# Patient Record
Sex: MALE | Race: WHITE | Employment: OTHER | URBAN - METROPOLITAN AREA
[De-identification: names, ages, dates, MRNs, and addresses within clinical notes are randomized per-mention and may not be internally consistent; named-entity substitution may affect disease eponyms.]

---

## 2017-01-10 ENCOUNTER — OFFICE VISIT (OUTPATIENT)
Dept: CARDIOLOGY CLINIC | Age: 62
End: 2017-01-10

## 2017-01-10 VITALS
WEIGHT: 315 LBS | BODY MASS INDEX: 39.17 KG/M2 | SYSTOLIC BLOOD PRESSURE: 126 MMHG | HEIGHT: 75 IN | DIASTOLIC BLOOD PRESSURE: 86 MMHG | HEART RATE: 58 BPM

## 2017-01-10 DIAGNOSIS — I48.19 PERSISTENT ATRIAL FIBRILLATION (HCC): Primary | ICD-10-CM

## 2017-01-10 DIAGNOSIS — E66.01 MORBID OBESITY DUE TO EXCESS CALORIES (HCC): ICD-10-CM

## 2017-01-10 DIAGNOSIS — J43.9 PULMONARY EMPHYSEMA, UNSPECIFIED EMPHYSEMA TYPE (HCC): ICD-10-CM

## 2017-01-10 DIAGNOSIS — G47.33 OBSTRUCTIVE SLEEP APNEA SYNDROME: ICD-10-CM

## 2017-01-10 DIAGNOSIS — I10 ESSENTIAL HYPERTENSION: ICD-10-CM

## 2017-01-10 DIAGNOSIS — I50.32 CHRONIC DIASTOLIC CONGESTIVE HEART FAILURE (HCC): ICD-10-CM

## 2017-01-10 PROCEDURE — 99213 OFFICE O/P EST LOW 20 MIN: CPT | Performed by: INTERNAL MEDICINE

## 2017-01-10 RX ORDER — METOPROLOL TARTRATE 50 MG/1
50 TABLET, FILM COATED ORAL 2 TIMES DAILY
Qty: 180 TABLET | Refills: 3 | Status: ON HOLD | OUTPATIENT
Start: 2017-01-10 | End: 2017-05-08 | Stop reason: HOSPADM

## 2017-01-10 RX ORDER — FUROSEMIDE 40 MG/1
40 TABLET ORAL 2 TIMES DAILY
Qty: 180 TABLET | Refills: 3 | Status: ON HOLD | OUTPATIENT
Start: 2017-01-10 | End: 2017-08-18

## 2017-01-10 RX ORDER — DILTIAZEM HYDROCHLORIDE 180 MG/1
180 CAPSULE, COATED, EXTENDED RELEASE ORAL 2 TIMES DAILY
Qty: 180 CAPSULE | Refills: 3 | Status: SHIPPED | OUTPATIENT
Start: 2017-01-10 | End: 2017-09-12 | Stop reason: SDUPTHER

## 2017-01-10 RX ORDER — PRAVASTATIN SODIUM 40 MG
40 TABLET ORAL EVERY EVENING
Qty: 90 TABLET | Refills: 3 | Status: SHIPPED | OUTPATIENT
Start: 2017-01-10 | End: 2017-07-07

## 2017-01-30 RX ORDER — PANTOPRAZOLE SODIUM 40 MG/1
TABLET, DELAYED RELEASE ORAL
Qty: 60 TABLET | Refills: 5 | Status: ON HOLD | OUTPATIENT
Start: 2017-01-30 | End: 2022-11-01 | Stop reason: HOSPADM

## 2017-03-09 RX ORDER — ALLOPURINOL 100 MG/1
TABLET ORAL
Qty: 270 TABLET | Refills: 1 | Status: ON HOLD | OUTPATIENT
Start: 2017-03-09 | End: 2017-05-08 | Stop reason: HOSPADM

## 2017-03-13 ENCOUNTER — OFFICE VISIT (OUTPATIENT)
Dept: PULMONOLOGY | Age: 62
End: 2017-03-13

## 2017-03-13 VITALS
WEIGHT: 315 LBS | HEART RATE: 101 BPM | HEIGHT: 75 IN | BODY MASS INDEX: 39.17 KG/M2 | DIASTOLIC BLOOD PRESSURE: 87 MMHG | OXYGEN SATURATION: 96 % | RESPIRATION RATE: 18 BRPM | SYSTOLIC BLOOD PRESSURE: 157 MMHG

## 2017-03-13 DIAGNOSIS — I10 ESSENTIAL HYPERTENSION: ICD-10-CM

## 2017-03-13 DIAGNOSIS — J44.9 COPD, SEVERE (HCC): Primary | ICD-10-CM

## 2017-03-13 DIAGNOSIS — G47.33 OBSTRUCTIVE SLEEP APNEA SYNDROME: ICD-10-CM

## 2017-03-13 DIAGNOSIS — J96.11 CHRONIC HYPOXEMIC RESPIRATORY FAILURE (HCC): ICD-10-CM

## 2017-03-13 PROCEDURE — G8484 FLU IMMUNIZE NO ADMIN: HCPCS | Performed by: INTERNAL MEDICINE

## 2017-03-13 PROCEDURE — G8427 DOCREV CUR MEDS BY ELIG CLIN: HCPCS | Performed by: INTERNAL MEDICINE

## 2017-03-13 PROCEDURE — 1036F TOBACCO NON-USER: CPT | Performed by: INTERNAL MEDICINE

## 2017-03-13 PROCEDURE — 3017F COLORECTAL CA SCREEN DOC REV: CPT | Performed by: INTERNAL MEDICINE

## 2017-03-13 PROCEDURE — G8419 CALC BMI OUT NRM PARAM NOF/U: HCPCS | Performed by: INTERNAL MEDICINE

## 2017-03-13 PROCEDURE — 3023F SPIROM DOC REV: CPT | Performed by: INTERNAL MEDICINE

## 2017-03-13 PROCEDURE — 99214 OFFICE O/P EST MOD 30 MIN: CPT | Performed by: INTERNAL MEDICINE

## 2017-03-13 PROCEDURE — G8926 SPIRO NO PERF OR DOC: HCPCS | Performed by: INTERNAL MEDICINE

## 2017-05-08 ENCOUNTER — CARE COORDINATOR VISIT (OUTPATIENT)
Dept: CASE MANAGEMENT | Age: 62
End: 2017-05-08

## 2017-05-09 ENCOUNTER — TELEPHONE (OUTPATIENT)
Dept: OTHER | Age: 62
End: 2017-05-09

## 2017-05-09 ENCOUNTER — CARE COORDINATION (OUTPATIENT)
Dept: CASE MANAGEMENT | Age: 62
End: 2017-05-09

## 2017-05-09 ENCOUNTER — TELEPHONE (OUTPATIENT)
Dept: PHARMACY | Age: 62
End: 2017-05-09

## 2017-05-12 ENCOUNTER — CARE COORDINATION (OUTPATIENT)
Dept: CASE MANAGEMENT | Age: 62
End: 2017-05-12

## 2017-05-16 ENCOUNTER — OFFICE VISIT (OUTPATIENT)
Dept: INTERNAL MEDICINE CLINIC | Age: 62
End: 2017-05-16

## 2017-05-16 VITALS
HEART RATE: 84 BPM | DIASTOLIC BLOOD PRESSURE: 68 MMHG | HEIGHT: 75 IN | WEIGHT: 309.6 LBS | SYSTOLIC BLOOD PRESSURE: 128 MMHG | BODY MASS INDEX: 38.49 KG/M2

## 2017-05-16 DIAGNOSIS — F32.4: ICD-10-CM

## 2017-05-16 DIAGNOSIS — Z13.31 POSITIVE DEPRESSION SCREENING: ICD-10-CM

## 2017-05-16 DIAGNOSIS — I50.32 CHRONIC DIASTOLIC CONGESTIVE HEART FAILURE (HCC): Primary | ICD-10-CM

## 2017-05-16 DIAGNOSIS — N18.3 CHRONIC KIDNEY DISEASE (CKD), STAGE 3 (MODERATE): ICD-10-CM

## 2017-05-16 DIAGNOSIS — D50.9 MICROCYTIC ANEMIA: ICD-10-CM

## 2017-05-16 PROCEDURE — G8431 POS CLIN DEPRES SCRN F/U DOC: HCPCS | Performed by: INTERNAL MEDICINE

## 2017-05-16 ASSESSMENT — PATIENT HEALTH QUESTIONNAIRE - PHQ9
2. FEELING DOWN, DEPRESSED OR HOPELESS: 2
SUM OF ALL RESPONSES TO PHQ QUESTIONS 1-9: 6
9. THOUGHTS THAT YOU WOULD BE BETTER OFF DEAD, OR OF HURTING YOURSELF: 0
4. FEELING TIRED OR HAVING LITTLE ENERGY: 2
7. TROUBLE CONCENTRATING ON THINGS, SUCH AS READING THE NEWSPAPER OR WATCHING TELEVISION: 0
6. FEELING BAD ABOUT YOURSELF - OR THAT YOU ARE A FAILURE OR HAVE LET YOURSELF OR YOUR FAMILY DOWN: 0
5. POOR APPETITE OR OVEREATING: 0
3. TROUBLE FALLING OR STAYING ASLEEP: 0
1. LITTLE INTEREST OR PLEASURE IN DOING THINGS: 2
8. MOVING OR SPEAKING SO SLOWLY THAT OTHER PEOPLE COULD HAVE NOTICED. OR THE OPPOSITE, BEING SO FIGETY OR RESTLESS THAT YOU HAVE BEEN MOVING AROUND A LOT MORE THAN USUAL: 0
10. IF YOU CHECKED OFF ANY PROBLEMS, HOW DIFFICULT HAVE THESE PROBLEMS MADE IT FOR YOU TO DO YOUR WORK, TAKE CARE OF THINGS AT HOME, OR GET ALONG WITH OTHER PEOPLE: 0
SUM OF ALL RESPONSES TO PHQ9 QUESTIONS 1 & 2: 4

## 2017-05-17 ENCOUNTER — TELEPHONE (OUTPATIENT)
Dept: INTERNAL MEDICINE CLINIC | Age: 62
End: 2017-05-17

## 2017-05-18 ENCOUNTER — TELEPHONE (OUTPATIENT)
Dept: INTERNAL MEDICINE CLINIC | Age: 62
End: 2017-05-18

## 2017-05-25 ENCOUNTER — TELEPHONE (OUTPATIENT)
Dept: INTERNAL MEDICINE CLINIC | Age: 62
End: 2017-05-25

## 2017-06-06 PROCEDURE — 99495 TRANSJ CARE MGMT MOD F2F 14D: CPT | Performed by: INTERNAL MEDICINE

## 2017-07-15 ENCOUNTER — CARE COORDINATION (OUTPATIENT)
Dept: CASE MANAGEMENT | Age: 62
End: 2017-07-15

## 2017-07-17 ENCOUNTER — TELEPHONE (OUTPATIENT)
Dept: INTERNAL MEDICINE CLINIC | Age: 62
End: 2017-07-17

## 2017-07-17 DIAGNOSIS — E87.6 HYPOKALEMIA: Primary | ICD-10-CM

## 2017-07-18 DIAGNOSIS — E87.6 HYPOKALEMIA: ICD-10-CM

## 2017-07-18 LAB — POTASSIUM SERPL-SCNC: 4.8 MMOL/L (ref 3.5–5.1)

## 2017-07-19 ENCOUNTER — CARE COORDINATION (OUTPATIENT)
Dept: CASE MANAGEMENT | Age: 62
End: 2017-07-19

## 2017-07-27 ENCOUNTER — CARE COORDINATION (OUTPATIENT)
Dept: CASE MANAGEMENT | Age: 62
End: 2017-07-27

## 2017-07-31 ENCOUNTER — HOSPITAL ENCOUNTER (OUTPATIENT)
Dept: WOUND CARE | Age: 62
Discharge: HOME | End: 2017-07-31
Attending: SURGERY | Admitting: SURGERY

## 2017-07-31 VITALS
DIASTOLIC BLOOD PRESSURE: 73 MMHG | SYSTOLIC BLOOD PRESSURE: 126 MMHG | WEIGHT: 309 LBS | RESPIRATION RATE: 17 BRPM | TEMPERATURE: 96.9 F | HEIGHT: 75 IN | HEART RATE: 102 BPM | BODY MASS INDEX: 38.42 KG/M2

## 2017-07-31 DIAGNOSIS — L97.512 SKIN ULCER OF RIGHT FOOT WITH FAT LAYER EXPOSED (HCC): ICD-10-CM

## 2017-07-31 PROCEDURE — 11042 DBRDMT SUBQ TIS 1ST 20SQCM/<: CPT | Performed by: SURGERY

## 2017-07-31 PROCEDURE — 11045 DBRDMT SUBQ TISS EACH ADDL: CPT | Performed by: SURGERY

## 2017-07-31 RX ORDER — LIDOCAINE HYDROCHLORIDE 40 MG/ML
SOLUTION TOPICAL ONCE
Status: DISCONTINUED | OUTPATIENT
Start: 2017-07-31 | End: 2017-08-01 | Stop reason: HOSPADM

## 2017-07-31 RX ORDER — CIPROFLOXACIN 500 MG/1
500 TABLET, FILM COATED ORAL 2 TIMES DAILY
Qty: 20 TABLET | Refills: 0 | Status: SHIPPED | OUTPATIENT
Start: 2017-07-31 | End: 2017-08-10

## 2017-08-03 LAB
GRAM STAIN RESULT: ABNORMAL
ORGANISM: ABNORMAL
WOUND/ABSCESS: ABNORMAL

## 2017-08-11 ENCOUNTER — HOSPITAL ENCOUNTER (OUTPATIENT)
Dept: WOUND CARE | Age: 62
Discharge: HOME | End: 2017-08-11
Attending: PODIATRIST | Admitting: PODIATRIST

## 2017-08-11 VITALS
HEART RATE: 97 BPM | TEMPERATURE: 97.9 F | DIASTOLIC BLOOD PRESSURE: 73 MMHG | RESPIRATION RATE: 16 BRPM | SYSTOLIC BLOOD PRESSURE: 125 MMHG

## 2017-08-11 DIAGNOSIS — I73.9 PVD (PERIPHERAL VASCULAR DISEASE) (HCC): Primary | ICD-10-CM

## 2017-08-11 DIAGNOSIS — M86.371 CHRONIC MULTIFOCAL OSTEOMYELITIS OF RIGHT FOOT (HCC): Chronic | ICD-10-CM

## 2017-08-11 PROBLEM — M86.379 CHRONIC MULTIFOCAL OSTEOMYELITIS OF FOOT (HCC): Chronic | Status: ACTIVE | Noted: 2017-08-11

## 2017-08-11 RX ORDER — SODIUM HYPOCHLORITE 1.25 MG/ML
SOLUTION TOPICAL DAILY
Qty: 1 BOTTLE | Refills: 0 | Status: SHIPPED | OUTPATIENT
Start: 2017-08-11 | End: 2017-08-30 | Stop reason: ALTCHOICE

## 2017-08-11 RX ORDER — CIPROFLOXACIN 500 MG/1
500 TABLET, FILM COATED ORAL 2 TIMES DAILY
Qty: 28 TABLET | Refills: 0 | Status: ON HOLD | OUTPATIENT
Start: 2017-08-11 | End: 2017-08-14

## 2017-08-13 LAB
GRAM STAIN RESULT: ABNORMAL
WOUND/ABSCESS: ABNORMAL

## 2017-08-19 ENCOUNTER — CARE COORDINATION (OUTPATIENT)
Dept: CASE MANAGEMENT | Age: 62
End: 2017-08-19

## 2017-08-21 ENCOUNTER — TELEPHONE (OUTPATIENT)
Dept: RHEUMATOLOGY | Age: 62
End: 2017-08-21

## 2017-08-21 ENCOUNTER — CARE COORDINATION (OUTPATIENT)
Dept: CASE MANAGEMENT | Age: 62
End: 2017-08-21

## 2017-08-23 ENCOUNTER — HOSPITAL ENCOUNTER (OUTPATIENT)
Dept: WOUND CARE | Age: 62
Discharge: HOME | End: 2017-08-23
Attending: PODIATRIST | Admitting: PODIATRIST

## 2017-08-23 VITALS
RESPIRATION RATE: 16 BRPM | SYSTOLIC BLOOD PRESSURE: 111 MMHG | DIASTOLIC BLOOD PRESSURE: 71 MMHG | HEART RATE: 82 BPM | TEMPERATURE: 98 F

## 2017-08-23 RX ORDER — LIDOCAINE HYDROCHLORIDE 40 MG/ML
SOLUTION TOPICAL ONCE
Status: DISCONTINUED | OUTPATIENT
Start: 2017-08-23 | End: 2017-08-24 | Stop reason: HOSPADM

## 2017-08-25 ENCOUNTER — OFFICE VISIT (OUTPATIENT)
Dept: INTERNAL MEDICINE CLINIC | Age: 62
End: 2017-08-25

## 2017-08-25 ENCOUNTER — CARE COORDINATION (OUTPATIENT)
Dept: CASE MANAGEMENT | Age: 62
End: 2017-08-25

## 2017-08-25 VITALS
DIASTOLIC BLOOD PRESSURE: 62 MMHG | BODY MASS INDEX: 39.17 KG/M2 | WEIGHT: 315 LBS | HEIGHT: 75 IN | HEART RATE: 68 BPM | SYSTOLIC BLOOD PRESSURE: 118 MMHG

## 2017-08-25 DIAGNOSIS — L97.512 SKIN ULCER OF RIGHT FOOT WITH FAT LAYER EXPOSED (HCC): ICD-10-CM

## 2017-08-25 DIAGNOSIS — R60.0 BILATERAL LEG EDEMA: ICD-10-CM

## 2017-08-25 DIAGNOSIS — M86.271 SUBACUTE OSTEOMYELITIS OF RIGHT FOOT (HCC): Primary | ICD-10-CM

## 2017-08-25 PROBLEM — E11.42 TYPE 2 DIABETES MELLITUS WITH DIABETIC POLYNEUROPATHY, WITHOUT LONG-TERM CURRENT USE OF INSULIN (HCC): Status: ACTIVE | Noted: 2017-08-25

## 2017-08-25 PROCEDURE — 99496 TRANSJ CARE MGMT HIGH F2F 7D: CPT | Performed by: INTERNAL MEDICINE

## 2017-08-25 RX ORDER — POTASSIUM CHLORIDE 20 MEQ/1
40 TABLET, EXTENDED RELEASE ORAL 2 TIMES DAILY
Qty: 120 TABLET | Refills: 3 | Status: SHIPPED | OUTPATIENT
Start: 2017-08-25 | End: 2017-08-25 | Stop reason: SDUPTHER

## 2017-08-25 RX ORDER — FUROSEMIDE 20 MG/1
20 TABLET ORAL DAILY
Qty: 60 TABLET | Refills: 3 | Status: SHIPPED | OUTPATIENT
Start: 2017-08-25 | End: 2017-08-25 | Stop reason: SDUPTHER

## 2017-08-28 ENCOUNTER — TELEPHONE (OUTPATIENT)
Dept: INFECTIOUS DISEASES | Age: 62
End: 2017-08-28

## 2017-08-28 NOTE — TELEPHONE ENCOUNTER
Vanco Trough 24.6 Creatinine 1.6, CRP 21.90 Called [facility] to check infusion vs draw time, Nurse Demetra Amador stated that he lana trough this morning at 0850 hrs, called Pt to check on infusion time but got no answer, left message. Spoke with Annita at [facility] she states the Pt is on a 9a, 9p infusion schedule. Pt on Vancomycin 1gm q12   Dr. Diomedes Ballard notified.

## 2017-08-28 NOTE — TELEPHONE ENCOUNTER
Talked with Marissa Cruz. Will decrease Vancomycin dose to 750 mg BID. Check vanco trough and BMP again before AM dose on 8/31/17. Please call me with results. CRP went up to 24. No fever, no rash. Will f/u on next week's level. If going up still, will like to see him in clinic.         Umesh Trevino MD, MPH  8/28/2017, 2:20 PM  [facility]   [address], [address]  Office: [phone]  Fax: [phone]

## 2017-08-30 ENCOUNTER — HOSPITAL ENCOUNTER (OUTPATIENT)
Dept: WOUND CARE | Age: 62
Discharge: HOME | End: 2017-08-30
Attending: PODIATRIST | Admitting: PODIATRIST

## 2017-08-30 VITALS
HEART RATE: 86 BPM | DIASTOLIC BLOOD PRESSURE: 55 MMHG | SYSTOLIC BLOOD PRESSURE: 87 MMHG | RESPIRATION RATE: 17 BRPM | TEMPERATURE: 97.9 F

## 2017-08-31 ENCOUNTER — CARE COORDINATION (OUTPATIENT)
Dept: CASE MANAGEMENT | Age: 62
End: 2017-08-31

## 2017-08-31 ENCOUNTER — TELEPHONE (OUTPATIENT)
Dept: INFECTIOUS DISEASES | Age: 62
End: 2017-08-31

## 2017-09-01 ENCOUNTER — OFFICE VISIT (OUTPATIENT)
Dept: INTERNAL MEDICINE CLINIC | Age: 62
End: 2017-09-01

## 2017-09-01 VITALS
DIASTOLIC BLOOD PRESSURE: 64 MMHG | WEIGHT: 315 LBS | SYSTOLIC BLOOD PRESSURE: 110 MMHG | BODY MASS INDEX: 39.17 KG/M2 | OXYGEN SATURATION: 98 % | TEMPERATURE: 98.3 F | HEART RATE: 84 BPM | HEIGHT: 75 IN

## 2017-09-01 DIAGNOSIS — M86.271 SUBACUTE OSTEOMYELITIS OF RIGHT FOOT (HCC): ICD-10-CM

## 2017-09-01 DIAGNOSIS — I50.32 CHRONIC DIASTOLIC CONGESTIVE HEART FAILURE (HCC): Primary | ICD-10-CM

## 2017-09-01 PROCEDURE — 99214 OFFICE O/P EST MOD 30 MIN: CPT | Performed by: INTERNAL MEDICINE

## 2017-09-01 PROCEDURE — G8427 DOCREV CUR MEDS BY ELIG CLIN: HCPCS | Performed by: INTERNAL MEDICINE

## 2017-09-01 PROCEDURE — 1111F DSCHRG MED/CURRENT MED MERGE: CPT | Performed by: INTERNAL MEDICINE

## 2017-09-01 PROCEDURE — G8417 CALC BMI ABV UP PARAM F/U: HCPCS | Performed by: INTERNAL MEDICINE

## 2017-09-01 PROCEDURE — 1036F TOBACCO NON-USER: CPT | Performed by: INTERNAL MEDICINE

## 2017-09-01 PROCEDURE — 3017F COLORECTAL CA SCREEN DOC REV: CPT | Performed by: INTERNAL MEDICINE

## 2017-09-01 RX ORDER — VANCOMYCIN HYDROCHLORIDE 10 G/100ML
INJECTION, POWDER, LYOPHILIZED, FOR SOLUTION INTRAVENOUS
COMMUNITY
Start: 2017-08-24 | End: 2017-09-28

## 2017-09-05 ENCOUNTER — TELEPHONE (OUTPATIENT)
Dept: INFECTIOUS DISEASES | Age: 62
End: 2017-09-05

## 2017-09-06 ENCOUNTER — CARE COORDINATION (OUTPATIENT)
Dept: INTERNAL MEDICINE CLINIC | Age: 62
End: 2017-09-06

## 2017-09-08 ENCOUNTER — HOSPITAL ENCOUNTER (OUTPATIENT)
Dept: WOUND CARE | Age: 62
Discharge: HOME | End: 2017-09-08
Attending: PODIATRIST | Admitting: PODIATRIST

## 2017-09-08 VITALS
HEART RATE: 86 BPM | RESPIRATION RATE: 16 BRPM | TEMPERATURE: 97.8 F | DIASTOLIC BLOOD PRESSURE: 69 MMHG | SYSTOLIC BLOOD PRESSURE: 105 MMHG

## 2017-09-11 ENCOUNTER — CARE COORDINATION (OUTPATIENT)
Dept: INTERNAL MEDICINE CLINIC | Age: 62
End: 2017-09-11

## 2017-09-11 LAB
ALBUMIN SERPL-MCNC: 3.1 G/DL
ALP BLD-CCNC: 142 U/L
ALT SERPL-CCNC: 15 U/L
ANION GAP SERPL CALCULATED.3IONS-SCNC: NORMAL MMOL/L
AST SERPL-CCNC: 14 U/L
BASOPHILS ABSOLUTE: 0.1 /ΜL
BASOPHILS RELATIVE PERCENT: 0.8 %
BILIRUB SERPL-MCNC: 1 MG/DL (ref 0.1–1.4)
BUN BLDV-MCNC: 16 MG/DL
C-REACTIVE PROTEIN: 22.1
CALCIUM SERPL-MCNC: 8.7 MG/DL
CHLORIDE BLD-SCNC: 105 MMOL/L
CO2: 29 MMOL/L
CREAT SERPL-MCNC: 1.4 MG/DL
EOSINOPHILS ABSOLUTE: 0.7 /ΜL
EOSINOPHILS RELATIVE PERCENT: 7.6 %
GFR CALCULATED: NORMAL
GLUCOSE BLD-MCNC: 87 MG/DL
HCT VFR BLD CALC: 24.4 % (ref 41–53)
HEMOGLOBIN: 7.3 G/DL (ref 13.5–17.5)
LYMPHOCYTES ABSOLUTE: 0.6 /ΜL
LYMPHOCYTES RELATIVE PERCENT: 6.8 %
MCH RBC QN AUTO: 20.4 PG
MCHC RBC AUTO-ENTMCNC: 29.8 G/DL
MCV RBC AUTO: 68.4 FL
MONOCYTES ABSOLUTE: 0.6 /ΜL
MONOCYTES RELATIVE PERCENT: 6.6 %
NEUTROPHILS ABSOLUTE: 7 /ΜL
NEUTROPHILS RELATIVE PERCENT: 78.2 %
PDW BLD-RTO: 18.6 %
PLATELET # BLD: 264 K/ΜL
PMV BLD AUTO: ABNORMAL FL
POTASSIUM SERPL-SCNC: 3.8 MMOL/L
RBC # BLD: 3.56 10^6/ΜL
SEDIMENTATION RATE, ERYTHROCYTE: 29
SODIUM BLD-SCNC: 139 MMOL/L
TOTAL PROTEIN: 6.4
VANCOMYCIN TROUGH: 11.3
WBC # BLD: 8.9 10^3/ML

## 2017-09-12 ENCOUNTER — OFFICE VISIT (OUTPATIENT)
Dept: CARDIOLOGY CLINIC | Age: 62
End: 2017-09-12

## 2017-09-12 VITALS
HEART RATE: 108 BPM | BODY MASS INDEX: 39.17 KG/M2 | DIASTOLIC BLOOD PRESSURE: 64 MMHG | WEIGHT: 315 LBS | HEIGHT: 75 IN | SYSTOLIC BLOOD PRESSURE: 136 MMHG

## 2017-09-12 DIAGNOSIS — J43.9 PULMONARY EMPHYSEMA, UNSPECIFIED EMPHYSEMA TYPE (HCC): ICD-10-CM

## 2017-09-12 DIAGNOSIS — I48.19 PERSISTENT ATRIAL FIBRILLATION (HCC): Primary | ICD-10-CM

## 2017-09-12 DIAGNOSIS — I10 ESSENTIAL HYPERTENSION: ICD-10-CM

## 2017-09-12 DIAGNOSIS — G47.33 OBSTRUCTIVE SLEEP APNEA SYNDROME: ICD-10-CM

## 2017-09-12 DIAGNOSIS — I50.32 CHRONIC DIASTOLIC CONGESTIVE HEART FAILURE (HCC): ICD-10-CM

## 2017-09-12 DIAGNOSIS — E66.01 MORBID OBESITY WITH BMI OF 50.0-59.9, ADULT (HCC): Chronic | ICD-10-CM

## 2017-09-12 PROCEDURE — G8926 SPIRO NO PERF OR DOC: HCPCS | Performed by: INTERNAL MEDICINE

## 2017-09-12 PROCEDURE — 1111F DSCHRG MED/CURRENT MED MERGE: CPT | Performed by: INTERNAL MEDICINE

## 2017-09-12 PROCEDURE — 3023F SPIROM DOC REV: CPT | Performed by: INTERNAL MEDICINE

## 2017-09-12 PROCEDURE — 3017F COLORECTAL CA SCREEN DOC REV: CPT | Performed by: INTERNAL MEDICINE

## 2017-09-12 PROCEDURE — 1036F TOBACCO NON-USER: CPT | Performed by: INTERNAL MEDICINE

## 2017-09-12 PROCEDURE — G8427 DOCREV CUR MEDS BY ELIG CLIN: HCPCS | Performed by: INTERNAL MEDICINE

## 2017-09-12 PROCEDURE — G8417 CALC BMI ABV UP PARAM F/U: HCPCS | Performed by: INTERNAL MEDICINE

## 2017-09-12 PROCEDURE — 99214 OFFICE O/P EST MOD 30 MIN: CPT | Performed by: INTERNAL MEDICINE

## 2017-09-12 RX ORDER — DILTIAZEM HYDROCHLORIDE 180 MG/1
180 CAPSULE, COATED, EXTENDED RELEASE ORAL 2 TIMES DAILY
Qty: 180 CAPSULE | Refills: 3 | Status: SHIPPED | OUTPATIENT
Start: 2017-09-12 | End: 2018-04-09 | Stop reason: SDUPTHER

## 2017-09-13 ENCOUNTER — TELEPHONE (OUTPATIENT)
Dept: INFECTIOUS DISEASES | Age: 62
End: 2017-09-13

## 2017-09-14 ENCOUNTER — CARE COORDINATION (OUTPATIENT)
Dept: INTERNAL MEDICINE CLINIC | Age: 62
End: 2017-09-14

## 2017-09-15 ENCOUNTER — HOSPITAL ENCOUNTER (OUTPATIENT)
Dept: WOUND CARE | Age: 62
Discharge: HOME | End: 2017-09-15
Attending: PODIATRIST | Admitting: PODIATRIST

## 2017-09-15 ENCOUNTER — TELEPHONE (OUTPATIENT)
Dept: INFECTIOUS DISEASES | Age: 62
End: 2017-09-15

## 2017-09-15 VITALS — SYSTOLIC BLOOD PRESSURE: 140 MMHG | DIASTOLIC BLOOD PRESSURE: 89 MMHG | TEMPERATURE: 98 F | HEART RATE: 94 BPM

## 2017-09-15 PROBLEM — M86.271 SUBACUTE OSTEOMYELITIS OF RIGHT FOOT (HCC): Status: ACTIVE | Noted: 2017-09-15

## 2017-09-15 RX ORDER — LIDOCAINE HYDROCHLORIDE 40 MG/ML
SOLUTION TOPICAL ONCE
Status: DISCONTINUED | OUTPATIENT
Start: 2017-09-15 | End: 2017-09-16 | Stop reason: HOSPADM

## 2017-09-16 ENCOUNTER — CARE COORDINATION (OUTPATIENT)
Dept: CASE MANAGEMENT | Age: 62
End: 2017-09-16

## 2017-09-18 ENCOUNTER — TELEPHONE (OUTPATIENT)
Dept: INFECTIOUS DISEASES | Age: 62
End: 2017-09-18

## 2017-09-18 DIAGNOSIS — D64.9 ANEMIA, UNSPECIFIED TYPE: Primary | ICD-10-CM

## 2017-09-18 LAB — SEDIMENTATION RATE, ERYTHROCYTE: 29

## 2017-09-18 NOTE — TELEPHONE ENCOUNTER
Called Critical access hospital gave order for Vancomycin to be increased to 1.25gm Q24 per Cate to BODØ.

## 2017-09-19 ENCOUNTER — CARE COORDINATION (OUTPATIENT)
Dept: INTERNAL MEDICINE CLINIC | Age: 62
End: 2017-09-19

## 2017-09-19 NOTE — TELEPHONE ENCOUNTER
Called Pt gave him the name of Dr. Morataya Courser and the phone number [phone] at [facility].

## 2017-09-20 ENCOUNTER — TELEPHONE (OUTPATIENT)
Dept: INFECTIOUS DISEASES | Age: 62
End: 2017-09-20

## 2017-09-20 LAB — VANCOMYCIN TROUGH: 10.1

## 2017-09-22 ENCOUNTER — TELEPHONE (OUTPATIENT)
Dept: RHEUMATOLOGY | Age: 62
End: 2017-09-22

## 2017-09-22 ENCOUNTER — HOSPITAL ENCOUNTER (OUTPATIENT)
Dept: WOUND CARE | Age: 62
Discharge: HOME | End: 2017-09-22
Attending: PODIATRIST | Admitting: PODIATRIST

## 2017-09-22 VITALS
SYSTOLIC BLOOD PRESSURE: 114 MMHG | DIASTOLIC BLOOD PRESSURE: 68 MMHG | TEMPERATURE: 97.9 F | HEART RATE: 101 BPM | RESPIRATION RATE: 16 BRPM

## 2017-09-22 RX ORDER — LIDOCAINE HYDROCHLORIDE 40 MG/ML
SOLUTION TOPICAL ONCE
Status: DISCONTINUED | OUTPATIENT
Start: 2017-09-22 | End: 2017-09-23 | Stop reason: HOSPADM

## 2017-09-25 ENCOUNTER — CARE COORDINATION (OUTPATIENT)
Dept: INTERNAL MEDICINE CLINIC | Age: 62
End: 2017-09-25

## 2017-09-26 ENCOUNTER — CARE COORDINATION (OUTPATIENT)
Dept: INTERNAL MEDICINE CLINIC | Age: 62
End: 2017-09-26

## 2017-09-27 ENCOUNTER — TELEPHONE (OUTPATIENT)
Dept: CARDIOLOGY CLINIC | Age: 62
End: 2017-09-27

## 2017-09-28 ENCOUNTER — TELEPHONE (OUTPATIENT)
Dept: INFECTIOUS DISEASES | Age: 62
End: 2017-09-28

## 2017-09-28 DIAGNOSIS — M86.271 SUBACUTE OSTEOMYELITIS OF RIGHT FOOT (HCC): Primary | ICD-10-CM

## 2017-09-28 RX ORDER — LEVOFLOXACIN 750 MG/1
750 TABLET ORAL DAILY
Qty: 14 TABLET | Refills: 0 | Status: SHIPPED | OUTPATIENT
Start: 2017-09-28 | End: 2017-10-12

## 2017-09-28 RX ORDER — DOXYCYCLINE HYCLATE 100 MG
100 TABLET ORAL 2 TIMES DAILY
Qty: 60 TABLET | Refills: 0 | Status: SHIPPED | OUTPATIENT
Start: 2017-09-28 | End: 2017-11-23

## 2017-09-28 NOTE — TELEPHONE ENCOUNTER
Continue levaquin for two more weeks please, I have eprescribed it to Donta located at [facility]

## 2017-09-29 ENCOUNTER — HOSPITAL ENCOUNTER (OUTPATIENT)
Dept: WOUND CARE | Age: 62
Discharge: HOME | End: 2017-09-29
Attending: PODIATRIST | Admitting: PODIATRIST

## 2017-09-29 VITALS
TEMPERATURE: 96.8 F | WEIGHT: 315 LBS | DIASTOLIC BLOOD PRESSURE: 76 MMHG | HEIGHT: 75 IN | BODY MASS INDEX: 39.17 KG/M2 | SYSTOLIC BLOOD PRESSURE: 125 MMHG | HEART RATE: 94 BPM | RESPIRATION RATE: 16 BRPM

## 2017-09-29 DIAGNOSIS — T81.89XD NON-HEALING SURGICAL WOUND, SUBSEQUENT ENCOUNTER: Primary | ICD-10-CM

## 2017-10-03 ENCOUNTER — CARE COORDINATION (OUTPATIENT)
Dept: INTERNAL MEDICINE CLINIC | Age: 62
End: 2017-10-03

## 2017-10-03 RX ORDER — FUROSEMIDE 40 MG/1
TABLET ORAL
Qty: 270 TABLET | Refills: 1 | Status: ON HOLD | OUTPATIENT
Start: 2017-10-03 | End: 2018-02-23 | Stop reason: HOSPADM

## 2017-10-03 NOTE — CARE COORDINATION
Nurse Care Coordinator called and spoke to pt on the phone today regarding his recent visit tot he ED for removal of his PICC line., He stated it took multiple RN's and physicians attempting to remove it, and it was finally done by IR yesterday at [facility]. The site is clean and dry today, and he states there has been no bleeding or redness at the site. He is getting a wound vac delivered tomorrow. He states he already feels better being off the IV antibiotics. He has a lost a few pounds. He is requesting a refill on his lasix, the old script/bottle has him taking 40mg bid. He has been taking 80mg in am, 40mg pm since last hospitalization. He would like 40mg tabs, and a 90day supply. He gave his appt this Monday to his dad, that was w/, since he was at [facility] having his PICC line removed. He will call and reschedule after he sees  Friday and gets started w/his wound vac. He also stated he has been having severe muscle cramps in his legs/calves and even his hands. He is taking his KCL bid as dir. He is drinking sugar free Gatorade to get extra K+. He is drinking enough fluids to not be dehydrated. He was told to discuss this further when he comes in to see Riccardo Rothman if his symptoms don't improve. Pt was encouraged to call w/any other  needs or concerns. He verbalized understanding.

## 2017-10-04 ENCOUNTER — HOSPITAL ENCOUNTER (OUTPATIENT)
Dept: VASCULAR LAB | Age: 62
Discharge: HOME | End: 2017-10-04
Attending: PODIATRIST | Admitting: PODIATRIST

## 2017-10-04 DIAGNOSIS — T81.89XD OTHER COMPLICATIONS OF PROCEDURES, NOT ELSEWHERE CLASSIFIED, SUBSEQUENT ENCOUNTER: ICD-10-CM

## 2017-10-04 DIAGNOSIS — T81.89XD NON-HEALING SURGICAL WOUND, SUBSEQUENT ENCOUNTER: ICD-10-CM

## 2017-10-04 LAB
GRAM STAIN RESULT: ABNORMAL
ORGANISM: ABNORMAL
ORGANISM: ABNORMAL
WOUND/ABSCESS: ABNORMAL

## 2017-10-06 ENCOUNTER — HOSPITAL ENCOUNTER (OUTPATIENT)
Dept: WOUND CARE | Age: 62
Discharge: HOME | End: 2017-10-06
Attending: PODIATRIST | Admitting: PODIATRIST

## 2017-10-06 VITALS
RESPIRATION RATE: 18 BRPM | HEIGHT: 63 IN | WEIGHT: 315 LBS | DIASTOLIC BLOOD PRESSURE: 76 MMHG | BODY MASS INDEX: 55.81 KG/M2 | HEART RATE: 95 BPM | TEMPERATURE: 97.2 F | SYSTOLIC BLOOD PRESSURE: 126 MMHG

## 2017-10-06 NOTE — IP AVS SNAPSHOT
furosemide 40 MG tablet   Commonly known as:  LASIX   Take 80mg in AM and 40mg in PM                                         JOBST KNEE HIGH COMPRESSION SM Misc   1 each by Does not apply route daily as needed (wear during the day time)                                         levofloxacin 750 MG tablet   Commonly known as:  LEVAQUIN   Take 1 tablet by mouth daily for 14 days                                         metoprolol tartrate 25 MG tablet   Commonly known as:  LOPRESSOR   Take 1 tablet by mouth 2 times daily                                         OXYGEN   Inhale  into the lungs. 2 liters/ nasal cannula prn                                         pantoprazole 40 MG tablet   Commonly known as:  PROTONIX   TAKE 1 TABLET BY MOUTH TWICE DAILY                                         potassium chloride 20 MEQ extended release tablet   Commonly known as:  KLOR-CON M   TAKE 2 TABLETS BY MOUTH TWICE DAILY                                         sertraline 50 MG tablet   Commonly known as:  ZOLOFT   TAKE 1 TABLET BY MOUTH EVERY DAY                                         therapeutic multivitamin-minerals tablet   Take 1 tablet by mouth daily. Allergies as of 10/6/2017        Reactions    Bactrim     hallucinations      Immunizations as of 10/6/2017     Name Date Dose VIS Date Route    DTaP Vaccine 7/29/2009 -- -- --    Tdap (Boostrix, Adacel) 5/7/2017 0.5 mL 2/24/2015 Intramuscular      Last Vitals          Most Recent Value    Temp  97.2 °F (36.2 °C)    Pulse  95    Resp  18    BP  126/76         After Visit Summary    This summary was created for you. Thank you for entrusting your care to us.   The following information includes details about your hospital/visit stay along with steps you should take to help with your recovery once you leave the hospital.  In this packet, you will find information about the topics listed below: · Instructions about your medications including a list of your home medications  · A summary of your hospital visit  · Follow-up appointments once you have left the hospital  · Your care plan at home      You may receive a survey regarding the care you received during your stay. Your input is valuable to us. We encourage you to complete and return your survey in the envelope provided. We hope you will choose us in the future for your healthcare needs. Patient Information     Patient Name VANE Miles 1955      Care Provided at:     Name Address [address] [phone]            Your Visit    Here you will find information about your visit, including the reason for your visit. Please take this sheet with you when you visit your doctor or other health care provider in the future. It will help determine the best possible medical care for you at that time. If you have any questions once you leave the hospital, please call the department phone number listed below. Why you were here     Your primary diagnosis was:  Not on File      Visit Information     Date & Time Provider Department Dept. Phone    10/6/2017 Kelsy Bazzi DPM [facility] [phone]       Follow-up Appointments    Below is a list of your follow-up and future appointments. This may not be a complete list as you may have made appointments directly with providers that we are not aware of or your providers may have made some for you. Please call your providers to confirm appointments. It is important to keep your appointments. Please bring your current insurance card, photo ID, co-pay, and all medication bottles to your appointment. If self-pay, payment is expected at the time of service.         Future Appointments     10/25/2017 9:30 AM     Appointment with Cristina Barnes MD at [facility], [address] [place] [address] ([phone]) Do not get wounds wet in bath or shower unless otherwise instructed by your physician.       When taking antibiotics take entire prescription as ordered by MD do not stop taking until medicine is all gone. Exercise as tolerated. No Smoking. Smoking prohibits wound healing.      Wound: Right Foot      With each dressing change, rinse wounds with 0.9% Saline. (May use wound wash or soft contact solution. Both can be purchased at a local drug store). If unable to obtain saline, may use a gentle soap and water.      Dressing care: Skin prep prabha wound. Wound vac at 125mmHg. Change MWF. Foam to dorsal foot and heel. 4 inch ace to foot, 2 - 6 inch ace wraps to foot up to knee. Keep tubing close to leg and feed up pant leg. Do not place tubing directly on leg under Ace wraps  Keep pressure off of right foot as much as possible    Your Doctor has ordered:     Negative pressure wound therapy (NPWT): a mechanical wound care treatment that uses controlled negative pressure to assist and accelerate wound healing. NPWT supports wound healing by evacuating wound fluids, stimulating granulation tissue formation, reducing the bacterial burden of a wound while maintaining a moist wound environment. Please leave dressing/device on at all times. Dressing will be changed every Monday, Wednesday and Friday    Next Dressing change due ___as instructed____________         Continue Abx. Keep leg elevated. Minimal amount of walking. Home Care to order necessary supplies  If Vascular testing is ordered, please call [address] [place] [address] ([phone]) to schedule if you do not hear from the scheduling department in 1-2 days.      If Vascular testing is scheduled, please bring supplies to replace your dressing after testing is done. The vascular department does not stock supplies.       Important dietary reminders:  1. Increase Protein intake  2. No added salt  3.  If diabetic, good glucose control     Follow up with Dr Adrian Walsh In 1 week in the wound care center. HBO H&P next week. Possibly Tuesday (try again next week)      Call [phone] for any questions or concerns.       Your  is [address]: [facility] - Phone # [phone], Fax # [phone] Information: Should you experience any significant changes in your wound(s) or have questions about your wound care, please contact the [address] [phone] Monday  - Thursday 8:00 am - 4:00 pm and Friday 8:00 am - 1:00pm. If you need help with your wound outside these hours and cannot wait until we are again available, contact your PCP or go to the hospital emergency room.       PLEASE NOTE: IF YOU ARE UNABLE TO [address], CONTINUE TO USE THE SUPPLIES YOU HAVE AVAILABLE UNTIL YOU ARE ABLE TO [facility]. IT IS MOST IMPORTANT TO KEEP THE WOUND COVERED AT ALL TIMES.      You Next Appointment is:      Date__________ Time____________  Graylon Poor  Essential steps to Wound Healing      Arterial/Venous Studies ordered on:  Debridement (See Flowsheet)  Culture sent on:7/31/17  Edema addressed:  Offloading addressed: See Nurses Note Plan of Care  Pain Control:  Host Factors Optimized (DM Control, nutrition, cardiac etc... ):Pharmacy-cipro, TMA 8/18 closure        MyChart Signup     Our records indicate that you have an active [employer] account. You can view your After Visit Summary by going to [email or web address] and logging in with your [employer] username and password. If you don't have a [employer] username and password but a parent or guardian has access to your record, the parent or guardian should login with their own [employer] username and password and access your record to view the After Visit Summary.      Additional Information  If you have questions, please contact the physician practice where you receive care. Remember, MyChart is NOT to be used for urgent needs. For medical emergencies, dial 911. For questions regarding your MyChart account call [phone]. If you have a clinical question, please call your doctor's office. View your information online  ? Review your current list of  medications, immunization, and allergies. ? Review your future test results online . ? Review your discharge instructions provided by your caregivers at discharge    Certain functionality such as prescription refills, scheduling appointments or sending messages to your provider are not activated if your provider does not use [employer] in his/her office    For questions regarding your MyChart account call [phone]. If you have a clinical question, please call your doctor's office. The information on all pages of the After Visit Summary has been reviewed with me, the patient and/or responsible adult, by my health care provider(s). I had the opportunity to ask questions regarding this information. I understand I should dispose of my armband safely at home to protect my health information. A complete copy of the After Visit Summary has been given to me, the patient and/or responsible adult.            Patient Signature/Responsible Adult:____________________    Clinician Signature:_____________________    Date:_____________________    Time:_____________________

## 2017-10-06 NOTE — IP AVS SNAPSHOT
Patient Information     Patient Name VANE Rutledge 1955         This is your updated medication list to keep with you all times      ASK your doctor about these medications     albuterol sulfate  (90 Base) MCG/ACT inhaler   Commonly known as:  PROVENTIL HFA   Inhale 2 puffs into the lungs every 4 hours as needed for Wheezing       albuterol-ipratropium  MCG/ACT Aers inhaler   Commonly known as:  COMBIVENT RESPIMAT   Inhale 1 puff into the lungs every 6 hours. allopurinol 100 MG tablet   Commonly known as:  ZYLOPRIM   TAKE 3 TABLETS BY MOUTH DAILY       aspirin 81 MG tablet       budesonide-formoterol 160-4.5 MCG/ACT Aero   Commonly known as:  SYMBICORT   Inhale 2 puffs into the lungs 2 times daily.        diltiazem 180 MG extended release capsule   Commonly known as:  CARTIA XT   Take 1 capsule by mouth 2 times daily       doxycycline hyclate 100 MG tablet   Commonly known as:  VIBRA-TABS   Take 1 tablet by mouth 2 times daily       ferrous sulfate 325 (65 Fe) MG tablet   Commonly known as:  DOROTHY-TORSTEN   Take 1 tablet by mouth daily       * furosemide 20 MG tablet   Commonly known as:  LASIX   Take 1 tablet by mouth daily       * furosemide 40 MG tablet   Commonly known as:  LASIX   Take 80mg in AM and 40mg in PM       JOBST KNEE HIGH COMPRESSION SM Misc   1 each by Does not apply route daily as needed (wear during the day time)       levofloxacin 750 MG tablet   Commonly known as:  LEVAQUIN   Take 1 tablet by mouth daily for 14 days       metoprolol tartrate 25 MG tablet   Commonly known as:  LOPRESSOR   Take 1 tablet by mouth 2 times daily       OXYGEN       pantoprazole 40 MG tablet   Commonly known as:  PROTONIX   TAKE 1 TABLET BY MOUTH TWICE DAILY       potassium chloride 20 MEQ extended release tablet   Commonly known as:  KLOR-CON M   TAKE 2 TABLETS BY MOUTH TWICE DAILY       sertraline 50 MG tablet   Commonly known as:  ZOLOFT   TAKE 1 TABLET BY MOUTH EVERY DAY therapeutic multivitamin-minerals tablet       * Notice: This list has 2 medication(s) that are the same as other medications prescribed for you. Read the directions carefully, and ask your doctor or other care provider to review them with you.

## 2017-10-07 NOTE — PROGRESS NOTES
[address]  AGE: 65 y.o. GENDER: male  : 1955  TODAY'S DATE:  10/6/2017    Subjective:     Chief Complaint   Patient presents with    Wound Check     Right foot         HISTORY of PRESENT ILLNESS HPI     Miguel Angel Sepulveda is a 65 y.o. male   He states that he is not having any pain. He states he is getting used to the wound VAC. He admits that he has not had compression on his leg for the past few days because I did not put it on his leg. He does admit to knowing that he should have compression on the leg. He denies nausea, vomiting, fever, chills, shortness of breath and chest pain.   PAST MEDICAL HISTORY        Diagnosis Date    Anxiety     Arthritis     Asthma     Atrial fibrillation (HCC)     Blood circulation, collateral     CAD (coronary artery disease)     CHF (congestive heart failure) (HCC)     Colon cancer (HCC)     COPD (chronic obstructive pulmonary disease) (HCC)     Diabetic foot infection (HCC)     Hernia     Hyperlipidemia     Hypertension     Movement disorder     Muscle cramps     Neuromuscular disorder (HCC)     marvin feet numbness/neuropathy    Neuropathy (HCC)     Pneumonia     Renal insufficiency     Sleep apnea     no cpap    Thyroid disease     Unspecified cerebral artery occlusion with cerebral infarction     mini stroke    Unspecified diseases of blood and blood-forming organs        PAST SURGICAL HISTORY    Past Surgical History:   Procedure Laterality Date    ABDOMEN SURGERY      APPENDECTOMY  10/9/2012    CARPAL TUNNEL RELEASE Left 14    COLON SURGERY      for Colon Ca 10 inches removed from colon    COLONOSCOPY      COLONOSCOPY  3/13/14    ENDOSCOPIC ULTRASOUND (UPPER)  14    ENDOSCOPY, COLON, DIAGNOSTIC      FOOT SURGERY Right 2017    Amputation toes 2,3,5, right foot Flap closure, Exostectomy 1st metatarsal right foot    FOOT SURGERY Right 2017    INCISION AND DRAINAGE OF RIGHT FOOT WITH  aspirin 81 MG tablet Take 81 mg by mouth daily.  therapeutic multivitamin-minerals (THERAGRAN-M) tablet Take 1 tablet by mouth daily.  Elastic Bandages & Supports (JOBST KNEE HIGH COMPRESSION SM) MISC 1 each by Does not apply route daily as needed (wear during the day time) 1 each 0    budesonide-formoterol (SYMBICORT) 160-4.5 MCG/ACT AERO Inhale 2 puffs into the lungs 2 times daily. 3 Inhaler 3    albuterol-ipratropium (COMBIVENT RESPIMAT)  MCG/ACT AERS inhaler Inhale 1 puff into the lungs every 6 hours. 3 Inhaler 3     No current facility-administered medications on file prior to encounter. REVIEW OF SYSTEMS    Pertinent items are noted in HPI. Objective:      /76   Pulse 95   Temp 97.2 °F (36.2 °C) (Oral)   Resp 18   Ht 5' 3\" (1.6 m)   Wt (!) 340 lb (154.2 kg)   BMI 60.23 kg/m²     PHYSICAL EXAM  Bloody dressing strike through. No active bleeding in the wound. Surgical site has dehisced medially. And healed laterally Both flaps have good cap refill <3 sec. No pain with dressing change. Maceration medial surgical site With active wound. +4 pitting edema bilateral legs.     Assessment:     Patient Active Problem List   Diagnosis    Atrial fibrillation ([address]    COPD (chronic obstructive pulmonary disease) ([address]    Sleep apnea    CHF (congestive heart failure) ([address]    Hypertension    Hyperlipidemia    Morbid obesity with BMI of 50.0-59.9, adult ([address]    COPD, severe ([address]    Chronic idiopathic gout of multiple sites    Hand arthritis    Skin ulcer of right foot with fat layer exposed ([address]    Chronic multifocal osteomyelitis of foot ([address]    Subacute osteomyelitis of right foot ([address]    Cellulitis of left leg    Neuropathy ([address]    Non-healing surgical wound    Subacute osteomyelitis of right foot ([address]    Needs peripherally inserted central catheter (PICC)    Receiving intravenous antibiotic treatment as outpatient    Smoker   Procedure

## 2017-10-13 ENCOUNTER — HOSPITAL ENCOUNTER (OUTPATIENT)
Dept: WOUND CARE | Age: 62
Discharge: HOME | End: 2017-10-13
Attending: PODIATRIST | Admitting: PODIATRIST

## 2017-10-13 VITALS
HEART RATE: 101 BPM | DIASTOLIC BLOOD PRESSURE: 82 MMHG | SYSTOLIC BLOOD PRESSURE: 134 MMHG | TEMPERATURE: 97.1 F | RESPIRATION RATE: 18 BRPM

## 2017-10-13 RX ORDER — LIDOCAINE HYDROCHLORIDE 40 MG/ML
SOLUTION TOPICAL ONCE
Status: DISCONTINUED | OUTPATIENT
Start: 2017-10-13 | End: 2017-10-14 | Stop reason: HOSPADM

## 2017-10-13 NOTE — PROGRESS NOTES
albuterol-ipratropium (COMBIVENT RESPIMAT)  MCG/ACT AERS inhaler Inhale 1 puff into the lungs every 6 hours. 3 Inhaler 3    OXYGEN Inhale  into the lungs. 2 liters/ nasal cannula prn      aspirin 81 MG tablet Take 81 mg by mouth daily.  therapeutic multivitamin-minerals (THERAGRAN-M) tablet Take 1 tablet by mouth daily. No current facility-administered medications on file prior to encounter. REVIEW OF SYSTEMS    Pertinent items are noted in HPI. Objective:      /82   Pulse 101   Temp 97.1 °F (36.2 °C) (Oral)   Resp 18     PHYSICAL EXAM   No active bleeding in the wound. Surgical site has dehisced medially. Healed laterally Both flaps have good cap refill <3 sec. No pain with dressing change. Minimal maceration medial surgical site With active wound. +2 pitting edema bilateral legs.     Assessment:     Patient Active Problem List   Diagnosis    Atrial fibrillation ([address]    COPD (chronic obstructive pulmonary disease) ([address]    Sleep apnea    CHF (congestive heart failure) ([address]    Hypertension    Hyperlipidemia    Morbid obesity with BMI of 50.0-59.9, adult ([address]    COPD, severe (HCC)    Chronic idiopathic gout of multiple sites    Hand arthritis    Skin ulcer of right foot with fat layer exposed ([address]    Chronic multifocal osteomyelitis of foot ([address]    Subacute osteomyelitis of right foot ([address]    Cellulitis of left leg    Neuropathy ([address]    Non-healing surgical wound    Subacute osteomyelitis of right foot ([address]    Needs peripherally inserted central catheter (PICC)    Receiving intravenous antibiotic treatment as outpatient    Smoker   Procedure Note    Performed by: Mercedes Melendrez DPM    Consent obtained: Yes    Time out taken:  Yes    Pain Control: Anesthetic  Anesthetic: 4% Topical Xylocaine     Debridement:Excisional Debridement    Using curette the wound was sharply debrided    down through and including the removal of epidermis, dermis, flaps well perfused   Reminded that he must wear compression on his leg to prevent wound drainage from third spacing  Concern for chronic osteomyelitis patient would benefit from hyperbaric oxygen treatment To assist with healing and limb salvage. At this time the patient is unwilling to do this.   Continue wound VAC with dressing changes 3 times a week  F/u wound care next wednesday

## 2017-10-20 ENCOUNTER — HOSPITAL ENCOUNTER (OUTPATIENT)
Dept: WOUND CARE | Age: 62
Discharge: HOME | End: 2017-10-20
Attending: PODIATRIST | Admitting: PODIATRIST

## 2017-10-20 VITALS
HEART RATE: 95 BPM | DIASTOLIC BLOOD PRESSURE: 75 MMHG | TEMPERATURE: 97.4 F | SYSTOLIC BLOOD PRESSURE: 118 MMHG | RESPIRATION RATE: 18 BRPM

## 2017-10-20 RX ORDER — LIDOCAINE HYDROCHLORIDE 40 MG/ML
SOLUTION TOPICAL ONCE
Status: DISCONTINUED | OUTPATIENT
Start: 2017-10-20 | End: 2017-10-21 | Stop reason: HOSPADM

## 2017-10-20 NOTE — PROGRESS NOTES
dermis, subcutaneous tissue and muscle/fascia. Devitalized Tissue Debrided:  fibrin, slough, necrotic/eschar, exudate and clots    Pre Debridement Measurements:  Are located in the Wound Documentation Flow Sheet    Wound #: 5   Wound Care Documentation:  Wound 08/23/17 Other (Comment) Foot Right;Distal #5  (Active)   Wound Type Wound 10/20/2017  8:08 AM   Wound Diabetic Arceo 3 10/20/2017  8:08 AM   Dressing Status Clean;Dry; Intact 9/15/2017  9:31 AM   Dressing Changed Changed/New 9/29/2017  9:46 AM   Dressing/Treatment Ace Wrap;Pharmaceutical agent (see eMar) 10/13/2017 10:15 AM   Wound Cleansed Rinsed/Irrigated with saline 10/20/2017  8:08 AM   Wound Length (cm) 0.7 cm 10/20/2017  8:43 AM   Wound Width (cm) 3.8 cm 10/20/2017  8:43 AM   Wound Depth (cm)  2.5 10/20/2017  8:43 AM   Calculated Wound Size (cm^2) (l*w) 2.66 cm^2 10/20/2017  8:43 AM   Change in Wound Size % (l*w) -26.67 10/20/2017  8:43 AM   Wound Assessment Red;Yellow 10/20/2017  8:08 AM   Samantha-wound Assessment Pink; White 10/20/2017  8:08 AM   Witt%Wound Bed 0 10/20/2017  8:08 AM   Red%Wound Bed 75 10/20/2017  8:08 AM   Yellow%Wound Bed 25 10/20/2017  8:08 AM   Black%Wound Bed 0 10/20/2017  8:08 AM   Purple%Wound Bed 0 10/20/2017  8:08 AM   Other%Wound Bed 0 10/20/2017  8:08 AM   Drainage Amount JEREMY 10/20/2017  8:08 AM   Drainage Description Serosanguinous 9/29/2017  8:06 AM   Odor None 10/13/2017  8:08 AM   Time out Yes 10/20/2017  8:43 AM   Op First Treatment Date 08/23/17 8/23/2017  1:39 PM   Number of days: 57       Total Surface Area Debrided:  2.66 sq cm     Percentage of wound debrided 100%    Bleeding:  Minimal    Hemostasis Achieved:  by pressure    Procedural Pain:  0  / 10     Post Procedural Pain:  0 / 10     Response to treatment:  Well tolerated by patient. DM Foot infection with OM     Plan:   Pt examined and evauated, Wound still present distal medial aspect of the foot. Wound debrided of fibrous tissue.     - flaps well

## 2017-10-27 ENCOUNTER — HOSPITAL ENCOUNTER (OUTPATIENT)
Dept: WOUND CARE | Age: 62
Discharge: HOME | End: 2017-10-27
Attending: PODIATRIST | Admitting: PODIATRIST

## 2017-10-27 VITALS
RESPIRATION RATE: 16 BRPM | SYSTOLIC BLOOD PRESSURE: 114 MMHG | BODY MASS INDEX: 39.17 KG/M2 | DIASTOLIC BLOOD PRESSURE: 72 MMHG | HEART RATE: 98 BPM | WEIGHT: 315 LBS | TEMPERATURE: 97.2 F | HEIGHT: 75 IN

## 2017-10-27 RX ORDER — LIDOCAINE HYDROCHLORIDE 40 MG/ML
SOLUTION TOPICAL ONCE
Status: DISCONTINUED | OUTPATIENT
Start: 2017-10-27 | End: 2017-10-28 | Stop reason: HOSPADM

## 2017-10-27 NOTE — PROGRESS NOTES
RIGHT FOURTH TOE    TOE SURGERY      ULNAR TUNNEL RELEASE Left 12-11-14    Left ulnar nerve decompression and left CTR    UPPER GASTROINTESTINAL ENDOSCOPY  1/28/2014    with biopsies    UPPER GASTROINTESTINAL ENDOSCOPY  3/13/14    UPPER GASTROINTESTINAL ENDOSCOPY  6/4/14    with biopsy    VENTRICULAR ABLATION SURGERY           ALLERGIES    Allergies   Allergen Reactions    Bactrim      hallucinations       MEDICATIONS    Current Outpatient Prescriptions on File Prior to Encounter   Medication Sig Dispense Refill    sertraline (ZOLOFT) 50 MG tablet TAKE 1 TABLET BY MOUTH EVERY DAY 30 tablet 5    furosemide (LASIX) 40 MG tablet Take 80mg in AM and 40mg in  tablet 1    doxycycline hyclate (VIBRA-TABS) 100 MG tablet Take 1 tablet by mouth 2 times daily 60 tablet 0    diltiazem (CARTIA XT) 180 MG extended release capsule Take 1 capsule by mouth 2 times daily 180 capsule 3    furosemide (LASIX) 20 MG tablet Take 1 tablet by mouth daily (Patient taking differently: Take 40 mg by mouth 2 times daily ) 180 tablet 0    potassium chloride (KLOR-CON M) 20 MEQ extended release tablet TAKE 2 TABLETS BY MOUTH TWICE DAILY 360 tablet 0    metoprolol tartrate (LOPRESSOR) 25 MG tablet Take 1 tablet by mouth 2 times daily 60 tablet 3    ferrous sulfate (DOROTHY-TORSTEN) 325 (65 FE) MG tablet Take 1 tablet by mouth daily 30 tablet 3    Elastic Bandages & Supports (JOBST KNEE HIGH COMPRESSION SM) MISC 1 each by Does not apply route daily as needed (wear during the day time) 1 each 0    pantoprazole (PROTONIX) 40 MG tablet TAKE 1 TABLET BY MOUTH TWICE DAILY 60 tablet 5    sertraline (ZOLOFT) 50 MG tablet TAKE 1 TABLET BY MOUTH EVERY DAY 90 tablet 1    allopurinol (ZYLOPRIM) 100 MG tablet TAKE 3 TABLETS BY MOUTH DAILY 270 tablet 1    albuterol sulfate HFA (PROVENTIL HFA) 108 (90 BASE) MCG/ACT inhaler Inhale 2 puffs into the lungs every 4 hours as needed for Wheezing 1 Inhaler 3    budesonide-formoterol (SYMBICORT) Wound still present distal medial aspect of the foot. Wound debrided of fibrous tissue. - flaps well perfused   Reminded that he must continue wear compression on his leg to prevent wound drainage from third spacing  Concern for chronic osteomyelitis patient would benefit from hyperbaric oxygen treatment To assist with healing and limb salvage.   At this time the patient is still refusing   Hold VAC , daily dressing changes with collagen and compression   F/u wound care one week

## 2017-10-27 NOTE — PLAN OF CARE
Problem: Wound:  Goal: Will show signs of wound healing; wound closure and no evidence of infection  Will show signs of wound healing; wound closure and no evidence of infection   Outcome: Ongoing  Discharge instructions given. Patient verbalized understanding. Return to [facility] in 1 week.   Called/faxed orders to [address] [place] [address] Rd for 1 week    [] antibiotics    [] X-ray     [] Culture   [x] Debridement      [] HBO Evaluation    [] LABS   [] Vascular Studies [] *

## 2017-11-03 ENCOUNTER — HOSPITAL ENCOUNTER (OUTPATIENT)
Dept: WOUND CARE | Age: 62
Discharge: HOME | End: 2017-11-03
Attending: PODIATRIST | Admitting: PODIATRIST

## 2017-11-03 VITALS
BODY MASS INDEX: 39.17 KG/M2 | RESPIRATION RATE: 18 BRPM | DIASTOLIC BLOOD PRESSURE: 82 MMHG | SYSTOLIC BLOOD PRESSURE: 138 MMHG | TEMPERATURE: 98.7 F | HEART RATE: 90 BPM | HEIGHT: 75 IN | WEIGHT: 315 LBS

## 2017-11-03 RX ORDER — LIDOCAINE HYDROCHLORIDE 40 MG/ML
SOLUTION TOPICAL ONCE
Status: DISCONTINUED | OUTPATIENT
Start: 2017-11-03 | End: 2017-11-04 | Stop reason: HOSPADM

## 2017-11-03 NOTE — PLAN OF CARE
Problem: Wound:  Goal: Will show signs of wound healing; wound closure and no evidence of infection  Will show signs of wound healing; wound closure and no evidence of infection   Outcome: Ongoing  Discharge instructions given. Patient verbalized understanding. Return to [address] [place] [address],[address] in 1 week.   Called[facility]faxed orders to  [facility]    [] antibiotics    [] X-ray     [] Culture   [x] Debridement      [] HBO Evaluation    [] LABS   [] Vascular Studies []

## 2017-11-03 NOTE — PROGRESS NOTES
AMPUTATION  3/7/11    RIGHT FOURTH TOE    TOE SURGERY      ULNAR TUNNEL RELEASE Left 12-11-14    Left ulnar nerve decompression and left CTR    UPPER GASTROINTESTINAL ENDOSCOPY  1/28/2014    with biopsies    UPPER GASTROINTESTINAL ENDOSCOPY  3/13/14    UPPER GASTROINTESTINAL ENDOSCOPY  6/4/14    with biopsy    VENTRICULAR ABLATION SURGERY           ALLERGIES    Allergies   Allergen Reactions    Bactrim      hallucinations       MEDICATIONS    Current Outpatient Prescriptions on File Prior to Encounter   Medication Sig Dispense Refill    sertraline (ZOLOFT) 50 MG tablet TAKE 1 TABLET BY MOUTH EVERY DAY 30 tablet 5    furosemide (LASIX) 40 MG tablet Take 80mg in AM and 40mg in  tablet 1    doxycycline hyclate (VIBRA-TABS) 100 MG tablet Take 1 tablet by mouth 2 times daily 60 tablet 0    diltiazem (CARTIA XT) 180 MG extended release capsule Take 1 capsule by mouth 2 times daily 180 capsule 3    furosemide (LASIX) 20 MG tablet Take 1 tablet by mouth daily (Patient taking differently: Take 40 mg by mouth 2 times daily ) 180 tablet 0    potassium chloride (KLOR-CON M) 20 MEQ extended release tablet TAKE 2 TABLETS BY MOUTH TWICE DAILY 360 tablet 0    metoprolol tartrate (LOPRESSOR) 25 MG tablet Take 1 tablet by mouth 2 times daily 60 tablet 3    ferrous sulfate (DOROTHY-TORSTEN) 325 (65 FE) MG tablet Take 1 tablet by mouth daily 30 tablet 3    Elastic Bandages & Supports (JOBST KNEE HIGH COMPRESSION SM) MISC 1 each by Does not apply route daily as needed (wear during the day time) 1 each 0    pantoprazole (PROTONIX) 40 MG tablet TAKE 1 TABLET BY MOUTH TWICE DAILY 60 tablet 5    sertraline (ZOLOFT) 50 MG tablet TAKE 1 TABLET BY MOUTH EVERY DAY 90 tablet 1    allopurinol (ZYLOPRIM) 100 MG tablet TAKE 3 TABLETS BY MOUTH DAILY 270 tablet 1    albuterol sulfate HFA (PROVENTIL HFA) 108 (90 BASE) MCG/ACT inhaler Inhale 2 puffs into the lungs every 4 hours as needed for Wheezing 1 Inhaler 3    budesonide-formoterol (SYMBICORT) 160-4.5 MCG/ACT AERO Inhale 2 puffs into the lungs 2 times daily. 3 Inhaler 3    albuterol-ipratropium (COMBIVENT RESPIMAT)  MCG/ACT AERS inhaler Inhale 1 puff into the lungs every 6 hours. 3 Inhaler 3    OXYGEN Inhale  into the lungs. 2 liters/ nasal cannula prn      aspirin 81 MG tablet Take 81 mg by mouth daily.  therapeutic multivitamin-minerals (THERAGRAN-M) tablet Take 1 tablet by mouth daily. No current facility-administered medications on file prior to encounter. REVIEW OF SYSTEMS    Pertinent items are noted in HPI. Objective:      /82   Pulse 90   Temp 98.7 °F (37.1 °C) (Oral)   Resp 18   Ht 6' 3\" (1.905 m)   Wt (!) 324 lb (147 kg)   BMI 40.50 kg/m²     PHYSICAL EXAM   No active bleeding in the wound. Surgical site wound medially. Healed laterally Both flaps have good cap refill <3 sec. No pain with dressing change. Minimal maceration. +2 pitting edema bilateral legs.     Assessment:     Patient Active Problem List   Diagnosis    Atrial fibrillation ([address]    COPD (chronic obstructive pulmonary disease) ([address]    Sleep apnea    CHF (congestive heart failure) ([address]    Hypertension    Hyperlipidemia    Morbid obesity with BMI of 50.0-59.9, adult ([address]    COPD, severe (HCC)    Chronic idiopathic gout of multiple sites    Hand arthritis    Skin ulcer of right foot with fat layer exposed ([address]    Chronic multifocal osteomyelitis of foot ([address]    Subacute osteomyelitis of right foot ([address]    Cellulitis of left leg    Neuropathy ([address]    Non-healing surgical wound    Subacute osteomyelitis of right foot ([address]    Needs peripherally inserted central catheter (PICC)    Receiving intravenous antibiotic treatment as outpatient    Smoker   Procedure Note    Performed by: Nitesh Jones DPM    Consent obtained: Yes    Time out taken:  Yes    Pain Control: Anesthetic  Anesthetic: 4% Topical Xylocaine Debridement:Excisional Debridement    Using curette the wound was sharply debrided    down through and including the removal of epidermis, dermis, subcutaneous tissue and muscle/fascia. Devitalized Tissue Debrided:  fibrin, slough, necrotic/eschar, exudate and clots    Pre Debridement Measurements:  Are located in the Wound Documentation Flow Sheet    Wound #: 5   Wound Care Documentation:  Wound 08/23/17 Other (Comment) Foot Right;Distal #5  (Active)   Wound Image   10/27/2017  7:57 AM   Wound Type Wound 11/3/2017  8:08 AM   Wound Other 11/3/2017  8:08 AM   Dressing Status Clean;Dry; Intact 9/15/2017  9:31 AM   Dressing Changed Changed/New 10/27/2017  1:17 PM   Dressing/Treatment Ace Wrap;Foam;Other (Comment) 11/3/2017  9:40 AM   Wound Cleansed Rinsed/Irrigated with saline 11/3/2017  8:08 AM   Wound Length (cm) 0.6 cm 11/3/2017  8:19 AM   Wound Width (cm) 3.1 cm 11/3/2017  8:19 AM   Wound Depth (cm)  2 11/3/2017  8:19 AM   Calculated Wound Size (cm^2) (l*w) 1.86 cm^2 11/3/2017  8:19 AM   Change in Wound Size % (l*w) 11.43 11/3/2017  8:19 AM   Wound Assessment Bleeding 11/3/2017  8:19 AM   Samantha-wound Assessment Pink 11/3/2017  8:08 AM   [place]%Wound Bed 90 11/3/2017  8:08 AM   Red%Wound Bed 0 11/3/2017  8:08 AM   Yellow%Wound Bed 10 11/3/2017  8:08 AM   Black%Wound Bed 0 11/3/2017  8:08 AM   Purple%Wound Bed 0 11/3/2017  8:08 AM   Other%Wound Bed 0 11/3/2017  8:08 AM   Drainage Amount Moderate 11/3/2017  8:08 AM   Drainage Description Green;Serosanguinous 11/3/2017  8:08 AM   Odor Mild 11/3/2017  8:08 AM   Time out Yes 11/3/2017  8:19 AM   Op First Treatment Date 08/23/17 8/23/2017  1:39 PM   Number of days: 71       Total Surface Area Debrided:  1.86 sq cm     Percentage of wound debrided 100%    Bleeding:  Minimal    Hemostasis Achieved:  by pressure    Procedural Pain:  0  / 10     Post Procedural Pain:  0 / 10     Response to treatment:  Well tolerated by patient.    DM Foot infection with OM     Plan: Pt examined and evauated, Wound still present distal medial aspect of the foot. Wound debrided of fibrous tissue. - flaps well perfused   Reminded that he must continue wear compression on his leg to prevent wound drainage from third spacing  Concern for chronic osteomyelitis patient would benefit from hyperbaric oxygen treatment To assist with healing and limb salvage.   At this time the patient is still refusing   D/cVAC , daily dressing changes with collagen and compression   F/u wound care one week

## 2017-11-10 ENCOUNTER — HOSPITAL ENCOUNTER (OUTPATIENT)
Dept: WOUND CARE | Age: 62
Discharge: HOME | End: 2017-11-10
Attending: PODIATRIST | Admitting: PODIATRIST

## 2017-11-10 VITALS
WEIGHT: 308 LBS | DIASTOLIC BLOOD PRESSURE: 76 MMHG | SYSTOLIC BLOOD PRESSURE: 130 MMHG | HEART RATE: 105 BPM | RESPIRATION RATE: 18 BRPM | BODY MASS INDEX: 38.5 KG/M2

## 2017-11-10 RX ORDER — LIDOCAINE HYDROCHLORIDE 40 MG/ML
SOLUTION TOPICAL ONCE
Status: DISCONTINUED | OUTPATIENT
Start: 2017-11-10 | End: 2017-11-11 | Stop reason: HOSPADM

## 2017-11-10 NOTE — PLAN OF CARE
Problem: Wound:  Goal: Will show signs of wound healing; wound closure and no evidence of infection  Will show signs of wound healing; wound closure and no evidence of infection   Outcome: Ongoing  Discharge instructions given. Patient verbalized understanding. Return to [facility] in 1 week.   Called/faxed orders to  [facility]    [] antibiotics    [] X-ray     [] Culture   [x] Debridement      [] HBO Evaluation    [] LABS   [] Vascular Studies [] *

## 2017-11-10 NOTE — PROGRESS NOTES
[address]  AGE: 65 y.o. GENDER: male  : 1955  TODAY'S DATE:  11/10/2017    Subjective:     Chief Complaint   Patient presents with    Wound Check     Diabetic wound right foot         HISTORY of PRESENT ILLNESS HPI     Ulanda Goltz is a 65 y.o. male   He states he is changing the bandage 2x week. He has been using compression on his legs. He has no complaints. He denies nausea, vomiting, fever, chills, shortness of breath and chest pain.   PAST MEDICAL HISTORY        Diagnosis Date    Anxiety     Arthritis     Asthma     Atrial fibrillation (HCC)     Blood circulation, collateral     CAD (coronary artery disease)     CHF (congestive heart failure) (HCC)     Colon cancer (HCC)     COPD (chronic obstructive pulmonary disease) (HCC)     Diabetic foot infection (HCC)     Hernia     Hyperlipidemia     Hypertension     Movement disorder     Muscle cramps     Neuromuscular disorder (HCC)     marvin feet numbness/neuropathy    Neuropathy (HCC)     Pneumonia     Renal insufficiency     Sleep apnea     no cpap    Thyroid disease     Unspecified cerebral artery occlusion with cerebral infarction     mini stroke    Unspecified diseases of blood and blood-forming organs        PAST SURGICAL HISTORY    Past Surgical History:   Procedure Laterality Date    ABDOMEN SURGERY      APPENDECTOMY  10/9/2012    CARPAL TUNNEL RELEASE Left 14    COLON SURGERY      for Colon Ca 10 inches removed from colon    COLONOSCOPY      COLONOSCOPY  3/13/14    ENDOSCOPIC ULTRASOUND (UPPER)  14    ENDOSCOPY, COLON, DIAGNOSTIC      FOOT SURGERY Right 2017    Amputation toes 2,3,5, right foot Flap closure, Exostectomy 1st metatarsal right foot    FOOT SURGERY Right 2017    INCISION AND DRAINAGE OF RIGHT FOOT WITH FLAP CLOSURE    HERNIA REPAIR      umbilical, hiatal hernia repairs    SKIN BIOPSY      TOE AMPUTATION  3/7/11    RIGHT FOURTH TOE    TOE SURGERY      ULNAR TUNNEL RELEASE Left 12-11-14    Left ulnar nerve decompression and left CTR    UPPER GASTROINTESTINAL ENDOSCOPY  1/28/2014    with biopsies    UPPER GASTROINTESTINAL ENDOSCOPY  3/13/14    UPPER GASTROINTESTINAL ENDOSCOPY  6/4/14    with biopsy    VENTRICULAR ABLATION SURGERY           ALLERGIES    Allergies   Allergen Reactions    Bactrim      hallucinations       MEDICATIONS    Current Outpatient Prescriptions on File Prior to Encounter   Medication Sig Dispense Refill    sertraline (ZOLOFT) 50 MG tablet TAKE 1 TABLET BY MOUTH EVERY DAY 30 tablet 5    furosemide (LASIX) 40 MG tablet Take 80mg in AM and 40mg in  tablet 1    doxycycline hyclate (VIBRA-TABS) 100 MG tablet Take 1 tablet by mouth 2 times daily 60 tablet 0    diltiazem (CARTIA XT) 180 MG extended release capsule Take 1 capsule by mouth 2 times daily 180 capsule 3    furosemide (LASIX) 20 MG tablet Take 1 tablet by mouth daily (Patient taking differently: Take 40 mg by mouth 2 times daily ) 180 tablet 0    potassium chloride (KLOR-CON M) 20 MEQ extended release tablet TAKE 2 TABLETS BY MOUTH TWICE DAILY 360 tablet 0    metoprolol tartrate (LOPRESSOR) 25 MG tablet Take 1 tablet by mouth 2 times daily 60 tablet 3    ferrous sulfate (DOROTHY-TORSTEN) 325 (65 FE) MG tablet Take 1 tablet by mouth daily 30 tablet 3    Elastic Bandages & Supports (JOBST KNEE HIGH COMPRESSION SM) MISC 1 each by Does not apply route daily as needed (wear during the day time) 1 each 0    pantoprazole (PROTONIX) 40 MG tablet TAKE 1 TABLET BY MOUTH TWICE DAILY 60 tablet 5    sertraline (ZOLOFT) 50 MG tablet TAKE 1 TABLET BY MOUTH EVERY DAY 90 tablet 1    allopurinol (ZYLOPRIM) 100 MG tablet TAKE 3 TABLETS BY MOUTH DAILY 270 tablet 1    albuterol sulfate HFA (PROVENTIL HFA) 108 (90 BASE) MCG/ACT inhaler Inhale 2 puffs into the lungs every 4 hours as needed for Wheezing 1 Inhaler 3    budesonide-formoterol (SYMBICORT) 160-4.5 MCG/ACT AERO Inhale 2

## 2017-11-12 LAB
GRAM STAIN RESULT: ABNORMAL
ORGANISM: ABNORMAL
ORGANISM: ABNORMAL
WOUND/ABSCESS: ABNORMAL

## 2017-11-17 ENCOUNTER — HOSPITAL ENCOUNTER (OUTPATIENT)
Dept: WOUND CARE | Age: 62
Discharge: HOME | End: 2017-11-17
Attending: PODIATRIST | Admitting: PODIATRIST

## 2017-11-17 VITALS
SYSTOLIC BLOOD PRESSURE: 112 MMHG | WEIGHT: 309.4 LBS | DIASTOLIC BLOOD PRESSURE: 67 MMHG | TEMPERATURE: 98.1 F | BODY MASS INDEX: 38.67 KG/M2 | RESPIRATION RATE: 18 BRPM | HEART RATE: 85 BPM

## 2017-11-17 RX ORDER — CLINDAMYCIN HYDROCHLORIDE 300 MG/1
300 CAPSULE ORAL 3 TIMES DAILY
Qty: 42 CAPSULE | Refills: 0 | Status: SHIPPED | OUTPATIENT
Start: 2017-11-17 | End: 2017-12-01

## 2017-11-17 NOTE — PROGRESS NOTES
[address]  AGE: 65 y.o. GENDER: male  : 1955  TODAY'S DATE:  2017    Subjective:     Chief Complaint   Patient presents with    Wound Check     right foot         HISTORY of PRESENT ILLNESS TOBY Savage is a 65 y.o. male   He states he is changing the bandage 2-3x week. He has been using compression on his legs. He has no complaints. He denies nausea, vomiting, fever, chills, shortness of breath and chest pain.   PAST MEDICAL HISTORY        Diagnosis Date    Anxiety     Arthritis     Asthma     Atrial fibrillation ([address]     Blood circulation, collateral     CAD (coronary artery disease)     CHF (congestive heart failure) (HCC)     Colon cancer (HCC)     COPD (chronic obstructive pulmonary disease) (HCC)     Diabetic foot infection (HCC)     Hernia     Hyperlipidemia     Hypertension     Movement disorder     Muscle cramps     Neuromuscular disorder (HCC)     marvin feet numbness/neuropathy    Neuropathy (HCC)     Pneumonia     Renal insufficiency     Sleep apnea     no cpap    Thyroid disease     Unspecified cerebral artery occlusion with cerebral infarction     mini stroke    Unspecified diseases of blood and blood-forming organs        ALLERGIES    Allergies   Allergen Reactions    Bactrim      hallucinations       MEDICATIONS    Current Outpatient Prescriptions on File Prior to Encounter   Medication Sig Dispense Refill    sertraline (ZOLOFT) 50 MG tablet TAKE 1 TABLET BY MOUTH EVERY DAY 30 tablet 5    furosemide (LASIX) 40 MG tablet Take 80mg in AM and 40mg in  tablet 1    doxycycline hyclate (VIBRA-TABS) 100 MG tablet Take 1 tablet by mouth 2 times daily 60 tablet 0    diltiazem (CARTIA XT) 180 MG extended release capsule Take 1 capsule by mouth 2 times daily 180 capsule 3    furosemide (LASIX) 20 MG tablet Take 1 tablet by mouth daily (Patient taking differently: Take 40 mg by mouth 2 times daily ) 180 tablet 0    potassium chloride (KLOR-CON M) 20 MEQ extended release tablet TAKE 2 TABLETS BY MOUTH TWICE DAILY 360 tablet 0    metoprolol tartrate (LOPRESSOR) 25 MG tablet Take 1 tablet by mouth 2 times daily 60 tablet 3    ferrous sulfate (DOROTHY-TORSTEN) 325 (65 FE) MG tablet Take 1 tablet by mouth daily 30 tablet 3    Elastic Bandages & Supports (JOBST KNEE HIGH COMPRESSION SM) MISC 1 each by Does not apply route daily as needed (wear during the day time) 1 each 0    pantoprazole (PROTONIX) 40 MG tablet TAKE 1 TABLET BY MOUTH TWICE DAILY 60 tablet 5    sertraline (ZOLOFT) 50 MG tablet TAKE 1 TABLET BY MOUTH EVERY DAY 90 tablet 1    allopurinol (ZYLOPRIM) 100 MG tablet TAKE 3 TABLETS BY MOUTH DAILY 270 tablet 1    albuterol sulfate HFA (PROVENTIL HFA) 108 (90 BASE) MCG/ACT inhaler Inhale 2 puffs into the lungs every 4 hours as needed for Wheezing 1 Inhaler 3    budesonide-formoterol (SYMBICORT) 160-4.5 MCG/ACT AERO Inhale 2 puffs into the lungs 2 times daily. 3 Inhaler 3    albuterol-ipratropium (COMBIVENT RESPIMAT)  MCG/ACT AERS inhaler Inhale 1 puff into the lungs every 6 hours. 3 Inhaler 3    OXYGEN Inhale  into the lungs. 2 liters/ nasal cannula prn      aspirin 81 MG tablet Take 81 mg by mouth daily.  therapeutic multivitamin-minerals (THERAGRAN-M) tablet Take 1 tablet by mouth daily. No current facility-administered medications on file prior to encounter. REVIEW OF SYSTEMS    Pertinent items are noted in HPI.       Objective:      /67   Pulse 85   Temp 98.1 °F (36.7 °C) (Oral)   Resp 18   Wt (!) 309 lb 6.4 oz (140.3 kg)   BMI 38.67 kg/m²   WOUND/ABSCESS 11/10/2017 10:48 AM [facility]   Gram Stain Result  (Abnormal) 11/10/2017 10:48 AM - [facility]    (A)   2+ Gram positive cocci   1+ WBC's (Polymorphonuclear)     Organism  (Abnormal) 11/10/2017 10:48 AM [facility]   [facility] A (Strep pyogenes)     WOUND/ABSCESS 11/10/2017 10:48 AM [facility] [place] ([address]    Subacute osteomyelitis of right foot ([address]    Cellulitis of left leg    Neuropathy ([address]    Non-healing surgical wound    Subacute osteomyelitis of right foot ([address]    Needs peripherally inserted central catheter (PICC)    Receiving intravenous antibiotic treatment as outpatient    Smoker   Procedure Note    Performed by: Ciarra Liang DPM    Consent obtained: Yes    Time out taken:  Yes    Pain Control: Anesthetic  Anesthetic: 4% Topical Xylocaine     Debridement:Excisional Debridement    Using curette the wound was sharply debrided    down through and including the removal of epidermis, dermis and subcutaneous tissue. Devitalized Tissue Debrided:  fibrin, slough, necrotic/eschar, exudate and clots    Pre Debridement Measurements:  Are located in the Wound Documentation Flow Sheet    Wound #: 5   Wound Care Documentation:  Wound 08/23/17 Other (Comment) Foot Right;Distal #5  (Active)   Wound Image   10/27/2017  7:57 AM   Wound Type Wound 11/17/2017  8:06 AM   Wound Diabetic Arceo 3 11/17/2017  8:06 AM   Dressing Status Clean;Dry; Intact 9/15/2017  9:31 AM   Dressing Changed Changed/New 10/27/2017  1:17 PM   Dressing/Treatment Ace Wrap;Foam;Other (Comment) 11/3/2017  9:40 AM   Wound Cleansed Rinsed/Irrigated with saline 11/17/2017  8:06 AM   Wound Length (cm) 0.4 cm 11/17/2017  8:39 AM   Wound Width (cm) 2.5 cm 11/17/2017  8:39 AM   Wound Depth (cm)  0.4 11/17/2017  8:39 AM   Calculated Wound Size (cm^2) (l*w) 1 cm^2 11/17/2017  8:39 AM   Change in Wound Size % (l*w) 52.38 11/17/2017  8:39 AM   Wound Assessment Bleeding 11/17/2017  8:39 AM   Drainage Amount Small 11/17/2017  8:06 AM   Drainage Description Serous 11/17/2017  8:06 AM   Odor Mild 11/17/2017  8:06 AM   Samantha-wound Assessment Clean;Dry 11/17/2017  8:06 AM   Ney%Wound Bed 0 11/17/2017  8:06 AM   Red%Wound Bed 0 11/17/2017  8:06 AM   Yellow%Wound Bed 100 11/17/2017  8:06 AM   Black%Wound Bed 0 11/17/2017  8:06 AM   Purple%Wound Bed 0 11/17/2017  8:06 AM   Other%Wound Bed 0 11/17/2017  8:06 AM   Time out Yes 11/17/2017  8:39 AM   Op First Treatment Date 08/23/17 8/23/2017  1:39 PM   Number of days: 85       Wound 11/17/17 Toe (Comment  which one) Left;Dorsal #6, 2nd toe  (Active)   Wound Image   11/17/2017  8:06 AM   Wound Type Wound 11/17/2017  8:06 AM   Wound Other 11/17/2017  8:06 AM   Wound Cleansed Rinsed/Irrigated with saline 11/17/2017  8:06 AM   Wound Length (cm) 1 cm 11/17/2017  8:06 AM   Wound Width (cm) 0.5 cm 11/17/2017  8:06 AM   Wound Depth (cm)  0.1 11/17/2017  8:06 AM   Calculated Wound Size (cm^2) (l*w) 0.5 cm^2 11/17/2017  8:06 AM   Wound Assessment Bleeding;Pink 11/17/2017  8:06 AM   Drainage Amount Large 11/17/2017  8:06 AM   Drainage Description Sanguinous 11/17/2017  8:06 AM   Odor None 11/17/2017  8:06 AM   Samantha-wound Assessment Dry 11/17/2017  8:06 AM   [place]%Wound Bed 100 11/17/2017  8:06 AM   Red%Wound Bed 0 11/17/2017  8:06 AM   Yellow%Wound Bed 0 11/17/2017  8:06 AM   Black%Wound Bed 0 11/17/2017  8:06 AM   Purple%Wound Bed 0 11/17/2017  8:06 AM   Other%Wound Bed 0 11/17/2017  8:06 AM   Op First Treatment Date 11/17/17 11/17/2017  8:06 AM   Number of days: 0       Total Surface Area Debrided:  0.5 sq cm Wound #5    Percentage of wound debrided 100%    Bleeding:  Minimal    Hemostasis Achieved:  by pressure    Procedural Pain:  0  / 10     Post Procedural Pain:  0 / 10     Response to treatment:  Well tolerated by patient. DM Foot infection with OM     Plan:   Pt examined and evauated, Wound still present distal medial aspect of the foot. Wound debrided of fibrous tissue. New wound left toe, DSD  - flaps well perfused   -erythema mild around wound distal stump  -The nature complications diabetic foot infections were explained in detail to the patient. It was explained to the patient that diabetic foot infections are very serious and can lead to limb and life-threatening infections.   The management of the

## 2017-11-17 NOTE — PLAN OF CARE
Culture from last visit reviewed with Dr Gia Rich. Pt currently on Doxy. Pt called and informed. Pt to continue Doxy for now.  To call if changes in foot

## 2017-11-30 ENCOUNTER — CARE COORDINATION (OUTPATIENT)
Dept: CARE COORDINATION | Age: 62
End: 2017-11-30

## 2017-11-30 NOTE — CARE COORDINATION
Nurse Care Coordinator called and spoke to pt on the phone today. Tomorrow will be his last appt w/the wound care center. He states it is the first time in almost 2 years he hasn't had some type of open wound on his leg/foot. He has lost the 50# he lost that he gained back when he initially started his antibiotic tx for his wounds. He hadn't seen Dr. Aleksandr Hdz for several months and felt like he should f/u with him since he is finishing w/wound center. This [facility] made him an appt the same day his father is coming in. He will be driving his dad. Pt was encouraged to call w/any needs or concerns. ACC will plan to see him at that time.     Future Appointments  Date Time Provider Natasha Marely   12/1/2017 8:00 AM Baldemar Mock DPM [facility] WOUND None   12/8/2017 9:40 AM Tresa Santos MD [facility]   3/19/2018 7:30 AM Irena Da Silva MD  [facility]

## 2017-12-01 ENCOUNTER — HOSPITAL ENCOUNTER (OUTPATIENT)
Dept: WOUND CARE | Age: 62
Discharge: HOME | End: 2017-12-01
Attending: PODIATRIST | Admitting: PODIATRIST

## 2017-12-01 VITALS
BODY MASS INDEX: 39.14 KG/M2 | WEIGHT: 313.13 LBS | DIASTOLIC BLOOD PRESSURE: 89 MMHG | RESPIRATION RATE: 18 BRPM | TEMPERATURE: 97.2 F | SYSTOLIC BLOOD PRESSURE: 149 MMHG | HEART RATE: 100 BPM

## 2017-12-01 DIAGNOSIS — L97.522 SKIN ULCER OF LEFT FOOT WITH FAT LAYER EXPOSED (HCC): Primary | ICD-10-CM

## 2017-12-01 RX ORDER — LIDOCAINE HYDROCHLORIDE 40 MG/ML
SOLUTION TOPICAL ONCE
Status: DISCONTINUED | OUTPATIENT
Start: 2017-12-01 | End: 2017-12-02 | Stop reason: HOSPADM

## 2017-12-01 NOTE — PLAN OF CARE
Problem: Wound:  Goal: Will show signs of wound healing; wound closure and no evidence of infection  Will show signs of wound healing; wound closure and no evidence of infection   Outcome: Ongoing  Discharge instructions given. Patient verbalized understanding. Return to [facility] in 1 week.       [] antibiotics    [] X-ray     [] Culture   [x] Debridement      [] HBO Evaluation    [] LABS   [] Vascular Studies []

## 2017-12-01 NOTE — PROGRESS NOTES
[address]  AGE: 65 y.o. GENDER: male  : 1955  TODAY'S DATE:  2017    Subjective:     Chief Complaint   Patient presents with    Wound Check     right foot         HISTORY of PRESENT ILLNESS HPI     Wade Nix is a 65 y.o. male   He states that he is not having any drainage coming from his right foot. He does admit to having a wound on his left second toe from stubbing his toe. He does note that his toe is red. He has not been taking antibiotics.   PAST MEDICAL HISTORY        Diagnosis Date    Anxiety     Arthritis     Asthma     Atrial [facility]     Blood circulation, collateral     CAD (coronary artery disease)     CHF (congestive heart failure) (HCC)     Colon cancer (HCC)     COPD (chronic obstructive pulmonary disease) (HCC)     Diabetic foot infection (HCC)     Hernia     Hyperlipidemia     Hypertension     Movement disorder     Muscle cramps     Neuromuscular disorder (HCC)     marvin feet numbness/neuropathy    Neuropathy (HCC)     Pneumonia     Renal insufficiency     Sleep apnea     no cpap    Thyroid disease     Unspecified cerebral artery occlusion with cerebral infarction     mini stroke    Unspecified diseases of blood and blood-forming organs        ALLERGIES    Allergies   Allergen Reactions    Bactrim      hallucinations       MEDICATIONS    Current Outpatient Prescriptions on File Prior to Encounter   Medication Sig Dispense Refill    clindamycin (CLEOCIN) 300 MG capsule Take 1 capsule by mouth 3 times daily for 14 days 42 capsule 0    sertraline (ZOLOFT) 50 MG tablet TAKE 1 TABLET BY MOUTH EVERY DAY 30 tablet 5    furosemide (LASIX) 40 MG tablet Take 80mg in AM and 40mg in  tablet 1    diltiazem (CARTIA XT) 180 MG extended release capsule Take 1 capsule by mouth 2 times daily 180 capsule 3    furosemide (LASIX) 20 MG tablet Take 1 tablet by mouth daily (Patient taking differently: Take 40 mg by mouth 2 multifocal osteomyelitis of foot ([address]    Subacute osteomyelitis of right foot ([address]    Cellulitis of left leg    Neuropathy (HCC)    Non-healing surgical wound    Subacute osteomyelitis of right foot ([address]    Needs peripherally inserted central catheter (PICC)    Receiving intravenous antibiotic treatment as outpatient    Smoker   Procedure Note    Performed by: Gene Castle DPM    Consent obtained: Yes    Time out taken:  Yes    Pain Control: Anesthetic  Anesthetic: 4% Topical Xylocaine     Debridement:Excisional Debridement    Using curette the wound was sharply debrided    down through and including the removal of epidermis, dermis and subcutaneous tissue. Devitalized Tissue Debrided:  fibrin, slough, necrotic/eschar, exudate and clots    Pre Debridement Measurements:  Are located in the Wound Documentation Flow Sheet    Wound #: 5 and 6   Wound Care Documentation:  Wound 08/23/17 Other (Comment) Foot Right;Distal #5  (Active)   Wound Image   10/27/2017  7:57 AM   Wound Type Wound 12/1/2017  8:11 AM   Wound Other 12/1/2017  8:11 AM   Dressing Status Clean;Dry; Intact 9/15/2017  9:31 AM   Dressing Changed Changed/New 10/27/2017  1:17 PM   Dressing/Treatment Ace Wrap;Antibacterial Ointment;Dry dressing 12/1/2017  8:11 AM   Wound Cleansed Rinsed/Irrigated with saline 12/1/2017  8:11 AM   Wound Length (cm) 0.3 cm 12/1/2017  8:21 AM   Wound Width (cm) 0.1 cm 12/1/2017  8:21 AM   Wound Depth (cm)  0.1 12/1/2017  8:21 AM   Calculated Wound Size (cm^2) (l*w) 0.03 cm^2 12/1/2017  8:21 AM   Change in Wound Size % (l*w) 98.57 12/1/2017  8:21 AM   Wound Assessment Pink 12/1/2017  8:21 AM   Drainage Amount None 12/1/2017  8:11 AM   Drainage Description Serous 11/17/2017  8:06 AM   Odor None 12/1/2017  8:11 AM   Samantha-wound Assessment Pink 12/1/2017  8:11 AM   Pink%Wound Bed 0 12/1/2017  8:11 AM   Red%Wound Bed 0 12/1/2017  8:11 AM   Yellow%Wound Bed 0 12/1/2017  8:11 AM   Black%Wound Bed 0 12/1/2017  8:11

## 2017-12-08 ENCOUNTER — CARE COORDINATOR VISIT (OUTPATIENT)
Dept: CARE COORDINATION | Age: 62
End: 2017-12-08

## 2017-12-08 ENCOUNTER — HOSPITAL ENCOUNTER (OUTPATIENT)
Dept: WOUND CARE | Age: 62
Discharge: HOME | End: 2017-12-08
Attending: PODIATRIST | Admitting: PODIATRIST

## 2017-12-08 ENCOUNTER — OFFICE VISIT (OUTPATIENT)
Dept: INTERNAL MEDICINE CLINIC | Age: 62
End: 2017-12-08

## 2017-12-08 VITALS
HEART RATE: 95 BPM | TEMPERATURE: 98 F | DIASTOLIC BLOOD PRESSURE: 85 MMHG | RESPIRATION RATE: 16 BRPM | SYSTOLIC BLOOD PRESSURE: 144 MMHG

## 2017-12-08 VITALS
SYSTOLIC BLOOD PRESSURE: 128 MMHG | HEIGHT: 75 IN | HEART RATE: 88 BPM | WEIGHT: 312.8 LBS | DIASTOLIC BLOOD PRESSURE: 70 MMHG | BODY MASS INDEX: 38.89 KG/M2

## 2017-12-08 DIAGNOSIS — E78.00 HYPERCHOLESTEREMIA: ICD-10-CM

## 2017-12-08 DIAGNOSIS — J44.9 COPD, SEVERE (HCC): ICD-10-CM

## 2017-12-08 DIAGNOSIS — I10 ESSENTIAL HYPERTENSION: Primary | ICD-10-CM

## 2017-12-08 PROCEDURE — 3017F COLORECTAL CA SCREEN DOC REV: CPT | Performed by: INTERNAL MEDICINE

## 2017-12-08 PROCEDURE — 1036F TOBACCO NON-USER: CPT | Performed by: INTERNAL MEDICINE

## 2017-12-08 PROCEDURE — G8484 FLU IMMUNIZE NO ADMIN: HCPCS | Performed by: INTERNAL MEDICINE

## 2017-12-08 PROCEDURE — 99214 OFFICE O/P EST MOD 30 MIN: CPT | Performed by: INTERNAL MEDICINE

## 2017-12-08 PROCEDURE — G8427 DOCREV CUR MEDS BY ELIG CLIN: HCPCS | Performed by: INTERNAL MEDICINE

## 2017-12-08 PROCEDURE — 3023F SPIROM DOC REV: CPT | Performed by: INTERNAL MEDICINE

## 2017-12-08 PROCEDURE — G8926 SPIRO NO PERF OR DOC: HCPCS | Performed by: INTERNAL MEDICINE

## 2017-12-08 PROCEDURE — G8417 CALC BMI ABV UP PARAM F/U: HCPCS | Performed by: INTERNAL MEDICINE

## 2017-12-08 RX ORDER — LIDOCAINE HYDROCHLORIDE 40 MG/ML
SOLUTION TOPICAL ONCE
Status: DISCONTINUED | OUTPATIENT
Start: 2017-12-08 | End: 2017-12-09 | Stop reason: HOSPADM

## 2017-12-08 RX ORDER — ATORVASTATIN CALCIUM 20 MG/1
20 TABLET, FILM COATED ORAL DAILY
Qty: 90 TABLET | Refills: 3 | Status: SHIPPED | OUTPATIENT
Start: 2017-12-08 | End: 2018-09-17 | Stop reason: SDUPTHER

## 2017-12-08 NOTE — PROGRESS NOTES
Subjective:      Patient ID: Raymundo Echevarria is a 65 y.o. male. CC: HTN, COPD, HLD  HPI  HTN :Tolerating medications well and taking them as directed. He reports that his blood pressure is generally well controlled outside of this office. No symptoms concerning for end organ damage are present. COPD: He reports his symptoms are currently well controlled. He is using Symbicort twice daily. In general his symptoms are aggravated by heat and humidity. Hyperlipidemia: Review of records reveals that he was previously on pravastatin. He is not currently on cholesterol lowering medication. He is unaware of taking cholesterol lowering medication the past.    [address] [facility]   transmetatarsal amputation of the left foot has healed very well. Social History   Substance Use Topics    Smoking status: Never Smoker    Smokeless tobacco: Never Used    Alcohol use No      Comment: quit 4 years ago      Review of Systems  CV: Neg for chest pain  RESP: neg for dyspnea   GI: Reg BM's  : No urinary problems  Objective:   Physical Exam  /70 (Site: Left Arm, Position: Sitting, Cuff Size: Large Adult)   Pulse 88   Ht 6' 3\" (1.905 m)   Wt (!) 312 lb 12.8 oz (141.9 kg)   BMI 39.10 kg/m²    GEN: WN/WD, NAD  CV: regular rate and rhythm, no murmurs rubs or gallops  Resp: normal effort, clear auscultation bilaterally  No peripheral edema   Abd: soft, nontender to palpation. MSK: There is a transmetatarsal amputation of the left foot. There is a well-healed scar in the skin is warm, dry.       Lab Results   Component Value Date    CHOL 167 11/15/2016    CHOL 116 07/23/2015    CHOL 127 08/19/2014     Lab Results   Component Value Date    TRIG 79 11/15/2016    TRIG 76 07/23/2015    TRIG 94 08/19/2014     Lab Results   Component Value Date    HDL 41 11/15/2016    HDL 59 07/23/2015    HDL 50 08/19/2014     Lab Results   Component Value Date    LDLCALC 110 (H) 11/15/2016    LDLCALC 42 07/23/2015    LDLCALC 58 08/19/2014     Lab Results   Component Value Date    LABVLDL 16 11/15/2016    LABVLDL 15 07/23/2015    LABVLDL 19 08/19/2014     No results found for: [facility]   Lab Results   Component Value Date    CREATININE 1.4 09/11/2017    BUN 16 09/11/2017     09/11/2017    K 3.8 09/11/2017     09/11/2017    CO2 29 09/11/2017      The 10-year ASCVD risk score (Marvin Dickinson et al., 2013) is: 10.7%    Values used to calculate the score:      Age: 65 years      Sex: Male      Is Non- : No      Diabetic: No      Tobacco smoker: No      Systolic Blood Pressure: 510 mmHg      Is BP treated: Yes      HDL Cholesterol: 41 mg/dL      Total Cholesterol: 167 mg/dL   Assessment:      1. Essential hypertension  BP at target  BW UTD  The current medical regimen is effective;  continue present plan and medications. 2. COPD, severe ([address]  Symptoms are well controlled  The current medical regimen is effective;  continue present plan and medications. 3. Hypercholesteremia  Not currently on statin. We discussed the indication for a statin to lower his risk of ischemic heart disease. He is agreeable to a trial of atorvastatin. A prescription was sent to pharmacy. Plan:      RTO 4 months for chronic disease management.

## 2017-12-08 NOTE — CARE COORDINATION
Assessment    Does the patient understand envrionmental exposure?:  Yes  Is the patient able to verbalize Rescue vs. Long Acting medications?:  Yes  Does the patient have a nebulizer?:  No            Symptoms:          and   Congestive Heart Failure Assessment    Are you currently restricting fluids?:  No Restriction  Do you understand a low sodium diet?:  Yes  Do you understand how to read food labels?:  Yes  How many restaurant meals do you eat per week?:  1-2  Do you salt your food before tasting it?:  No     No patient-reported symptoms      Symptoms:   None:  Yes      Symptom course:  stable  Patient-reported weight (lb):  (Comment: recently 10# wgt gain)  Weight trend:  stable     ,

## 2017-12-08 NOTE — PROGRESS NOTES
[address]  AGE: 65 y.o. GENDER: male  : 1955  TODAY'S DATE:  2017    Subjective:     Chief Complaint   Patient presents with    Wound Check     left foot         HISTORY of PRESENT ILLNESS HPI     Magdaleno Whitaker is a 65 y.o. male   He states that he has not been having any drainage in the right foot wound. He admits that there has been some drainage on the left second toe wound. He is not having any pain in this foot. He have a bandage on the left foot until yesterday. He has not gotten his shoes. He has no other pedal complaints at this time. He denies nausea, vomiting, fever, chills, diarrhea, SOB and  CP.    PAST MEDICAL HISTORY        Diagnosis Date    Anxiety     Arthritis     Asthma     Atrial fibrillation ([facility]     Blood circulation, collateral     CAD (coronary artery disease)     CHF (congestive heart failure) (HCC)     Colon cancer (HCC)     COPD (chronic obstructive pulmonary disease) (HCC)     Diabetic foot infection (HCC)     Hernia     Hyperlipidemia     Hypertension     Movement disorder     Muscle cramps     Neuromuscular disorder (HCC)     marvin feet numbness/neuropathy    Neuropathy (HCC)     Pneumonia     Renal insufficiency     Sleep apnea     no cpap    Thyroid disease     Unspecified cerebral artery occlusion with cerebral infarction     mini stroke    Unspecified diseases of blood and blood-forming organs        ALLERGIES    Allergies   Allergen Reactions    Bactrim      hallucinations       MEDICATIONS    Current Outpatient Prescriptions on File Prior to Encounter   Medication Sig Dispense Refill    sertraline (ZOLOFT) 50 MG tablet TAKE 1 TABLET BY MOUTH EVERY DAY 30 tablet 5    furosemide (LASIX) 40 MG tablet Take 80mg in AM and 40mg in  tablet 1    diltiazem (CARTIA XT) 180 MG extended release capsule Take 1 capsule by mouth 2 times daily 180 capsule 3    furosemide (LASIX) 20 MG tablet Take 1 tablet by mouth daily (Patient taking differently: Take 40 mg by mouth 2 times daily ) 180 tablet 0    potassium chloride (KLOR-CON M) 20 MEQ extended release tablet TAKE 2 TABLETS BY MOUTH TWICE DAILY 360 tablet 0    metoprolol tartrate (LOPRESSOR) 25 MG tablet Take 1 tablet by mouth 2 times daily 60 tablet 3    ferrous sulfate (DOROTHY-TORSTEN) 325 (65 FE) MG tablet Take 1 tablet by mouth daily 30 tablet 3    Elastic Bandages & Supports (JOBST KNEE HIGH COMPRESSION SM) MISC 1 each by Does not apply route daily as needed (wear during the day time) 1 each 0    pantoprazole (PROTONIX) 40 MG tablet TAKE 1 TABLET BY MOUTH TWICE DAILY 60 tablet 5    sertraline (ZOLOFT) 50 MG tablet TAKE 1 TABLET BY MOUTH EVERY DAY 90 tablet 1    allopurinol (ZYLOPRIM) 100 MG tablet TAKE 3 TABLETS BY MOUTH DAILY 270 tablet 1    albuterol sulfate HFA (PROVENTIL HFA) 108 (90 BASE) MCG/ACT inhaler Inhale 2 puffs into the lungs every 4 hours as needed for Wheezing 1 Inhaler 3    budesonide-formoterol (SYMBICORT) 160-4.5 MCG/ACT AERO Inhale 2 puffs into the lungs 2 times daily. 3 Inhaler 3    albuterol-ipratropium (COMBIVENT RESPIMAT)  MCG/ACT AERS inhaler Inhale 1 puff into the lungs every 6 hours. 3 Inhaler 3    OXYGEN Inhale  into the lungs. 2 liters/ nasal cannula prn      aspirin 81 MG tablet Take 81 mg by mouth daily.  therapeutic multivitamin-minerals (THERAGRAN-M) tablet Take 1 tablet by mouth daily. No current facility-administered medications on file prior to encounter. REVIEW OF SYSTEMS    Pertinent items are noted in HPI.       Objective:      BP (!) 144/85   Pulse 95   Temp 98 °F (36.7 °C) (Oral)   Resp 16   WOUND/ABSCESS 11/10/2017 10:48 AM  - [facility] Lab   Gram Stain Result  (Abnormal) 11/10/2017 10:48 AM - [facility]    (A)   2+ Gram positive cocci   1+ WBC's (Polymorphonuclear)     Organism  (Abnormal) 11/10/2017 10:48 AM [facility]   BHS Group A (Strep pyogenes) WOUND/ABSCESS 11/10/2017 10:48 AM [facility]   Light growth   Susceptibility testing of penicillin and other beta-lactams is   not necessary for beta hemolytic Streptococci since resistant   strains have not been identified. (CLSI E196-S04, 2017)     Organism  (Abnormal) 11/10/2017 10:48 AM [facility] Lab   Diphtheroids     WOUND/ABSCESS 11/10/2017 10:48 AM [address] Lab   Light growth   No further workup     Testing Performed By     Lab - Abbreviation Name Director Address Valid Date Range   11-- [address] Ailcia Malhotra MD  [address] [address] 08/30/17 1209-Present   19- - [facility] Isabel Alexander M.D., Ph.D. [place]. [facility] [address] 08/30/17 1217-Present   Narrative     ORDER#: [id number]                          ORDERED BY: McClelland Show  SOURCE: Foot                               COLLECTED:  11/10/17 10:48  ANTIBIOTICS AT [facility]. :                      RECEIVED :  11/10/17 13:14  Performed at:  [address] [address]   Phone [phone]   Lab and Collection     Wound Culture on 11/10/2017   Result History     Wound Culture on 11/12/2017       PHYSICAL EXAM   No active bleeding in the wound. Surgical site wound medially. Healed laterally Both flaps have good cap refill <3 sec. No pain with dressing change. Minimal maceration. +2 pitting edema bilateral legs.     Assessment:     Patient Active Problem List   Diagnosis    Atrial fibrillation ([address]    COPD (chronic obstructive pulmonary disease) ([facility]    Sleep apnea    CHF (congestive heart failure) ([facility] [facility]    Hypertension    Hyperlipidemia    Morbid obesity with BMI of 50.0-59.9, adult ([address]    COPD, severe ([address]    Chronic idiopathic gout of multiple sites    Hand arthritis    Skin ulcer of right foot with fat layer exposed ([address] AM   Purple%Wound Bed 0 12/8/2017  8:05 AM   Other%Wound Bed 0 12/8/2017  8:05 AM   Time out Yes 12/1/2017  8:21 AM   Op First Treatment Date 08/23/17 8/23/2017  1:39 PM   Number of days: 106       Wound 11/17/17 Toe (Comment  which one) Left;Dorsal #6, 2nd toe  (Active)   Wound Image   11/17/2017  8:06 AM   Wound Type Wound 12/8/2017  8:05 AM   Wound Pressure Stage  2 12/8/2017  8:05 AM   Dressing/Treatment Antibacterial Ointment;Dry dressing 12/1/2017  8:11 AM   Wound Cleansed Rinsed/Irrigated with saline 12/8/2017  8:05 AM   Wound Length (cm) 0.2 cm 12/8/2017  8:05 AM   Wound Width (cm) 0.5 cm 12/8/2017  8:05 AM   Wound Depth (cm)  0.1 12/8/2017  8:05 AM   Calculated Wound Size (cm^2) (l*w) 0.1 cm^2 12/8/2017  8:05 AM   Change in Wound Size % (l*w) 80 12/8/2017  8:05 AM   Wound Assessment Red 12/8/2017  8:05 AM   Drainage Amount Scant 12/8/2017  8:05 AM   Drainage Description Serosanguinous 12/8/2017  8:05 AM   Odor None 12/1/2017  8:11 AM   Samantha-wound Assessment Pink 12/8/2017  8:05 AM   [place]%Wound Bed 0 12/8/2017  8:05 AM   Red%Wound Bed 100 12/8/2017  8:05 AM   Yellow%Wound Bed 0 12/8/2017  8:05 AM   Black%Wound Bed 0 12/8/2017  8:05 AM   Purple%Wound Bed 0 12/8/2017  8:05 AM   Other%Wound Bed 0 12/8/2017  8:05 AM   Time out Yes 12/1/2017  8:21 AM   Op First Treatment Date 11/17/17 11/17/2017  8:06 AM   Number of days: 21   Total Surface Area Debrided:  0.1 sq cm Wound #6    Percentage of wound debrided 100%    Bleeding:  Minimal    Hemostasis Achieved:  by pressure    Procedural Pain:  0  / 10     Post Procedural Pain:  0 / 10     Response to treatment:  Well tolerated by patient.    DM Foot infection with OM , contracted digit/hammertoe left foot second toe with wound    Plan:   Pt examined and evauated, Right foot wound healed, left second toe wound improving    Wound left toe toe has mild erythema no active purulence  Percutaneous tenotomy suture removed today without incident  -The nature complications diabetic foot infections were explained in detail to the patient. It was explained to the patient that diabetic foot infections are very serious and can lead to limb and life-threatening infections. The management of the infections needs to be a priority if the patient is to prevent poor outcome. It was explained to the patient that because of the neuropathy they cannot feel when their infection has become potentially dangerous. Instructions to keep a close eye on any changes in their infection especially advancement of erythema proximally, increasing malodor, increase in drainage, cyanosis of skin and exposure of bone. The patient is also informed to be observant for constitutional symptoms such as fever, chills, nausea, vomiting, malaise and shortness of breath and that if any of these symptoms arise they need to be seen in the emergency room for treatment. Finish clindamycin as directed  Reminded that he must continue wear compression on his leg to prevent wound drainage from third spacing  Patient has refused hyperbaric oxygen treatment. Daily dressing changes with antibiotic ointment left foot. Compression bilateral legs.   F/u wound care one week

## 2017-12-22 ENCOUNTER — HOSPITAL ENCOUNTER (OUTPATIENT)
Dept: WOUND CARE | Age: 62
Discharge: HOME | End: 2017-12-22
Attending: PODIATRIST | Admitting: PODIATRIST

## 2017-12-22 VITALS
RESPIRATION RATE: 16 BRPM | HEART RATE: 83 BPM | DIASTOLIC BLOOD PRESSURE: 84 MMHG | SYSTOLIC BLOOD PRESSURE: 128 MMHG | TEMPERATURE: 98.2 F

## 2017-12-22 NOTE — PLAN OF CARE
Problem: Wound:  Goal: Will show signs of wound healing; wound closure and no evidence of infection  Will show signs of wound healing; wound closure and no evidence of infection   Outcome: Met This Shift  Discharge instructions given. Patient verbalized understanding.   Return as needed  Surgical Wound Healed    [] antibiotics    [] X-ray     [] Culture   [] Debridement      [] HBO Evaluation    [] LABS   [] Vascular Studies []

## 2017-12-22 NOTE — PROGRESS NOTES
tablet 0    potassium chloride (KLOR-CON M) 20 MEQ extended release tablet TAKE 2 TABLETS BY MOUTH TWICE DAILY 360 tablet 0    metoprolol tartrate (LOPRESSOR) 25 MG tablet Take 1 tablet by mouth 2 times daily 60 tablet 3    ferrous sulfate (DOROTHY-TORSTEN) 325 (65 FE) MG tablet Take 1 tablet by mouth daily 30 tablet 3    Elastic Bandages & Supports (JOBST KNEE HIGH COMPRESSION SM) MISC 1 each by Does not apply route daily as needed (wear during the day time) 1 each 0    pantoprazole (PROTONIX) 40 MG tablet TAKE 1 TABLET BY MOUTH TWICE DAILY 60 tablet 5    allopurinol (ZYLOPRIM) 100 MG tablet TAKE 3 TABLETS BY MOUTH DAILY 270 tablet 1    albuterol sulfate HFA (PROVENTIL HFA) 108 (90 BASE) MCG/ACT inhaler Inhale 2 puffs into the lungs every 4 hours as needed for Wheezing 1 Inhaler 3    budesonide-formoterol (SYMBICORT) 160-4.5 MCG/ACT AERO Inhale 2 puffs into the lungs 2 times daily. 3 Inhaler 3    albuterol-ipratropium (COMBIVENT RESPIMAT)  MCG/ACT AERS inhaler Inhale 1 puff into the lungs every 6 hours. 3 Inhaler 3    OXYGEN Inhale  into the lungs. 2 liters/ nasal cannula prn      aspirin 81 MG tablet Take 81 mg by mouth daily.  therapeutic multivitamin-minerals (THERAGRAN-M) tablet Take 1 tablet by mouth daily. No current facility-administered medications on file prior to encounter. REVIEW OF SYSTEMS    Pertinent items are noted in HPI.       Objective:      /84   Pulse 83   Temp 98.2 °F (36.8 °C) (Oral)   Resp 16   WOUND/ABSCESS 11/10/2017 10:48 AM [facility]   Gram Stain Result  (Abnormal) 11/10/2017 10:48 AM - [facility]    (A)   2+ Gram positive cocci   1+ WBC's (Polymorphonuclear)     Organism  (Abnormal) 11/10/2017 10:48 AM [facility]   BHS Group A (Strep pyogenes)     WOUND/ABSCESS 11/10/2017 10:48 AM [facility]   Light growth   Susceptibility testing of penicillin and other beta-lactams is   not necessary for beta hemolytic Streptococci since resistant   strains have not been identified. (CLSI H782-Z32, 2017)     Organism  (Abnormal) 11/10/2017 10:48 AM [facility] Lab   Diphtheroids     WOUND/ABSCESS 11/10/2017 10:48 AM [address] [facility]   Light growth   No further workup     Testing Performed By     Lab - Abbreviation Name Director Address Valid Date Range   11-- [address] [facility] Sabino Salmeron MD  [address] [address] 08/30/17 1209-Present   19-[facility] Brynn Alonso M.D., Ph.D. [place] [place]. [facility] [address] 08/30/17 1217-Present   Narrative     ORDER#: [id number]                          ORDERED BY: Flakito Sui  SOURCE: Foot                               COLLECTED:  11/10/17 10:48  ANTIBIOTICS AT [facility]. :                      RECEIVED :  11/10/17 13:14  Performed at:  [address] [address]   Phone [phone]   Lab and Collection     Wound Culture on 11/10/2017   Result History     Wound Culture on 11/12/2017       PHYSICAL EXAM   Surgical site well coapted, tenotomy site well coapted wound healed left foot. Hyperkeratotic appropriate hepatic sensations absent. +2 pitting edema bilaterally. Xerosis. Transmetatarsal dictation right foot. Contracted digits left foot 1 through 5. Palpable pedal pulses +2+ DP and PT bilaterally.     Assessment:     Patient Active Problem List   Diagnosis    Atrial fibrillation ([address]    COPD (chronic obstructive pulmonary disease) ([address]    Sleep apnea    CHF (congestive heart failure) ([address]    Hypertension    Hyperlipidemia    Morbid obesity with BMI of 50.0-59.9, adult ([address]    COPD, severe ([address]    Chronic idiopathic gout of multiple sites    Hand arthritis    Skin ulcer of right foot with fat layer exposed ([address]    Chronic multifocal osteomyelitis of foot ([address]    Subacute

## 2018-01-08 ENCOUNTER — OFFICE VISIT (OUTPATIENT)
Dept: INTERNAL MEDICINE CLINIC | Age: 63
End: 2018-01-08

## 2018-01-08 ENCOUNTER — CARE COORDINATOR VISIT (OUTPATIENT)
Dept: CARE COORDINATION | Age: 63
End: 2018-01-08

## 2018-01-08 VITALS
BODY MASS INDEX: 39.17 KG/M2 | HEIGHT: 75 IN | TEMPERATURE: 98.2 F | DIASTOLIC BLOOD PRESSURE: 70 MMHG | SYSTOLIC BLOOD PRESSURE: 124 MMHG | WEIGHT: 315 LBS | OXYGEN SATURATION: 98 %

## 2018-01-08 DIAGNOSIS — R60.0 BILATERAL LEG EDEMA: Primary | ICD-10-CM

## 2018-01-08 DIAGNOSIS — Z89.431 S/P TRANSMETATARSAL AMPUTATION OF FOOT, RIGHT (HCC): ICD-10-CM

## 2018-01-08 DIAGNOSIS — I50.32 CHRONIC DIASTOLIC CONGESTIVE HEART FAILURE (HCC): ICD-10-CM

## 2018-01-08 PROBLEM — L97.512 SKIN ULCER OF RIGHT FOOT WITH FAT LAYER EXPOSED (HCC): Status: RESOLVED | Noted: 2017-07-31 | Resolved: 2018-01-08

## 2018-01-08 PROBLEM — M86.271 SUBACUTE OSTEOMYELITIS OF RIGHT FOOT (HCC): Status: RESOLVED | Noted: 2017-09-15 | Resolved: 2018-01-08

## 2018-01-08 PROBLEM — M86.379 CHRONIC MULTIFOCAL OSTEOMYELITIS OF FOOT (HCC): Chronic | Status: RESOLVED | Noted: 2017-08-11 | Resolved: 2018-01-08

## 2018-01-08 LAB
A/G RATIO: 1.5 (ref 1.1–2.2)
ALBUMIN SERPL-MCNC: 4.4 G/DL (ref 3.4–5)
ALP BLD-CCNC: 120 U/L (ref 40–129)
ALT SERPL-CCNC: 10 U/L (ref 10–40)
ANION GAP SERPL CALCULATED.3IONS-SCNC: 23 MMOL/L (ref 3–16)
AST SERPL-CCNC: 18 U/L (ref 15–37)
BILIRUB SERPL-MCNC: 0.6 MG/DL (ref 0–1)
BUN BLDV-MCNC: 12 MG/DL (ref 7–20)
CALCIUM SERPL-MCNC: 9 MG/DL (ref 8.3–10.6)
CHLORIDE BLD-SCNC: 99 MMOL/L (ref 99–110)
CO2: 25 MMOL/L (ref 21–32)
CREAT SERPL-MCNC: 1.2 MG/DL (ref 0.8–1.3)
CREATININE URINE: 16.1 MG/DL (ref 39–259)
GFR AFRICAN AMERICAN: >60
GFR NON-AFRICAN AMERICAN: >60
GLOBULIN: 2.9 G/DL
GLUCOSE BLD-MCNC: 44 MG/DL (ref 70–99)
POTASSIUM SERPL-SCNC: 4 MMOL/L (ref 3.5–5.1)
PRO-BNP: 2320 PG/ML (ref 0–124)
PROTEIN PROTEIN: 6 MG/DL
PROTEIN/CREAT RATIO: 0.4 MG/DL
SODIUM BLD-SCNC: 147 MMOL/L (ref 136–145)
TOTAL PROTEIN: 7.3 G/DL (ref 6.4–8.2)

## 2018-01-08 PROCEDURE — G8428 CUR MEDS NOT DOCUMENT: HCPCS | Performed by: INTERNAL MEDICINE

## 2018-01-08 PROCEDURE — 3017F COLORECTAL CA SCREEN DOC REV: CPT | Performed by: INTERNAL MEDICINE

## 2018-01-08 PROCEDURE — G8417 CALC BMI ABV UP PARAM F/U: HCPCS | Performed by: INTERNAL MEDICINE

## 2018-01-08 PROCEDURE — G8484 FLU IMMUNIZE NO ADMIN: HCPCS | Performed by: INTERNAL MEDICINE

## 2018-01-08 PROCEDURE — 1036F TOBACCO NON-USER: CPT | Performed by: INTERNAL MEDICINE

## 2018-01-08 PROCEDURE — 99214 OFFICE O/P EST MOD 30 MIN: CPT | Performed by: INTERNAL MEDICINE

## 2018-01-08 RX ORDER — FUROSEMIDE 40 MG/1
TABLET ORAL
Qty: 270 TABLET | Refills: 1 | Status: ON HOLD | OUTPATIENT
Start: 2018-01-08 | End: 2018-02-23 | Stop reason: HOSPADM

## 2018-01-08 NOTE — CARE COORDINATION
Ambulatory Care Coordination Note  1/8/2018  CM Risk Score: 14  [place] Mortality Risk Score:      ACC: Stephanie Anderson RN    Summary Note: Nurse Care Coordinator met with patient while he was in to see Mary Ramírez MD.  He was in this am, he brought his father to his appt, and stated that he was gaining weight and his legs had increased swelling. He was added on for this afternoon by Carolina Kelly. He also has had increased sob w/minimal exertion. He took an extra 20mg lasix yesterday on his own, and states he has urinated a lot this am and he feels it helped. He is at this appt w/o wearing his compression stockings. He also didn't have them on this am.  ACC reviewed importance of those stockings, as well as monitoring sodium intake, and daily wgts. See education plan. Care Coordination Interventions    Program Enrollment:  Complex Care  Referral from Primary Care Provider:  No  Suggested Interventions and Community Resources  Home Health Services:  Completed  Life Skills Coaching: In Process  Zone Management Tools: In Process         Goals Addressed             Most Recent       Care Coordination     Conditions and Symptoms   On track (1/8/2018)             I will schedule office visits, as directed by my provider. I will follow my Zone Management tool to seek urgent or emergent care. I will notify my provider of any symptoms that indicate a worsening of my condition. Barriers: overwhelmed by complexity of regimen and lack of education  Plan for overcoming my barriers: will call [facility], [phone] [place] [address] (while avail) or PCP w/any changes, concerns regarding wound or breathing/CHF   Confidence: 8/10  Anticipated Goal Completion Date: 3 months              Prior to Admission medications    Medication Sig Start Date End Date Taking? Authorizing Provider   furosemide (LASIX) 40 MG tablet Take 80mg in the AM and 40mg.  In the PM 1/8/18   Mary Ramírez MD   atorvastatin (LIPITOR) 20 MG tablet Take 1

## 2018-01-09 ENCOUNTER — TELEPHONE (OUTPATIENT)
Dept: INTERNAL MEDICINE CLINIC | Age: 63
End: 2018-01-09

## 2018-01-09 DIAGNOSIS — I50.32 CHRONIC DIASTOLIC CONGESTIVE HEART FAILURE (HCC): ICD-10-CM

## 2018-01-09 DIAGNOSIS — R73.09 LOW GLUCOSE LEVEL: Primary | ICD-10-CM

## 2018-01-09 LAB — GLUCOSE BLD-MCNC: 62 MG/DL (ref 70–99)

## 2018-01-22 ENCOUNTER — CARE COORDINATION (OUTPATIENT)
Dept: CARE COORDINATION | Age: 63
End: 2018-01-22

## 2018-01-22 NOTE — CARE COORDINATION
ACC called and spoke to pt regarding his weight. He states that the extra lasix he had taken prior to [address] on 1/8/18 has seemed to have kept the weight off. He stated he has watched his fluid intake and restriction guidelines. His leg edema has decreased and he was able to walk easily around the grocery store yesterday. He was encouraged to call if he starts to notice his wgt go back up, following the zone mgt guidelines, or have other symptoms. He verbalized understanding.

## 2018-02-05 ENCOUNTER — CARE COORDINATION (OUTPATIENT)
Dept: CARE COORDINATION | Age: 63
End: 2018-02-05

## 2018-02-05 NOTE — CARE COORDINATION
Pt called ACC to tell her that he feels like his CHF is worse. He states his wgt was up to 355# lb today. He weighed 343# on 1/8/18 OV, and 312# on 12/8/2017. He states his sob is worse than when he was here in Jan. He feels his legs are much more edematous, the skin is tighter. He is wrapping his legs and keeping them elevated every day. He is taking his Lasix as dir. He denied any extra intake of sodium. He stated he hardly urinated at all this weekend. He is urinating a little more today. He is using his Proventil inhaler several times throughout the day for his increased sob, and feels this helps. ACC asked he felt he should be evaluated, but he initially declined. He stated he didn't know what an OV would do to help, he just wanted Vesta Hernandez to know. He also wanted to know if there was a surgeon who Vesta Hernandez thought would remove the extra abdominal skin that is left over from his huge wgt loss from 1 yr ago. He feels that extra skin/wgt is also making it harder for him to walk around, and makes his sob worse when ambulating. ACC explained she would forward the info to Vesta Hernandez and someone would get back w/him.

## 2018-02-05 NOTE — TELEPHONE ENCOUNTER
I would like him to increase his furosemide to 80 mg twice daily and have an office visit in 1 week. Please check on him towards the end of the week to make sure his weight is coming down and his shortness of breath is improving. I will address his excess skin concern at an office visit.

## 2018-02-06 ENCOUNTER — CARE COORDINATION (OUTPATIENT)
Dept: CARE COORDINATION | Age: 63
End: 2018-02-06

## 2018-02-06 NOTE — CARE COORDINATION
Pt called ACC this am. He stated he went to pharmacy to get a refill on his Voltaren gel. He was told by pharmacist that his PCP refused it and he needed an appt first. [address] explained again she doesn't call in or handle refills. ACC did state she would forward msg on to Pearl Abdalla and Fidelia Ferris to investigate. He wanted Pearl Abdalla to know that since he has been using this on his knees he hasn't had to have a cortisone shot in a couple of years. ACC reviewed med history, it appears it was last prescribed in 2015. It was refused by [facility] on 1/30/18, per msg in [place] [id number] on incoming refill request from [employer].

## 2018-02-09 ENCOUNTER — CARE COORDINATION (OUTPATIENT)
Dept: CARE COORDINATION | Age: 63
End: 2018-02-09

## 2018-02-09 NOTE — CARE COORDINATION
Nurse Care Coordinator spoke to pt today. Pt states he has been taking the increased dose of lasix ( 80mg bid) as dir since Monday. He is urinating more, but doesn't feel his edema is less, or his sob has improved. He states his legs are still very edematous. ACC reminded pt that they were that way at last OV, and he does have some chronic bilat leg edema. He has continued to wrap them and keep them elevated when sitting. He went to 2d2c today and stated he had to stop several times d/t sob. Despite being asked several times to weigh himself QD he has not. He states he can tell he is holding water by the way he feels. ACC again urged him to weigh QD so  could more accurately teodora how much fluid he is retaining, and how better to adjust his treatment. He was reminded of appt this Tues, Feb 13th. He was asked to call the office or [facility] Monday am if he felt his symptoms worsened over the weekend. He verbalized understanding.      Future Appointments  Date Time Provider Natasha Yap   2/13/2018 11:40 AM MD MARGARITA Carrasco  [facility]   3/19/2018 7:30 AM Caty Osman MD  Cardio [facility]   4/9/2018 8:00 AM MD MARGARITA Carrasco [facility]

## 2018-02-13 ENCOUNTER — OFFICE VISIT (OUTPATIENT)
Dept: INTERNAL MEDICINE CLINIC | Age: 63
End: 2018-02-13

## 2018-02-13 VITALS
SYSTOLIC BLOOD PRESSURE: 130 MMHG | HEIGHT: 75 IN | DIASTOLIC BLOOD PRESSURE: 82 MMHG | OXYGEN SATURATION: 94 % | BODY MASS INDEX: 39.17 KG/M2 | HEART RATE: 92 BPM | WEIGHT: 315 LBS

## 2018-02-13 DIAGNOSIS — J44.9 COPD, SEVERE (HCC): ICD-10-CM

## 2018-02-13 DIAGNOSIS — G47.33 OBSTRUCTIVE SLEEP APNEA SYNDROME: ICD-10-CM

## 2018-02-13 DIAGNOSIS — I50.33 ACUTE ON CHRONIC DIASTOLIC CONGESTIVE HEART FAILURE (HCC): Primary | ICD-10-CM

## 2018-02-13 PROBLEM — I50.32 CHRONIC DIASTOLIC CONGESTIVE HEART FAILURE (HCC): Status: ACTIVE | Noted: 2018-02-13

## 2018-02-13 LAB
A/G RATIO: 1.7 (ref 1.1–2.2)
ALBUMIN SERPL-MCNC: 4.4 G/DL (ref 3.4–5)
ALP BLD-CCNC: 119 U/L (ref 40–129)
ALT SERPL-CCNC: 13 U/L (ref 10–40)
ANION GAP SERPL CALCULATED.3IONS-SCNC: 16 MMOL/L (ref 3–16)
AST SERPL-CCNC: 22 U/L (ref 15–37)
BILIRUB SERPL-MCNC: 0.9 MG/DL (ref 0–1)
BUN BLDV-MCNC: 14 MG/DL (ref 7–20)
CALCIUM SERPL-MCNC: 9.4 MG/DL (ref 8.3–10.6)
CHLORIDE BLD-SCNC: 102 MMOL/L (ref 99–110)
CO2: 27 MMOL/L (ref 21–32)
CREAT SERPL-MCNC: 1.4 MG/DL (ref 0.8–1.3)
GFR AFRICAN AMERICAN: >60
GFR NON-AFRICAN AMERICAN: 51
GLOBULIN: 2.6 G/DL
GLUCOSE BLD-MCNC: 83 MG/DL (ref 70–99)
HCT VFR BLD CALC: 25.3 % (ref 40.5–52.5)
HEMATOLOGY PATH CONSULT: YES
HEMOGLOBIN: 7.6 G/DL (ref 13.5–17.5)
MCH RBC QN AUTO: 19.6 PG (ref 26–34)
MCHC RBC AUTO-ENTMCNC: 30.2 G/DL (ref 31–36)
MCV RBC AUTO: 65 FL (ref 80–100)
PDW BLD-RTO: 18.6 % (ref 12.4–15.4)
PLATELET # BLD: 299 K/UL (ref 135–450)
PMV BLD AUTO: 7.6 FL (ref 5–10.5)
POTASSIUM SERPL-SCNC: 4 MMOL/L (ref 3.5–5.1)
PRO-BNP: 3219 PG/ML (ref 0–124)
RBC # BLD: 3.89 M/UL (ref 4.2–5.9)
SODIUM BLD-SCNC: 145 MMOL/L (ref 136–145)
TOTAL PROTEIN: 7 G/DL (ref 6.4–8.2)
WBC # BLD: 9.3 K/UL (ref 4–11)

## 2018-02-13 PROCEDURE — G8484 FLU IMMUNIZE NO ADMIN: HCPCS | Performed by: INTERNAL MEDICINE

## 2018-02-13 PROCEDURE — 99214 OFFICE O/P EST MOD 30 MIN: CPT | Performed by: INTERNAL MEDICINE

## 2018-02-13 PROCEDURE — G8427 DOCREV CUR MEDS BY ELIG CLIN: HCPCS | Performed by: INTERNAL MEDICINE

## 2018-02-13 PROCEDURE — 3017F COLORECTAL CA SCREEN DOC REV: CPT | Performed by: INTERNAL MEDICINE

## 2018-02-13 PROCEDURE — 1036F TOBACCO NON-USER: CPT | Performed by: INTERNAL MEDICINE

## 2018-02-13 PROCEDURE — G8417 CALC BMI ABV UP PARAM F/U: HCPCS | Performed by: INTERNAL MEDICINE

## 2018-02-13 PROCEDURE — 3023F SPIROM DOC REV: CPT | Performed by: INTERNAL MEDICINE

## 2018-02-13 PROCEDURE — G8926 SPIRO NO PERF OR DOC: HCPCS | Performed by: INTERNAL MEDICINE

## 2018-02-13 RX ORDER — METOLAZONE 5 MG/1
TABLET ORAL
Qty: 6 TABLET | Refills: 0 | Status: ON HOLD | OUTPATIENT
Start: 2018-02-13 | End: 2018-02-23 | Stop reason: HOSPADM

## 2018-02-13 RX ORDER — METOLAZONE 5 MG/1
TABLET ORAL
Qty: 6 TABLET | Refills: 0 | Status: SHIPPED | OUTPATIENT
Start: 2018-02-13 | End: 2018-02-13 | Stop reason: SDUPTHER

## 2018-02-13 NOTE — PROGRESS NOTES
Chief Complaint   Patient presents with    Congestive Heart Failure    Shortness of Breath    Leg Swelling       HPI:  Barbra Mckeon is a 65 y.o. (: 1955) here today for a follow up for his chronic diastolic congestive heart failure. CHF: He was last seen 5 weeks ago, c/o shortness of breath is worsening, bilateral leg edema is worsening. Location: legs, chest  Onset: worse over the last several months. Aggravating factors: walking to the bathroom/walking short distances  Alleviating factors: none known. His Lasix was increased to 80 mg twice daily, but he has not noticed significant improvement. He reports about 40 pounds of weight gain since the past summer. His symptoms are confounded by COPD. He reports that he is taking his medicine as directed which includes Symbicort and Combivent. He does not feel like he has active acute bronchitis. His symptoms are also confounded by obstructive sleep apnea. He is unaware of when this was diagnosed. He reports that he feels tired frequently and falls asleep very easily. He reports that he has never been treated with CPAP. PFSH:    Social History   Substance Use Topics    Smoking status: Never Smoker    Smokeless tobacco: Never Used    Alcohol use No      Comment: quit 4 years ago      He reports that he does not eat very much sodium.     ROS:  CV: Neg for chest pain  RESP: Positive for dyspnea, cough, wheezing  GI: Neg for constipation or diarrhea  : Positive for frequent urination       OBJECTIVE:    /82   Pulse 92   Ht 6' 3\" (1.905 m)   Wt (!) 350 lb (158.8 kg)   SpO2 94%   BMI 43.75 kg/m²   BP Readings from Last 2 Encounters:   18 130/82   18 124/70     Wt Readings from Last 3 Encounters:   18 (!) 350 lb (158.8 kg)   18 (!) 343 lb (155.6 kg)   17 (!) 312 lb 12.8 oz (141.9 kg)       GEN: Chronically ill in appearance, but not in acute distress  CV: Normal rate and irregular rhythm, no

## 2018-02-14 LAB — HEMATOLOGY PATH CONSULT: NORMAL

## 2018-02-16 ENCOUNTER — CARE COORDINATION (OUTPATIENT)
Dept: CARE COORDINATION | Age: 63
End: 2018-02-16

## 2018-02-16 NOTE — CARE COORDINATION
Pt called ACC, with questions regarding recent labs, and to verify he truly needed an EGD and colonoscopy. ACC explained importance of both, especially w/his history of polyps and abdominal surgery. ACC reinforced what was explained to him by Avis Shah. HE was agreeable to scheduling both tests, and will call on Monday if the office doesn't call him today to schedule. Pt also stated he has lost 15# since taking the Zaroxolyn. He understands to take it next M-W-F as well. He stated the chest heaviness is gone when he lies down and can sleep easier. He was able to walk through [employer] this am and go to [facility] [place] w[facility]o increased sob. He believes his legs are less edematous. He called 's office for appt regarding his OLEG. He could not get an appt until first of May. He was asked to be put on the cx list.     ACC will continue to follow, Pt was encouraged to call w/any needs or concerns.

## 2018-02-19 PROBLEM — R07.9 CHEST PAIN: Status: ACTIVE | Noted: 2018-02-19

## 2018-02-19 PROBLEM — L03.116 CELLULITIS OF LEFT LEG: Status: ACTIVE | Noted: 2018-02-19

## 2018-02-19 PROBLEM — R07.89 CHEST WALL PAIN: Status: ACTIVE | Noted: 2018-02-19

## 2018-02-20 ENCOUNTER — CARE COORDINATION (OUTPATIENT)
Dept: CARE COORDINATION | Age: 63
End: 2018-02-20

## 2018-02-20 NOTE — CARE COORDINATION
ACC spoke to  at [address] She was informed pt was currently admitted, and is scheduled for test next week. She stated it is up to the physician following the pt at [facility] to order a GI consult, and they will then determine whether pt can have EGD next week. She didn't take a msg or offer to send a msg to .  She again stated it had to come from the hospitalist.

## 2018-02-20 NOTE — CARE COORDINATION
PT called this ACC to inform her he was admitted to [facility]. He stated he got chest pain along w/N&V Fri afternoon that lasted until yesterday when he came to the hospital. He is concerned b/c he is scheduled for an EGD next Wed w/[facility], . He stated someone there told him he wasn't allowed to have that d/t meds he is now on. He is asking for direction on this. ACC explained she would call  office and inform them pt is at [facility].

## 2018-02-24 ENCOUNTER — CARE COORDINATION (OUTPATIENT)
Dept: CASE MANAGEMENT | Age: 63
End: 2018-02-24

## 2018-02-24 DIAGNOSIS — I48.20 CHRONIC ATRIAL FIBRILLATION (HCC): Primary | ICD-10-CM

## 2018-02-24 PROCEDURE — 1111F DSCHRG MED/CURRENT MED MERGE: CPT

## 2018-02-27 ENCOUNTER — CARE COORDINATOR VISIT (OUTPATIENT)
Dept: CARE COORDINATION | Age: 63
End: 2018-02-27

## 2018-02-27 ENCOUNTER — OFFICE VISIT (OUTPATIENT)
Dept: INTERNAL MEDICINE CLINIC | Age: 63
End: 2018-02-27

## 2018-02-27 VITALS
BODY MASS INDEX: 38.36 KG/M2 | SYSTOLIC BLOOD PRESSURE: 118 MMHG | HEIGHT: 76 IN | DIASTOLIC BLOOD PRESSURE: 76 MMHG | TEMPERATURE: 98.5 F | WEIGHT: 315 LBS | HEART RATE: 88 BPM

## 2018-02-27 DIAGNOSIS — L03.116 LEFT LEG CELLULITIS: Primary | ICD-10-CM

## 2018-02-27 DIAGNOSIS — I50.32 CHRONIC DIASTOLIC CONGESTIVE HEART FAILURE (HCC): ICD-10-CM

## 2018-02-27 DIAGNOSIS — N17.9 ACUTE RENAL FAILURE SUPERIMPOSED ON STAGE 3 CHRONIC KIDNEY DISEASE, UNSPECIFIED ACUTE RENAL FAILURE TYPE: ICD-10-CM

## 2018-02-27 DIAGNOSIS — D50.9 MICROCYTIC ANEMIA: ICD-10-CM

## 2018-02-27 DIAGNOSIS — N18.3 ACUTE RENAL FAILURE SUPERIMPOSED ON STAGE 3 CHRONIC KIDNEY DISEASE, UNSPECIFIED ACUTE RENAL FAILURE TYPE: ICD-10-CM

## 2018-02-27 DIAGNOSIS — I48.20 CHRONIC ATRIAL FIBRILLATION (HCC): ICD-10-CM

## 2018-02-27 PROBLEM — R07.9 CHEST PAIN: Status: RESOLVED | Noted: 2018-02-19 | Resolved: 2018-02-27

## 2018-02-27 LAB
ALBUMIN SERPL-MCNC: 3.8 G/DL (ref 3.4–5)
ANION GAP SERPL CALCULATED.3IONS-SCNC: 16 MMOL/L (ref 3–16)
BUN BLDV-MCNC: 15 MG/DL (ref 7–20)
CALCIUM SERPL-MCNC: 8.4 MG/DL (ref 8.3–10.6)
CHLORIDE BLD-SCNC: 95 MMOL/L (ref 99–110)
CO2: 26 MMOL/L (ref 21–32)
CREAT SERPL-MCNC: 1.3 MG/DL (ref 0.8–1.3)
GFR AFRICAN AMERICAN: >60
GFR NON-AFRICAN AMERICAN: 56
GLUCOSE BLD-MCNC: 97 MG/DL (ref 70–99)
PHOSPHORUS: 3.7 MG/DL (ref 2.5–4.9)
POTASSIUM SERPL-SCNC: 4 MMOL/L (ref 3.5–5.1)
SODIUM BLD-SCNC: 137 MMOL/L (ref 136–145)

## 2018-02-27 PROCEDURE — 99496 TRANSJ CARE MGMT HIGH F2F 7D: CPT | Performed by: INTERNAL MEDICINE

## 2018-02-28 ENCOUNTER — CARE COORDINATION (OUTPATIENT)
Dept: CASE MANAGEMENT | Age: 63
End: 2018-02-28

## 2018-02-28 NOTE — CARE COORDINATION
[facility] Transitions Follow Up Call    2018    Patient: Dennis Hercules  Patient : 1955   MRN: [id number]  Reason for Admission: cellulitis of left leg  Discharge Date: 18 RARS: Risk Score: 25.5       Spoke with: Cyndi Guerra    Care Transitions Subsequent and Final Call    Subsequent and Final Calls  Do you have any ongoing symptoms?:  No  Have your medications changed?:  No  Do you have any questions related to your medications?:  No  Do you currently have any active services?:  No  Are you currently active with any services?:  Home Health  Do you have any needs or concerns that I can assist you with?:  No  Identified Barriers:  None  Care Transitions Interventions  No Identified Needs  Other Interventions:          Pt states doing well, no issues or concerns. F/U with PCP went well, has scheduled his EGD and colonoscopy for 3/20/18. F/U appts listed below.  Agreed to more CTC f/u calls      Follow Up  Future Appointments  Date Time Provider Natasha Yap   3/13/2018 9:40 [facility]   3/19/2018 7:30 AM Marcela Bolton MD FF Cardio [facility]   2018 8:00 AM [facility]   2018 11:40 AM Brisa Barone MD FF SLEEP MED [facility]       Genie Love RN

## 2018-03-01 ENCOUNTER — CARE COORDINATION (OUTPATIENT)
Dept: CARE COORDINATION | Age: 63
End: 2018-03-01

## 2018-03-01 PROBLEM — K92.2 GI BLEED: Status: ACTIVE | Noted: 2018-03-01

## 2018-03-03 ENCOUNTER — CARE COORDINATION (OUTPATIENT)
Dept: CASE MANAGEMENT | Age: 63
End: 2018-03-03

## 2018-03-04 ENCOUNTER — CARE COORDINATION (OUTPATIENT)
Dept: CASE MANAGEMENT | Age: 63
End: 2018-03-04

## 2018-03-04 DIAGNOSIS — I48.20 CHRONIC ATRIAL FIBRILLATION (HCC): Primary | ICD-10-CM

## 2018-03-04 PROCEDURE — 1111F DSCHRG MED/CURRENT MED MERGE: CPT

## 2018-03-08 ENCOUNTER — CARE COORDINATION (OUTPATIENT)
Dept: CASE MANAGEMENT | Age: 63
End: 2018-03-08

## 2018-03-08 NOTE — CARE COORDINATION
[facility] Transitions Follow Up Call    3/8/2018    Patient: Cinthya Johnson  Patient : 1955   MRN: [id number]  Reason for Admission: There are no discharge diagnoses documented for the most recent discharge. Discharge Date: 3/2/18 RARS: Risk Score: 25.5       Spoke with: Jakub Haley    Care Transitions Subsequent and Final Call    Subsequent and Final Calls  Do you have any ongoing symptoms?:  No  Have your medications changed?:  No  Do you have any questions related to your medications?:  No  Do you currently have any active services?:  No  Are you currently active with any services?:  Home Health  Do you have any needs or concerns that I can assist you with?:  No  Identified Barriers:  None  Care Transitions Interventions  No Identified Needs  Other Interventions:          Pt states doing well, no issues or concerns. F/U appts listed below. Agreed to more CTC f/u calls      Follow Up  Future Appointments  Date Time Provider Natasha Yap   3/13/2018 9:40 AM Jennifer Tam MD [facility]   3/19/2018 7:30 AM Chato Carter MD  Cardio [facility]   3/20/2018 11:00 AM Sam Millard MD F ENDOS None   2018 11:40 AM Sarah Matos MD FF SLEEP MED [facility]       Bashir Barnett RN

## 2018-03-13 ENCOUNTER — CARE COORDINATOR VISIT (OUTPATIENT)
Dept: CARE COORDINATION | Age: 63
End: 2018-03-13

## 2018-03-13 ENCOUNTER — OFFICE VISIT (OUTPATIENT)
Dept: INTERNAL MEDICINE CLINIC | Age: 63
End: 2018-03-13

## 2018-03-13 VITALS
HEIGHT: 76 IN | SYSTOLIC BLOOD PRESSURE: 120 MMHG | DIASTOLIC BLOOD PRESSURE: 82 MMHG | WEIGHT: 307 LBS | HEART RATE: 84 BPM | BODY MASS INDEX: 37.38 KG/M2

## 2018-03-13 DIAGNOSIS — I10 ESSENTIAL HYPERTENSION: ICD-10-CM

## 2018-03-13 DIAGNOSIS — I48.20 CHRONIC ATRIAL FIBRILLATION (HCC): ICD-10-CM

## 2018-03-13 DIAGNOSIS — K92.2 LOWER GI BLEED: Primary | ICD-10-CM

## 2018-03-13 LAB
ALBUMIN SERPL-MCNC: 4.1 G/DL (ref 3.4–5)
ANION GAP SERPL CALCULATED.3IONS-SCNC: 13 MMOL/L (ref 3–16)
BUN BLDV-MCNC: 14 MG/DL (ref 7–20)
CALCIUM SERPL-MCNC: 9.2 MG/DL (ref 8.3–10.6)
CHLORIDE BLD-SCNC: 102 MMOL/L (ref 99–110)
CO2: 26 MMOL/L (ref 21–32)
CREAT SERPL-MCNC: 1.2 MG/DL (ref 0.8–1.3)
GFR AFRICAN AMERICAN: >60
GFR NON-AFRICAN AMERICAN: >60
GLUCOSE BLD-MCNC: 85 MG/DL (ref 70–99)
HEMOGLOBIN: 10.8 G/DL (ref 13.5–17.5)
PHOSPHORUS: 3.7 MG/DL (ref 2.5–4.9)
POTASSIUM SERPL-SCNC: 4.8 MMOL/L (ref 3.5–5.1)
SODIUM BLD-SCNC: 141 MMOL/L (ref 136–145)

## 2018-03-13 PROCEDURE — 99495 TRANSJ CARE MGMT MOD F2F 14D: CPT | Performed by: INTERNAL MEDICINE

## 2018-03-13 RX ORDER — FERROUS SULFATE 325(65) MG
325 TABLET ORAL DAILY
Qty: 30 TABLET | Refills: 3 | Status: SHIPPED | OUTPATIENT
Start: 2018-03-13 | End: 2018-07-09 | Stop reason: SDUPTHER

## 2018-03-13 RX ORDER — FUROSEMIDE 40 MG/1
40 TABLET ORAL DAILY
COMMUNITY
End: 2018-06-11 | Stop reason: SDUPTHER

## 2018-03-14 ENCOUNTER — CARE COORDINATION (OUTPATIENT)
Dept: CASE MANAGEMENT | Age: 63
End: 2018-03-14

## 2018-03-20 ENCOUNTER — TELEPHONE (OUTPATIENT)
Dept: CARDIOLOGY CLINIC | Age: 63
End: 2018-03-20

## 2018-03-20 DIAGNOSIS — I50.32 CHRONIC DIASTOLIC CONGESTIVE HEART FAILURE (HCC): Primary | ICD-10-CM

## 2018-03-20 DIAGNOSIS — I10 ESSENTIAL HYPERTENSION: ICD-10-CM

## 2018-03-22 RX ORDER — ALLOPURINOL 100 MG/1
300 TABLET ORAL DAILY
Qty: 270 TABLET | Refills: 5 | Status: ON HOLD | OUTPATIENT
Start: 2018-03-22 | End: 2019-05-03

## 2018-04-03 ENCOUNTER — CARE COORDINATION (OUTPATIENT)
Dept: CARE COORDINATION | Age: 63
End: 2018-04-03

## 2018-04-09 ENCOUNTER — OFFICE VISIT (OUTPATIENT)
Dept: INTERNAL MEDICINE CLINIC | Age: 63
End: 2018-04-09

## 2018-04-09 ENCOUNTER — CARE COORDINATOR VISIT (OUTPATIENT)
Dept: CARE COORDINATION | Age: 63
End: 2018-04-09

## 2018-04-09 VITALS
HEART RATE: 88 BPM | SYSTOLIC BLOOD PRESSURE: 136 MMHG | HEIGHT: 76 IN | WEIGHT: 315 LBS | DIASTOLIC BLOOD PRESSURE: 82 MMHG | BODY MASS INDEX: 38.36 KG/M2

## 2018-04-09 DIAGNOSIS — M17.12 ARTHRITIS OF LEFT KNEE: ICD-10-CM

## 2018-04-09 DIAGNOSIS — M17.11 ARTHRITIS OF RIGHT KNEE: Primary | ICD-10-CM

## 2018-04-09 PROCEDURE — 20610 DRAIN/INJ JOINT/BURSA W/O US: CPT | Performed by: INTERNAL MEDICINE

## 2018-04-09 RX ORDER — TRIAMCINOLONE ACETONIDE 40 MG/ML
40 INJECTION, SUSPENSION INTRA-ARTICULAR; INTRAMUSCULAR ONCE
Status: COMPLETED | OUTPATIENT
Start: 2018-04-09 | End: 2018-04-09

## 2018-04-09 RX ORDER — DILTIAZEM HYDROCHLORIDE 180 MG/1
180 CAPSULE, COATED, EXTENDED RELEASE ORAL 2 TIMES DAILY
Qty: 180 CAPSULE | Refills: 3 | Status: SHIPPED | OUTPATIENT
Start: 2018-04-09 | End: 2018-09-19 | Stop reason: SDUPTHER

## 2018-04-09 RX ADMIN — TRIAMCINOLONE ACETONIDE 40 MG: 40 INJECTION, SUSPENSION INTRA-ARTICULAR; INTRAMUSCULAR at 15:16

## 2018-04-09 RX ADMIN — TRIAMCINOLONE ACETONIDE 40 MG: 40 INJECTION, SUSPENSION INTRA-ARTICULAR; INTRAMUSCULAR at 15:15

## 2018-04-24 ENCOUNTER — TELEPHONE (OUTPATIENT)
Dept: CARDIOLOGY CLINIC | Age: 63
End: 2018-04-24

## 2018-04-24 DIAGNOSIS — I50.32 CHRONIC DIASTOLIC CONGESTIVE HEART FAILURE (HCC): ICD-10-CM

## 2018-04-24 DIAGNOSIS — I10 ESSENTIAL HYPERTENSION: ICD-10-CM

## 2018-05-03 ENCOUNTER — TELEPHONE (OUTPATIENT)
Dept: INTERNAL MEDICINE CLINIC | Age: 63
End: 2018-05-03

## 2018-05-03 RX ORDER — POTASSIUM CHLORIDE 20 MEQ/1
40 TABLET, EXTENDED RELEASE ORAL 3 TIMES DAILY
Qty: 540 TABLET | Refills: 1 | Status: SHIPPED | OUTPATIENT
Start: 2018-05-03 | End: 2018-10-22 | Stop reason: SDUPTHER

## 2018-05-14 ENCOUNTER — CARE COORDINATION (OUTPATIENT)
Dept: CARE COORDINATION | Age: 63
End: 2018-05-14

## 2018-05-15 ENCOUNTER — CARE COORDINATION (OUTPATIENT)
Dept: CARE COORDINATION | Age: 63
End: 2018-05-15

## 2018-05-15 ENCOUNTER — OFFICE VISIT (OUTPATIENT)
Dept: INTERNAL MEDICINE CLINIC | Age: 63
End: 2018-05-15

## 2018-05-15 VITALS
BODY MASS INDEX: 38.36 KG/M2 | SYSTOLIC BLOOD PRESSURE: 128 MMHG | HEART RATE: 68 BPM | HEIGHT: 76 IN | WEIGHT: 315 LBS | DIASTOLIC BLOOD PRESSURE: 76 MMHG

## 2018-05-15 DIAGNOSIS — M79.10 MYALGIA: Primary | ICD-10-CM

## 2018-05-15 LAB
C-REACTIVE PROTEIN: 13.5 MG/L (ref 0–5.1)
HCT VFR BLD CALC: 38.5 % (ref 40.5–52.5)
HEMOGLOBIN: 12 G/DL (ref 13.5–17.5)
MAGNESIUM: 2.2 MG/DL (ref 1.8–2.4)
MCH RBC QN AUTO: 23.6 PG (ref 26–34)
MCHC RBC AUTO-ENTMCNC: 31.1 G/DL (ref 31–36)
MCV RBC AUTO: 75.7 FL (ref 80–100)
PDW BLD-RTO: 20.1 % (ref 12.4–15.4)
PLATELET # BLD: 374 K/UL (ref 135–450)
PMV BLD AUTO: 8.3 FL (ref 5–10.5)
POTASSIUM SERPL-SCNC: 3.9 MMOL/L (ref 3.5–5.1)
RBC # BLD: 5.08 M/UL (ref 4.2–5.9)
TOTAL CK: 58 U/L (ref 39–308)
WBC # BLD: 12.3 K/UL (ref 4–11)

## 2018-05-15 PROCEDURE — 99213 OFFICE O/P EST LOW 20 MIN: CPT | Performed by: INTERNAL MEDICINE

## 2018-05-15 PROCEDURE — G8417 CALC BMI ABV UP PARAM F/U: HCPCS | Performed by: INTERNAL MEDICINE

## 2018-05-15 PROCEDURE — 3017F COLORECTAL CA SCREEN DOC REV: CPT | Performed by: INTERNAL MEDICINE

## 2018-05-15 PROCEDURE — 1036F TOBACCO NON-USER: CPT | Performed by: INTERNAL MEDICINE

## 2018-05-15 PROCEDURE — G8427 DOCREV CUR MEDS BY ELIG CLIN: HCPCS | Performed by: INTERNAL MEDICINE

## 2018-05-15 RX ORDER — THIAMINE HCL 100 MG
TABLET ORAL
COMMUNITY
End: 2019-05-21 | Stop reason: ALTCHOICE

## 2018-06-11 RX ORDER — FUROSEMIDE 40 MG/1
40 TABLET ORAL DAILY
Qty: 90 TABLET | Refills: 1 | Status: SHIPPED | OUTPATIENT
Start: 2018-06-11 | End: 2018-06-28 | Stop reason: DRUGHIGH

## 2018-06-14 ENCOUNTER — CARE COORDINATION (OUTPATIENT)
Dept: CARE COORDINATION | Age: 63
End: 2018-06-14

## 2018-06-14 ENCOUNTER — OFFICE VISIT (OUTPATIENT)
Dept: INTERNAL MEDICINE CLINIC | Age: 63
End: 2018-06-14

## 2018-06-14 VITALS
BODY MASS INDEX: 38.36 KG/M2 | HEART RATE: 80 BPM | DIASTOLIC BLOOD PRESSURE: 74 MMHG | WEIGHT: 315 LBS | HEIGHT: 76 IN | SYSTOLIC BLOOD PRESSURE: 112 MMHG

## 2018-06-14 DIAGNOSIS — I50.32 CHRONIC DIASTOLIC CONGESTIVE HEART FAILURE (HCC): ICD-10-CM

## 2018-06-14 DIAGNOSIS — J44.9 COPD, SEVERE (HCC): ICD-10-CM

## 2018-06-14 DIAGNOSIS — I10 ESSENTIAL HYPERTENSION: Primary | ICD-10-CM

## 2018-06-14 PROCEDURE — G8417 CALC BMI ABV UP PARAM F/U: HCPCS | Performed by: INTERNAL MEDICINE

## 2018-06-14 PROCEDURE — 99214 OFFICE O/P EST MOD 30 MIN: CPT | Performed by: INTERNAL MEDICINE

## 2018-06-14 PROCEDURE — 3017F COLORECTAL CA SCREEN DOC REV: CPT | Performed by: INTERNAL MEDICINE

## 2018-06-14 PROCEDURE — 1036F TOBACCO NON-USER: CPT | Performed by: INTERNAL MEDICINE

## 2018-06-14 PROCEDURE — G8926 SPIRO NO PERF OR DOC: HCPCS | Performed by: INTERNAL MEDICINE

## 2018-06-14 PROCEDURE — G8427 DOCREV CUR MEDS BY ELIG CLIN: HCPCS | Performed by: INTERNAL MEDICINE

## 2018-06-14 PROCEDURE — 3023F SPIROM DOC REV: CPT | Performed by: INTERNAL MEDICINE

## 2018-06-27 ENCOUNTER — CARE COORDINATION (OUTPATIENT)
Dept: CARE COORDINATION | Age: 63
End: 2018-06-27

## 2018-06-27 DIAGNOSIS — I50.32 CHRONIC DIASTOLIC CONGESTIVE HEART FAILURE (HCC): Primary | ICD-10-CM

## 2018-06-28 RX ORDER — FUROSEMIDE 40 MG/1
40 TABLET ORAL 2 TIMES DAILY
Qty: 180 TABLET | Refills: 1 | Status: SHIPPED | OUTPATIENT
Start: 2018-06-28 | End: 2018-08-14 | Stop reason: SDUPTHER

## 2018-06-29 LAB
ALBUMIN SERPL-MCNC: 4 G/DL (ref 3.4–5)
ANION GAP SERPL CALCULATED.3IONS-SCNC: 16 MMOL/L (ref 3–16)
BUN BLDV-MCNC: 15 MG/DL (ref 7–20)
CALCIUM SERPL-MCNC: 9 MG/DL (ref 8.3–10.6)
CHLORIDE BLD-SCNC: 102 MMOL/L (ref 99–110)
CO2: 25 MMOL/L (ref 21–32)
CREAT SERPL-MCNC: 1.2 MG/DL (ref 0.8–1.3)
GFR AFRICAN AMERICAN: >60
GFR NON-AFRICAN AMERICAN: >60
GLUCOSE BLD-MCNC: 101 MG/DL (ref 70–99)
PHOSPHORUS: 3 MG/DL (ref 2.5–4.9)
POTASSIUM SERPL-SCNC: 4 MMOL/L (ref 3.5–5.1)
SODIUM BLD-SCNC: 143 MMOL/L (ref 136–145)

## 2018-07-03 LAB
HCT VFR BLD CALC: 29 % (ref 40.5–52.5)
HEMATOLOGY PATH CONSULT: NO
HEMOGLOBIN: 8.6 G/DL (ref 13.5–17.5)
MCH RBC QN AUTO: 19.1 PG (ref 26–34)
MCHC RBC AUTO-ENTMCNC: 29.6 G/DL (ref 31–36)
MCV RBC AUTO: 64.6 FL (ref 80–100)
PDW BLD-RTO: 18.4 % (ref 12.4–15.4)
PLATELET # BLD: 638 K/UL (ref 135–450)
PMV BLD AUTO: 7.4 FL (ref 5–10.5)
RBC # BLD: 4.49 M/UL (ref 4.2–5.9)
WBC # BLD: 14.7 K/UL (ref 4–11)

## 2018-07-09 RX ORDER — FERROUS SULFATE 325(65) MG
325 TABLET ORAL DAILY
Qty: 30 TABLET | Refills: 3 | Status: SHIPPED | OUTPATIENT
Start: 2018-07-09 | End: 2018-08-30 | Stop reason: SDUPTHER

## 2018-07-19 ENCOUNTER — CARE COORDINATION (OUTPATIENT)
Dept: CARE COORDINATION | Age: 63
End: 2018-07-19

## 2018-07-27 ENCOUNTER — CARE COORDINATION (OUTPATIENT)
Dept: CARE COORDINATION | Age: 63
End: 2018-07-27

## 2018-07-27 DIAGNOSIS — M17.10 ARTHRITIS OF KNEE: Primary | ICD-10-CM

## 2018-07-27 NOTE — CARE COORDINATION
This ACC spoke to pt regarding CHF/COPD sx. Pt c/c is right knee pain, describing it as \"bone on bone. \"  Pt is limping and the pain is disrupting his sleep. He has received cortisone shots for the past several years, but feels that maybe it is time for knee surgery. He stated that he can come in sooner to see Dr. Sourav Bennett than 9/14/208 or a referral to an orthopedic doctor would be okay as well. Dr. Sourav Bennett to advise. Pt denied any concerns of COPD/CHF sx and denied weight gain. Pt reported doing well other than right knee pain effecting quality of life. This ACC will inform pt of Dr. Raciel Galicia response; pt agreed.

## 2018-08-01 ENCOUNTER — TELEPHONE (OUTPATIENT)
Dept: ORTHOPEDIC SURGERY | Age: 63
End: 2018-08-01

## 2018-08-01 ENCOUNTER — OFFICE VISIT (OUTPATIENT)
Dept: ORTHOPEDIC SURGERY | Age: 63
End: 2018-08-01

## 2018-08-01 VITALS
HEART RATE: 58 BPM | HEIGHT: 76 IN | DIASTOLIC BLOOD PRESSURE: 89 MMHG | BODY MASS INDEX: 38.36 KG/M2 | WEIGHT: 315 LBS | SYSTOLIC BLOOD PRESSURE: 135 MMHG

## 2018-08-01 DIAGNOSIS — F41.9 ANXIETY: ICD-10-CM

## 2018-08-01 DIAGNOSIS — M25.561 RIGHT KNEE PAIN, UNSPECIFIED CHRONICITY: Primary | ICD-10-CM

## 2018-08-01 DIAGNOSIS — S83.241A TEAR OF MEDIAL MENISCUS OF RIGHT KNEE, INITIAL ENCOUNTER: ICD-10-CM

## 2018-08-01 DIAGNOSIS — M17.11 PRIMARY OSTEOARTHRITIS OF RIGHT KNEE: ICD-10-CM

## 2018-08-01 PROCEDURE — 1036F TOBACCO NON-USER: CPT | Performed by: ORTHOPAEDIC SURGERY

## 2018-08-01 PROCEDURE — G8417 CALC BMI ABV UP PARAM F/U: HCPCS | Performed by: ORTHOPAEDIC SURGERY

## 2018-08-01 PROCEDURE — 3017F COLORECTAL CA SCREEN DOC REV: CPT | Performed by: ORTHOPAEDIC SURGERY

## 2018-08-01 PROCEDURE — G8427 DOCREV CUR MEDS BY ELIG CLIN: HCPCS | Performed by: ORTHOPAEDIC SURGERY

## 2018-08-01 PROCEDURE — 99214 OFFICE O/P EST MOD 30 MIN: CPT | Performed by: ORTHOPAEDIC SURGERY

## 2018-08-01 RX ORDER — DIAZEPAM 5 MG/1
5 TABLET ORAL ONCE
Qty: 2 TABLET | Refills: 0 | Status: SHIPPED | OUTPATIENT
Start: 2018-08-01 | End: 2018-08-01

## 2018-08-01 RX ORDER — HYDROCODONE BITARTRATE AND ACETAMINOPHEN 5; 325 MG/1; MG/1
1 TABLET ORAL EVERY 6 HOURS PRN
Qty: 28 TABLET | Refills: 0 | Status: SHIPPED | OUTPATIENT
Start: 2018-08-01 | End: 2018-08-06 | Stop reason: SDUPTHER

## 2018-08-01 NOTE — PROGRESS NOTES
Date of Encounter: 8/1/2018  Patient Name:Seymour Faulkner Aux Alla Record Number: [id number]    Chief Complaint   Patient presents with    Knee Pain     right knee pain x 1 year       History of Present Illness:  Shamar Lilly is a 59 y.o. male here for evaluation of his right knee. He was referred by Dr. Senia Avila. His current symptoms are described below and I reviewed them with him today. The pain is been present for about a year. He describes significant increase over the last several months. Pain bothers him most with walking and changing position from sitting to standing. He gets grinding and sharp pain sensations primarily along the medial aspect of the joint. No numbness or tingling that radiates down the leg. Previous cortisone injection with minimal benefit. Undergone a self-directed exercise program also without significant improvement. He has limited benefit with over-the-counter anti-inflammatories and Tylenol. No history of prior knee surgery. She previous amputation to the transmetatarsal area on his right foot. Pain Assessment  Location of Pain: Knee  Location Modifiers: Right  Severity of Pain: 10  Quality of Pain: Sharp, Grinding  Duration of Pain: Persistent  Frequency of Pain: Constant  Date Pain First Started: 08/01/17  Aggravating Factors: Walking, Other (Comment) (sitting)  Limiting Behavior: Yes  Relieving Factors:  Other (Comment) (pain medication)  Result of Injury: No  Work-Related Injury: No  Are there other pain locations you wish to document?: No    Past Medical History:   Diagnosis Date    Anxiety     Arthritis     Asthma     Atrial fibrillation ([facility]     Blood circulation, collateral     CAD (coronary artery disease)     CHF (congestive heart failure) (HCC)     Colon cancer (HCC)     COPD (chronic obstructive pulmonary disease) (HCC)     Diabetic foot infection ([address]     Hernia     Hyperlipidemia     Hypertension     Movement disorder     Muscle cramps     Neuromuscular disorder (HCC)     marvin feet numbness/neuropathy    Neuropathy (HCC)     Pneumonia     Renal insufficiency     Sleep apnea     no cpap    Thyroid disease     Unspecified cerebral artery occlusion with cerebral infarction     mini stroke    Unspecified diseases of blood and blood-forming organs        Past Surgical History:   Procedure Laterality Date    ABDOMEN SURGERY      APPENDECTOMY  10/9/2012    CARPAL TUNNEL RELEASE Left 12/11/14    COLON SURGERY      for Colon Ca 10 inches removed from colon    COLONOSCOPY      COLONOSCOPY  3/13/14    COLONOSCOPY  03/02/2018    ENDOSCOPIC ULTRASOUND (UPPER)  6/4/14    ENDOSCOPY, COLON, DIAGNOSTIC      FOOT SURGERY Right 07/13/2017    Amputation toes 2,3,5, right foot Flap closure, Exostectomy 1st metatarsal right foot    FOOT SURGERY Right 08/17/2017    INCISION AND DRAINAGE OF RIGHT FOOT WITH FLAP CLOSURE    HERNIA REPAIR      umbilical, hiatal hernia repairs    SKIN BIOPSY      TOE AMPUTATION  3/7/11    RIGHT FOURTH TOE    TOE SURGERY      ULNAR TUNNEL RELEASE Left 12-11-14    Left ulnar nerve decompression and left CTR    UPPER GASTROINTESTINAL ENDOSCOPY  1/28/2014    with biopsies    UPPER GASTROINTESTINAL ENDOSCOPY  3/13/14    UPPER GASTROINTESTINAL ENDOSCOPY  6/4/14    with biopsy    UPPER GASTROINTESTINAL ENDOSCOPY  03/02/2018    VENTRICULAR ABLATION SURGERY         Current Outpatient Prescriptions   Medication Sig Dispense Refill    ferrous sulfate 325 (65 Fe) MG tablet TAKE 1 TABLET BY MOUTH DAILY 30 tablet 3    furosemide (LASIX) 40 MG tablet Take 1 tablet by mouth 2 times daily 180 tablet 1    sertraline (ZOLOFT) 50 MG tablet TAKE 1 TABLET BY MOUTH EVERY DAY 30 tablet 3    Magnesium 500 MG TABS Take by mouth      potassium chloride (KLOR-CON M) 20 MEQ extended release tablet Take 2 tablets by mouth 3 times daily 540 tablet 1    metoprolol tartrate (LOPRESSOR) 25 MG tablet Take 1 tablet by mouth 2 times daily 180 tablet 0    diltiazem (CARTIA XT) 180 MG extended release capsule Take 1 capsule by mouth 2 times daily 180 capsule 3    allopurinol (ZYLOPRIM) 100 MG tablet Take 3 tablets by mouth daily 205 tablet 5    folic acid (FOLVITE) 1 MG tablet Take 1 tablet by mouth daily 30 tablet 3    atorvastatin (LIPITOR) 20 MG tablet Take 1 tablet by mouth daily 90 tablet 3    pantoprazole (PROTONIX) 40 MG tablet TAKE 1 TABLET BY MOUTH TWICE DAILY 60 tablet 5    albuterol sulfate HFA (PROVENTIL HFA) 108 (90 BASE) MCG/ACT inhaler Inhale 2 puffs into the lungs every 4 hours as needed for Wheezing 1 Inhaler 3    budesonide-formoterol (SYMBICORT) 160-4.5 MCG/ACT AERO Inhale 2 puffs into the lungs 2 times daily. 3 Inhaler 3    albuterol-ipratropium (COMBIVENT RESPIMAT)  MCG/ACT AERS inhaler Inhale 1 puff into the lungs every 6 hours. 3 Inhaler 3    OXYGEN Inhale  into the lungs. 2 liters/ nasal cannula prn      therapeutic multivitamin-minerals (THERAGRAN-M) tablet Take 1 tablet by mouth daily. No current facility-administered medications for this visit. allergies, social and family histories were reviewed and updated as appropriate. Review of Systems:  Relevant review of systems reviewed and available in the patient's chart    Vital Signs:  /89   Pulse 58   Ht 6' 4\" (1.93 m)   Wt (!) 335 lb (152 kg)   BMI 40.78 kg/m²     General Exam:   Constitutional: He is adequately groomed with no evidence of malnutrition, class III obesity noted. Mental Status: He is oriented to time, place and person. Normal mentation and affect for age. Lymphatic: The lymphatic examination bilaterally reveals all areas to be without enlargement or induration. Vascular: Examination reveals no swelling or calf tenderness. Peripheral pulses are palpable and 2+. Neurological: He has good coordination and balance. There is no focal weakness or sensory deficit.     Right Knee Examination:    Inspection:  Trace effusion with no erythema. Muscle bulk and tone reflect deconditioning. No gross atrophy. Overall joint alignment neutral.    Palpation:  Moderate tenderness along the medial joint line. Trace tenderness laterally. Extensor mechanism palpates intact. Range of Motion:  He achieve full extension on the last 10° are quite uncomfortable. He tolerates flexion to 95° also limited by pain. Strength:  Quad 4 / 5  ; Hamstrings 4+ / 5. Gross motor to hip and ankle intact    Special Tests:      Negative Lachman    negative anterior drawer    no posterior sag    no posterior drawer    does not open to valgus or varus stress at 0 or 30°    Increased pain with Valorie's medially.  negative Homans    Posterior tibial pulses are +2/4 capillary refill is brisk sensation is intact. Skin: There are no rashes, ulcerations or lesions. Gait: Tandem gait with decreased weight shift and stride length favoring his right side. Radiology:     X-rays obtained today and reviewed in office:  Views 4 Right  Knee with comparison AP, flexion PA and skyline views of the left knee  Impression No evidence for acute fracture or subluxation / dislocation. Left knee shows moderate arthritic changes with significant loss of joint space and tricompartmental osteophytes. Right knee also shows mild to moderate degenerative change with better preservation of tibiofemoral joint space. No lytic or blastic lesions in either knee. No evidence for significant effusion. Impression:  Encounter Diagnoses   Name Primary?  Right knee pain, unspecified chronicity Yes    Primary osteoarthritis of right knee        Office Procedures:  Orders Placed This Encounter   Procedures    XR Knee Bilateral Standing    XR KNEE RIGHT (3 VIEWS)       Treatment Plan:   We discussed treatment options. This point he is likely had chronic meniscal pathology in the right knee.   He's failed to improve with conservative measures including multiple cortisone injections, activity modification and a home exercise program.  He may benefit from consideration for arthroscopic intervention. 1st, I would confirm this with an MRI and we will order that today. We have the results will touch base with him regarding treatment options.     Deandra Barrow understands and accepts this course of care

## 2018-08-01 NOTE — LETTER
[facility] [address] [address]  Phone: [phone]  Fax: [phone]    Ruben Cantu        August 13, 2018       Patient: Fernanda Woo   MR Number: [id number]   YOB: 1955   Date of Visit: 8/1/2018       Dear Dr. Sourav Bennett: Thank you for the request for consultation for Sasha Kaplan to me for the evaluation of right knee chronic meniscal tear. Below are the relevant portions of my assessment and plan of care. If you have questions, please do not hesitate to call me. I look forward to following Marilyn Overton along with you.     Sincerely,        Yvonne Childs MD    CC providers:  Hardeep Nugent MD  [address] [place] [address]  VIA In Basket

## 2018-08-02 ENCOUNTER — TELEPHONE (OUTPATIENT)
Dept: ORTHOPEDIC SURGERY | Age: 63
End: 2018-08-02

## 2018-08-02 NOTE — LETTER
[facility]  Surgery Scheduling Form:  [facility]    DEMOGRAPHICS:                                                                                                              .    Patient Name:  Roseline Doe  Patient :  1955   Patient SS#:      Patient Phone:  [phone] (home)  Alt. Patient Phone:    Patient Address:  [address] [address]    PCP:  Xavier Villanueva MD  Payor/Plan Subscr  Sex Relation Sub. Ins. ID Effective Group Num   1. MEDICARE - MEKristan Merchant 1955 Male  [id number]                                    [address]      DIAGNOSIS & PROCEDURE:                                                                                            .    Diagnosis:  S83.241A Medial Meniscus Tear, s83.242a Lateral Meniscus Tear   Operation:  Right knee arthroscopy  Location: [facility]  Provider:  Jasper Yanez.  Pastor Rinne, M.D. Belva Hare INFORMATION:                                                                                         .    Requested Date:  18    OR Time:  9:45                      Patient Arrival Time:  7:45  OR Time Required:  30  Minutes  Anesthesia:  General  Equipment:  Nothing  Mini C-Arm:  No   Standard C-Arm:  No   Status:  Outpatient  PAT Required:  No  Comments:Allergies: Bactrim     18   BILLING INFORMATION:                                                                                                    .    Procedure:       CPT Code Modifier                        ORTHOPAEDIC SURGERY PRE-SURGICAL PHYSICIAN ORDERS    Roseline Doe  1955  Date of Surgery: 18  Right knee arthroscopy    Bactrim  History & Physical:  [  ] [facility]   [ ] By Surgeon   By PCP [ ]  Referrals:  [ ] Medical/Cardiac Clearance by _____________________________  [ ] Joint Replacement Class  [ ] Physical Therapy  [ ] Crutch Walking Instructions   Weight Bearing Status _____________

## 2018-08-03 ENCOUNTER — TELEPHONE (OUTPATIENT)
Dept: ORTHOPEDIC SURGERY | Age: 63
End: 2018-08-03

## 2018-08-03 NOTE — TELEPHONE ENCOUNTER
Kym Claros at 8/3/2018  8:16 AM     Status: Signed      Patient states he dropped hos MRI CD off to office yesterday morning     He wants to know if JDA has reviewed and if he can call him to discus the results     I did inform patient Janae Ernie was in sx and not in office yesterday so not sure if he has had a chance to review yet     But I told him I would put the request in for JDA to call to discuss results after he has reviewed     Patient can be reached [phone]

## 2018-08-03 NOTE — TELEPHONE ENCOUNTER
I spoke to pt and gave him Dr Walsh's message. He would like to go ahead and schedule the Sx. [facility].

## 2018-08-06 ENCOUNTER — TELEPHONE (OUTPATIENT)
Dept: ORTHOPEDIC SURGERY | Age: 63
End: 2018-08-06

## 2018-08-06 DIAGNOSIS — S83.241A TEAR OF MEDIAL MENISCUS OF RIGHT KNEE, INITIAL ENCOUNTER: ICD-10-CM

## 2018-08-06 RX ORDER — HYDROCODONE BITARTRATE AND ACETAMINOPHEN 5; 325 MG/1; MG/1
1 TABLET ORAL EVERY 6 HOURS PRN
Qty: 28 TABLET | Refills: 0 | Status: SHIPPED | OUTPATIENT
Start: 2018-08-06 | End: 2018-08-13

## 2018-08-14 ENCOUNTER — OFFICE VISIT (OUTPATIENT)
Dept: INTERNAL MEDICINE CLINIC | Age: 63
End: 2018-08-14

## 2018-08-14 ENCOUNTER — CARE COORDINATION (OUTPATIENT)
Dept: CARE COORDINATION | Age: 63
End: 2018-08-14

## 2018-08-14 VITALS
SYSTOLIC BLOOD PRESSURE: 130 MMHG | HEART RATE: 100 BPM | DIASTOLIC BLOOD PRESSURE: 84 MMHG | BODY MASS INDEX: 38.36 KG/M2 | TEMPERATURE: 98.3 F | WEIGHT: 315 LBS | HEIGHT: 76 IN

## 2018-08-14 DIAGNOSIS — J44.9 COPD, SEVERE (HCC): ICD-10-CM

## 2018-08-14 DIAGNOSIS — I10 ESSENTIAL HYPERTENSION: ICD-10-CM

## 2018-08-14 DIAGNOSIS — M25.561 CHRONIC PAIN OF RIGHT KNEE: Primary | ICD-10-CM

## 2018-08-14 DIAGNOSIS — G89.29 CHRONIC PAIN OF RIGHT KNEE: Primary | ICD-10-CM

## 2018-08-14 DIAGNOSIS — I50.32 CHRONIC DIASTOLIC CONGESTIVE HEART FAILURE (HCC): ICD-10-CM

## 2018-08-14 PROBLEM — K92.2 GI BLEED: Status: RESOLVED | Noted: 2018-03-01 | Resolved: 2018-08-14

## 2018-08-14 PROBLEM — L03.116 CELLULITIS OF LEFT LEG: Status: RESOLVED | Noted: 2018-02-19 | Resolved: 2018-08-14

## 2018-08-14 PROCEDURE — 3017F COLORECTAL CA SCREEN DOC REV: CPT | Performed by: INTERNAL MEDICINE

## 2018-08-14 PROCEDURE — 3023F SPIROM DOC REV: CPT | Performed by: INTERNAL MEDICINE

## 2018-08-14 PROCEDURE — G8427 DOCREV CUR MEDS BY ELIG CLIN: HCPCS | Performed by: INTERNAL MEDICINE

## 2018-08-14 PROCEDURE — G8926 SPIRO NO PERF OR DOC: HCPCS | Performed by: INTERNAL MEDICINE

## 2018-08-14 PROCEDURE — 1036F TOBACCO NON-USER: CPT | Performed by: INTERNAL MEDICINE

## 2018-08-14 PROCEDURE — 99214 OFFICE O/P EST MOD 30 MIN: CPT | Performed by: INTERNAL MEDICINE

## 2018-08-14 PROCEDURE — G8417 CALC BMI ABV UP PARAM F/U: HCPCS | Performed by: INTERNAL MEDICINE

## 2018-08-14 RX ORDER — FUROSEMIDE 40 MG/1
80 TABLET ORAL 2 TIMES DAILY
Qty: 360 TABLET | Refills: 1 | Status: SHIPPED | OUTPATIENT
Start: 2018-08-14 | End: 2018-11-13 | Stop reason: SDUPTHER

## 2018-08-14 NOTE — PROGRESS NOTES
160-4.5 MCG/ACT AERO Inhale 2 puffs into the lungs 2 times daily. 3 Inhaler 3    albuterol-ipratropium (COMBIVENT RESPIMAT)  MCG/ACT AERS inhaler Inhale 1 puff into the lungs every 6 hours. 3 Inhaler 3    OXYGEN Inhale  into the lungs. 2 liters/ nasal cannula prn      therapeutic multivitamin-minerals (THERAGRAN-M) tablet Take 1 tablet by mouth daily.  potassium chloride (KLOR-CON M) 20 MEQ extended release tablet Take 2 tablets by mouth 3 times daily 540 tablet 1     No current facility-administered medications for this visit.          Allergies   Allergen Reactions    Bactrim      hallucinations          Past Medical History:   Diagnosis Date    Anxiety     Arthritis     Asthma     Atrial fibrillation (HCC)     Blood circulation, collateral     CAD (coronary artery disease)     CHF (congestive heart failure) (HCC)     Colon cancer (HCC)     COPD (chronic obstructive pulmonary disease) (HCC)     Diabetic foot infection (HCC)     Hernia     Hyperlipidemia     Hypertension     Movement disorder     Muscle cramps     Neuromuscular disorder (HCC)     marvin feet numbness/neuropathy    Neuropathy     Pneumonia     Renal insufficiency     Sleep apnea     no cpap    Thyroid disease     Unspecified cerebral artery occlusion with cerebral infarction     mini stroke    Unspecified diseases of blood and blood-forming organs         Past Surgical History:   Procedure Laterality Date    ABDOMEN SURGERY      APPENDECTOMY  10/9/2012    CARPAL TUNNEL RELEASE Left 12/11/14    COLON SURGERY      for Colon Ca 10 inches removed from colon    COLONOSCOPY      COLONOSCOPY  3/13/14    COLONOSCOPY  03/02/2018    ENDOSCOPIC ULTRASOUND (UPPER)  6/4/14    ENDOSCOPY, COLON, DIAGNOSTIC      FOOT SURGERY Right 07/13/2017    Amputation toes 2,3,5, right foot Flap closure, Exostectomy 1st metatarsal right foot    FOOT SURGERY Right 08/17/2017    INCISION AND DRAINAGE OF RIGHT FOOT WITH FLAP CLOSURE    HERNIA REPAIR      umbilical, hiatal hernia repairs    SKIN BIOPSY      TOE AMPUTATION  3/7/11    RIGHT FOURTH TOE    TOE SURGERY      ULNAR TUNNEL RELEASE Left 12-11-14    Left ulnar nerve decompression and left CTR    UPPER GASTROINTESTINAL ENDOSCOPY  1/28/2014    with biopsies    UPPER GASTROINTESTINAL ENDOSCOPY  3/13/14    UPPER GASTROINTESTINAL ENDOSCOPY  6/4/14    with biopsy    UPPER GASTROINTESTINAL ENDOSCOPY  03/02/2018    VENTRICULAR ABLATION SURGERY          Social History   Substance Use Topics    Smoking status: Never Smoker    Smokeless tobacco: Never Used    Alcohol use No      Comment: quit 2006        Family History   Problem Relation Age of Onset    Cancer Father         colon    High Blood Pressure Father     Heart Disease Father     High Blood Pressure Mother     Heart Disease Mother         ROS:  Review of Systems   Musculoskeletal: Positive for joint swelling (knee). Neurological: Positive for numbness (left hand). All other systems reviewed and are negative. EXAM:  /84   Pulse 100   Temp 98.3 °F (36.8 °C) (Oral)   Ht 6' 4\" (1.93 m)   Wt (!) 336 lb 3.2 oz (152.5 kg)   BMI 40.92 kg/m²    Gen: WN/WD, no acute distress  Head: normocephalic, atraumatic  Eyes: no icterus, no conjunctival erythema. Pupils reactive to light. ENT: Moist mucous membranes, normal appearing nose, normal appearing OP  Neck: supple, no masses  CV: regular rate and rhythm, no murmurs, rubs, gallops. Trace BLE edema  Resp: Normal effort, clear to auscultation bilaterally  Abd: +Bowel Sounds, soft, non tender to palpation. MSK:  no cyanosis or clubbing  Skin: warm, dry, no rashes visualized; Hyperpigmentation and thickening of the bilateral legs  Neuro: Cranial Nerves intact, no focal motor deficits  Psych: normal mood and affect. Appropriately oriented.      Lab Results   Component Value Date    CREATININE 1.2 06/29/2018    BUN 15 06/29/2018     06/29/2018    K 4.0 06/29/2018  06/29/2018    CO2 25 06/29/2018        Lab Results   Component Value Date    WBC 12.3 (H) 05/15/2018    HGB 12.0 (L) 05/15/2018    HCT 38.5 (L) 05/15/2018    MCV 75.7 (L) 05/15/2018     05/15/2018      MRI right knee 8/3/18: Chronic horizontal oblique tear of the posterior horn and posterior body of the medial meniscus. Chondromalacia of the medial compartment with mild spurring. Chronic horizontal tear of the posterior horn of the lateral meniscus. Hypertrophic tendinopathy of the distal insertional segment of the quadriceps tendon along with quadriceps fat pad swelling         A/P  1. Chronic pain of right knee  The patient has chronic right knee pain that has failed to respond to conservative measures. His MRI shows bilateral meniscal tears and chondromalacia. He is scheduled for arthroscopic surgery next week with Dr. Pamela Hoff. A medical preoperative evaluation was completed today. There are no signs or symptoms suggestive of active cardiovascular disease. His activity tolerance is greater than 4 metabolic equivalents. His chronic medical conditions are well compensated. He is medically stable to proceed with scheduled surgery. 2. Essential hypertension  His blood pressure is under optimal control. He will continue his current antihypertensive regimen perioperatively. 3. COPD, severe ([address]  His COPD symptoms are well-controlled. He will continue his current maintenance regimen. 4. Chronic diastolic congestive heart failure ([address]  He is well compensated, and he will continue his current treatment at this time. Scribe attestation: Joellen Stack MA, am scribing for and in the presence of Maribel Mayorga MD. Electronically signed by Santos Platt MA on 8/14/2018 at 3:16 PM        Provider attestation: Lindsay Mckeon MD, personally performed the services scribed by the user listed above in my presence, and it is both accurate and complete.  I agree with the Chief Complaint, ROS and Past Histories independently gathered by the clinical support staff and the remaining scribed note accurately describes my personal service to the patient.     Iwona Cross MD   8/14/18   3:25 PM

## 2018-08-15 ENCOUNTER — HOSPITAL ENCOUNTER (OUTPATIENT)
Dept: OTHER | Age: 63
Discharge: HOME | End: 2018-08-15
Attending: INTERNAL MEDICINE | Admitting: INTERNAL MEDICINE

## 2018-08-15 ENCOUNTER — OFFICE VISIT (OUTPATIENT)
Dept: CARDIOLOGY CLINIC | Age: 63
End: 2018-08-15

## 2018-08-15 VITALS
HEART RATE: 99 BPM | HEIGHT: 75 IN | DIASTOLIC BLOOD PRESSURE: 94 MMHG | SYSTOLIC BLOOD PRESSURE: 132 MMHG | BODY MASS INDEX: 39.17 KG/M2 | WEIGHT: 315 LBS | OXYGEN SATURATION: 95 %

## 2018-08-15 DIAGNOSIS — Z01.810 PREOP CARDIOVASCULAR EXAM: Primary | ICD-10-CM

## 2018-08-15 DIAGNOSIS — E66.9 OBESITY, UNSPECIFIED CLASSIFICATION, UNSPECIFIED OBESITY TYPE, UNSPECIFIED WHETHER SERIOUS COMORBIDITY PRESENT: ICD-10-CM

## 2018-08-15 DIAGNOSIS — J43.9 PULMONARY EMPHYSEMA, UNSPECIFIED EMPHYSEMA TYPE (HCC): ICD-10-CM

## 2018-08-15 DIAGNOSIS — R00.0 TACHYCARDIA: ICD-10-CM

## 2018-08-15 DIAGNOSIS — I10 ESSENTIAL HYPERTENSION: ICD-10-CM

## 2018-08-15 DIAGNOSIS — G47.33 OBSTRUCTIVE SLEEP APNEA SYNDROME: ICD-10-CM

## 2018-08-15 DIAGNOSIS — I50.32 CHRONIC DIASTOLIC CONGESTIVE HEART FAILURE (HCC): ICD-10-CM

## 2018-08-15 DIAGNOSIS — I48.20 CHRONIC ATRIAL FIBRILLATION (HCC): ICD-10-CM

## 2018-08-15 LAB
ANION GAP SERPL CALCULATED.3IONS-SCNC: 13 MMOL/L (ref 3–16)
BUN BLDV-MCNC: 9 MG/DL (ref 7–20)
CALCIUM SERPL-MCNC: 9.1 MG/DL (ref 8.3–10.6)
CHLORIDE BLD-SCNC: 106 MMOL/L (ref 99–110)
CO2: 24 MMOL/L (ref 21–32)
CREAT SERPL-MCNC: 1.2 MG/DL (ref 0.8–1.3)
GFR AFRICAN AMERICAN: >60
GFR NON-AFRICAN AMERICAN: >60
GLUCOSE BLD-MCNC: 87 MG/DL (ref 70–99)
HCT VFR BLD CALC: 38.8 % (ref 40.5–52.5)
HEMOGLOBIN: 12.1 G/DL (ref 13.5–17.5)
INR BLD: 1.04 (ref 0.86–1.14)
MCH RBC QN AUTO: 23.7 PG (ref 26–34)
MCHC RBC AUTO-ENTMCNC: 31.1 G/DL (ref 31–36)
MCV RBC AUTO: 76.3 FL (ref 80–100)
PDW BLD-RTO: 18.6 % (ref 12.4–15.4)
PLATELET # BLD: 299 K/UL (ref 135–450)
PMV BLD AUTO: 8.5 FL (ref 5–10.5)
POTASSIUM SERPL-SCNC: 3.9 MMOL/L (ref 3.5–5.1)
PROTHROMBIN TIME: 11.9 SEC (ref 9.8–13)
RBC # BLD: 5.08 M/UL (ref 4.2–5.9)
SODIUM BLD-SCNC: 143 MMOL/L (ref 136–145)
TSH REFLEX: 1.04 UIU/ML (ref 0.27–4.2)
WBC # BLD: 9.3 K/UL (ref 4–11)

## 2018-08-15 PROCEDURE — G8427 DOCREV CUR MEDS BY ELIG CLIN: HCPCS | Performed by: INTERNAL MEDICINE

## 2018-08-15 PROCEDURE — G8417 CALC BMI ABV UP PARAM F/U: HCPCS | Performed by: INTERNAL MEDICINE

## 2018-08-15 PROCEDURE — 3023F SPIROM DOC REV: CPT | Performed by: INTERNAL MEDICINE

## 2018-08-15 PROCEDURE — G8926 SPIRO NO PERF OR DOC: HCPCS | Performed by: INTERNAL MEDICINE

## 2018-08-15 PROCEDURE — 3017F COLORECTAL CA SCREEN DOC REV: CPT | Performed by: INTERNAL MEDICINE

## 2018-08-15 PROCEDURE — 93000 ELECTROCARDIOGRAM COMPLETE: CPT | Performed by: INTERNAL MEDICINE

## 2018-08-15 PROCEDURE — 99215 OFFICE O/P EST HI 40 MIN: CPT | Performed by: INTERNAL MEDICINE

## 2018-08-15 PROCEDURE — 1036F TOBACCO NON-USER: CPT | Performed by: INTERNAL MEDICINE

## 2018-08-15 NOTE — LETTER
[address] [address] [address] [address]  Phone: [phone]  Fax: [phone]    Martina Solomon MD        August 15, 2018     Catrina Salazar, [address] [place] [address]    Patient: Ranjith Kamara  MR Number: [id number]  YOB: 1955  Date of Visit: 8/15/2018    Dear Dr. Catrina Salazar:        [address]       Referring Provider:  Catrina Salazar MD     Chief Complaint   Patient presents with   Del Goon Cardiac Clearance     knee arthroscopy, Dr Mile Villafana 8/21/18    Congestive Heart Failure     no new cardiac s/s    Atrial Fibrillation    Hyperlipidemia    Shortness of Breath     unchanged      History of Present Illness:     Mr. Dustin Brewster is here for a routine  follow up for atrial fibrillation,CHF,HTN and cardiac risk evaluation for knee surgery. Also has a history copd,asthma (prn oxygen),sleep apnea (unable to tolerate treatment),colon cancer,RI,hernia,arthritis,CAD,CVA,HLD, and neuropathy. In 2004, an angiogram showed normal coronaries. He is off Coumadin due to GI bleed from a fall; the others are too expensive. Takes 81 mg ASA. He has not been seen by me a year. He was admitted this Spring with cellulitis and had partial right foot amputation. He also had a colonoscopy for anemia and had some polyp removal. He denies chest pain or edema. Past Medical History:   has a past medical history of Anxiety; Arthritis; Asthma; Atrial fibrillation ([address] Blood circulation, collateral; CAD (coronary artery disease); CHF (congestive heart failure) ([address] Colon cancer ([address] COPD (chronic obstructive pulmonary disease) ([address] Diabetic foot infection ([address] Hernia; Hyperlipidemia; Hypertension; Movement disorder; Muscle cramps; Neuromuscular disorder ([address] Neuropathy; Pneumonia; Renal insufficiency; Sleep apnea; Thyroid disease;  Unspecified cerebral artery occlusion with cerebral infarction; and Unspecified diseases of blood and blood-forming organs. Surgical History:   has a past surgical history that includes Toe Surgery; Ventricular ablation surgery; Colon surgery; Toe amputation (3/7/11); Appendectomy (10/9/2012); Abdomen surgery; Colonoscopy; Endoscopy, colon, diagnostic; skin biopsy; Upper gastrointestinal endoscopy (1/28/2014); Upper gastrointestinal endoscopy (3/13/14); Colonoscopy (3/13/14); endoscopic ultrasound (upper) (6/4/14); Upper gastrointestinal endoscopy (6/4/14); Ulnar tunnel release (Left, 12-11-14); Carpal tunnel release (Left, 12/11/14); hernia repair; Foot surgery (Right, 07/13/2017); Foot surgery (Right, 08/17/2017); Upper gastrointestinal endoscopy (03/02/2018); and Colonoscopy (03/02/2018). Social History:   reports that he has never smoked. He has never used smokeless tobacco. He reports that he does not drink alcohol or use drugs. Family History:  family history includes Cancer in his father; Heart Disease in his father and mother; High Blood Pressure in his father and mother.      Home Medications:  Outpatient Encounter Prescriptions as of 8/15/2018   Medication Sig Dispense Refill    furosemide (LASIX) 40 MG tablet Take 2 tablets by mouth 2 times daily 360 tablet 1    ferrous sulfate 325 (65 Fe) MG tablet TAKE 1 TABLET BY MOUTH DAILY 30 tablet 3    sertraline (ZOLOFT) 50 MG tablet TAKE 1 TABLET BY MOUTH EVERY DAY 30 tablet 3    Magnesium 500 MG TABS Take by mouth      metoprolol tartrate (LOPRESSOR) 25 MG tablet Take 1 tablet by mouth 2 times daily 180 tablet 0    diltiazem (CARTIA XT) 180 MG extended release capsule Take 1 capsule by mouth 2 times daily 180 capsule 3    allopurinol (ZYLOPRIM) 100 MG tablet Take 3 tablets by mouth daily 368 tablet 5    folic acid (FOLVITE) 1 MG tablet Take 1 tablet by mouth daily 30 tablet 3    atorvastatin (LIPITOR) 20 MG tablet Take 1 tablet by mouth daily 90 tablet 3  pantoprazole (PROTONIX) 40 MG tablet TAKE 1 TABLET BY MOUTH TWICE DAILY 60 tablet 5    albuterol sulfate HFA (PROVENTIL HFA) 108 (90 BASE) MCG/ACT inhaler Inhale 2 puffs into the lungs every 4 hours as needed for Wheezing 1 Inhaler 3    budesonide-formoterol (SYMBICORT) 160-4.5 MCG/ACT AERO Inhale 2 puffs into the lungs 2 times daily. 3 Inhaler 3    albuterol-ipratropium (COMBIVENT RESPIMAT)  MCG/ACT AERS inhaler Inhale 1 puff into the lungs every 6 hours. 3 Inhaler 3    OXYGEN Inhale  into the lungs. 2 liters/ nasal cannula prn      therapeutic multivitamin-minerals (THERAGRAN-M) tablet Take 1 tablet by mouth daily.  potassium chloride (KLOR-CON M) 20 MEQ extended release tablet Take 2 tablets by mouth 3 times daily (Patient taking differently: Take 40 mEq by mouth 2 times daily 3 tabs BID) 540 tablet 1     No facility-administered encounter medications on file as of 8/15/2018. Allergies:  Bactrim     · [x] Medications and dosages reviewed. · ROS:  [x]Full ROS obtained and negative except as mentioned in HPI      Physical Examination:    BP (!) 132/94 (Site: Left Arm, Position: Sitting, Cuff Size: Large Adult)   Pulse 99   Ht 6' 3\" (1.905 m)   Wt (!) 336 lb 12.8 oz (152.8 kg)   SpO2 95%   BMI 42.10 kg/m²       GENERAL: Chronically ill appearing male sitting up in chair in NAD. · NEUROLOGICAL: Alert and oriented  · PSYCH: Calm affect-although somewhat anxious as his Dmitri Moore is not working  · SKIN: Warm and dry  · HEENT: Normocephalic, Sclera non-icteric, mucus membranes moist  · NECK: supple, JVP normal  · CAROTID: Normal upstroke, no bruits  · CARDIAC: Normal PMI, Tachycardic irregular rate and rhythm,  normal S1S2, no murmur, rub, or gallop.   · RESPIRATORY: Normal respiratory effort, clear to auscultation bilaterally  · EXTREMITIES: Trace edema--.  · MUSCULOSKELETAL: No joint swelling or tenderness, no chest wall tenderness · GASTROINTESTINAL: normal bowel sounds, soft, non-tender, no bruit    Assessment:   1. Atrial fibrillation,   HR fast. Unclear why. Did not take meds this am. [facility] called and meds confirmed. I am reluctant for him to go to surgery with resting tachycardia. Labs today. F/u tomorrow AFTER TAKING MEDS. If labs OK and still tachy, increase lopressor. CHADS 2 vascular score 2:  Taken off of Coumadin due to fall, resulting in internal bleeding in 2009. History of possible skin necrosis from the Coumadin. On 81 mg ASA. Not interested in NOAC. 2. CHF (congestive heart failure):    Normal LV last [facility]) 5/2017. Compensated. Class 1     EF=55% 2014 and 2017,  Normal cardiac cath ~10 years ago. 3. HTN: Controlled on current medications. BP (!) 132/94 (Site: Left Arm, Position: Sitting, Cuff Size: Large Adult)   Pulse 99   Ht 6' 3\" (1.905 m)   Wt (!) 336 lb 12.8 oz (152.8 kg)   SpO2 95%   BMI 42.10 kg/m²       4. Sleep apnea: Not treating   5. Obesity: Unchanged. Needs weight loss   6. COPD (chronic obstructive pulmonary disease): Managed by Dr Kc--wears oxygen prn (usually wears at night). Plan:  Labs today  TAKE MEDS  F/u tomorrow for HR. If HR controlled, he can proceed with surgery at low cardiac risk. If HR high on meds and labs OK, need to increase lopressor for better HR control. Pt to bring meds tomorrow to review and make sure what he is taking    Thank you for allowing me to participate in the care of this individual.      Rupert Urbina M.D., [address] [facility]        If you have questions, please do not hesitate to call me. I look forward to following Jackie Campbell along with you.     Sincerely,        Herbert Richard MD

## 2018-08-15 NOTE — PROGRESS NOTES
(3/13/14); endoscopic ultrasound (upper) (6/4/14); Upper gastrointestinal endoscopy (6/4/14); Ulnar tunnel release (Left, 12-11-14); Carpal tunnel release (Left, 12/11/14); hernia repair; Foot surgery (Right, 07/13/2017); Foot surgery (Right, 08/17/2017); Upper gastrointestinal endoscopy (03/02/2018); and Colonoscopy (03/02/2018). Social History:   reports that he has never smoked. He has never used smokeless tobacco. He reports that he does not drink alcohol or use drugs. Family History:  family history includes Cancer in his father; Heart Disease in his father and mother; High Blood Pressure in his father and mother. Home Medications:  Outpatient Encounter Prescriptions as of 8/15/2018   Medication Sig Dispense Refill    furosemide (LASIX) 40 MG tablet Take 2 tablets by mouth 2 times daily 360 tablet 1    ferrous sulfate 325 (65 Fe) MG tablet TAKE 1 TABLET BY MOUTH DAILY 30 tablet 3    sertraline (ZOLOFT) 50 MG tablet TAKE 1 TABLET BY MOUTH EVERY DAY 30 tablet 3    Magnesium 500 MG TABS Take by mouth      metoprolol tartrate (LOPRESSOR) 25 MG tablet Take 1 tablet by mouth 2 times daily 180 tablet 0    diltiazem (CARTIA XT) 180 MG extended release capsule Take 1 capsule by mouth 2 times daily 180 capsule 3    allopurinol (ZYLOPRIM) 100 MG tablet Take 3 tablets by mouth daily 585 tablet 5    folic acid (FOLVITE) 1 MG tablet Take 1 tablet by mouth daily 30 tablet 3    atorvastatin (LIPITOR) 20 MG tablet Take 1 tablet by mouth daily 90 tablet 3    pantoprazole (PROTONIX) 40 MG tablet TAKE 1 TABLET BY MOUTH TWICE DAILY 60 tablet 5    albuterol sulfate HFA (PROVENTIL HFA) 108 (90 BASE) MCG/ACT inhaler Inhale 2 puffs into the lungs every 4 hours as needed for Wheezing 1 Inhaler 3    budesonide-formoterol (SYMBICORT) 160-4.5 MCG/ACT AERO Inhale 2 puffs into the lungs 2 times daily.  3 Inhaler 3    albuterol-ipratropium (COMBIVENT RESPIMAT)  MCG/ACT AERS inhaler Inhale 1 puff into the lungs

## 2018-08-15 NOTE — COMMUNICATION BODY
[place]       Referring Provider:  Stas Carlson MD     Chief Complaint   Patient presents with   Irma March Cardiac Clearance     knee arthroscopy, Dr John Paul Krishnan 8/21/18    Congestive Heart Failure     no new cardiac s/s    Atrial Fibrillation    Hyperlipidemia    Shortness of Breath     unchanged      History of Present Illness:     Mr. Meade Schilder is here for a routine  follow up for atrial fibrillation,CHF,HTN and cardiac risk evaluation for knee surgery. Also has a history copd,asthma (prn oxygen),sleep apnea (unable to tolerate treatment),colon cancer,RI,hernia,arthritis,CAD,CVA,HLD, and neuropathy. In 2004, an angiogram showed normal coronaries. He is off Coumadin due to GI bleed from a fall; the others are too expensive. Takes 81 mg ASA. He has not been seen by me a year. He was admitted this Spring with cellulitis and had partial right foot amputation. He also had a colonoscopy for anemia and had some polyp removal. He denies chest pain or edema. Past Medical History:   has a past medical history of Anxiety; Arthritis; Asthma; Atrial fibrillation ([address] Blood circulation, collateral; CAD (coronary artery disease); CHF (congestive heart failure) ([address] Colon cancer ([address] COPD (chronic obstructive pulmonary disease) ([address] Diabetic foot infection ([address] Hernia; Hyperlipidemia; Hypertension; Movement disorder; Muscle cramps; Neuromuscular disorder ([address] Neuropathy; Pneumonia; Renal insufficiency; Sleep apnea; Thyroid disease; Unspecified cerebral artery occlusion with cerebral infarction; and Unspecified diseases of blood and blood-forming organs. Surgical History:   has a past surgical history that includes Toe Surgery; Ventricular ablation surgery; Colon surgery; Toe amputation (3/7/11); Appendectomy (10/9/2012); Abdomen surgery; Colonoscopy; Endoscopy, colon, diagnostic; skin biopsy; Upper gastrointestinal endoscopy (1/28/2014); Upper gastrointestinal endoscopy (3/13/14);  Colonoscopy (3/13/14); endoscopic ultrasound (upper) (6/4/14); Upper gastrointestinal endoscopy (6/4/14); Ulnar tunnel release (Left, 12-11-14); Carpal tunnel release (Left, 12/11/14); hernia repair; Foot surgery (Right, 07/13/2017); Foot surgery (Right, 08/17/2017); Upper gastrointestinal endoscopy (03/02/2018); and Colonoscopy (03/02/2018). Social History:   reports that he has never smoked. He has never used smokeless tobacco. He reports that he does not drink alcohol or use drugs. Family History:  family history includes Cancer in his father; Heart Disease in his father and mother; High Blood Pressure in his father and mother. Home Medications:  Outpatient Encounter Prescriptions as of 8/15/2018   Medication Sig Dispense Refill    furosemide (LASIX) 40 MG tablet Take 2 tablets by mouth 2 times daily 360 tablet 1    ferrous sulfate 325 (65 Fe) MG tablet TAKE 1 TABLET BY MOUTH DAILY 30 tablet 3    sertraline (ZOLOFT) 50 MG tablet TAKE 1 TABLET BY MOUTH EVERY DAY 30 tablet 3    Magnesium 500 MG TABS Take by mouth      metoprolol tartrate (LOPRESSOR) 25 MG tablet Take 1 tablet by mouth 2 times daily 180 tablet 0    diltiazem (CARTIA XT) 180 MG extended release capsule Take 1 capsule by mouth 2 times daily 180 capsule 3    allopurinol (ZYLOPRIM) 100 MG tablet Take 3 tablets by mouth daily 729 tablet 5    folic acid (FOLVITE) 1 MG tablet Take 1 tablet by mouth daily 30 tablet 3    atorvastatin (LIPITOR) 20 MG tablet Take 1 tablet by mouth daily 90 tablet 3    pantoprazole (PROTONIX) 40 MG tablet TAKE 1 TABLET BY MOUTH TWICE DAILY 60 tablet 5    albuterol sulfate HFA (PROVENTIL HFA) 108 (90 BASE) MCG/ACT inhaler Inhale 2 puffs into the lungs every 4 hours as needed for Wheezing 1 Inhaler 3    budesonide-formoterol (SYMBICORT) 160-4.5 MCG/ACT AERO Inhale 2 puffs into the lungs 2 times daily.  3 Inhaler 3    albuterol-ipratropium (COMBIVENT RESPIMAT)  MCG/ACT AERS inhaler Inhale 1 puff into the lungs

## 2018-08-16 ENCOUNTER — OFFICE VISIT (OUTPATIENT)
Dept: CARDIOLOGY CLINIC | Age: 63
End: 2018-08-16

## 2018-08-16 VITALS
SYSTOLIC BLOOD PRESSURE: 126 MMHG | HEIGHT: 75 IN | BODY MASS INDEX: 39.17 KG/M2 | HEART RATE: 58 BPM | DIASTOLIC BLOOD PRESSURE: 80 MMHG | WEIGHT: 315 LBS

## 2018-08-16 DIAGNOSIS — I48.20 CHRONIC ATRIAL FIBRILLATION (HCC): Primary | ICD-10-CM

## 2018-08-16 DIAGNOSIS — I10 ESSENTIAL HYPERTENSION: ICD-10-CM

## 2018-08-16 DIAGNOSIS — I50.32 CHRONIC DIASTOLIC CONGESTIVE HEART FAILURE (HCC): ICD-10-CM

## 2018-08-16 PROCEDURE — 99214 OFFICE O/P EST MOD 30 MIN: CPT | Performed by: NURSE PRACTITIONER

## 2018-08-16 PROCEDURE — 93000 ELECTROCARDIOGRAM COMPLETE: CPT | Performed by: NURSE PRACTITIONER

## 2018-08-16 PROCEDURE — G8417 CALC BMI ABV UP PARAM F/U: HCPCS | Performed by: NURSE PRACTITIONER

## 2018-08-16 PROCEDURE — G8427 DOCREV CUR MEDS BY ELIG CLIN: HCPCS | Performed by: NURSE PRACTITIONER

## 2018-08-16 PROCEDURE — 1036F TOBACCO NON-USER: CPT | Performed by: NURSE PRACTITIONER

## 2018-08-16 PROCEDURE — 3017F COLORECTAL CA SCREEN DOC REV: CPT | Performed by: NURSE PRACTITIONER

## 2018-08-16 NOTE — PROGRESS NOTES
[address]     Outpatient Follow Up Note    Ranjith Kamara is 59 y.o. male who presents today with a history of AF, HTN and hyperlipidemia. CHIEF COMPLAINT / HPI:  Follow Up secondary to HR / BP check after taking medicines. His resting EKG yesterday showed AF ; /94  His H/H showed mild anemia at 12.1/38. 8: he takes iron supplements ; BMP WNL    He thinks the confusion with his medications started after [facility] [employer]. He was put on automatic refills and has been getting bottles of refills every 2 weeks. He has several diltiazem. He's also limited financially     Subjective:   he denies significant chest pain/heaviness unless he gets off his pee pills. There is no SOB/GOMEZ. The patient denies PND. He sleeps in a recliner for comfort (has no bed). He does not sleep for more than 3 hrs at a time. The patient has some swelling in his legs that has gotten better after increasing lasix (he had ran out for about 10-12 days after his dose was changed; and could not afford refills at that time). The patients weight is not checked daily; he keeps pants' size 44-50 depending on his wt fluctuation. He's wearing a size 48 today and has been for a while. The patient is not experiencing palpitations or dizziness. These symptoms show no change since the last OV. With regard to medication therapy the patient has been compliant with prescribed regimen. They have tolerated therapy to date.      Past Medical History:   Diagnosis Date    Anxiety     Arthritis     Asthma     [address]     Blood circulation, collateral     CAD (coronary artery disease)     CHF (congestive heart failure) (HCC)     Colon cancer (HCC)     COPD (chronic obstructive pulmonary disease) (HCC)     Diabetic foot infection (HCC)     Hernia     Hyperlipidemia     Hypertension     Movement disorder     Muscle cramps     Neuromuscular disorder (HCC)     marvin feet tablet Take 2 tablets by mouth 3 times daily (Patient taking differently: Take 40 mEq by mouth 2 times daily 3 tabs BID) 540 tablet 1     No current facility-administered medications for this visit. REVIEW OF SYSTEMS:    CONSTITUTIONAL: No major weight gain or loss, fatigue, weakness, night sweats or fever. HEENT: No new vision difficulties or ringing in the ears. RESPIRATORY: No new SOB, PND, orthopnea or cough. CARDIOVASCULAR: See HPI  GI: No nausea, vomiting, diarrhea, constipation, abdominal pain or changes in bowel habits. : No urinary frequency, urgency, incontinence hematuria or dysuria. SKIN: No cyanosis or skin lesions. MUSCULOSKELETAL: No new muscle or joint pain. NEUROLOGICAL: No syncope or TIA-like symptoms. PSYCHIATRIC: No anxiety, pain, insomnia or depression    Objective:   PHYSICAL EXAM:      VITALS:  /80   Pulse 58   Ht 6' 3\" (1.905 m)   Wt (!) 337 lb (152.9 kg)   BMI 42.12 kg/m²   CONSTITUTIONAL: Cooperative, no apparent distress, and appears well nourished / obese  NEUROLOGIC:  Awake and orientated to person, place and time. PSYCH: Calm affect. SKIN: Warm and dry. HEENT: Sclera non-icteric, normocephalic, neck supple, no elevation of JVP, normal carotid pulses with no bruits and thyroid normal size. LUNGS:  No increased work of breathing and clear to auscultation, no crackles or wheezing  CARDIOVASCULAR:  IRRegular rate 60 and rhythm with no murmurs, gallops, rubs, or abnormal heart sounds, normal PMI. The apical impulses not displaced  JVP less than 8 cm H2O  Heart tones are crisp and normal  Cervical veins are not engorged  The carotid upstroke is normal in amplitude and contour without delay or bruit  JVP is not elevated  ABDOMEN:  Normal bowel sounds, non-distended and non-tender to palpation  EXT: No edema, no calf tenderness. Pulses are present bilaterally.     DATA:    Lab Results   Component Value Date    ALT 24 03/02/2018    AST 32 03/02/2018    GGT 98 (H)

## 2018-08-23 ENCOUNTER — HOSPITAL ENCOUNTER (OUTPATIENT)
Dept: SURGERY | Age: 63
Discharge: HOME | End: 2018-08-23
Attending: ORTHOPAEDIC SURGERY | Admitting: ORTHOPAEDIC SURGERY

## 2018-08-23 VITALS
HEART RATE: 73 BPM | HEIGHT: 75 IN | BODY MASS INDEX: 39.17 KG/M2 | WEIGHT: 315 LBS | DIASTOLIC BLOOD PRESSURE: 93 MMHG | OXYGEN SATURATION: 100 % | TEMPERATURE: 96.9 F | RESPIRATION RATE: 16 BRPM | SYSTOLIC BLOOD PRESSURE: 137 MMHG

## 2018-08-23 DIAGNOSIS — Z98.890 S/P RIGHT KNEE ARTHROSCOPY: Primary | ICD-10-CM

## 2018-08-23 LAB
ANION GAP SERPL CALCULATED.3IONS-SCNC: 10 MMOL/L (ref 3–16)
BUN BLDV-MCNC: 14 MG/DL (ref 7–20)
CALCIUM SERPL-MCNC: 8.9 MG/DL (ref 8.3–10.6)
CHLORIDE BLD-SCNC: 102 MMOL/L (ref 99–110)
CO2: 32 MMOL/L (ref 21–32)
CREAT SERPL-MCNC: 1.1 MG/DL (ref 0.8–1.3)
GFR AFRICAN AMERICAN: >60
GFR NON-AFRICAN AMERICAN: >60
GLUCOSE BLD-MCNC: 93 MG/DL (ref 70–99)
HCT VFR BLD CALC: 38.1 % (ref 40.5–52.5)
HEMOGLOBIN: 11.8 G/DL (ref 13.5–17.5)
MCH RBC QN AUTO: 23.8 PG (ref 26–34)
MCHC RBC AUTO-ENTMCNC: 31 G/DL (ref 31–36)
MCV RBC AUTO: 76.7 FL (ref 80–100)
PDW BLD-RTO: 19.5 % (ref 12.4–15.4)
PLATELET # BLD: 274 K/UL (ref 135–450)
PMV BLD AUTO: 7.7 FL (ref 5–10.5)
POTASSIUM SERPL-SCNC: 5.5 MMOL/L (ref 3.5–5.1)
RBC # BLD: 4.98 M/UL (ref 4.2–5.9)
SODIUM BLD-SCNC: 144 MMOL/L (ref 136–145)
WBC # BLD: 8.1 K/UL (ref 4–11)

## 2018-08-23 RX ORDER — LABETALOL HYDROCHLORIDE 5 MG/ML
5 INJECTION, SOLUTION INTRAVENOUS EVERY 10 MIN PRN
Status: DISCONTINUED | OUTPATIENT
Start: 2018-08-23 | End: 2018-08-24 | Stop reason: HOSPADM

## 2018-08-23 RX ORDER — OXYCODONE HYDROCHLORIDE AND ACETAMINOPHEN 5; 325 MG/1; MG/1
1 TABLET ORAL
Status: ACTIVE | OUTPATIENT
Start: 2018-08-23 | End: 2018-08-23

## 2018-08-23 RX ORDER — ONDANSETRON 2 MG/ML
4 INJECTION INTRAMUSCULAR; INTRAVENOUS
Status: ACTIVE | OUTPATIENT
Start: 2018-08-23 | End: 2018-08-23

## 2018-08-23 RX ORDER — SODIUM CHLORIDE, SODIUM LACTATE, POTASSIUM CHLORIDE, CALCIUM CHLORIDE 600; 310; 30; 20 MG/100ML; MG/100ML; MG/100ML; MG/100ML
INJECTION, SOLUTION INTRAVENOUS CONTINUOUS
Status: DISCONTINUED | OUTPATIENT
Start: 2018-08-23 | End: 2018-08-24 | Stop reason: HOSPADM

## 2018-08-23 RX ORDER — SODIUM CHLORIDE 0.9 % (FLUSH) 0.9 %
10 SYRINGE (ML) INJECTION PRN
Status: CANCELLED | OUTPATIENT
Start: 2018-08-23

## 2018-08-23 RX ORDER — OXYCODONE HYDROCHLORIDE AND ACETAMINOPHEN 5; 325 MG/1; MG/1
1 TABLET ORAL EVERY 6 HOURS PRN
Qty: 28 TABLET | Refills: 0 | Status: SHIPPED | OUTPATIENT
Start: 2018-08-23 | End: 2018-08-30

## 2018-08-23 RX ORDER — HYDRALAZINE HYDROCHLORIDE 20 MG/ML
5 INJECTION INTRAMUSCULAR; INTRAVENOUS EVERY 10 MIN PRN
Status: DISCONTINUED | OUTPATIENT
Start: 2018-08-23 | End: 2018-08-24 | Stop reason: HOSPADM

## 2018-08-23 RX ORDER — LIDOCAINE HYDROCHLORIDE 10 MG/ML
1 INJECTION, SOLUTION EPIDURAL; INFILTRATION; INTRACAUDAL; PERINEURAL
Status: CANCELLED | OUTPATIENT
Start: 2018-08-23 | End: 2018-08-23

## 2018-08-23 RX ORDER — MEPERIDINE HYDROCHLORIDE 25 MG/ML
12.5 INJECTION INTRAMUSCULAR; INTRAVENOUS; SUBCUTANEOUS EVERY 5 MIN PRN
Status: DISCONTINUED | OUTPATIENT
Start: 2018-08-23 | End: 2018-08-24 | Stop reason: HOSPADM

## 2018-08-23 RX ORDER — LIDOCAINE HYDROCHLORIDE 10 MG/ML
0.5 INJECTION, SOLUTION EPIDURAL; INFILTRATION; INTRACAUDAL; PERINEURAL ONCE
Status: DISCONTINUED | OUTPATIENT
Start: 2018-08-23 | End: 2018-08-24 | Stop reason: HOSPADM

## 2018-08-23 RX ORDER — SODIUM CHLORIDE 0.9 % (FLUSH) 0.9 %
10 SYRINGE (ML) INJECTION EVERY 12 HOURS SCHEDULED
Status: CANCELLED | OUTPATIENT
Start: 2018-08-23

## 2018-08-23 RX ORDER — HYDROMORPHONE HCL 110MG/55ML
0.3 PATIENT CONTROLLED ANALGESIA SYRINGE INTRAVENOUS EVERY 5 MIN PRN
Status: DISCONTINUED | OUTPATIENT
Start: 2018-08-23 | End: 2018-08-24 | Stop reason: HOSPADM

## 2018-08-23 RX ORDER — OXYCODONE HYDROCHLORIDE AND ACETAMINOPHEN 5; 325 MG/1; MG/1
1 TABLET ORAL EVERY 6 HOURS PRN
Qty: 28 TABLET | Refills: 0 | Status: SHIPPED | OUTPATIENT
Start: 2018-08-23 | End: 2018-08-23

## 2018-08-23 RX ORDER — SODIUM CHLORIDE, SODIUM LACTATE, POTASSIUM CHLORIDE, CALCIUM CHLORIDE 600; 310; 30; 20 MG/100ML; MG/100ML; MG/100ML; MG/100ML
INJECTION, SOLUTION INTRAVENOUS CONTINUOUS
Status: CANCELLED | OUTPATIENT
Start: 2018-08-23

## 2018-08-23 RX ADMIN — SODIUM CHLORIDE, SODIUM LACTATE, POTASSIUM CHLORIDE, CALCIUM CHLORIDE: 600; 310; 30; 20 INJECTION, SOLUTION INTRAVENOUS at 08:18

## 2018-08-23 ASSESSMENT — PAIN SCALES - GENERAL
PAINLEVEL_OUTOF10: 0

## 2018-08-23 ASSESSMENT — ACTIVITIES OF DAILY LIVING (ADL): EFFECT OF PAIN ON DAILY ACTIVITIES: MOVEMENT

## 2018-08-23 ASSESSMENT — PAIN DESCRIPTION - DESCRIPTORS: DESCRIPTORS: ACHING

## 2018-08-23 ASSESSMENT — PAIN - FUNCTIONAL ASSESSMENT: PAIN_FUNCTIONAL_ASSESSMENT: 0-10

## 2018-08-23 NOTE — ANESTHESIA PRE-OP
(SYMBICORT) 160-4.5 MCG/ACT AERO Inhale 2 puffs into the lungs 2 times daily. 4/20/15   Hayden Sanches MD   albuterol-ipratropium (COMBIVENT RESPIMAT)  MCG/ACT AERS inhaler Inhale 1 puff into the lungs every 6 hours. 4/20/15   Hayden Sanches MD   therapeutic multivitamin-minerals [facility]) tablet Take 1 tablet by mouth daily. Historical Provider, MD       Current medications:    Current Outpatient Prescriptions   Medication Sig Dispense Refill    OXYGEN Inhale  into the lungs. 2 liters/ nasal cannula prn      metoprolol tartrate (LOPRESSOR) 25 MG tablet Take 1 tablet by mouth 2 times daily 180 tablet 3    HYDROcodone-acetaminophen (NORCO) 5-325 MG per tablet Take 1 tablet by mouth every 6 hours as needed for Pain. Bedelia Sol furosemide (LASIX) 40 MG tablet Take 2 tablets by mouth 2 times daily 360 tablet 1    ferrous sulfate 325 (65 Fe) MG tablet TAKE 1 TABLET BY MOUTH DAILY 30 tablet 3    sertraline (ZOLOFT) 50 MG tablet TAKE 1 TABLET BY MOUTH EVERY DAY 30 tablet 3    Magnesium 500 MG TABS Take by mouth      potassium chloride (KLOR-CON M) 20 MEQ extended release tablet Take 2 tablets by mouth 3 times daily (Patient taking differently: Take 40 mEq by mouth 2 times daily 3 tabs BID) 540 tablet 1    diltiazem (CARTIA XT) 180 MG extended release capsule Take 1 capsule by mouth 2 times daily 180 capsule 3    allopurinol (ZYLOPRIM) 100 MG tablet Take 3 tablets by mouth daily 270 tablet 5    atorvastatin (LIPITOR) 20 MG tablet Take 1 tablet by mouth daily 90 tablet 3    pantoprazole (PROTONIX) 40 MG tablet TAKE 1 TABLET BY MOUTH TWICE DAILY 60 tablet 5    albuterol sulfate HFA (PROVENTIL HFA) 108 (90 BASE) MCG/ACT inhaler Inhale 2 puffs into the lungs every 4 hours as needed for Wheezing 1 Inhaler 3    budesonide-formoterol (SYMBICORT) 160-4.5 MCG/ACT AERO Inhale 2 puffs into the lungs 2 times daily.  3 Inhaler 3    albuterol-ipratropium (COMBIVENT RESPIMAT)  MCG/ACT AERS inhaler blood-forming organs        Past Surgical History:        Procedure Laterality Date    ABDOMEN SURGERY      APPENDECTOMY  10/9/2012    CARPAL TUNNEL RELEASE Left 12/11/14    COLON SURGERY      for Colon Ca 10 inches removed from colon    COLONOSCOPY      COLONOSCOPY  3/13/14    COLONOSCOPY  03/02/2018    ENDOSCOPIC ULTRASOUND (UPPER)  6/4/14    ENDOSCOPY, COLON, DIAGNOSTIC      FOOT SURGERY Right 07/13/2017    Amputation toes 2,3,5, right foot Flap closure, Exostectomy 1st metatarsal right foot    FOOT SURGERY Right 08/17/2017    INCISION AND DRAINAGE OF RIGHT FOOT WITH FLAP CLOSURE    HERNIA REPAIR      umbilical, hiatal hernia repairs    SKIN BIOPSY      TOE AMPUTATION  3/7/11    RIGHT FOURTH TOE    TOE SURGERY      ULNAR TUNNEL RELEASE Left 12-11-14    Left ulnar nerve decompression and left CTR    UPPER GASTROINTESTINAL ENDOSCOPY  1/28/2014    with biopsies    UPPER GASTROINTESTINAL ENDOSCOPY  3/13/14    UPPER GASTROINTESTINAL ENDOSCOPY  6/4/14    with biopsy    UPPER GASTROINTESTINAL ENDOSCOPY  03/02/2018    VENTRICULAR ABLATION SURGERY         Social History:    Social History   Substance Use Topics    Smoking status: Never Smoker    Smokeless tobacco: Never Used    Alcohol use No      Comment: quit 2006                                Counseling given: Not Answered      Vital Signs (Current):   Vitals:    08/23/18 0742   Weight: (!) 338 lb 4 oz (153.4 kg)   Height: 6' 3\" (1.905 m)                                              BP Readings from Last 3 Encounters:   08/15/18 126/80   08/15/18 (!) 132/94   08/14/18 130/84       NPO Status: Time of last liquid consumption: 1930                        Time of last solid consumption: 1930                        Date of last liquid consumption: 08/22/18                        Date of last solid food consumption: 08/22/18    BMI:   Wt Readings from Last 3 Encounters:   08/23/18 (!) 338 lb 4 oz (153.4 kg)   08/15/18 (!) 337 lb (152.9 kg)

## 2018-08-23 NOTE — PROGRESS NOTES
Pt arrived from or, report from torres gonzalez, RADHA. Pt awake and responsive upon arrival to pacu. Pt had right knee arthroscopy with partial medial and lateral menisectomy. Leg dressing clean dry and intact. Pt had prior amputation of all toes on right foot, right stub pink and warm.

## 2018-08-29 ENCOUNTER — CARE COORDINATION (OUTPATIENT)
Dept: CARE COORDINATION | Age: 63
End: 2018-08-29

## 2018-08-29 NOTE — CARE COORDINATION
This ACC spoke with pt to touch base s/p right knee arthroscopy from 8/23/2018. Pt reported that he has never felt better. Refused crutches, denied any pain at all. Wound dressings were removed by pt, per MD order, on 8/26/2018. Pt denied any s/s infection. Pt denied any needs or assistance at this time, but aware to call this ACC if a need arises.

## 2018-08-30 RX ORDER — FERROUS SULFATE 325(65) MG
325 TABLET ORAL DAILY
Qty: 90 TABLET | Refills: 1 | Status: SHIPPED | OUTPATIENT
Start: 2018-08-30 | End: 2019-10-29 | Stop reason: ALTCHOICE

## 2018-09-04 ENCOUNTER — TELEPHONE (OUTPATIENT)
Dept: FAMILY MEDICINE CLINIC | Age: 63
End: 2018-09-04

## 2018-09-07 ENCOUNTER — OFFICE VISIT (OUTPATIENT)
Dept: ORTHOPEDIC SURGERY | Age: 63
End: 2018-09-07

## 2018-09-07 VITALS — HEIGHT: 75 IN | WEIGHT: 315 LBS | RESPIRATION RATE: 17 BRPM | HEART RATE: 70 BPM | BODY MASS INDEX: 39.17 KG/M2

## 2018-09-07 DIAGNOSIS — S83.241D TEAR OF MEDIAL MENISCUS OF RIGHT KNEE, SUBSEQUENT ENCOUNTER: Primary | ICD-10-CM

## 2018-09-07 PROCEDURE — 99024 POSTOP FOLLOW-UP VISIT: CPT | Performed by: ORTHOPAEDIC SURGERY

## 2018-09-07 NOTE — PROGRESS NOTES
Fernanda Woo  [id number]  Encounter Date: 9/7/2018  YOB: 1955    Chief Complaint   Patient presents with    Post-Op Check     right knee DOS 8/23/18       History:Mr. Fernanda Woo is here in follow up regarding his right knee Arthroscopy with partial meniscectomy medial and lateral meniscus 2 weeks ago. He states he is doing much better and having minimal discomfort in the right knee rating 1/10 on average. He states he was even able to walk the day of surgery noticed a big difference in the pain in his right knee immediately. He's had no fevers or chills. No drainage from his wound. Feels like it started help his left knee little bit which is also suffering from severe osteoarthritis. He does make mention of issues with his autonomic neuropathy affecting both of his hands and the fact that he's had previous carpal tunnel release in the left wrist.  I advised him to continue to following up with neurology and/or his hand specialist which is Dr. Jade Chapa for this. Exam:  Pulse 70   Resp 17   Ht 6' 3\" (1.905 m)   Wt (!) 337 lb (152.9 kg)   BMI 42.12 kg/m²   General: Alert and oriented x3, No acute distress, Cooperative and conversant  right Knee: Incision areas are well-healed. There is no erythema. Soft tissue swelling is improving as expected. He can achieve full extension with flexion comfortable beyond 105°. No severe tenderness at the joint line today. Quad strength 4+/5. No signs / symptoms of DVT / PE or infection    Assessment:   Diagnosis Orders   1. Tear of medial meniscus of right knee, subsequent encounter       Plan:  He is making expected progress regarding his right knee. Hopefully this will help his left knee last longer. He has multiple co-morbidities though that make the option of total knee arthroplasty a very risky proposition and he understands this.   I do not feel I have anything in addition from an orthopedic perspective to help with his neuropathy

## 2018-09-14 ENCOUNTER — OFFICE VISIT (OUTPATIENT)
Dept: INTERNAL MEDICINE CLINIC | Age: 63
End: 2018-09-14

## 2018-09-14 VITALS
DIASTOLIC BLOOD PRESSURE: 74 MMHG | HEIGHT: 75 IN | BODY MASS INDEX: 39.17 KG/M2 | HEART RATE: 84 BPM | SYSTOLIC BLOOD PRESSURE: 118 MMHG | WEIGHT: 315 LBS

## 2018-09-14 DIAGNOSIS — J43.8 OTHER EMPHYSEMA (HCC): ICD-10-CM

## 2018-09-14 DIAGNOSIS — I50.32 CHRONIC DIASTOLIC CONGESTIVE HEART FAILURE (HCC): ICD-10-CM

## 2018-09-14 DIAGNOSIS — I10 ESSENTIAL HYPERTENSION: Primary | ICD-10-CM

## 2018-09-14 DIAGNOSIS — E78.2 MIXED HYPERLIPIDEMIA: ICD-10-CM

## 2018-09-14 DIAGNOSIS — E66.01 MORBID OBESITY WITH BMI OF 40.0-44.9, ADULT (HCC): Chronic | ICD-10-CM

## 2018-09-14 LAB
CHOLESTEROL, TOTAL: 167 MG/DL (ref 0–199)
HDLC SERPL-MCNC: 34 MG/DL (ref 40–60)
LDL CHOLESTEROL CALCULATED: 111 MG/DL
POTASSIUM SERPL-SCNC: 4.1 MMOL/L (ref 3.5–5.1)
TRIGL SERPL-MCNC: 112 MG/DL (ref 0–150)
VLDLC SERPL CALC-MCNC: 22 MG/DL

## 2018-09-14 PROCEDURE — 3017F COLORECTAL CA SCREEN DOC REV: CPT | Performed by: INTERNAL MEDICINE

## 2018-09-14 PROCEDURE — G8427 DOCREV CUR MEDS BY ELIG CLIN: HCPCS | Performed by: INTERNAL MEDICINE

## 2018-09-14 PROCEDURE — 99214 OFFICE O/P EST MOD 30 MIN: CPT | Performed by: INTERNAL MEDICINE

## 2018-09-14 PROCEDURE — 3023F SPIROM DOC REV: CPT | Performed by: INTERNAL MEDICINE

## 2018-09-14 PROCEDURE — 1036F TOBACCO NON-USER: CPT | Performed by: INTERNAL MEDICINE

## 2018-09-14 PROCEDURE — G8926 SPIRO NO PERF OR DOC: HCPCS | Performed by: INTERNAL MEDICINE

## 2018-09-14 PROCEDURE — G8417 CALC BMI ABV UP PARAM F/U: HCPCS | Performed by: INTERNAL MEDICINE

## 2018-09-14 ASSESSMENT — PATIENT HEALTH QUESTIONNAIRE - PHQ9
2. FEELING DOWN, DEPRESSED OR HOPELESS: 0
1. LITTLE INTEREST OR PLEASURE IN DOING THINGS: 0
SUM OF ALL RESPONSES TO PHQ9 QUESTIONS 1 & 2: 0
SUM OF ALL RESPONSES TO PHQ QUESTIONS 1-9: 0
SUM OF ALL RESPONSES TO PHQ QUESTIONS 1-9: 0

## 2018-09-14 NOTE — PROGRESS NOTES
08/23/2018    CO2 32 08/23/2018      Lab Results   Component Value Date    CHOL 167 11/15/2016    CHOL 116 07/23/2015    CHOL 127 08/19/2014     Lab Results   Component Value Date    TRIG 79 11/15/2016    TRIG 76 07/23/2015    TRIG 94 08/19/2014     Lab Results   Component Value Date    HDL 41 11/15/2016    HDL 59 07/23/2015    HDL 50 08/19/2014     Lab Results   Component Value Date    LDLCALC 110 (H) 11/15/2016    LDLCALC 42 07/23/2015    LDLCALC 58 08/19/2014     Lab Results   Component Value Date    LABVLDL 16 11/15/2016    LABVLDL 15 07/23/2015    LABVLDL 19 08/19/2014     No results found for: CHOLHDLRATIO     A/P  1. Essential hypertension  His blood pressure is well-controlled. I will recheck his potassium level given his last level of 5.5. Continue current antihypertensive regimen.  - POTASSIUM    2. Chronic diastolic congestive heart failure ([facility] [address]  He appears very well compensated on current regimen. Continue furosemide. 3. Other emphysema ([address]  Symptoms are under excellent control. Continue Symbicort and Combivent. 4. Morbid obesity with BMI of 40.0-44.9, adult ([facility] [address]  He was commended on 10 pounds of weight loss and encouraged to continue with healthy diet and regular physical activity. 5. Mixed hyperlipidemia  He is on atorvastatin which he tolerates well. He is due for lipid testing which will be obtained today.   - Lipid Panel    RTO 4 months

## 2018-09-17 RX ORDER — ATORVASTATIN CALCIUM 40 MG/1
40 TABLET, FILM COATED ORAL DAILY
Qty: 90 TABLET | Refills: 3 | Status: SHIPPED | OUTPATIENT
Start: 2018-09-17 | End: 2021-03-05 | Stop reason: SDUPTHER

## 2018-09-19 RX ORDER — DILTIAZEM HYDROCHLORIDE 180 MG/1
CAPSULE, COATED, EXTENDED RELEASE ORAL
Qty: 180 CAPSULE | Refills: 1 | Status: SHIPPED | OUTPATIENT
Start: 2018-09-19 | End: 2021-03-05 | Stop reason: SDUPTHER

## 2018-09-24 ENCOUNTER — CARE COORDINATION (OUTPATIENT)
Dept: CARE COORDINATION | Age: 63
End: 2018-09-24

## 2018-09-24 NOTE — CARE COORDINATION
This ACC spoke with pt regarding COPD and CHF management and s/p right knee arthroscopy. Pt reported right knee becomes sore at times but Voltaren gel is effective to tx sx. Pt maintains weight loss of 10#, denied any s/s COPD or CHF exacerbation. Pt denied any concerns at this moment. This ACC and pt agree to touch base in several weeks, but pt aware to call for any needs or assistance and to call [facility] 24/7 with any concerns or health status changes. Goyo Wells

## 2018-10-18 ENCOUNTER — CARE COORDINATION (OUTPATIENT)
Dept: CARE COORDINATION | Age: 63
End: 2018-10-18

## 2018-10-22 RX ORDER — POTASSIUM CHLORIDE 20 MEQ/1
40 TABLET, EXTENDED RELEASE ORAL 3 TIMES DAILY
Qty: 540 TABLET | Refills: 1 | Status: SHIPPED | OUTPATIENT
Start: 2018-10-22 | End: 2019-04-17 | Stop reason: SDUPTHER

## 2018-10-30 ENCOUNTER — CARE COORDINATION (OUTPATIENT)
Dept: CARE COORDINATION | Age: 63
End: 2018-10-30

## 2018-10-30 NOTE — CARE COORDINATION
Enrollment:  Complex Care  Referral from Primary Care Provider:  No  Suggested Interventions and Community Resources  Behavorial Health: In Process  Fall Risk Prevention: In Process  Home Health Services:  Completed  Life Skills Coaching: In Process  Other Services: In Process  Zone Management Tools:  Completed  Other Services or Interventions:  medication mgt         Goals Addressed             Most Recent     COMPLETED: Behavioral Health                I will work towards the following Behavioral Health goals: I will schedule a new appointment to establish care with a psychologist/counselor and/or psychiatrist. and I will reach out to [facility]    Barriers: lack of motivation and stress  Plan for overcoming my barriers: use info provided by [facility] to call [facility] and well as read info provided by [facility]  Confidence: 8/10  Anticipated Goal Completion Date: 60 days       COMPLETED: Conditions and Symptoms   On track (8/14/2018)             I will schedule office visits, as directed by my provider. I will keep my appointment or reschedule if I have to cancel. I will notify my provider of any barriers to my plan of care. I will follow my Zone Management tool to seek urgent or emergent care. I will notify my provider of any symptoms that indicate a worsening of my condition. Barriers: impairment:  physical and mental health financial, overwhelmed by complexity of regimen, stress, time constraints, medication side effects and lack of education  Plan for overcoming my barriers: will seek out [facility]' support and notify Dr. Maryam Fisher if there are problems filling medications  Confidence: 7/10  Anticipated Goal Completion Date: 10/2018         COMPLETED: Medication Management   On track (8/14/2018)             I will take my medication as directed. I will notify my provider/Care Coordinator if I am unable to afford my medications.   I will notify my provider for advice before I stop taking any of my

## 2018-11-05 ENCOUNTER — OFFICE VISIT (OUTPATIENT)
Dept: INTERNAL MEDICINE CLINIC | Age: 63
End: 2018-11-05
Payer: MEDICARE

## 2018-11-05 ENCOUNTER — CARE COORDINATION (OUTPATIENT)
Dept: CARE COORDINATION | Age: 63
End: 2018-11-05

## 2018-11-05 VITALS
HEIGHT: 75 IN | SYSTOLIC BLOOD PRESSURE: 132 MMHG | HEART RATE: 88 BPM | BODY MASS INDEX: 39.17 KG/M2 | WEIGHT: 315 LBS | DIASTOLIC BLOOD PRESSURE: 80 MMHG

## 2018-11-05 DIAGNOSIS — M25.562 ACUTE PAIN OF LEFT KNEE: Primary | ICD-10-CM

## 2018-11-05 PROCEDURE — 99213 OFFICE O/P EST LOW 20 MIN: CPT | Performed by: INTERNAL MEDICINE

## 2018-11-05 PROCEDURE — G8417 CALC BMI ABV UP PARAM F/U: HCPCS | Performed by: INTERNAL MEDICINE

## 2018-11-05 PROCEDURE — 3017F COLORECTAL CA SCREEN DOC REV: CPT | Performed by: INTERNAL MEDICINE

## 2018-11-05 PROCEDURE — G8484 FLU IMMUNIZE NO ADMIN: HCPCS | Performed by: INTERNAL MEDICINE

## 2018-11-05 PROCEDURE — G8427 DOCREV CUR MEDS BY ELIG CLIN: HCPCS | Performed by: INTERNAL MEDICINE

## 2018-11-05 PROCEDURE — 1036F TOBACCO NON-USER: CPT | Performed by: INTERNAL MEDICINE

## 2018-11-05 RX ORDER — HYDROCODONE BITARTRATE AND ACETAMINOPHEN 5; 325 MG/1; MG/1
1 TABLET ORAL EVERY 6 HOURS PRN
Qty: 28 TABLET | Refills: 0 | Status: SHIPPED | OUTPATIENT
Start: 2018-11-05 | End: 2018-11-12

## 2018-11-08 ENCOUNTER — HOSPITAL ENCOUNTER (OUTPATIENT)
Dept: GENERAL RADIOLOGY | Age: 63
Discharge: HOME | End: 2018-11-08
Payer: MEDICARE

## 2018-11-08 ENCOUNTER — CARE COORDINATION (OUTPATIENT)
Dept: CARE COORDINATION | Age: 63
End: 2018-11-08

## 2018-11-08 ENCOUNTER — HOSPITAL ENCOUNTER (OUTPATIENT)
Age: 63
Discharge: HOME | End: 2018-11-08
Payer: MEDICARE

## 2018-11-08 DIAGNOSIS — M25.562 ACUTE PAIN OF LEFT KNEE: ICD-10-CM

## 2018-11-08 PROCEDURE — 73564 X-RAY EXAM KNEE 4 OR MORE: CPT

## 2018-11-13 RX ORDER — FUROSEMIDE 40 MG/1
40 TABLET ORAL 2 TIMES DAILY
Qty: 180 TABLET | Refills: 1 | Status: SHIPPED | OUTPATIENT
Start: 2018-11-13 | End: 2019-01-18 | Stop reason: SDUPTHER

## 2018-11-21 ENCOUNTER — CARE COORDINATION (OUTPATIENT)
Dept: CARE COORDINATION | Age: 63
End: 2018-11-21

## 2018-12-06 ENCOUNTER — CARE COORDINATION (OUTPATIENT)
Dept: CARE COORDINATION | Age: 63
End: 2018-12-06

## 2018-12-12 ENCOUNTER — OFFICE VISIT (OUTPATIENT)
Dept: ORTHOPEDIC SURGERY | Age: 63
End: 2018-12-12
Payer: MEDICARE

## 2018-12-12 VITALS
WEIGHT: 315 LBS | DIASTOLIC BLOOD PRESSURE: 89 MMHG | SYSTOLIC BLOOD PRESSURE: 144 MMHG | BODY MASS INDEX: 39.17 KG/M2 | RESPIRATION RATE: 16 BRPM | HEART RATE: 86 BPM | HEIGHT: 75 IN

## 2018-12-12 DIAGNOSIS — M65.331 TRIGGER MIDDLE FINGER OF RIGHT HAND: ICD-10-CM

## 2018-12-12 DIAGNOSIS — G56.01 CARPAL TUNNEL SYNDROME OF RIGHT WRIST: Primary | ICD-10-CM

## 2018-12-12 PROCEDURE — 20550 NJX 1 TENDON SHEATH/LIGAMENT: CPT | Performed by: ORTHOPAEDIC SURGERY

## 2018-12-12 PROCEDURE — G8484 FLU IMMUNIZE NO ADMIN: HCPCS | Performed by: ORTHOPAEDIC SURGERY

## 2018-12-12 PROCEDURE — G8417 CALC BMI ABV UP PARAM F/U: HCPCS | Performed by: ORTHOPAEDIC SURGERY

## 2018-12-12 PROCEDURE — 1036F TOBACCO NON-USER: CPT | Performed by: ORTHOPAEDIC SURGERY

## 2018-12-12 PROCEDURE — 3017F COLORECTAL CA SCREEN DOC REV: CPT | Performed by: ORTHOPAEDIC SURGERY

## 2018-12-12 PROCEDURE — 99214 OFFICE O/P EST MOD 30 MIN: CPT | Performed by: ORTHOPAEDIC SURGERY

## 2018-12-12 PROCEDURE — G8427 DOCREV CUR MEDS BY ELIG CLIN: HCPCS | Performed by: ORTHOPAEDIC SURGERY

## 2018-12-12 PROCEDURE — 20526 THER INJECTION CARP TUNNEL: CPT | Performed by: ORTHOPAEDIC SURGERY

## 2018-12-13 ENCOUNTER — CARE COORDINATION (OUTPATIENT)
Dept: CARE COORDINATION | Age: 63
End: 2018-12-13

## 2018-12-13 NOTE — CARE COORDINATION
robin and keeping them with him AAT to ensure optimal health;  Confidence: 7/10  Anticipated Goal Completion Date: 12/2018            Prior to Admission medications    Medication Sig Start Date End Date Taking? Authorizing Provider   furosemide (LASIX) 40 MG tablet Take 1 tablet by mouth 2 times daily 11/13/18   Cameron Costa MD   potassium chloride (KLOR-CON M) 20 MEQ extended release tablet Take 2 tablets by mouth 3 times daily 10/22/18 1/20/19  Cameron Costa MD   diclofenac sodium 1 % GEL APPLY 4 G TOPICALLY 4 TIMES DAILY 10/11/18 11/10/18  Cameron Costa MD   diltiazem (CARDIZEM CD) 180 MG extended release capsule TAKE ONE CAPSULE BY MOUTH TWICE A DAY 9/19/18   Paty Fonseca MD   atorvastatin (LIPITOR) 40 MG tablet Take 1 tablet by mouth daily 9/17/18   Cameron Costa MD   aspirin 81 MG tablet Take 81 mg by mouth daily    Historical Provider, MD   ferrous sulfate 325 (65 Fe) MG tablet Take 1 tablet by mouth daily 8/30/18   Cameron Costa MD   sertraline (ZOLOFT) 50 MG tablet TAKE 1 TABLET BY MOUTH EVERY DAY 8/30/18   Cameron Costa MD   metoprolol tartrate (LOPRESSOR) 25 MG tablet Take 1 tablet by mouth 2 times daily 8/15/18   Paty Fonseca MD   Magnesium 500 MG TABS Take by mouth    Historical Provider, MD   allopurinol (ZYLOPRIM) 100 MG tablet Take 3 tablets by mouth daily 3/22/18   Cameron Costa MD   pantoprazole (PROTONIX) 40 MG tablet TAKE 1 TABLET BY MOUTH TWICE DAILY 1/30/17   Cameron Costa MD   albuterol sulfate HFA (PROVENTIL HFA) 108 (90 BASE) MCG/ACT inhaler Inhale 2 puffs into the lungs every 4 hours as needed for Wheezing 1/25/16   Idalia Ortega MD   budesonide-formoterol (SYMBICORT) 160-4.5 MCG/ACT AERO Inhale 2 puffs into the lungs 2 times daily. 4/20/15   Idalia Ortega MD   albuterol-ipratropium (COMBIVENT RESPIMAT)  MCG/ACT AERS inhaler Inhale 1 puff into the lungs every 6 hours.  4/20/15   Judeth Dakin

## 2019-01-07 ENCOUNTER — TELEPHONE (OUTPATIENT)
Dept: INTERNAL MEDICINE CLINIC | Age: 64
End: 2019-01-07

## 2019-01-09 ENCOUNTER — OFFICE VISIT (OUTPATIENT)
Dept: ORTHOPEDIC SURGERY | Age: 64
End: 2019-01-09
Payer: MEDICARE

## 2019-01-09 VITALS
BODY MASS INDEX: 39.17 KG/M2 | HEIGHT: 75 IN | HEART RATE: 104 BPM | DIASTOLIC BLOOD PRESSURE: 95 MMHG | WEIGHT: 315 LBS | SYSTOLIC BLOOD PRESSURE: 133 MMHG

## 2019-01-09 DIAGNOSIS — M17.12 PRIMARY OSTEOARTHRITIS OF LEFT KNEE: ICD-10-CM

## 2019-01-09 DIAGNOSIS — S83.207A TEAR OF LEFT MENISCUS AS CURRENT INJURY, INITIAL ENCOUNTER: Primary | ICD-10-CM

## 2019-01-09 PROCEDURE — G8417 CALC BMI ABV UP PARAM F/U: HCPCS | Performed by: PHYSICIAN ASSISTANT

## 2019-01-09 PROCEDURE — 1036F TOBACCO NON-USER: CPT | Performed by: PHYSICIAN ASSISTANT

## 2019-01-09 PROCEDURE — 20610 DRAIN/INJ JOINT/BURSA W/O US: CPT | Performed by: PHYSICIAN ASSISTANT

## 2019-01-09 PROCEDURE — G8484 FLU IMMUNIZE NO ADMIN: HCPCS | Performed by: PHYSICIAN ASSISTANT

## 2019-01-09 PROCEDURE — G8427 DOCREV CUR MEDS BY ELIG CLIN: HCPCS | Performed by: PHYSICIAN ASSISTANT

## 2019-01-09 PROCEDURE — 99213 OFFICE O/P EST LOW 20 MIN: CPT | Performed by: PHYSICIAN ASSISTANT

## 2019-01-09 PROCEDURE — 3017F COLORECTAL CA SCREEN DOC REV: CPT | Performed by: PHYSICIAN ASSISTANT

## 2019-01-09 RX ORDER — BETAMETHASONE SODIUM PHOSPHATE AND BETAMETHASONE ACETATE 3; 3 MG/ML; MG/ML
12 INJECTION, SUSPENSION INTRA-ARTICULAR; INTRALESIONAL; INTRAMUSCULAR; SOFT TISSUE ONCE
Status: COMPLETED | OUTPATIENT
Start: 2019-01-09 | End: 2019-01-09

## 2019-01-09 RX ADMIN — BETAMETHASONE SODIUM PHOSPHATE AND BETAMETHASONE ACETATE 12 MG: 3; 3 INJECTION, SUSPENSION INTRA-ARTICULAR; INTRALESIONAL; INTRAMUSCULAR; SOFT TISSUE at 16:32

## 2019-01-18 ENCOUNTER — OFFICE VISIT (OUTPATIENT)
Dept: INTERNAL MEDICINE CLINIC | Age: 64
End: 2019-01-18
Payer: MEDICARE

## 2019-01-18 VITALS
WEIGHT: 315 LBS | BODY MASS INDEX: 39.17 KG/M2 | SYSTOLIC BLOOD PRESSURE: 124 MMHG | HEART RATE: 100 BPM | DIASTOLIC BLOOD PRESSURE: 76 MMHG | HEIGHT: 75 IN

## 2019-01-18 DIAGNOSIS — I10 ESSENTIAL HYPERTENSION: Primary | ICD-10-CM

## 2019-01-18 DIAGNOSIS — J43.8 OTHER EMPHYSEMA (HCC): ICD-10-CM

## 2019-01-18 DIAGNOSIS — E78.2 MIXED HYPERLIPIDEMIA: ICD-10-CM

## 2019-01-18 DIAGNOSIS — I48.20 CHRONIC ATRIAL FIBRILLATION (HCC): ICD-10-CM

## 2019-01-18 DIAGNOSIS — I50.32 CHRONIC DIASTOLIC CONGESTIVE HEART FAILURE (HCC): ICD-10-CM

## 2019-01-18 PROCEDURE — G8926 SPIRO NO PERF OR DOC: HCPCS | Performed by: INTERNAL MEDICINE

## 2019-01-18 PROCEDURE — G8484 FLU IMMUNIZE NO ADMIN: HCPCS | Performed by: INTERNAL MEDICINE

## 2019-01-18 PROCEDURE — 1036F TOBACCO NON-USER: CPT | Performed by: INTERNAL MEDICINE

## 2019-01-18 PROCEDURE — G8417 CALC BMI ABV UP PARAM F/U: HCPCS | Performed by: INTERNAL MEDICINE

## 2019-01-18 PROCEDURE — 3023F SPIROM DOC REV: CPT | Performed by: INTERNAL MEDICINE

## 2019-01-18 PROCEDURE — 3017F COLORECTAL CA SCREEN DOC REV: CPT | Performed by: INTERNAL MEDICINE

## 2019-01-18 PROCEDURE — G8427 DOCREV CUR MEDS BY ELIG CLIN: HCPCS | Performed by: INTERNAL MEDICINE

## 2019-01-18 PROCEDURE — 99214 OFFICE O/P EST MOD 30 MIN: CPT | Performed by: INTERNAL MEDICINE

## 2019-01-18 RX ORDER — FUROSEMIDE 40 MG/1
80 TABLET ORAL 2 TIMES DAILY
Qty: 360 TABLET | Refills: 0 | Status: SHIPPED | OUTPATIENT
Start: 2019-01-18 | End: 2019-04-13 | Stop reason: SDUPTHER

## 2019-01-29 ENCOUNTER — CARE COORDINATION (OUTPATIENT)
Dept: CARE COORDINATION | Age: 64
End: 2019-01-29

## 2019-03-07 ENCOUNTER — CARE COORDINATION (OUTPATIENT)
Dept: CARE COORDINATION | Age: 64
End: 2019-03-07

## 2019-03-08 ENCOUNTER — TELEPHONE (OUTPATIENT)
Dept: ORTHOPEDIC SURGERY | Age: 64
End: 2019-03-08

## 2019-03-08 ENCOUNTER — TELEPHONE (OUTPATIENT)
Dept: INTERNAL MEDICINE CLINIC | Age: 64
End: 2019-03-08

## 2019-03-08 ENCOUNTER — OFFICE VISIT (OUTPATIENT)
Dept: ORTHOPEDIC SURGERY | Age: 64
End: 2019-03-08
Payer: MEDICARE

## 2019-03-08 VITALS
WEIGHT: 315 LBS | DIASTOLIC BLOOD PRESSURE: 106 MMHG | BODY MASS INDEX: 38.36 KG/M2 | SYSTOLIC BLOOD PRESSURE: 154 MMHG | HEART RATE: 97 BPM | HEIGHT: 76 IN

## 2019-03-08 DIAGNOSIS — M17.12 PRIMARY OSTEOARTHRITIS OF LEFT KNEE: Primary | ICD-10-CM

## 2019-03-08 DIAGNOSIS — S83.206D TEAR OF RIGHT MENISCUS AS CURRENT INJURY, SUBSEQUENT ENCOUNTER: ICD-10-CM

## 2019-03-08 DIAGNOSIS — S83.207D TEAR OF LEFT MENISCUS AS CURRENT INJURY, SUBSEQUENT ENCOUNTER: ICD-10-CM

## 2019-03-08 DIAGNOSIS — M17.11 PRIMARY OSTEOARTHRITIS OF RIGHT KNEE: ICD-10-CM

## 2019-03-08 PROCEDURE — 1036F TOBACCO NON-USER: CPT | Performed by: PHYSICIAN ASSISTANT

## 2019-03-08 PROCEDURE — 99213 OFFICE O/P EST LOW 20 MIN: CPT | Performed by: PHYSICIAN ASSISTANT

## 2019-03-08 PROCEDURE — G8484 FLU IMMUNIZE NO ADMIN: HCPCS | Performed by: PHYSICIAN ASSISTANT

## 2019-03-08 PROCEDURE — G8417 CALC BMI ABV UP PARAM F/U: HCPCS | Performed by: PHYSICIAN ASSISTANT

## 2019-03-08 PROCEDURE — 20610 DRAIN/INJ JOINT/BURSA W/O US: CPT | Performed by: PHYSICIAN ASSISTANT

## 2019-03-08 PROCEDURE — G8427 DOCREV CUR MEDS BY ELIG CLIN: HCPCS | Performed by: PHYSICIAN ASSISTANT

## 2019-03-08 PROCEDURE — 3017F COLORECTAL CA SCREEN DOC REV: CPT | Performed by: PHYSICIAN ASSISTANT

## 2019-03-11 ENCOUNTER — TELEPHONE (OUTPATIENT)
Dept: ORTHOPEDIC SURGERY | Age: 64
End: 2019-03-11

## 2019-03-11 ENCOUNTER — TELEPHONE (OUTPATIENT)
Dept: INTERNAL MEDICINE CLINIC | Age: 64
End: 2019-03-11

## 2019-03-13 ENCOUNTER — OFFICE VISIT (OUTPATIENT)
Dept: ORTHOPEDIC SURGERY | Age: 64
End: 2019-03-13
Payer: MEDICARE

## 2019-03-13 VITALS
HEART RATE: 92 BPM | SYSTOLIC BLOOD PRESSURE: 137 MMHG | BODY MASS INDEX: 38.36 KG/M2 | DIASTOLIC BLOOD PRESSURE: 87 MMHG | HEIGHT: 76 IN | WEIGHT: 315 LBS

## 2019-03-13 DIAGNOSIS — M17.12 PRIMARY OSTEOARTHRITIS OF LEFT KNEE: ICD-10-CM

## 2019-03-13 DIAGNOSIS — M17.11 PRIMARY OSTEOARTHRITIS OF RIGHT KNEE: Primary | ICD-10-CM

## 2019-03-13 PROCEDURE — 99214 OFFICE O/P EST MOD 30 MIN: CPT | Performed by: ORTHOPAEDIC SURGERY

## 2019-03-13 PROCEDURE — 20610 DRAIN/INJ JOINT/BURSA W/O US: CPT | Performed by: ORTHOPAEDIC SURGERY

## 2019-03-13 RX ORDER — TRIAMCINOLONE ACETONIDE 40 MG/ML
80 INJECTION, SUSPENSION INTRA-ARTICULAR; INTRAMUSCULAR ONCE
Status: COMPLETED | OUTPATIENT
Start: 2019-03-13 | End: 2019-03-13

## 2019-03-13 RX ORDER — HYDROCODONE BITARTRATE AND ACETAMINOPHEN 5; 325 MG/1; MG/1
1 TABLET ORAL EVERY 4 HOURS PRN
Qty: 28 TABLET | Refills: 0 | Status: SHIPPED | OUTPATIENT
Start: 2019-03-13 | End: 2019-03-20

## 2019-03-13 RX ADMIN — TRIAMCINOLONE ACETONIDE 80 MG: 40 INJECTION, SUSPENSION INTRA-ARTICULAR; INTRAMUSCULAR at 15:33

## 2019-03-25 ENCOUNTER — TELEPHONE (OUTPATIENT)
Dept: INTERNAL MEDICINE CLINIC | Age: 64
End: 2019-03-25

## 2019-03-25 RX ORDER — COLCHICINE 0.6 MG/1
0.6 TABLET ORAL 2 TIMES DAILY PRN
Qty: 60 TABLET | Refills: 1 | Status: ON HOLD | OUTPATIENT
Start: 2019-03-25 | End: 2019-05-03

## 2019-04-15 RX ORDER — FUROSEMIDE 40 MG/1
TABLET ORAL
Qty: 360 TABLET | Refills: 1 | Status: SHIPPED | OUTPATIENT
Start: 2019-04-15 | End: 2019-10-07 | Stop reason: SDUPTHER

## 2019-04-19 ENCOUNTER — TELEPHONE (OUTPATIENT)
Dept: RHEUMATOLOGY | Age: 64
End: 2019-04-19

## 2019-04-19 NOTE — TELEPHONE ENCOUNTER
Received APPROVAL for Diclofenac Sodium powder through 12/31/2019. Please advise patient. Thank you.

## 2019-04-29 ENCOUNTER — CARE COORDINATION (OUTPATIENT)
Dept: CARE COORDINATION | Age: 64
End: 2019-04-29

## 2019-04-29 NOTE — CARE COORDINATION
[address] call to pt: states that his \"breathing\" has been good- using medications as prescribed. Pt states that he has lost some weight, down to 310 lbs. Denies any increased SOB, or increased edema > baseline. Pt states \"doing good\" overall. Pt states his tinnitus is worsening, driving him crazy. Pt wants to know if Dr. Adis Garcia can see him sooner than his 5/21 appt or if he could recommend an ENT.

## 2019-04-29 NOTE — CARE COORDINATION
RNCC call to [facility]- pt can be seen by one of the NP's this week for tinnitus. RNCC to call pt back with info.

## 2019-05-03 ENCOUNTER — APPOINTMENT (OUTPATIENT)
Dept: CT IMAGING | Age: 64
DRG: 669 | End: 2019-05-03
Payer: MEDICARE

## 2019-05-03 ENCOUNTER — APPOINTMENT (OUTPATIENT)
Dept: GENERAL RADIOLOGY | Age: 64
DRG: 669 | End: 2019-05-03
Payer: MEDICARE

## 2019-05-03 ENCOUNTER — HOSPITAL ENCOUNTER (INPATIENT)
Age: 64
LOS: 1 days | Discharge: HOME | DRG: 669 | End: 2019-05-04
Attending: EMERGENCY MEDICINE | Admitting: INTERNAL MEDICINE
Payer: MEDICARE

## 2019-05-03 ENCOUNTER — ANESTHESIA (OUTPATIENT)
Dept: OPERATING ROOM | Age: 64
DRG: 669 | End: 2019-05-03
Payer: MEDICARE

## 2019-05-03 ENCOUNTER — ANESTHESIA EVENT (OUTPATIENT)
Dept: OPERATING ROOM | Age: 64
DRG: 669 | End: 2019-05-03
Payer: MEDICARE

## 2019-05-03 VITALS — TEMPERATURE: 97.7 F | DIASTOLIC BLOOD PRESSURE: 74 MMHG | SYSTOLIC BLOOD PRESSURE: 150 MMHG | OXYGEN SATURATION: 96 %

## 2019-05-03 DIAGNOSIS — N17.9 ACUTE RENAL FAILURE, UNSPECIFIED ACUTE RENAL FAILURE TYPE (HCC): ICD-10-CM

## 2019-05-03 DIAGNOSIS — N13.2 HYDRONEPHROSIS WITH URINARY OBSTRUCTION DUE TO URETERAL CALCULUS: Primary | ICD-10-CM

## 2019-05-03 PROBLEM — N20.0 RENAL STONE: Status: ACTIVE | Noted: 2019-05-03

## 2019-05-03 LAB
A/G RATIO: 1.3 (ref 1.1–2.2)
ALBUMIN SERPL-MCNC: 4.2 G/DL (ref 3.4–5)
ALP BLD-CCNC: 143 U/L (ref 40–129)
ALT SERPL-CCNC: 41 U/L (ref 10–40)
ANION GAP SERPL CALCULATED.3IONS-SCNC: 13 MMOL/L (ref 3–16)
AST SERPL-CCNC: 28 U/L (ref 15–37)
BASOPHILS ABSOLUTE: 0.1 K/UL (ref 0–0.2)
BASOPHILS RELATIVE PERCENT: 0.7 %
BILIRUB SERPL-MCNC: 0.5 MG/DL (ref 0–1)
BILIRUBIN URINE: NEGATIVE
BLOOD, URINE: ABNORMAL
BUN BLDV-MCNC: 29 MG/DL (ref 7–20)
CALCIUM SERPL-MCNC: 9.7 MG/DL (ref 8.3–10.6)
CHLORIDE BLD-SCNC: 102 MMOL/L (ref 99–110)
CLARITY: ABNORMAL
CO2: 26 MMOL/L (ref 21–32)
COLOR: YELLOW
CREAT SERPL-MCNC: 1.3 MG/DL (ref 0.8–1.3)
EOSINOPHILS ABSOLUTE: 0 K/UL (ref 0–0.6)
EOSINOPHILS RELATIVE PERCENT: 0.2 %
EPITHELIAL CELLS, UA: 3 /HPF (ref 0–5)
GFR AFRICAN AMERICAN: >60
GFR NON-AFRICAN AMERICAN: 56
GLOBULIN: 3.3 G/DL
GLUCOSE BLD-MCNC: 110 MG/DL (ref 70–99)
GLUCOSE BLD-MCNC: 118 MG/DL (ref 70–99)
GLUCOSE URINE: NEGATIVE MG/DL
HCT VFR BLD CALC: 46.1 % (ref 40.5–52.5)
HEMOGLOBIN: 14.6 G/DL (ref 13.5–17.5)
HYALINE CASTS: 1 /LPF (ref 0–8)
INR BLD: 1.08 (ref 0.86–1.14)
KETONES, URINE: NEGATIVE MG/DL
LACTIC ACID: 1.6 MMOL/L (ref 0.4–2)
LEUKOCYTE ESTERASE, URINE: ABNORMAL
LYMPHOCYTES ABSOLUTE: 0.9 K/UL (ref 1–5.1)
LYMPHOCYTES RELATIVE PERCENT: 5.2 %
MCH RBC QN AUTO: 27.2 PG (ref 26–34)
MCHC RBC AUTO-ENTMCNC: 31.7 G/DL (ref 31–36)
MCV RBC AUTO: 85.8 FL (ref 80–100)
MICROSCOPIC EXAMINATION: YES
MONOCYTES ABSOLUTE: 0.9 K/UL (ref 0–1.3)
MONOCYTES RELATIVE PERCENT: 5.5 %
NEUTROPHILS ABSOLUTE: 15 K/UL (ref 1.7–7.7)
NEUTROPHILS RELATIVE PERCENT: 88.4 %
NITRITE, URINE: NEGATIVE
PDW BLD-RTO: 16 % (ref 12.4–15.4)
PERFORMED ON: ABNORMAL
PH UA: 7.5 (ref 5–8)
PLATELET # BLD: 301 K/UL (ref 135–450)
PMV BLD AUTO: 8.5 FL (ref 5–10.5)
POTASSIUM SERPL-SCNC: 4.6 MMOL/L (ref 3.5–5.1)
PROTEIN UA: 30 MG/DL
PROTHROMBIN TIME: 12.3 SEC (ref 9.8–13)
RBC # BLD: 5.37 M/UL (ref 4.2–5.9)
RBC UA: 69 /HPF (ref 0–4)
SODIUM BLD-SCNC: 141 MMOL/L (ref 136–145)
SPECIFIC GRAVITY UA: 1.01 (ref 1–1.03)
TOTAL PROTEIN: 7.5 G/DL (ref 6.4–8.2)
URINE REFLEX TO CULTURE: YES
URINE TYPE: ABNORMAL
UROBILINOGEN, URINE: 0.2 E.U./DL
WBC # BLD: 16.9 K/UL (ref 4–11)
WBC UA: 54 /HPF (ref 0–5)

## 2019-05-03 PROCEDURE — 2500000003 HC RX 250 WO HCPCS: Performed by: PHYSICIAN ASSISTANT

## 2019-05-03 PROCEDURE — 6370000000 HC RX 637 (ALT 250 FOR IP): Performed by: INTERNAL MEDICINE

## 2019-05-03 PROCEDURE — 6360000002 HC RX W HCPCS: Performed by: EMERGENCY MEDICINE

## 2019-05-03 PROCEDURE — 0TC68ZZ EXTIRPATION OF MATTER FROM RIGHT URETER, VIA NATURAL OR ARTIFICIAL OPENING ENDOSCOPIC: ICD-10-PCS | Performed by: UROLOGY

## 2019-05-03 PROCEDURE — 7100000001 HC PACU RECOVERY - ADDTL 15 MIN: Performed by: UROLOGY

## 2019-05-03 PROCEDURE — C1769 GUIDE WIRE: HCPCS | Performed by: UROLOGY

## 2019-05-03 PROCEDURE — 3600000004 HC SURGERY LEVEL 4 BASE: Performed by: UROLOGY

## 2019-05-03 PROCEDURE — 2580000003 HC RX 258: Performed by: NURSE ANESTHETIST, CERTIFIED REGISTERED

## 2019-05-03 PROCEDURE — 85025 COMPLETE CBC W/AUTO DIFF WBC: CPT

## 2019-05-03 PROCEDURE — 87086 URINE CULTURE/COLONY COUNT: CPT

## 2019-05-03 PROCEDURE — 3700000001 HC ADD 15 MINUTES (ANESTHESIA): Performed by: UROLOGY

## 2019-05-03 PROCEDURE — 87040 BLOOD CULTURE FOR BACTERIA: CPT

## 2019-05-03 PROCEDURE — 2580000003 HC RX 258: Performed by: INTERNAL MEDICINE

## 2019-05-03 PROCEDURE — 3600000014 HC SURGERY LEVEL 4 ADDTL 15MIN: Performed by: UROLOGY

## 2019-05-03 PROCEDURE — 6360000002 HC RX W HCPCS: Performed by: NURSE ANESTHETIST, CERTIFIED REGISTERED

## 2019-05-03 PROCEDURE — 81001 URINALYSIS AUTO W/SCOPE: CPT

## 2019-05-03 PROCEDURE — 1200000000 HC SEMI PRIVATE

## 2019-05-03 PROCEDURE — 7100000000 HC PACU RECOVERY - FIRST 15 MIN: Performed by: UROLOGY

## 2019-05-03 PROCEDURE — 80053 COMPREHEN METABOLIC PANEL: CPT

## 2019-05-03 PROCEDURE — C1726 CATH, BAL DIL, NON-VASCULAR: HCPCS | Performed by: UROLOGY

## 2019-05-03 PROCEDURE — 99285 EMERGENCY DEPT VISIT HI MDM: CPT

## 2019-05-03 PROCEDURE — 2720000010 HC SURG SUPPLY STERILE: Performed by: UROLOGY

## 2019-05-03 PROCEDURE — 6370000000 HC RX 637 (ALT 250 FOR IP): Performed by: UROLOGY

## 2019-05-03 PROCEDURE — 94640 AIRWAY INHALATION TREATMENT: CPT

## 2019-05-03 PROCEDURE — 36415 COLL VENOUS BLD VENIPUNCTURE: CPT

## 2019-05-03 PROCEDURE — 3700000000 HC ANESTHESIA ATTENDED CARE: Performed by: UROLOGY

## 2019-05-03 PROCEDURE — 2500000003 HC RX 250 WO HCPCS: Performed by: NURSE ANESTHETIST, CERTIFIED REGISTERED

## 2019-05-03 PROCEDURE — 96376 TX/PRO/DX INJ SAME DRUG ADON: CPT

## 2019-05-03 PROCEDURE — 82365 CALCULUS SPECTROSCOPY: CPT

## 2019-05-03 PROCEDURE — 96375 TX/PRO/DX INJ NEW DRUG ADDON: CPT

## 2019-05-03 PROCEDURE — 87186 SC STD MICRODIL/AGAR DIL: CPT

## 2019-05-03 PROCEDURE — 6360000002 HC RX W HCPCS: Performed by: PHYSICIAN ASSISTANT

## 2019-05-03 PROCEDURE — 85610 PROTHROMBIN TIME: CPT

## 2019-05-03 PROCEDURE — 2580000003 HC RX 258: Performed by: UROLOGY

## 2019-05-03 PROCEDURE — 2580000003 HC RX 258: Performed by: PHYSICIAN ASSISTANT

## 2019-05-03 PROCEDURE — 74420 UROGRAPHY RTRGR +-KUB: CPT

## 2019-05-03 PROCEDURE — 87801 DETECT AGNT MULT DNA AMPLI: CPT

## 2019-05-03 PROCEDURE — 94760 N-INVAS EAR/PLS OXIMETRY 1: CPT

## 2019-05-03 PROCEDURE — 83605 ASSAY OF LACTIC ACID: CPT

## 2019-05-03 PROCEDURE — 2709999900 HC NON-CHARGEABLE SUPPLY: Performed by: UROLOGY

## 2019-05-03 PROCEDURE — 6360000002 HC RX W HCPCS: Performed by: INTERNAL MEDICINE

## 2019-05-03 PROCEDURE — 74176 CT ABD & PELVIS W/O CONTRAST: CPT

## 2019-05-03 PROCEDURE — 96374 THER/PROPH/DIAG INJ IV PUSH: CPT

## 2019-05-03 PROCEDURE — 96361 HYDRATE IV INFUSION ADD-ON: CPT

## 2019-05-03 PROCEDURE — 87077 CULTURE AEROBIC IDENTIFY: CPT

## 2019-05-03 RX ORDER — DEXAMETHASONE SODIUM PHOSPHATE 4 MG/ML
INJECTION, SOLUTION INTRA-ARTICULAR; INTRALESIONAL; INTRAMUSCULAR; INTRAVENOUS; SOFT TISSUE PRN
Status: DISCONTINUED | OUTPATIENT
Start: 2019-05-03 | End: 2019-05-03 | Stop reason: SDUPTHER

## 2019-05-03 RX ORDER — ONDANSETRON 2 MG/ML
4 INJECTION INTRAMUSCULAR; INTRAVENOUS ONCE
Status: COMPLETED | OUTPATIENT
Start: 2019-05-03 | End: 2019-05-03

## 2019-05-03 RX ORDER — PROPOFOL 10 MG/ML
INJECTION, EMULSION INTRAVENOUS PRN
Status: DISCONTINUED | OUTPATIENT
Start: 2019-05-03 | End: 2019-05-03 | Stop reason: SDUPTHER

## 2019-05-03 RX ORDER — SODIUM CHLORIDE 0.9 % (FLUSH) 0.9 %
10 SYRINGE (ML) INJECTION EVERY 12 HOURS SCHEDULED
Status: DISCONTINUED | OUTPATIENT
Start: 2019-05-03 | End: 2019-05-03 | Stop reason: HOSPADM

## 2019-05-03 RX ORDER — MIDAZOLAM HYDROCHLORIDE 1 MG/ML
INJECTION INTRAMUSCULAR; INTRAVENOUS PRN
Status: DISCONTINUED | OUTPATIENT
Start: 2019-05-03 | End: 2019-05-03 | Stop reason: SDUPTHER

## 2019-05-03 RX ORDER — HYDROMORPHONE HCL 110MG/55ML
0.5 PATIENT CONTROLLED ANALGESIA SYRINGE INTRAVENOUS EVERY 5 MIN PRN
Status: DISCONTINUED | OUTPATIENT
Start: 2019-05-03 | End: 2019-05-03 | Stop reason: HOSPADM

## 2019-05-03 RX ORDER — LIDOCAINE HYDROCHLORIDE 20 MG/ML
INJECTION, SOLUTION EPIDURAL; INFILTRATION; INTRACAUDAL; PERINEURAL PRN
Status: DISCONTINUED | OUTPATIENT
Start: 2019-05-03 | End: 2019-05-03 | Stop reason: SDUPTHER

## 2019-05-03 RX ORDER — MAGNESIUM HYDROXIDE 1200 MG/15ML
LIQUID ORAL
Status: COMPLETED | OUTPATIENT
Start: 2019-05-03 | End: 2019-05-03

## 2019-05-03 RX ORDER — HYDROMORPHONE HYDROCHLORIDE 1 MG/ML
0.5 INJECTION, SOLUTION INTRAMUSCULAR; INTRAVENOUS; SUBCUTANEOUS ONCE
Status: COMPLETED | OUTPATIENT
Start: 2019-05-03 | End: 2019-05-03

## 2019-05-03 RX ORDER — SODIUM CHLORIDE 9 MG/ML
INJECTION, SOLUTION INTRAVENOUS
Status: DISPENSED
Start: 2019-05-03 | End: 2019-05-04

## 2019-05-03 RX ORDER — PANTOPRAZOLE SODIUM 40 MG/1
40 TABLET, DELAYED RELEASE ORAL 2 TIMES DAILY
Status: DISCONTINUED | OUTPATIENT
Start: 2019-05-03 | End: 2019-05-04 | Stop reason: HOSPADM

## 2019-05-03 RX ORDER — HYDROCODONE BITARTRATE AND ACETAMINOPHEN 5; 325 MG/1; MG/1
2 TABLET ORAL PRN
Status: DISCONTINUED | OUTPATIENT
Start: 2019-05-03 | End: 2019-05-03 | Stop reason: HOSPADM

## 2019-05-03 RX ORDER — DILTIAZEM HYDROCHLORIDE 180 MG/1
180 CAPSULE, COATED, EXTENDED RELEASE ORAL 2 TIMES DAILY
Status: DISCONTINUED | OUTPATIENT
Start: 2019-05-03 | End: 2019-05-04 | Stop reason: HOSPADM

## 2019-05-03 RX ORDER — SODIUM CHLORIDE 9 MG/ML
INJECTION, SOLUTION INTRAVENOUS CONTINUOUS
Status: DISCONTINUED | OUTPATIENT
Start: 2019-05-03 | End: 2019-05-04 | Stop reason: HOSPADM

## 2019-05-03 RX ORDER — MEPERIDINE HYDROCHLORIDE 25 MG/ML
12.5 INJECTION INTRAMUSCULAR; INTRAVENOUS; SUBCUTANEOUS EVERY 5 MIN PRN
Status: DISCONTINUED | OUTPATIENT
Start: 2019-05-03 | End: 2019-05-03 | Stop reason: HOSPADM

## 2019-05-03 RX ORDER — IPRATROPIUM BROMIDE AND ALBUTEROL SULFATE 2.5; .5 MG/3ML; MG/3ML
1 SOLUTION RESPIRATORY (INHALATION) 4 TIMES DAILY
Status: DISCONTINUED | OUTPATIENT
Start: 2019-05-03 | End: 2019-05-04 | Stop reason: HOSPADM

## 2019-05-03 RX ORDER — HYDROMORPHONE HCL 110MG/55ML
0.25 PATIENT CONTROLLED ANALGESIA SYRINGE INTRAVENOUS EVERY 5 MIN PRN
Status: DISCONTINUED | OUTPATIENT
Start: 2019-05-03 | End: 2019-05-03 | Stop reason: HOSPADM

## 2019-05-03 RX ORDER — FENTANYL CITRATE 50 UG/ML
INJECTION, SOLUTION INTRAMUSCULAR; INTRAVENOUS PRN
Status: DISCONTINUED | OUTPATIENT
Start: 2019-05-03 | End: 2019-05-03 | Stop reason: SDUPTHER

## 2019-05-03 RX ORDER — ONDANSETRON 2 MG/ML
4 INJECTION INTRAMUSCULAR; INTRAVENOUS EVERY 6 HOURS PRN
Status: DISCONTINUED | OUTPATIENT
Start: 2019-05-03 | End: 2019-05-04 | Stop reason: HOSPADM

## 2019-05-03 RX ORDER — FENTANYL CITRATE 50 UG/ML
50 INJECTION, SOLUTION INTRAMUSCULAR; INTRAVENOUS EVERY 5 MIN PRN
Status: DISCONTINUED | OUTPATIENT
Start: 2019-05-03 | End: 2019-05-03 | Stop reason: HOSPADM

## 2019-05-03 RX ORDER — SODIUM CHLORIDE 0.9 % (FLUSH) 0.9 %
10 SYRINGE (ML) INJECTION PRN
Status: DISCONTINUED | OUTPATIENT
Start: 2019-05-03 | End: 2019-05-04 | Stop reason: HOSPADM

## 2019-05-03 RX ORDER — ACETAMINOPHEN 325 MG/1
650 TABLET ORAL EVERY 4 HOURS PRN
Status: DISCONTINUED | OUTPATIENT
Start: 2019-05-03 | End: 2019-05-04 | Stop reason: HOSPADM

## 2019-05-03 RX ORDER — SODIUM CHLORIDE 0.9 % (FLUSH) 0.9 %
10 SYRINGE (ML) INJECTION EVERY 12 HOURS SCHEDULED
Status: DISCONTINUED | OUTPATIENT
Start: 2019-05-03 | End: 2019-05-04 | Stop reason: HOSPADM

## 2019-05-03 RX ORDER — ALLOPURINOL 300 MG/1
300 TABLET ORAL DAILY
Status: DISCONTINUED | OUTPATIENT
Start: 2019-05-04 | End: 2019-05-04 | Stop reason: HOSPADM

## 2019-05-03 RX ORDER — HYDROCODONE BITARTRATE AND ACETAMINOPHEN 5; 325 MG/1; MG/1
1 TABLET ORAL PRN
Status: DISCONTINUED | OUTPATIENT
Start: 2019-05-03 | End: 2019-05-03 | Stop reason: HOSPADM

## 2019-05-03 RX ORDER — HYDROMORPHONE HYDROCHLORIDE 1 MG/ML
1 INJECTION, SOLUTION INTRAMUSCULAR; INTRAVENOUS; SUBCUTANEOUS ONCE
Status: COMPLETED | OUTPATIENT
Start: 2019-05-03 | End: 2019-05-03

## 2019-05-03 RX ORDER — PROMETHAZINE HYDROCHLORIDE 25 MG/ML
6.25 INJECTION, SOLUTION INTRAMUSCULAR; INTRAVENOUS EVERY 30 MIN PRN
Status: DISCONTINUED | OUTPATIENT
Start: 2019-05-03 | End: 2019-05-03 | Stop reason: HOSPADM

## 2019-05-03 RX ORDER — ALBUTEROL SULFATE 90 UG/1
2 AEROSOL, METERED RESPIRATORY (INHALATION) EVERY 4 HOURS PRN
Status: DISCONTINUED | OUTPATIENT
Start: 2019-05-03 | End: 2019-05-04 | Stop reason: HOSPADM

## 2019-05-03 RX ORDER — LIDOCAINE HYDROCHLORIDE 10 MG/ML
1 INJECTION, SOLUTION EPIDURAL; INFILTRATION; INTRACAUDAL; PERINEURAL
Status: DISCONTINUED | OUTPATIENT
Start: 2019-05-03 | End: 2019-05-03 | Stop reason: HOSPADM

## 2019-05-03 RX ORDER — 0.9 % SODIUM CHLORIDE 0.9 %
500 INTRAVENOUS SOLUTION INTRAVENOUS ONCE
Status: COMPLETED | OUTPATIENT
Start: 2019-05-03 | End: 2019-05-03

## 2019-05-03 RX ORDER — ATORVASTATIN CALCIUM 40 MG/1
40 TABLET, FILM COATED ORAL DAILY
Status: DISCONTINUED | OUTPATIENT
Start: 2019-05-04 | End: 2019-05-04 | Stop reason: HOSPADM

## 2019-05-03 RX ORDER — FENTANYL CITRATE 50 UG/ML
25 INJECTION, SOLUTION INTRAMUSCULAR; INTRAVENOUS EVERY 5 MIN PRN
Status: DISCONTINUED | OUTPATIENT
Start: 2019-05-03 | End: 2019-05-03 | Stop reason: HOSPADM

## 2019-05-03 RX ORDER — SODIUM CHLORIDE, SODIUM LACTATE, POTASSIUM CHLORIDE, CALCIUM CHLORIDE 600; 310; 30; 20 MG/100ML; MG/100ML; MG/100ML; MG/100ML
INJECTION, SOLUTION INTRAVENOUS CONTINUOUS PRN
Status: DISCONTINUED | OUTPATIENT
Start: 2019-05-03 | End: 2019-05-03 | Stop reason: SDUPTHER

## 2019-05-03 RX ORDER — SODIUM CHLORIDE 0.9 % (FLUSH) 0.9 %
10 SYRINGE (ML) INJECTION PRN
Status: DISCONTINUED | OUTPATIENT
Start: 2019-05-03 | End: 2019-05-03 | Stop reason: HOSPADM

## 2019-05-03 RX ORDER — DIPHENHYDRAMINE HYDROCHLORIDE 50 MG/ML
12.5 INJECTION INTRAMUSCULAR; INTRAVENOUS
Status: DISCONTINUED | OUTPATIENT
Start: 2019-05-03 | End: 2019-05-03 | Stop reason: HOSPADM

## 2019-05-03 RX ORDER — ONDANSETRON 2 MG/ML
INJECTION INTRAMUSCULAR; INTRAVENOUS PRN
Status: DISCONTINUED | OUTPATIENT
Start: 2019-05-03 | End: 2019-05-03 | Stop reason: SDUPTHER

## 2019-05-03 RX ADMIN — HYDROMORPHONE HYDROCHLORIDE 0.5 MG: 1 INJECTION, SOLUTION INTRAMUSCULAR; INTRAVENOUS; SUBCUTANEOUS at 12:34

## 2019-05-03 RX ADMIN — PANTOPRAZOLE SODIUM 40 MG: 40 TABLET, DELAYED RELEASE ORAL at 21:37

## 2019-05-03 RX ADMIN — IPRATROPIUM BROMIDE AND ALBUTEROL SULFATE 1 AMPULE: .5; 3 SOLUTION RESPIRATORY (INHALATION) at 21:04

## 2019-05-03 RX ADMIN — Medication 1 G: at 13:46

## 2019-05-03 RX ADMIN — PROPOFOL 50 MG: 10 INJECTION, EMULSION INTRAVENOUS at 14:56

## 2019-05-03 RX ADMIN — ONDANSETRON 4 MG: 2 INJECTION INTRAMUSCULAR; INTRAVENOUS at 14:57

## 2019-05-03 RX ADMIN — Medication 2 PUFF: at 21:04

## 2019-05-03 RX ADMIN — ONDANSETRON 4 MG: 2 INJECTION INTRAMUSCULAR; INTRAVENOUS at 12:33

## 2019-05-03 RX ADMIN — DEXAMETHASONE SODIUM PHOSPHATE 4 MG: 4 INJECTION, SOLUTION INTRAMUSCULAR; INTRAVENOUS at 14:57

## 2019-05-03 RX ADMIN — METOPROLOL TARTRATE 25 MG: 25 TABLET ORAL at 21:37

## 2019-05-03 RX ADMIN — HYDROMORPHONE HYDROCHLORIDE 1 MG: 1 INJECTION, SOLUTION INTRAMUSCULAR; INTRAVENOUS; SUBCUTANEOUS at 13:20

## 2019-05-03 RX ADMIN — ENOXAPARIN SODIUM 40 MG: 40 INJECTION SUBCUTANEOUS at 21:36

## 2019-05-03 RX ADMIN — Medication 10 ML: at 21:37

## 2019-05-03 RX ADMIN — SODIUM CHLORIDE 500 ML: 9 INJECTION, SOLUTION INTRAVENOUS at 12:33

## 2019-05-03 RX ADMIN — SODIUM CHLORIDE, POTASSIUM CHLORIDE, SODIUM LACTATE AND CALCIUM CHLORIDE: 600; 310; 30; 20 INJECTION, SOLUTION INTRAVENOUS at 14:48

## 2019-05-03 RX ADMIN — DILTIAZEM HYDROCHLORIDE 180 MG: 180 CAPSULE, COATED, EXTENDED RELEASE ORAL at 21:37

## 2019-05-03 RX ADMIN — LIDOCAINE HYDROCHLORIDE 80 MG: 20 INJECTION, SOLUTION EPIDURAL; INFILTRATION; INTRACAUDAL; PERINEURAL at 14:53

## 2019-05-03 RX ADMIN — SODIUM CHLORIDE: 9 INJECTION, SOLUTION INTRAVENOUS at 23:38

## 2019-05-03 RX ADMIN — PROPOFOL 150 MG: 10 INJECTION, EMULSION INTRAVENOUS at 14:53

## 2019-05-03 RX ADMIN — FENTANYL CITRATE 50 MCG: 50 INJECTION, SOLUTION INTRAMUSCULAR; INTRAVENOUS at 14:53

## 2019-05-03 RX ADMIN — HYDROMORPHONE HYDROCHLORIDE 1 MG: 1 INJECTION, SOLUTION INTRAMUSCULAR; INTRAVENOUS; SUBCUTANEOUS at 14:04

## 2019-05-03 RX ADMIN — MIDAZOLAM HYDROCHLORIDE 1 MG: 1 INJECTION, SOLUTION INTRAMUSCULAR; INTRAVENOUS at 14:48

## 2019-05-03 ASSESSMENT — PULMONARY FUNCTION TESTS
PIF_VALUE: 3
PIF_VALUE: 10
PIF_VALUE: 17
PIF_VALUE: 2
PIF_VALUE: 3
PIF_VALUE: 2
PIF_VALUE: 13
PIF_VALUE: 2
PIF_VALUE: 1
PIF_VALUE: 1
PIF_VALUE: 14
PIF_VALUE: 2
PIF_VALUE: 23
PIF_VALUE: 1
PIF_VALUE: 15
PIF_VALUE: 22
PIF_VALUE: 2
PIF_VALUE: 5
PIF_VALUE: 15
PIF_VALUE: 2
PIF_VALUE: 24

## 2019-05-03 ASSESSMENT — ENCOUNTER SYMPTOMS
STRIDOR: 0
ABDOMINAL DISTENTION: 0
SHORTNESS OF BREATH: 0
COLOR CHANGE: 0
COUGH: 0
DIARRHEA: 0
ABDOMINAL PAIN: 1
VOMITING: 1
CONSTIPATION: 0
WHEEZING: 0
NAUSEA: 1
BACK PAIN: 0

## 2019-05-03 ASSESSMENT — COPD QUESTIONNAIRES: CAT_SEVERITY: MODERATE

## 2019-05-03 ASSESSMENT — PAIN SCALES - GENERAL
PAINLEVEL_OUTOF10: 10
PAINLEVEL_OUTOF10: 9
PAINLEVEL_OUTOF10: 9
PAINLEVEL_OUTOF10: 0
PAINLEVEL_OUTOF10: 8
PAINLEVEL_OUTOF10: 10

## 2019-05-03 ASSESSMENT — PAIN DESCRIPTION - PAIN TYPE: TYPE: ACUTE PAIN

## 2019-05-03 ASSESSMENT — PAIN DESCRIPTION - ORIENTATION: ORIENTATION: RIGHT

## 2019-05-03 NOTE — ANESTHESIA POSTPROCEDURE EVALUATION
Department of Anesthesiology  Postprocedure Note    Patient: Dakota House  MRN: [id number]  YOB: 1955  Date of evaluation: 5/3/2019  Time:  4:01 PM     Procedure Summary     Date:  05/03/19 Room / Location:  Health system OR [address] [place] [address] OR    Anesthesia Start:  1015 Anesthesia Stop:  2141    Procedure:  CYSTOSCOPY, RIGHT URETEROSCOPY, STONE EXTRACTION WITH STONE BASKET (Right ) Diagnosis:  (Right Hydronephrosis)    Surgeon:  Jeff Nelson MD Responsible Provider:  Laina Miles MD    Anesthesia Type:  general ASA Status:  3          Anesthesia Type: general    Wong Phase I: Wong Score: 9    Wong Phase II:      Last vitals: Reviewed and per EMR flowsheets.        Anesthesia Post Evaluation    Patient location during evaluation: PACU  Patient participation: complete - patient participated  Level of consciousness: awake and alert  Pain score: 2  Airway patency: patent  Nausea & Vomiting: no vomiting  Complications: no  Cardiovascular status: blood pressure returned to baseline  Respiratory status: acceptable  Hydration status: euvolemic

## 2019-05-03 NOTE — ED NOTES
Bed: 23  Expected date:   Expected time:   Means of arrival: [facility]  Comments:  [facility]  05/03/19 6805

## 2019-05-03 NOTE — H&P
1st metatarsal right foot    FOOT SURGERY Right 08/17/2017    INCISION AND DRAINAGE OF RIGHT FOOT WITH FLAP CLOSURE    HERNIA REPAIR      umbilical, hiatal hernia repairs    KNEE ARTHROSCOPY Right 08/23/2018    partial medial/ lateral menisectomy    SKIN BIOPSY      TOE AMPUTATION  3/7/11    RIGHT FOURTH TOE    TOE SURGERY      ULNAR TUNNEL RELEASE Left 12-11-14    Left ulnar nerve decompression and left CTR    UPPER GASTROINTESTINAL ENDOSCOPY  1/28/2014    with biopsies    UPPER GASTROINTESTINAL ENDOSCOPY  3/13/14    UPPER GASTROINTESTINAL ENDOSCOPY  6/4/14    with biopsy    UPPER GASTROINTESTINAL ENDOSCOPY  03/02/2018    VENTRICULAR ABLATION SURGERY         Medications Prior to Admission:    Prior to Admission medications    Medication Sig Start Date End Date Taking?  Authorizing Provider   potassium chloride (KLOR-CON M20) 20 MEQ extended release tablet Taking 3 tablets BID 4/17/19  Yes MANDY Jim - CNP   furosemide (LASIX) 40 MG tablet TAKE 2 TABLETS BY MOUTH 2 TIMES DAILY 4/15/19  Yes Jose Grijalva MD   rivaroxaban Morene Listen) 20 MG TABS tablet Take 1 tablet by mouth daily (with breakfast) 1/18/19  Yes Jose Grijalva MD   diltiazem (CARDIZEM CD) 180 MG extended release capsule TAKE ONE CAPSULE BY MOUTH TWICE A DAY 9/19/18  Yes Constance Pride MD   atorvastatin (LIPITOR) 40 MG tablet Take 1 tablet by mouth daily 9/17/18  Yes Jose Grijalva MD   ferrous sulfate 325 (65 Fe) MG tablet Take 1 tablet by mouth daily 8/30/18  Yes Jose Grijalva MD   sertraline (ZOLOFT) 50 MG tablet TAKE 1 TABLET BY MOUTH EVERY DAY 8/30/18  Yes Jose Grijalva MD   metoprolol tartrate (LOPRESSOR) 25 MG tablet Take 1 tablet by mouth 2 times daily 8/15/18  Yes Constance Pride MD   Magnesium 500 MG TABS Take by mouth   Yes Historical Provider, MD   pantoprazole (PROTONIX) 40 MG tablet TAKE 1 TABLET BY MOUTH TWICE DAILY 1/30/17  Yes Jose Grijalva MD   albuterol sulfate HFA (PROVENTIL HFA) 108 (90 BASE) MCG/ACT inhaler Inhale 2 puffs into the lungs every 4 hours as needed for Wheezing 1/25/16  Yes Armani Keen MD   budesonide-formoterol [facility]) 160-4.5 MCG/ACT AERO Inhale 2 puffs into the lungs 2 times daily. 4/20/15  Yes Armani Keen MD   albuterol-ipratropium (COMBIVENT RESPIMAT)  MCG/ACT AERS inhaler Inhale 1 puff into the lungs every 6 hours. 4/20/15  Yes Armani Keen MD   therapeutic multivitamin-minerals [facility]) tablet Take 1 tablet by mouth daily. Yes Historical Provider, MD   Diclofenac Sodium POWD Apply 1-2 g topically 4 times daily Formula #5 Diclo 3% Vasiliy 6% lido 2% Prilo 2% 3/8/19   Juliann Jackson PA-C   diclofenac sodium 1 % GEL APPLY 4 G TOPICALLY 4 TIMES DAILY 10/11/18 1/7/19  Joe Whiteside MD   OXYGEN Inhale  into the lungs. 2 liters/ nasal cannula prn    Historical Provider, MD       Allergies:  Bactrim    Social History:  The patient currently lives  At home with partner    TOBACCO:   reports that he has never smoked. He has never used smokeless tobacco.  ETOH:   reports that he does not drink alcohol. Family History:  Reviewed in detail and negative for DM, Early CAD, Cancer, CVA. Positive as follows:        Problem Relation Age of Onset    Cancer Father         colon    High Blood Pressure Father     Heart Disease Father     High Blood Pressure Mother     Heart Disease Mother        REVIEW OF SYSTEMS:   Positive  And negative  as noted in the HPI. All other systems reviewed and negative. PHYSICAL EXAM:    BP (!) 141/99   Pulse 97   Temp 98.1 °F (36.7 °C) (Temporal)   Resp 18   Ht 6' 4\" (1.93 m)   Wt (!) 320 lb (145.2 kg)   SpO2 96%   BMI 38.95 kg/m²     General appearance: No apparent distress appears stated age and cooperative. HEENT Normal cephalic, atraumatic without obvious deformity. Pupils equal, round, and reactive to light. Extra ocular muscles intact.   Conjunctivae/corneas clear.  Neck: Supple, No jugular venous distention/bruits. Trachea midline without thyromegaly or adenopathy with full range of motion. Lungs: Clear to auscultation, bilaterally without Rales/Wheezes/Rhonchi with good respiratory effort. Heart: Regular rate and rhythm with Normal S1/S2 without murmurs, rubs or gallops, point of maximum impulse non-displaced  Abdomen: Soft, non-tender or non-distended without rigidity or guarding and positive bowel sounds all four quadrants. Extremities: No clubbing, cyanosis, or edema bilaterally. Full range of motion without deformity and normal gait intact. Skin: Skin color, texture, turgor normal.  No rashes or lesions. Neurologic: Alert and oriented X 3, neurovascularly intact with sensory/motor intact upper extremities/lower extremities, bilaterally. Cranial nerves: II-XII intact, grossly non-focal.  Mental status: Alert, oriented, thought content appropriate. Capillary Refill: Acceptable  < 3 seconds  Peripheral Pulses: +3 Easily felt, not easily obliterated with pressure      CXR:  I have reviewed the CXR with the following interpretation: n/a   EKG:  I have reviewed the EKG with the following interpretation: n/a     CBC   Recent Labs     05/03/19  1256   WBC 16.9*   HGB 14.6   HCT 46.1         RENAL  Recent Labs     05/03/19  1256      K 4.6      CO2 26   BUN 29*   CREATININE 1.3     LFT'S  Recent Labs     05/03/19  1256   AST 28   ALT 41*   BILITOT 0.5   ALKPHOS 143*     COAG  Recent Labs     05/03/19  1230   INR 1.08     CARDIAC ENZYMES  No results for input(s): CKTOTAL, CKMB, CKMBINDEX, TROPONINI in the last 72 hours.     U/A:    Lab Results   Component Value Date    COLORU YELLOW 05/03/2019    WBCUA 54 05/03/2019    RBCUA 69 05/03/2019    CLARITYU CLOUDY 05/03/2019    SPECGRAV 1.011 05/03/2019    LEUKOCYTESUR LARGE 05/03/2019    BLOODU LARGE 05/03/2019    GLUCOSEU Negative 05/03/2019       ABG  No results found for: BHX4IGN, BEART, [id number], Dwayne ENRIQUEZ, [place]        Active Hospital Problems    Diagnosis Date Noted    Renal stone [N20.0] 05/03/2019         PHYSICIANS CERTIFICATION:    I certify that Manuel Doyle is expected to be hospitalized for more than 2 midnights based on the following assessment and plan:      ASSESSMENT/PLAN:  1. Right hydronephrosis:- s/p cystoscopy today with removal of stent, pain free now, ic abx, switch to po tomorrow    2. Chronic d chf:- compensated, continue home meds    3. cKD stage 2:- stable, continue home emds    4. Chr A fib: not on xarelto as cannot afford, rate controlled     5. COPD:- stable, not in exacerbation    DVT Prophylaxis: lovenox  Diet: DIET GENERAL;  Code Status: Full Code  PT/OT Eval Status: n/a     Dispo - inpatient        Bryan Thrasher MD    Thank you Fermin Edwards MD for the opportunity to be involved in this patient's care. If you have any questions or concerns please feel free to contact me at [phone].

## 2019-05-03 NOTE — PROGRESS NOTES
4 Eyes Skin Assessment     The patient is being assess for  Admission    I agree that 2 RN's have performed a thorough Head to Toe Skin Assessment on the patient. ALL assessment sites listed below have been assessed. Areas assessed by both nurses: yes  [x]   Head, Face, and Ears   [x]   Shoulders, Back, and Chest  [x]   Arms, Elbows, and Hands   [x]   Coccyx, Sacrum, and IschIum  [x]   Legs, Feet, and Heels        Does the Patient have Skin Breakdown?   Yes LDA WOUND CARE was Initiated documentation include the Samantha-wound, Wound Assessment, Measurements, Dressing Treatment, Drainage, and Color\",         Alexi Prevention initiated:  Yes   Wound Care Orders initiated:  No      WOC nurse consulted for Pressure Injury (Stage 3,4, Unstageable, DTI, NWPT, and Complex wounds), New and Established Ostomies:  No      Nurse 1 eSignature: Electronically signed by Laura Hedrick RN on 5/3/19 at 5:08 PM    **SHARE this note so that the co-signing nurse is able to place an eSignature**    Nurse 2 eSignature: Electronically signed by Abdirahman Guzmán RN on 5/3/19 at 5:57 PM

## 2019-05-03 NOTE — ED NOTES
Dr. Nain Rodriguez at bedside     Radhahanna Lobo, [facility]  05/03/19 9207

## 2019-05-03 NOTE — ED PROVIDER NOTES
I independently performed a history and physical on Guillermo Eric. All diagnostic, treatment, and disposition decisions were made by myself in conjunction with the advanced practice provider. Briefly, this is a 62 y.o. male here for right-sided flank pain with radiation to and from the right side. Moderate, worse today. Relate some urinary symptoms as well. See MAUREEN note      On exam:  Constitutional:  Well developed, well nourished, non-toxic appearance   Eyes:  conjunctiva normal   HENT:  Atraumatic,   Neck- normal range of motion, supple   Respiratory:  No respiratory distress, normal breath sounds, no rales, no wheezing   Cardiovascular:  Normal rate, normal rhythm, no murmurs,   GI:  Soft, nondistended, right-sided flank tenderness to suprapubic tenderness, no obvious organomegaly, no mass, no rebound, no guarding   Musculoskeletal:  No tenderness, no deformities. Integument:  no rashes on exposed surfaces  Neurologic:  Alert & oriented x 3,   no focal deficits noted   Psychiatric:  Speech and behavior appropriate. No agitation. MDM      Patient does have a 4-5 mm stone which is causing hydronephrosis and does have infected appearing urine o with BRIAN. IV antibiotics and IV fluids administered as well as pain medication. Plan is to admit. Urology is being consult it. SEE MAUREEN NOTE        Critical Care  There was a high probability of life-threatening clinical deterioration in the patient's condition requiring my urgent intervention. Total critical care time with the patient was 31 minutes excluding separately reportable procedures. Critical care required due to patients obstructive uropathy with hydronephrosis and BRIAN  associate with of infection        Patient Referrals:  No follow-up provider specified. Discharge Medications:  New Prescriptions    No medications on file       FINAL IMPRESSION  1. Hydronephrosis with urinary obstruction due to ureteral calculus    2.  Acute renal failure, unspecified acute renal failure type (HCC)        Blood pressure (!) 178/95, pulse 88, temperature 97.8 °F (36.6 °C), resp. rate 18, height 6' 4\" (1.93 m), weight (!) 320 lb (145.2 kg), SpO2 98 %. For further details of [address] emergency department encounter, please see documentation by advanced practice provider.        Cal Garcia III,   05/03/19 9500

## 2019-05-03 NOTE — OP NOTE
Urology Operative Report  [facility]    Provider: Landy Marquez MD Patient ID:  Admission Date: 5/3/2019 Name: Gladis Salas  OR Date: 5/3/2019  MRN: [id number]   Patient Location: OR/NONE : 1955  Attending: No att. providers found Date of Service: 5/3/2019  PCP: Jose Grijalva MD     Date of Operation: 5/3/2019    Preoperative Diagnosis: RIGHT side distal ureteral stone    Postoperative Diagnosis: same    Procedure:    1. Cystoscopy  2. Right side ureteroscopy  3. Basket extraction of stone  4. Fluoroscopic imaging < 1 hr physician time    Surgeon:   Landy Marquez MD    Anesthesia: General endotracheal anesthesia    Indications: Gladis Salas is a 62 y.o. male who presents for the above named surgery. Informed consent was obtained and the risks, benefits, and details of the procedure were explained to the patient who elected to proceed. Details of Procedure: The patient was brought to the operating room and placed in the supine position on the operating room table. SCDs were placed on the lower extremities. Following induction of anesthesia the patient was positioned in a lithotomy position, all pressure points were padded, and the genitals were prepped and draped in the usual sterile fashion. A routine timeout was performed, confirming the patient, procedure, site, risk of fire, patient allergies and confirming that preoperative antibiotics had been administered prior to beginning. A 21 fr rigid cystoscope was advance via a normal appearing urethra into the bladder. The bladder was inspected and there were no suspicious lesions, stones or diverticula seen. A semi-rigid ureteroscope was passed alongside the safety wire and the stone was seen. The single small stone was removed using a 0- tip nitinol basket. A second pass of the scope revealed no ureteral trauma or stones. The decision was made to avoid a ureteral stent.  The bladder was emptied and the scope removed. urojet was placed. At the end of the procedure all counts were correct. The patient tolerated the procedure well and was transported to the PACU in stable condition. Findings: successful treatment of the distal RIGHT ureteral stone    Estimated Blood Loss: none                 Drains: none          Specimens: stone for analysis    Complications: none apparent           Disposition:  PACU - hemodynamically stable.             Linda Soares MD  5/3/2019

## 2019-05-03 NOTE — PROGRESS NOTES
Assessment completed, see doc flowsheets. Pt is alert, awake and orient x 4. Lung sounds are clear. Tele on, rhythm NSR.  VSS. Call light with in reach, will continue to monitor.

## 2019-05-03 NOTE — ED PROVIDER NOTES
volume, discharge, dysuria, flank pain, frequency, penile pain, penile swelling, scrotal swelling and urgency. Musculoskeletal: Negative for back pain, neck pain and neck stiffness. Skin: Negative for color change, pallor, rash and wound. Neurological: Negative for dizziness, tremors, seizures, syncope, facial asymmetry, speech difficulty, weakness, light-headedness, numbness and headaches. Psychiatric/Behavioral: Negative for confusion. All other systems reviewed and are negative. Positives and Pertinent negatives as per HPI. Except as noted abovein the ROS, all other systems were reviewed and negative.        PAST MEDICAL HISTORY     Past Medical History:   Diagnosis Date    Anxiety     Arthritis     Asthma     Atrial fibrillation ([facility]     Blood circulation, collateral     CAD (coronary artery disease)     CHF (congestive heart failure) (HCC)     Colon cancer (HCC)     COPD (chronic obstructive pulmonary disease) (HCC)     Diabetic foot infection (HCC)     Hernia     Hyperlipidemia     Hypertension     Movement disorder     Muscle cramps     Neuromuscular disorder (HCC)     marvin feet numbness/neuropathy    Neuropathy     Pneumonia     Renal insufficiency     Sleep apnea     no cpap    Thyroid disease     Unspecified cerebral artery occlusion with cerebral infarction     mini stroke    Unspecified diseases of blood and blood-forming organs          SURGICAL HISTORY     Past Surgical History:   Procedure Laterality Date    ABDOMEN SURGERY      APPENDECTOMY  10/9/2012    CARPAL TUNNEL RELEASE Left 12/11/14    COLON SURGERY      for Colon Ca 10 inches removed from colon    COLONOSCOPY      COLONOSCOPY  3/13/14    COLONOSCOPY  03/02/2018    ENDOSCOPIC ULTRASOUND (UPPER)  6/4/14    ENDOSCOPY, COLON, DIAGNOSTIC      FOOT SURGERY Right 07/13/2017    Amputation toes 2,3,5, right foot Flap closure, Exostectomy 1st metatarsal right foot    FOOT SURGERY Right 08/17/2017 INCISION AND DRAINAGE OF RIGHT FOOT WITH FLAP CLOSURE    HERNIA REPAIR      umbilical, hiatal hernia repairs    KNEE ARTHROSCOPY Right 08/23/2018    partial medial/ lateral menisectomy    SKIN BIOPSY      TOE AMPUTATION  3/7/11    RIGHT FOURTH TOE    TOE SURGERY      ULNAR TUNNEL RELEASE Left 12-11-14    Left ulnar nerve decompression and left CTR    UPPER GASTROINTESTINAL ENDOSCOPY  1/28/2014    with biopsies    UPPER GASTROINTESTINAL ENDOSCOPY  3/13/14    UPPER GASTROINTESTINAL ENDOSCOPY  6/4/14    with biopsy    UPPER GASTROINTESTINAL ENDOSCOPY  03/02/2018    VENTRICULAR ABLATION SURGERY           CURRENTMEDICATIONS       Previous Medications    ALBUTEROL SULFATE HFA (PROVENTIL HFA) 108 (90 BASE) MCG/ACT INHALER    Inhale 2 puffs into the lungs every 4 hours as needed for Wheezing    ALBUTEROL-IPRATROPIUM (COMBIVENT RESPIMAT)  MCG/ACT AERS INHALER    Inhale 1 puff into the lungs every 6 hours. ALLOPURINOL (ZYLOPRIM) 100 MG TABLET    Take 3 tablets by mouth daily    ATORVASTATIN (LIPITOR) 40 MG TABLET    Take 1 tablet by mouth daily    BUDESONIDE-FORMOTEROL (SYMBICORT) 160-4.5 MCG/ACT AERO    Inhale 2 puffs into the lungs 2 times daily. COLCHICINE (COLCRYS) 0.6 MG TABLET    Take 1 tablet by mouth 2 times daily as needed for Pain    DICLOFENAC SODIUM 1 % GEL    APPLY 4 G TOPICALLY 4 TIMES DAILY    DICLOFENAC SODIUM POWD    Apply 1-2 g topically 4 times daily Formula #5 Diclo 3% Vasiliy 6% lido 2% Prilo 2%    DILTIAZEM (CARDIZEM CD) 180 MG EXTENDED RELEASE CAPSULE    TAKE ONE CAPSULE BY MOUTH TWICE A DAY    FERROUS SULFATE 325 (65 FE) MG TABLET    Take 1 tablet by mouth daily    FUROSEMIDE (LASIX) 40 MG TABLET    TAKE 2 TABLETS BY MOUTH 2 TIMES DAILY    MAGNESIUM 500 MG TABS    Take by mouth    METOPROLOL TARTRATE (LOPRESSOR) 25 MG TABLET    Take 1 tablet by mouth 2 times daily    OXYGEN    Inhale  into the lungs.  2 liters/ nasal cannula prn    PANTOPRAZOLE (PROTONIX) 40 MG TABLET    TAKE 1 TABLET BY MOUTH TWICE DAILY    POTASSIUM CHLORIDE (KLOR-CON M20) 20 MEQ EXTENDED RELEASE TABLET    Taking 3 tablets BID    RIVAROXABAN (XARELTO) 20 MG TABS TABLET    Take 1 tablet by mouth daily (with breakfast)    SERTRALINE (ZOLOFT) 50 MG TABLET    TAKE 1 TABLET BY MOUTH EVERY DAY    THERAPEUTIC MULTIVITAMIN-MINERALS (THERAGRAN-M) TABLET    Take 1 tablet by mouth daily.          ALLERGIES     Bactrim    FAMILYHISTORY       Family History   Problem Relation Age of Onset   Savana Watkins Cancer Father         colon    High Blood Pressure Father     Heart Disease Father     High Blood Pressure Mother     Heart Disease Mother           SOCIAL HISTORY       Social History     Socioeconomic History    Marital status:      Spouse name: Not on file    Number of children: 1    Years of education: 15    Highest education level: Not on file   Occupational History    Not on file   Social Needs    Financial resource strain: Not on file    Food insecurity:     Worry: Not on file     Inability: Not on file    Transportation needs:     Medical: Not on file     Non-medical: Not on file   Tobacco Use    Smoking status: Never Smoker    Smokeless tobacco: Never Used   Substance and Sexual Activity    Alcohol use: No     Comment: quit 2006    Drug use: No    Sexual activity: Not on file   Lifestyle    Physical activity:     Days per week: Not on file     Minutes per session: Not on file    Stress: Not on file   Relationships    Social connections:     Talks on phone: Not on file     Gets together: Not on file     Attends [employer] service: Not on file     Active member of club or organization: Not on file     Attends meetings of clubs or organizations: Not on file     Relationship status: Not on file    Intimate partner violence:     Fear of current or ex partner: Not on file     Emotionally abused: Not on file     Physically abused: Not on file     Forced sexual activity: Not on file   Other Topics Concern    Not on file   Social History Narrative    Not on file       SCREENINGS             PHYSICAL EXAM    (up to 7 for level 4, 8 or more for level 5)     ED Triage Vitals [05/03/19 1210]   BP Temp Temp src Pulse Resp SpO2 Height Weight   (!) 156/88 97.8 °F (36.6 °C) -- 88 18 98 % 6' 4\" (1.93 m) (!) 320 lb (145.2 kg)       Physical Exam   Constitutional: He is oriented to person, place, and time. He appears well-developed and well-nourished. No distress. HENT:   Head: Normocephalic and atraumatic. Right Ear: External ear normal.   Left Ear: External ear normal.   Nose: Nose normal.   Mouth/Throat: Oropharynx is clear and moist.   Eyes: Pupils are equal, round, and reactive to light. Conjunctivae and EOM are normal. Right eye exhibits no discharge. Left eye exhibits no discharge. No scleral icterus. Neck: Normal range of motion. No JVD present. Cardiovascular: Normal rate. Pulmonary/Chest: Effort normal.   Abdominal: Soft. Bowel sounds are normal. He exhibits no abdominal bruit and no pulsatile midline mass. There is tenderness in the right lower quadrant. There is CVA tenderness (right). There is no rigidity, no rebound, no guarding, no tenderness at McBurney's point and negative Blair's sign. Musculoskeletal: Normal range of motion. Lymphadenopathy:     He has no cervical adenopathy. Neurological: He is alert and oriented to person, place, and time. Skin: Skin is warm and dry. Capillary refill takes less than 2 seconds. No rash noted. He is not diaphoretic. No erythema. No pallor. Psychiatric: He has a normal mood and affect. His behavior is normal.   Nursing note and vitals reviewed.       DIAGNOSTIC RESULTS   LABS:    Labs Reviewed   CBC WITH AUTO DIFFERENTIAL - Abnormal; Notable for the following components:       Result Value    WBC 16.9 (*)     RDW 16.0 (*)     Neutrophils # 15.0 (*)     Lymphocytes # 0.9 (*)     All other components within normal limits    Narrative:     Performed at:  [facility]) - available at the time of this note:    CT ABDOMEN PELVIS WO CONTRAST Additional Contrast? None   Preliminary Result   1. 4.5 mm calculus within the distal right ureter, with mild right   hydronephrosis. 2. Bilateral nonobstructing nephrolithiasis. 3. Findings suggestive of cirrhosis and portal hypertension. Clinical   correlation is advised. 4. Bilateral renal cysts. 5. Stable left adrenal adenoma. 6. Colonic diverticulosis. PROCEDURES   Unless otherwise noted below, none     Procedures    CRITICAL CARE TIME   Critical Care  There was a high probability of life-threatening clinical deterioration in the patient's condition requiring my urgent intervention. Total critical care time with the patient was 30 minutes excluding separately reportable procedures. Critical care required due to patients infected ureteral calculus with renal insufficiency and leukocytosis prompting medical management, iv fluids, antibiotic, consultation and admission. CONSULTS:  Juaquin Willis will admit [phone] Aware that urology has been paged but waiting to hear back from them. Dr. Kate Ly, urologist, called back at [phone]. He wants patient kept NPO in preparation for possible procedure.      EMERGENCY DEPARTMENT COURSE and DIFFERENTIALDIAGNOSIS/MDM:   Vitals:    Vitals:    05/03/19 1210 05/03/19 1259 05/03/19 1300 05/03/19 1315   BP: (!) 156/88 (!) 163/95 (!) 162/87 (!) 178/95   Pulse: 88      Resp: 18      Temp: 97.8 °F (36.6 °C)      SpO2: 98%      Weight: (!) 320 lb (145.2 kg)      Height: 6' 4\" (1.93 m)          Patient was given thefollowing medications:  Medications   HYDROmorphone HCl PF (DILAUDID) injection 1 mg (has no administration in time range)   ondansetron (ZOFRAN) injection 4 mg (4 mg Intravenous Given 5/3/19 1233)   0.9 % sodium chloride bolus (0 mLs Intravenous Stopped 5/3/19 1342)   HYDROmorphone HCl PF (DILAUDID) injection 0.5 mg (0.5 mg Intravenous Given 5/3/19 1234)   HYDROmorphone HCl PF (DILAUDID) injection 1 mg (1 mg Intravenous Given 5/3/19 1320)   cefTRIAXone (ROCEPHIN) 1 g in sterile water 10 mL IV syringe (1 g Intravenous Given 5/3/19 1346)       This patient presents to the emergency department complaining of right flank pain with radiation to right abdomen/groin/testicle. CT scan confirms a 4.5 mm calculus within the distal right ureter with hydronephrosis. Urinalysis suggests mild infection as well. He does have acute leukocytosis. Therefore, we do feel admission is warranted for further management. Urologist will consult and wants patient kept NPO. Hospitalist will admit. We have ordered IV antibiotics and fluids with pain control. We have addressed his concerns and expectations. FINAL IMPRESSION      1. Hydronephrosis with urinary obstruction due to ureteral calculus- with infection    2.  Acute renal failure, unspecified acute renal failure type [facility])          DISPOSITION/PLAN   DISPOSITION Admitted 05/03/2019 01:48:13 PM      PATIENT REFERREDTO:  Celina Devlin MD  [address] [place] [address]  [phone]            DISCHARGE MEDICATIONS:  New Prescriptions    No medications on file       DISCONTINUED MEDICATIONS:  Discontinued Medications    No medications on file              (Please note that portions ofthis note were completed with a voice recognition program.  Efforts were made to edit the dictations but occasionally words are mis-transcribed.)    Omega Ellis PA-C (electronically signed)           Omega Ellis PA-C  05/03/19 3600

## 2019-05-03 NOTE — CONSULTS
[facility] [place]  [facility] [place]: Shanda Pope MD Patient ID:  Admission Date: 5/3/2019 Name: Coretha Jeans  OR Date: n/a MRN: [id number]   Patient Location: OR/NONE : 1955  Attending: No att. providers found Date of Service: 5/3/2019  PCP: Vielka Cunningham MD     Diagnoses:  1. Hydronephrosis with urinary obstruction due to ureteral calculus- with infection    2. Acute renal failure, unspecified acute renal failure type ([address]    3. RIGHT side distal ureteral obstruction  4. COPD  5. A.fib    Assessment/Plan:  NPO for ureteroscopy  Admit for observation of urinary infection. The decision was made for surgery on the day of the consultation    All the patients questions were answered in detail. He understands the plan as listed above. CC:   Chief Complaint   Patient presents with    Abdominal Pain     c/o rt lower abdominal pain into testicles and rt flank with nausea and vomiting today around 0530. Denies kidney stone history. HPI: Coretha Jeans is a 62 y.o. male. I saw the patient today for RIGHT flank pain, and associated symptoms of testicular pain, that have been present for one day. Symptoms relieved by pain medications and aggravated by a passing ureteral stone. He has tried the following treatments: he has no history of urinary stones. He has never been a smoker. We discussed surgery in detail and he had a chance to ask questions.       Past medical History:   He has a past medical history of Anxiety, Arthritis, Asthma, Atrial fibrillation ([address] Blood circulation, collateral, CAD (coronary artery disease), CHF (congestive heart failure) ([address] Colon cancer ([address] COPD (chronic obstructive pulmonary disease) ([address] Diabetic foot infection ([address] Hernia, Hyperlipidemia, Hypertension, Movement disorder, Muscle cramps, Neuromuscular disorder (HCC), Neuropathy, Pneumonia, Renal insufficiency, Sleep apnea, Thyroid disease, Unspecified cerebral artery occlusion with cerebral infarction, and Unspecified diseases of blood and blood-forming organs. Past Surgical History:  He has a past surgical history that includes Toe Surgery; Ventricular ablation surgery; Colon surgery; Toe amputation (3/7/11); Appendectomy (10/9/2012); Abdomen surgery; Colonoscopy; Endoscopy, colon, diagnostic; skin biopsy; Upper gastrointestinal endoscopy (1/28/2014); Upper gastrointestinal endoscopy (3/13/14); Colonoscopy (3/13/14); endoscopic ultrasound (upper) (6/4/14); Upper gastrointestinal endoscopy (6/4/14); Ulnar tunnel release (Left, 12-11-14); Carpal tunnel release (Left, 12/11/14); hernia repair; Foot surgery (Right, 07/13/2017); Foot surgery (Right, 08/17/2017); Upper gastrointestinal endoscopy (03/02/2018); Colonoscopy (03/02/2018); and Knee arthroscopy (Right, 08/23/2018). Allergies: Allergies   Allergen Reactions    Bactrim      hallucinations       Social History:  He reports that he has never smoked. He has never used smokeless tobacco. He reports that he does not drink alcohol or use drugs. Family History:  family history includes Cancer in his father; Heart Disease in his father and mother; High Blood Pressure in his father and mother.     Medications:   Scheduled Meds:  Continuous Infusions:  PRN Meds:    Review of Systems:  Constitutional: Negative for fever    Genitourinary: see HPI  Eyes: negative for sudden change in vision  EENT: no complaints  Cardiovascular: Negative for chest pain  Respiratory: Negative for shortness of breath  Gastrointestinal: Negative for nausea  Musculoskeletal: Negative for back pain   Neurological: Negative for weakness  Psychiatric: Negative for anxiety  Integumentary: Negative for rashes or adenopathy     Physical Exam:  Vitals:    05/03/19 1412   BP: (!) 158/88 MD Jose  5/3/2019

## 2019-05-03 NOTE — ANESTHESIA PRE PROCEDURE
Department of Anesthesiology  Preprocedure Note       Name:  Rohan Gold   Age:  62 y.o.  :  1955                                          MRN:  [id number]         Date:  5/3/2019      Surgeon: Libra Altamirano):  Brsieida Trinh MD    Procedure: CYSTOSCOPY, RIGHT URETEROSCOPY, HOLMIUM LASER LITHOTRIPSY, STONE MANIPULATION, PLACEMENT OF RIGHT URETERAL STENT (Right )    Medications prior to admission:   Prior to Admission medications    Medication Sig Start Date End Date Taking?  Authorizing Provider   potassium chloride (KLOR-CON M20) 20 MEQ extended release tablet Taking 3 tablets BID 19   Reed Lisa APRN - CNP   furosemide (LASIX) 40 MG tablet TAKE 2 TABLETS BY MOUTH 2 TIMES DAILY 4/15/19   Gabi Skelton MD   colchicine (COLCRYS) 0.6 MG tablet Take 1 tablet by mouth 2 times daily as needed for Pain 3/25/19   Gabi Skelton MD   Diclofenac Sodium POWD Apply 1-2 g topically 4 times daily Formula #5 Diclo 3% Vasiliy 6% lido 2% Prilo 2% 3/8/19   Shellie Shaw PA-C   rivaroxaban (XARELTO) 20 MG TABS tablet Take 1 tablet by mouth daily (with breakfast) 19   Gabi Skelton MD   diclofenac sodium 1 % GEL APPLY 4 G TOPICALLY 4 TIMES DAILY 10/11/18 1/7/19  Gabi Skelton MD   diltiazem (CARDIZEM CD) 180 MG extended release capsule TAKE ONE CAPSULE BY MOUTH TWICE A DAY 18   Hipolito Thornton MD   atorvastatin (LIPITOR) 40 MG tablet Take 1 tablet by mouth daily 18   Gabi Skelton MD   ferrous sulfate 325 (65 Fe) MG tablet Take 1 tablet by mouth daily 18   Gabi Skelton MD   sertraline (ZOLOFT) 50 MG tablet TAKE 1 TABLET BY MOUTH EVERY DAY 18   Gabi Skelton MD   metoprolol tartrate (LOPRESSOR) 25 MG tablet Take 1 tablet by mouth 2 times daily 8/15/18   Hipolito Thornton MD   Magnesium 500 MG TABS Take by mouth    Historical Provider, MD   allopurinol (ZYLOPRIM) 100 MG tablet Take 3 tablets by mouth daily 3/22/18   Dorice Hemp Take 1 tablet by mouth 2 times daily 180 tablet 3    Magnesium 500 MG TABS Take by mouth      allopurinol (ZYLOPRIM) 100 MG tablet Take 3 tablets by mouth daily 270 tablet 5    pantoprazole (PROTONIX) 40 MG tablet TAKE 1 TABLET BY MOUTH TWICE DAILY 60 tablet 5    albuterol sulfate HFA (PROVENTIL HFA) 108 (90 BASE) MCG/ACT inhaler Inhale 2 puffs into the lungs every 4 hours as needed for Wheezing 1 Inhaler 3    budesonide-formoterol (SYMBICORT) 160-4.5 MCG/ACT AERO Inhale 2 puffs into the lungs 2 times daily. 3 Inhaler 3    albuterol-ipratropium (COMBIVENT RESPIMAT)  MCG/ACT AERS inhaler Inhale 1 puff into the lungs every 6 hours. 3 Inhaler 3    OXYGEN Inhale  into the lungs. 2 liters/ nasal cannula prn      therapeutic multivitamin-minerals (THERAGRAN-M) tablet Take 1 tablet by mouth daily. Allergies: Allergies   Allergen Reactions    Bactrim      hallucinations       Problem List:    Patient Active Problem List   Diagnosis Code    Chronic atrial fibrillation ([facility]) I48.2    COPD (chronic obstructive pulmonary disease) ([facility]) J44.9    Sleep apnea G47.30    Essential hypertension I10    Hyperlipidemia E78.5    Morbid obesity with BMI of 40.0-44.9, adult ([email or web address] E66.01, Z68.41    Chronic idiopathic gout of multiple sites M1A. 09X0    Hand arthritis M19.049    Chronic diastolic congestive heart failure ([facility]) I50.32    Obesity E66.9    S/P transmetatarsal amputation of foot, right ([email or web address] Z89.431    Renal stone N20.0       Past Medical History:        Diagnosis Date    Anxiety     Arthritis     Asthma     Atrial fibrillation ([email or web address]     Blood circulation, collateral     CAD (coronary artery disease)     CHF (congestive heart failure) ([facility])     Colon cancer ([facility])     COPD (chronic obstructive pulmonary disease) ([facility])     Diabetic foot infection ([facility])     Hernia     Hyperlipidemia     Hypertension     Movement disorder     Muscle cramps     Neuromuscular disorder ([email or web address]     marvin feet numbness/neuropathy    Neuropathy     Pneumonia     Renal insufficiency     Sleep apnea     no cpap    Thyroid disease     Unspecified cerebral artery occlusion with cerebral infarction     mini stroke    Unspecified diseases of blood and blood-forming organs        Past Surgical History:        Procedure Laterality Date    ABDOMEN SURGERY      APPENDECTOMY  10/9/2012    CARPAL TUNNEL RELEASE Left 12/11/14    COLON SURGERY      for Colon Ca 10 inches removed from colon    COLONOSCOPY      COLONOSCOPY  3/13/14    COLONOSCOPY  03/02/2018    ENDOSCOPIC ULTRASOUND (UPPER)  6/4/14    ENDOSCOPY, COLON, DIAGNOSTIC      FOOT SURGERY Right 07/13/2017    Amputation toes 2,3,5, right foot Flap closure, Exostectomy 1st metatarsal right foot    FOOT SURGERY Right 08/17/2017    INCISION AND DRAINAGE OF RIGHT FOOT WITH FLAP CLOSURE    HERNIA REPAIR      umbilical, hiatal hernia repairs    KNEE ARTHROSCOPY Right 08/23/2018    partial medial/ lateral menisectomy    SKIN BIOPSY      TOE AMPUTATION  3/7/11    RIGHT FOURTH TOE    TOE SURGERY      ULNAR TUNNEL RELEASE Left 12-11-14    Left ulnar nerve decompression and left CTR    UPPER GASTROINTESTINAL ENDOSCOPY  1/28/2014    with biopsies    UPPER GASTROINTESTINAL ENDOSCOPY  3/13/14    UPPER GASTROINTESTINAL ENDOSCOPY  6/4/14    with biopsy    UPPER GASTROINTESTINAL ENDOSCOPY  03/02/2018    VENTRICULAR ABLATION SURGERY         Social History:    Social History     Tobacco Use    Smoking status: Never Smoker    Smokeless tobacco: Never Used   Substance Use Topics    Alcohol use: No     Comment: quit 2006                                Counseling given: Not Answered      Vital Signs (Current):   Vitals:    05/03/19 1315 05/03/19 1353 05/03/19 1405 05/03/19 1412   BP: (!) 178/95 (!) 141/94  (!) 158/88   Pulse:  82 86 107   Resp:  14  16   Temp:    97.2 °F (36.2 °C)   SpO2:  96%  95%   Weight:       Height: non-obstructive, CHF: diastolic,                   Neuro/Psych:   (+) CVA: residual symptoms, neuromuscular disease: Parkinson's disease,             GI/Hepatic/Renal:   (+) morbid obesity          Endo/Other:    (+) DiabetesType II DM, well controlled, , .                 Abdominal:           Vascular:                                        Anesthesia Plan      general     ASA 3       Induction: intravenous. MIPS: Postoperative opioids intended, Prophylactic antiemetics administered and Postoperative trial extubation. Anesthetic plan and risks discussed with patient. Plan discussed with CRNA.     Attending anesthesiologist reviewed and agrees with Ino Headley MD   5/3/2019

## 2019-05-04 VITALS
WEIGHT: 315 LBS | TEMPERATURE: 97.5 F | RESPIRATION RATE: 16 BRPM | DIASTOLIC BLOOD PRESSURE: 86 MMHG | OXYGEN SATURATION: 96 % | HEIGHT: 76 IN | SYSTOLIC BLOOD PRESSURE: 123 MMHG | HEART RATE: 85 BPM | BODY MASS INDEX: 38.36 KG/M2

## 2019-05-04 LAB
ANION GAP SERPL CALCULATED.3IONS-SCNC: 14 MMOL/L (ref 3–16)
BUN BLDV-MCNC: 28 MG/DL (ref 7–20)
CALCIUM SERPL-MCNC: 9.1 MG/DL (ref 8.3–10.6)
CHLORIDE BLD-SCNC: 103 MMOL/L (ref 99–110)
CO2: 24 MMOL/L (ref 21–32)
CREAT SERPL-MCNC: 1.3 MG/DL (ref 0.8–1.3)
GFR AFRICAN AMERICAN: >60
GFR NON-AFRICAN AMERICAN: 56
GLUCOSE BLD-MCNC: 165 MG/DL (ref 70–99)
HCT VFR BLD CALC: 40.9 % (ref 40.5–52.5)
HEMOGLOBIN: 12.9 G/DL (ref 13.5–17.5)
MCH RBC QN AUTO: 27.3 PG (ref 26–34)
MCHC RBC AUTO-ENTMCNC: 31.5 G/DL (ref 31–36)
MCV RBC AUTO: 86.8 FL (ref 80–100)
PDW BLD-RTO: 15.7 % (ref 12.4–15.4)
PLATELET # BLD: 263 K/UL (ref 135–450)
PMV BLD AUTO: 8.8 FL (ref 5–10.5)
POTASSIUM SERPL-SCNC: 4.5 MMOL/L (ref 3.5–5.1)
RBC # BLD: 4.72 M/UL (ref 4.2–5.9)
REPORT: NORMAL
SODIUM BLD-SCNC: 141 MMOL/L (ref 136–145)
WBC # BLD: 15.9 K/UL (ref 4–11)

## 2019-05-04 PROCEDURE — 94760 N-INVAS EAR/PLS OXIMETRY 1: CPT

## 2019-05-04 PROCEDURE — 85027 COMPLETE CBC AUTOMATED: CPT

## 2019-05-04 PROCEDURE — 36415 COLL VENOUS BLD VENIPUNCTURE: CPT

## 2019-05-04 PROCEDURE — 80048 BASIC METABOLIC PNL TOTAL CA: CPT

## 2019-05-04 PROCEDURE — 6370000000 HC RX 637 (ALT 250 FOR IP): Performed by: INTERNAL MEDICINE

## 2019-05-04 PROCEDURE — 94640 AIRWAY INHALATION TREATMENT: CPT

## 2019-05-04 RX ORDER — CIPROFLOXACIN 500 MG/1
500 TABLET, FILM COATED ORAL 2 TIMES DAILY
Qty: 20 TABLET | Refills: 0 | Status: SHIPPED | OUTPATIENT
Start: 2019-05-04 | End: 2019-05-14

## 2019-05-04 RX ADMIN — PANTOPRAZOLE SODIUM 40 MG: 40 TABLET, DELAYED RELEASE ORAL at 06:09

## 2019-05-04 RX ADMIN — SERTRALINE 50 MG: 50 TABLET, FILM COATED ORAL at 09:27

## 2019-05-04 RX ADMIN — METOPROLOL TARTRATE 25 MG: 25 TABLET ORAL at 09:27

## 2019-05-04 RX ADMIN — DESMOPRESSIN ACETATE 40 MG: 0.2 TABLET ORAL at 09:27

## 2019-05-04 RX ADMIN — DILTIAZEM HYDROCHLORIDE 180 MG: 180 CAPSULE, COATED, EXTENDED RELEASE ORAL at 09:27

## 2019-05-04 RX ADMIN — Medication 2 PUFF: at 08:07

## 2019-05-04 RX ADMIN — ALLOPURINOL 300 MG: 300 TABLET ORAL at 09:27

## 2019-05-04 ASSESSMENT — PAIN SCALES - GENERAL
PAINLEVEL_OUTOF10: 0
PAINLEVEL_OUTOF10: 0

## 2019-05-04 NOTE — PROGRESS NOTES
Pt had ureteroscopy yesterday. He is upset about not DC'd yet. Hospitalist has given ok from their standpoint. From a urology standpoint can we discharge him, and does he need to follow up? Please advise. Thank you      Call back from MD right away. They will be in to evaluate patient in about 30 minutes.   Balta Decker

## 2019-05-04 NOTE — PROGRESS NOTES
Shift assessment completed, see flowsheets. Medications administered, see MAR. Plan of care discussed with patient. Fall precautions in place. Call light and bedside table within reach. Nonskid footwear on. Pt denies further needs at this time. Will continue to monitor.   Kenzie Shahid RN

## 2019-05-04 NOTE — PROGRESS NOTES
Discharge instructions reviewed and signed with patient. Patient upset that he has to follow up with Urology and that their offices are not here. IV and tele removed. Belongings gathered. Pt awaiting lift ride.   Steve [place]

## 2019-05-04 NOTE — DISCHARGE SUMMARY
Hospital Discharge Summary    Patient's PCP: Tammie Carrillo MD  Admit Date: 5/3/2019   Discharge Date: 5/4/2019    Admitting Physician: Dr. Mary Ellen Post MD  Discharge Physician: Dr. Adrián Polanco:   IP CONSULT TO HOSPITALIST  IP CONSULT TO UROLOGY    Brief HPI: The patient is a 62 y.o. male who presents to [facility] with right abdominal pain, right groin pain that started this morning .      Ed workup showed right renal stone with hydronephrosis. Brief hospital course:   S/p S/p R URS by Dr. Nadja Lincoln 5/3/2019, blood culture with gram neg bacteremia, patient adamant on going home, patient completely pain free, and asymptomatic today, discharge on po abx. F/u with urology as scheduled. Invasive procedures:  None     Discharge Diagnoses: Active Problems:    Renal stone  Resolved Problems:    * No resolved hospital problems. *      Physical Exam: /75   Pulse 80   Temp 97.8 °F (36.6 °C) (Temporal)   Resp 16   Ht 6' 4\" (1.93 m)   Wt (!) 320 lb 12.8 oz (145.5 kg)   SpO2 97%   BMI 39.05 kg/m²   Gen/overall appearance: Not in acute distress. Alert. Head: Normocephalic, atraumatic  Eyes: EOMI, good acuity  ENT:- Oral mucosa moist  Neck: No JVD, thyromegaly  CVS: Nml S1S2, no MRG, RRR  Pulm: Clear bilaterally. No crackles/wheezes  Gastrointestinal: Soft, NT/ND, +BS  Musculoskeletal: No edema. Warm  Neuro: No focal deficit. Moves extremity spontaneously. Psychiatry: Appropriate affect. Not agitated. Skin: Warm, dry with normal turgor. No rash        Significant diagnostic studies that may require follow up:  Ct Abdomen Pelvis Wo Contrast Additional Contrast? None    Result Date: 5/3/2019  EXAMINATION: CT OF THE ABDOMEN AND PELVIS WITHOUT CONTRAST, 5/3/2019 12:45 pm TECHNIQUE: CT of the abdomen and pelvis was performed without the administration of intravenous contrast. Multiplanar reformatted images are provided for review.  Dose modulation, iterative reconstruction, and/or weight based adjustment of the mA/kV was utilized to reduce the radiation dose to as low as reasonably achievable. COMPARISON: 10/14/2012 HISTORY: ORDERING SYSTEM PROVIDED HISTORY: R. abdominal pain radiating to testicles. TECHNOLOGIST PROVIDED HISTORY: Additional Contrast?->None Ordering Physician Provided Reason for Exam: R. abdominal pain radiating to testicles. Acuity: Unknown Type of Exam: Unknown FINDINGS: Lower Chest: There are scattered calcified granulomata within the bilateral lung bases. The lung bases are otherwise clear. Organs: Noncontrast images of the kidneys and ureters demonstrate a 4.5 mm calculus within the distal right ureter, just proximal to the right UVJ, with mild right hydronephrosis. No additional ureteral calculus is identified. There are bilateral renal calculi evident, the largest measuring 12 mm within the left kidney lower pole. There are bilateral renal cysts, the largest measuring approximately 6.7 cm within the left kidney lower pole. There is a benign 1.6 cm cyst within the hepatic dome. The liver is somewhat nodular in contour, which may suggest underlying cirrhosis. The liver is otherwise unremarkable. The spleen is mildly enlarged, which may suggest underlying portal hypertension. There are calcified splenic granulomata. The pancreas is normal.  The gallbladder is mildly distended, though otherwise unremarkable. The adrenal glands are stable, with a stable small approximate 11 mm adenoma within the left adrenal gland. GI/Bowel: Evaluation of the hollow GI tract demonstrates no evidence of abnormal bowel wall thickening, dilatation, or obstruction. There is colonic diverticulosis, though no evidence of diverticulitis. The patient is status post appendectomy. Pelvis: There is the aforementioned distal right ureteral calculus. There is wall thickening of the urinary bladder, though this is likely secondary to partial collapse.   The prostate is unremarkable. There are small bilateral fat containing inguinal hernias. There is stable mild bilateral external iliac chain lymphadenopathy, unchanged from the prior study of 2012, and likely benign given its stability. No free pelvic fluid is identified. Peritoneum/Retroperitoneum: No intraperitoneal free air or free fluid is identified. No pathologic lymphadenopathy is seen. The abdominal aorta is unremarkable. Bones/Soft Tissues: There is degenerative change throughout the thoracolumbar spine. No osteolytic or osteoblastic lesion is seen. 1. 4.5 mm calculus within the distal right ureter, with mild right hydronephrosis. 2. Bilateral nonobstructing nephrolithiasis. 3. Findings suggestive of cirrhosis and portal hypertension. Clinical correlation is advised. 4. Bilateral renal cysts. 5. Stable left adrenal adenoma. 6. Colonic diverticulosis. Fl Retrograde Pyelogram Right    Result Date: 5/3/2019  EXAMINATION: SPOT FLUOROSCOPIC IMAGES 5/3/2019 3:11 pm TECHNIQUE: Fluoroscopy was provided by the radiology department for procedure. Radiologist was not present during examination. FLUOROSCOPY DOSE AND TYPE OR TIME AND EXPOSURES: 1 second 1 fluoroscopic image COMPARISON: CT abdomen and pelvis performed earlier the same day. HISTORY: Intraprocedural imaging. FINDINGS: Single spot intraprocedural fluoroscopic image of the upper abdomen was submitted. Intraprocedural fluoroscopic spot images as above. See separate procedure report for more information. Treatments: As above.       Discharge Medications:     Medication List      START taking these medications    ciprofloxacin 500 MG tablet  Commonly known as:  CIPRO  Take 1 tablet by mouth 2 times daily for 10 days        CONTINUE taking these medications    albuterol sulfate  (90 Base) MCG/ACT inhaler  Commonly known as:  PROVENTIL HFA  Inhale 2 puffs into the lungs every 4 hours as needed for Wheezing     albuterol-ipratropium  MCG/ACT Aers inhaler  Commonly known as:  COMBIVENT RESPIMAT  Inhale 1 puff into the lungs every 6 hours. atorvastatin 40 MG tablet  Commonly known as:  LIPITOR  Take 1 tablet by mouth daily     budesonide-formoterol 160-4.5 MCG/ACT Aero  Commonly known as:  SYMBICORT  Inhale 2 puffs into the lungs 2 times daily.      diltiazem 180 MG extended release capsule  Commonly known as:  CARDIZEM CD  TAKE ONE CAPSULE BY MOUTH TWICE A DAY     ferrous sulfate 325 (65 Fe) MG tablet  Take 1 tablet by mouth daily     furosemide 40 MG tablet  Commonly known as:  LASIX  TAKE 2 TABLETS BY MOUTH 2 TIMES DAILY     Magnesium 500 MG Tabs     metoprolol tartrate 25 MG tablet  Commonly known as:  LOPRESSOR  Take 1 tablet by mouth 2 times daily     OXYGEN     pantoprazole 40 MG tablet  Commonly known as:  PROTONIX  TAKE 1 TABLET BY MOUTH TWICE DAILY     potassium chloride 20 MEQ extended release tablet  Commonly known as:  KLOR-CON M20  Taking 3 tablets BID     rivaroxaban 20 MG Tabs tablet  Commonly known as:  XARELTO  Take 1 tablet by mouth daily (with breakfast)     sertraline 50 MG tablet  Commonly known as:  ZOLOFT  TAKE 1 TABLET BY MOUTH EVERY DAY     therapeutic multivitamin-minerals tablet        ASK your doctor about these medications    diclofenac sodium 1 % Gel  APPLY 4 G TOPICALLY 4 TIMES DAILY     Diclofenac Sodium Powd  Apply 1-2 g topically 4 times daily Formula #5 Diclo 3% Vsailiy 6% lido 2% Prilo 2%           Where to Get Your Medications      These medications were sent to [facility]/pharmacy #[id number] [facility], [address], [place] [id number]    Phone:  [phone]   · ciprofloxacin 500 MG tablet         Activity: activity as tolerated  Diet: DIET GENERAL;      Disposition: home  Discharged Condition: Stable  Follow Up:   Fauzia Mohr MD  [address]  [phone]              Code status:  Full Code         Total time spent on

## 2019-05-04 NOTE — PLAN OF CARE
Problem: Falls - Risk of:  Goal: Will remain free from falls  Description  Will remain free from falls  5/4/2019 0445 by Nic Das RN  Outcome: Ongoing  5/4/2019 0445 by Nic Das RN  Outcome: Ongoing  Goal: Absence of physical injury  Description  Absence of physical injury  5/4/2019 0445 by Nic Das RN  Outcome: Ongoing  5/4/2019 0445 by Nic Das RN  Outcome: Ongoing     Problem: Pain:  Goal: Pain level will decrease  Description  Pain level will decrease  5/4/2019 0445 by Nic Das RN  Outcome: Ongoing  5/4/2019 0445 by Nic Das RN  Outcome: Ongoing  Goal: Control of acute pain  Description  Control of acute pain  5/4/2019 0445 by Nic Das RN  Outcome: Ongoing  5/4/2019 0445 by Nic Das RN  Outcome: Ongoing  Goal: Control of chronic pain  Description  Control of chronic pain  5/4/2019 0445 by Nic Das RN  Outcome: Ongoing  5/4/2019 0445 by Nic Das RN  Outcome: Ongoing

## 2019-05-05 LAB
ORGANISM: ABNORMAL
URINE CULTURE, ROUTINE: ABNORMAL
URINE CULTURE, ROUTINE: ABNORMAL

## 2019-05-06 ENCOUNTER — CARE COORDINATION (OUTPATIENT)
Dept: CARE COORDINATION | Age: 64
End: 2019-05-06

## 2019-05-06 ENCOUNTER — TELEPHONE (OUTPATIENT)
Dept: INTERNAL MEDICINE CLINIC | Age: 64
End: 2019-05-06

## 2019-05-06 LAB
BLOOD CULTURE, ROUTINE: ABNORMAL
CULTURE, BLOOD 2: ABNORMAL
CULTURE, BLOOD 2: ABNORMAL
ORGANISM: ABNORMAL

## 2019-05-06 NOTE — CARE COORDINATION
RNCC attempted to reach patient for IP DC F/U call. HIPAA compliant VM message left, requesting a return phone call at patient's earliest convenience.

## 2019-05-07 LAB
CALCULI COMPOSITION: NORMAL
MASS: 30 MG
STONE DESCRIPTION: NORMAL
STONE NUMBER: 1
STONE SIZE: NORMAL MM

## 2019-05-13 RX ORDER — COLCHICINE 0.6 MG/1
TABLET ORAL
Qty: 60 TABLET | Refills: 1 | Status: SHIPPED | OUTPATIENT
Start: 2019-05-13 | End: 2019-07-12 | Stop reason: SDUPTHER

## 2019-05-21 ENCOUNTER — OFFICE VISIT (OUTPATIENT)
Dept: INTERNAL MEDICINE CLINIC | Age: 64
End: 2019-05-21
Payer: MEDICARE

## 2019-05-21 VITALS
DIASTOLIC BLOOD PRESSURE: 76 MMHG | HEIGHT: 76 IN | WEIGHT: 315 LBS | SYSTOLIC BLOOD PRESSURE: 124 MMHG | BODY MASS INDEX: 38.36 KG/M2 | HEART RATE: 76 BPM

## 2019-05-21 DIAGNOSIS — I10 ESSENTIAL HYPERTENSION: Primary | ICD-10-CM

## 2019-05-21 DIAGNOSIS — J43.8 OTHER EMPHYSEMA (HCC): ICD-10-CM

## 2019-05-21 DIAGNOSIS — I50.32 CHRONIC DIASTOLIC CONGESTIVE HEART FAILURE (HCC): ICD-10-CM

## 2019-05-21 DIAGNOSIS — I48.20 CHRONIC ATRIAL FIBRILLATION (HCC): ICD-10-CM

## 2019-05-21 DIAGNOSIS — H93.13 TINNITUS OF BOTH EARS: ICD-10-CM

## 2019-05-21 DIAGNOSIS — E78.2 MIXED HYPERLIPIDEMIA: ICD-10-CM

## 2019-05-21 PROBLEM — N20.0 RENAL STONE: Status: RESOLVED | Noted: 2019-05-03 | Resolved: 2019-05-21

## 2019-05-21 PROCEDURE — 3017F COLORECTAL CA SCREEN DOC REV: CPT | Performed by: INTERNAL MEDICINE

## 2019-05-21 PROCEDURE — 1036F TOBACCO NON-USER: CPT | Performed by: INTERNAL MEDICINE

## 2019-05-21 PROCEDURE — 1111F DSCHRG MED/CURRENT MED MERGE: CPT | Performed by: INTERNAL MEDICINE

## 2019-05-21 PROCEDURE — G8417 CALC BMI ABV UP PARAM F/U: HCPCS | Performed by: INTERNAL MEDICINE

## 2019-05-21 PROCEDURE — 99214 OFFICE O/P EST MOD 30 MIN: CPT | Performed by: INTERNAL MEDICINE

## 2019-05-21 PROCEDURE — 3023F SPIROM DOC REV: CPT | Performed by: INTERNAL MEDICINE

## 2019-05-21 PROCEDURE — G8427 DOCREV CUR MEDS BY ELIG CLIN: HCPCS | Performed by: INTERNAL MEDICINE

## 2019-05-21 PROCEDURE — G8926 SPIRO NO PERF OR DOC: HCPCS | Performed by: INTERNAL MEDICINE

## 2019-05-21 ASSESSMENT — PATIENT HEALTH QUESTIONNAIRE - PHQ9
SUM OF ALL RESPONSES TO PHQ9 QUESTIONS 1 & 2: 2
1. LITTLE INTEREST OR PLEASURE IN DOING THINGS: 1
SUM OF ALL RESPONSES TO PHQ QUESTIONS 1-9: 2
SUM OF ALL RESPONSES TO PHQ QUESTIONS 1-9: 2
2. FEELING DOWN, DEPRESSED OR HOPELESS: 1

## 2019-05-21 NOTE — PROGRESS NOTES
Ousmane Lung, RN at 5/21/2019  8:00 AM     Status: Signed      Dr. Adams Gerardo,  I'll see what I can find out. Thanks!   Evens Burgos

## 2019-05-21 NOTE — Clinical Note
Melina Villafana,  Mr. Claros Founds should be on anti coagulation for chronic a fib, but he had adverse effects from warfarin and he has been unable to afford the newer drugs such as Xarelto. Do you have any ideas about how we can help him with getting on an appropriate medication to lower his stroke risk? Justen Fernández

## 2019-05-21 NOTE — PROGRESS NOTES
Chief Complaint   Patient presents with    Hypertension    Hyperlipidemia    Congestive Heart Failure    COPD    Follow-Up from Hospital     Pt was seen in Hospital for Kidney stone and would like to discuss flomax.  Tinnitus     Pt would like to discuss referral for Audiologist for tinnitus he has had for years. HPI:  HTN: The patient is tolerating blood pressure medication well and taking them as directed. BP control outside of the office is reported as not monitored. No symptoms concerning for end organ damage are present. COPD: He is taking Symbicort BID. He is breathing well. CHF: he denies leg swelling or dyspnea. He is taking lasix 80mg BID.    HLD: he takes atorvastatin as directed. He tolerates this well. Chronic a fib: he remains asymptomatic. He suffered adverse effects from warfarin. He is currently on aspirin. He could not afford Xarelto. He was hospitalized 5/3 to 5/4 for kidney stones. He received stone extraction procedure by Dr. Carrie Sharif. Social History     Tobacco Use    Smoking status: Never Smoker    Smokeless tobacco: Never Used   Substance Use Topics    Alcohol use: No     Comment: quit 2006    Drug use: No        ROS:  MSK:+muscle cramps occur nightly  ENT: +bilateral tinnitus for years  CV: neg for leg swelling, neg for chest pain  RESP: Neg for dyspnea          EXAM:  /76 (Site: Left Upper Arm, Position: Sitting, Cuff Size: Large Adult)   Pulse 76   Ht 6' 4\" (1.93 m)   Wt (!) 324 lb (147 kg)   BMI 39.44 kg/m²    GEN: WN/WD, NAD  CV: irregular rhythm, no murmurs rubs or gallops  Resp: normal effort, clear auscultation bilaterally  No peripheral edema of the BLE  Abd: soft, nontender to palpation.      Lab Results   Component Value Date    CREATININE 1.3 05/04/2019    BUN 28 (H) 05/04/2019     05/04/2019    K 4.5 05/04/2019     05/04/2019    CO2 24 05/04/2019     Lab Results   Component Value Date    CHOL 167 09/14/2018    CHOL 167 11/15/2016    CHOL 116 07/23/2015     Lab Results   Component Value Date    TRIG 112 09/14/2018    TRIG 79 11/15/2016    TRIG 76 07/23/2015     Lab Results   Component Value Date    HDL 34 (L) 09/14/2018    HDL 41 11/15/2016    HDL 59 07/23/2015     Lab Results   Component Value Date    LDLCALC 111 (H) 09/14/2018    LDLCALC 110 (H) 11/15/2016    LDLCALC 42 07/23/2015     Lab Results   Component Value Date    LABVLDL 22 09/14/2018    LABVLDL 16 11/15/2016    LABVLDL 15 07/23/2015     No results found for: CHOLHDLRATIO   [id number] Score for Atrial Fibrillation Stroke Risk   Risk   Factors  Component Value   C CHF Yes 1   H HTN Yes 1   A2 Age >= 75 No,  (65 y.o.) 0   D DM Yes 1   S2 Prior Stroke/TIA No 0   V Vascular Disease No 0   A Age 74-69 No,  (65 y.o.) 0   Sc Sex male 0    [id number]  Score  3   Score last updated 4/99/55 6:64 AM    Click here for a link to the UpToDate guideline \"Atrial Fibrillation: Anticoagulation therapy to prevent embolization    Disclaimer: Risk Score calculation is dependent on accuracy of patient problem list and past encounter diagnosis. A/P  1. Essential hypertension  BP at goal  BW UTD  The current medical regimen is effective;  continue present plan and medications. 2. Other emphysema (HCC)  Stable, well controlled  Continue Symbicort    3. Chronic diastolic congestive heart failure ([address]  Well compensated  Continue BID Lasix    4. Mixed hyperlipidemia  Stable  Continue atrovastatin    5. Chronic atrial fibrillation (HCC)  Asymptomatic. Anti coagulation is indicated, but he is intolerant of warfarin and DOAC has not been affordable. Continue aspirin    6. Tinnitus of both ears  Chronic. Refer to audiology. - Mario Bunch, [facility]    Addendum: At the time of evaluation on May 21, 2019, the patient is not showing signs of active cardiovascular disease and his chronic conditions are stable.   He is medically

## 2019-05-22 ENCOUNTER — CARE COORDINATION (OUTPATIENT)
Dept: CARE COORDINATION | Age: 64
End: 2019-05-22

## 2019-05-22 NOTE — CARE COORDINATION
[address] call to pt: discussed medication assistance. Pt states his only income is SS in the amount of $1298.60/ mo. Based on this information, RNCC will look into PAP's, Medicare Savings Programs and [facility].     POC: RNCC to f/u with pt x1 week

## 2019-05-23 ENCOUNTER — CARE COORDINATION (OUTPATIENT)
Dept: CARE COORDINATION | Age: 64
End: 2019-05-23

## 2019-05-23 NOTE — CARE COORDINATION
RNCC called to speak with Cathie Moyer with the [facility], regarding possible assistance with the cost of pt's medications. [address] says: pt should qualify, based upon the amount listed on the [employer] [facility] award letter pt's receive. [address] states that pt should call and schedule to meet with her. RNCC to relay info to pt.

## 2019-05-24 ENCOUNTER — TELEPHONE (OUTPATIENT)
Dept: ORTHOPEDIC SURGERY | Age: 64
End: 2019-05-24

## 2019-05-29 ENCOUNTER — OFFICE VISIT (OUTPATIENT)
Dept: ORTHOPEDIC SURGERY | Age: 64
End: 2019-05-29
Payer: MEDICARE

## 2019-05-29 VITALS
WEIGHT: 315 LBS | HEIGHT: 75 IN | DIASTOLIC BLOOD PRESSURE: 87 MMHG | RESPIRATION RATE: 16 BRPM | BODY MASS INDEX: 39.17 KG/M2 | HEART RATE: 107 BPM | SYSTOLIC BLOOD PRESSURE: 127 MMHG

## 2019-05-29 DIAGNOSIS — G56.01 CARPAL TUNNEL SYNDROME OF RIGHT WRIST: ICD-10-CM

## 2019-05-29 DIAGNOSIS — M65.331 TRIGGER MIDDLE FINGER OF RIGHT HAND: ICD-10-CM

## 2019-05-29 DIAGNOSIS — M79.641 HAND PAIN, RIGHT: Primary | ICD-10-CM

## 2019-05-29 PROCEDURE — G8417 CALC BMI ABV UP PARAM F/U: HCPCS | Performed by: ORTHOPAEDIC SURGERY

## 2019-05-29 PROCEDURE — 3017F COLORECTAL CA SCREEN DOC REV: CPT | Performed by: ORTHOPAEDIC SURGERY

## 2019-05-29 PROCEDURE — 99214 OFFICE O/P EST MOD 30 MIN: CPT | Performed by: ORTHOPAEDIC SURGERY

## 2019-05-29 PROCEDURE — 1036F TOBACCO NON-USER: CPT | Performed by: ORTHOPAEDIC SURGERY

## 2019-05-29 PROCEDURE — G8427 DOCREV CUR MEDS BY ELIG CLIN: HCPCS | Performed by: ORTHOPAEDIC SURGERY

## 2019-05-29 PROCEDURE — 1111F DSCHRG MED/CURRENT MED MERGE: CPT | Performed by: ORTHOPAEDIC SURGERY

## 2019-05-29 NOTE — CARE COORDINATION
[address] attempted to f/u with pt re: [address] [place] to leave [facility]. RNCC to call back.

## 2019-05-29 NOTE — LETTER
CONSENT TO OPERATION  AND/OR OTHER PROCEDURE(S)          PATIENT : Sid Jackson OF BIRTH:  1955    DATE : 5/29/19          1. I request and consent that Dr. Dionicio Arango,  and/or his associates or assistants perform an operation and/or procedures on the above patient at  [place] [address], to treat the condition(s) which appear indicated by the diagnostic studies already performed. I have been told that in general terms the nature, purpose and reasonable expectations of the operation and/or procedure(s) are:                                Right Carpal Tunnel Release &   right Middle Finger Trigger Finger Release        2. It has been explained to me by the informing physician that during the course of the operation and/or procedure(s) unforeseen conditions may be revealed that necessitate an extension of the original operation and/or procedure(s) or different operation and/or procedures than those set forth in Paragraph 1. I therefore authorize and request that my physician and/or his associates or assistants perform such operations and/or procedures as are necessary and desirable in the exercise of professional judgment. The authority granted under this Paragraph 2 shall extend to all conditions that require treatment and are known to my physician at the time the operation is commenced. 3. I have been made aware by the informing physician of certain risks and consequences that are associated with the operation and/or procedure(s) described in Paragraph 1, the reasonable alternative methods or treatment, the possible consequences, the possibility that the operation and/or procedure(s) may be unsuccessful and the possibility of complications. I understand the reasonably known risks to be:      ? Bleeding  ? Infection  ? Poor Healing  ? Possible Damage to Nerve, Vessel, Tendon/Muscle or Bone  ? Need for further Treatment/Surgery  ? Stiffness  ?  Pain this consent for and on behalf of the above named patient. Witness               o  Parent  o  Guardian   o  Spouse       o  Other (specify)                                   Patient Name: Analisa Leigh  Patient YOB: 1955  Dr. Loida Ribeiro' Return To Work Policy  Regarding your ability to return to work after surgery or injury, Dr. Loida Ribeiro will not state that any patient is off of work or cannot work at all. He will place you on restrictions after your surgical procedure or injury. This means that you will be allowed to return to work the day after your office visit or surgery with restrictions. Depending on the details of your particular situation, Dr. Loida Ribeiro may state that you will have either light use or no use of your hand for a specific number of weeks. It is your obligation to communicate with your employer regarding your restrictions. It is your employer's decision as to whether they will accommodate your restrictions (i.e. allow you to come to work in your restricted capacity) or to not allow you to return to work under your restrictions. Dr. Loida Ribeiro does not participate in making this decision and cannot influence your employer regarding their decision. If you do not communicate your restrictions to your employer, or if you do not present to work as you are scheduled to, Dr. Loida Ribeiro will not provide an 'excuse' to explain your absence. A doctors note, or official forms (BWC, FMLA, etc.) will be filled out, upon request, to indicate your date of surgery and your restrictions as stated above. Dr. Loida Ribeiro' Narcotic Policy  Patients will only be prescribed narcotics after surgical procedures or significant injury. Not all procedures cause pain great enough to require Narcotics and thus, not all patients will receive prescriptions after surgical procedures or injuries.   Narcotics are never prescribed for

## 2019-05-29 NOTE — LETTER
[facility]  Surgery Scheduling Form:    DEMOGRAPHICS:                                                                                                              .    Patient Name:  Shelia Hernández  Patient :  1955   Patient SS#:      Patient Phone:  [phone] (home) [phone] (work) Alt. Patient Phone:    Patient Address:  [address] [address]    PCP:  Danna Reynolds MD  Insurance:    Payor/Plan Subscr  Sex Relation Sub. Ins. ID Effective Group Num   1. MEDICARE - [employer] 1955 Male  [id number]                                    [address]      Insurance ID Number:    DIAGNOSIS & PROCEDURE:                                                                                            .  Diagnosis:    right Carpal Tunnel Syndrome &  right Middle Finger Trigger Finger G56.01, M65.331  Operation:   right Carpal Tunnel Release &   right Middle Finger Trigger Finger Release   [CPT: [id number] and [id number]]  Location:  [facility]  Surgeon:  Aleksandr Stroud    SCHEDULING INFORMATION:                                                                                         .  Surgeon's Scheduling Instruction:  Elective 19  Requested Date:  19  OR Time:  9:20am Patient Arrival Time:  7:45amOR Time Required:  15  Minutes  Anesthesia:  MAC/TIVA  Equipment:  None  Mini C-Arm:  No   Standard C-Arm:  No  Status:  outpatient  PAT Required:  Yes  Comments:                      Eron Curiel.  Modesto Gleason MD  19   BILLING INFORMATION:                                                                                                    .  Procedure:       CPT Code Modifier

## 2019-05-30 ENCOUNTER — CARE COORDINATION (OUTPATIENT)
Dept: CARE COORDINATION | Age: 64
End: 2019-05-30

## 2019-05-30 NOTE — CARE COORDINATION
[facility] call to pt: provided with contact information for Zonia Cross with the [facility]. If pt's income is as stated, Blake Clifford thinks the Partnership can help pt with the cost of Xarelto and his other meds. RNCC advised pt to call Blake Clifford, set up an appt to discuss and apply for medication assistance. Pt states he has to have surgery for carpal tunnel on his hand, not sure when he can call Blake Clifford back. RNCC strongly encouraged pt to make the call and follow through with application, in order to get help with prescription costs. POC: RNCC to f/u x1-2 weeks, assess educational and or community resource needs. Did pt call Zonia Cross and apply for prescription assistance? If denied for [facility] assistance, other avenues to explore are:   [facility] reduced cost pharmacy   extra help from [employer] and [employer].

## 2019-05-30 NOTE — PATIENT INSTRUCTIONS
Pre-Operative Instructions    1. The night before your surgery, unless otherwise instructed, do not eat any food, drink any liquids, chew gum or mints after midnight. Abstain from alcohol for 24 hours prior to surgery. 2. You will be contacted by the Hospital the working day prior to your procedure to confirm your arrival time. 3. Patients under 25years of age must have a parent or legal guardian present to sign their consent and discharge paperwork. 4. On the day of surgery,  you will be seen pre-operatively by an anesthesiologist.     5. If you are having hand surgery, it is recommended that nail polish and acrylic nails be removed prior to surgery if possible. 6. Please bring cases for glasses, contact lenses, hearing aids or dentures. They will likely be removed prior to surgery. 7. Wear casual, loose-fitting and comfortable clothing. Consider that you may have a large dressing to fit under your clothing after surgery. 9. Please do not bring valuables such as jewelry or large sums of cash to the hospital. Remove all body piercings before coming to the hospital. Uday Judd may not  wear any rings on the hand if you are having surgery on that hand, wrist or elbow. 10. Do not smoke or chew tobacco before your surgery. [address] [place] [address] and surgery facilities are smoke-free environments. Smoking is not permitted anywhere on campus. 11. Be sure to follow any additional instructions from your physician. If the above conditions are not met, your surgery may be cancelled and rescheduled for another day. Should you develop any change in your health such as fever, cough, sore throat, cold, flu, or infection, or if you have any questions regarding your Pre-admission or surgery, please contact [facility] - Surgery Scheduling at [phone], Monday through Friday, 9 a.m. to 5 p.m.

## 2019-05-30 NOTE — PROGRESS NOTES
have discussed with them the potential complications, limitations, expectations, alternatives, and risks of Carpal Tunnel Release(s). They have had full opportunity to ask their questions. I have answered them all to their satisfaction. I feel that the patient and any present family members do understand our discussion today and they have provided informed consent for Right carpal tunnel release(s). After discussion of the treatment options available for the stenosing tenosynovitis and review of his past treatments, I have outline for Mr. Coretha Jeans the surgical treatment options for trigger finger. We have discussed the details of the surgical procedure and the pre, prabha and postoperative concerns and appropriate expectations after this surgical procedure. He was given opportunity to ask questions and had them answered fully to his satisfaction. He voiced an understanding of the procedure and did wish to proceed with Right Middle Finger Flexor Tendon Sheath A1 Pulley Release (Trigger Finger Release). I had an extensive discussion with Mr. Coretha Jeans and any family members present regarding the natural history, etiology, and long term consequences of this problem. I have outlined a treatment plan with them and, in my opinion, surgical intervention is indicated at this time. I have discussed with them the potential complications, limitations, expectations, alternatives, and risks of Right Middle Finger Flexor Tendon Sheath A1 Pulley Release (Trigger Finger Release). He has had full opportunity to ask his questions. I have answered them all to his satisfaction. I feel that Mr. Coretha Jeans  and any present family members do understand our discussion today and they have provided informed consent for Right Middle Finger Flexor Tendon Sheath A1 Pulley Release (Trigger Finger Release).      I have also discussed with Mr. Coretha Jeans the other treatment options available to him for this condition. We have today selected to proceed with Surgical treatment. He has voiced and  understanding that there are other less aggressive treatment options which are available in this situation, albeit possibly less efficacious or durable, and he is comfortable with the plan that he has chosen. I have explained to Mr. Tej Dave that despite successful treatment (surgical or otherwise) of his current presenting condition, that due to his coexistent conditions (both diagnosed and undiagnosed), that he is likely to have some permanent residual symptoms related to these conditions that do not improve long term. I have also explained that maximal recovery of function & symptom improvement may take a full year or longer to realize. He voiced a clear understanding of this. After discussion of the treatment options available for hand or wrist gout, I have outlined for Mr. Tej Dave  a conservative treatment program including the judicious use of anti-inflammatory medication (if allowed by his medical physician), and appropriate activity modifications. We specifically discussed the risk of infection, possible need for more aggressive intervention, and the risk of tissue of digital loss if this condition substantially worsens. I have referred Mr. Tej Dave back to Lou Diaz MD for further evaluation and treatment of his systemic medical condition as is medically appropriate. I have explained the likely expectations and outcomes of this treatment. He voiced an understanding of this and was content with this treatment plan. Mr. Tej Dave has requested a prescription for pain medication. I have explained to him that I do not treat chronic or long-standing conditions such as his with narcotic medication.   I have explained to him that  I will not be providing him with pain medication for this condition other than per my protocol in the immediate post-operative period    Mr. Soledad Thompson has been given a full verbal list of instructions and precautions related to his present condition. I have asked him to followup with me in the office at the prescribed time. He is also specifically requested to call or return to the office sooner if his symptoms change or worsen prior to the next scheduled appointment.

## 2019-05-31 ENCOUNTER — TELEPHONE (OUTPATIENT)
Dept: INTERNAL MEDICINE CLINIC | Age: 64
End: 2019-05-31

## 2019-05-31 NOTE — TELEPHONE ENCOUNTER
Pt is going to be have carpal tunnel sx on right hand for June 11. Pt states he was just in and was seen by Dr Adams Gerardo. Will you fill out paper work for his pre op? Please advise. Pt advised he will get an answer to this on Mon when the doctor is back in he office.

## 2019-06-03 NOTE — TELEPHONE ENCOUNTER
Called to advise. Called to request form from dr. Son Ospina office. Will call patient back when this has been done. Danny on [facility] surgery scheduler to fax form over to us.

## 2019-06-10 ENCOUNTER — ANESTHESIA EVENT (OUTPATIENT)
Dept: OPERATING ROOM | Age: 64
End: 2019-06-10
Payer: MEDICARE

## 2019-06-10 NOTE — PROGRESS NOTES
[address] [facility] time_______745_____        Surgery time____________    Take the following medications with a sip of water:defer to cerda office for medication issues or quesitons for surgery  Do not eat or drink anything after 12:00 midnight prior to your surgery. This includes water chewing gum, mints and ice chips. You may brush your teeth and gargle the morning of your surgery, but do not swallow the water     Please see your family doctor/pediatrician for a history and physical and/or concerning medications. Bring any test results/reports from your physicians office. If you are under the care of a heart doctor or specialist doctor, please be aware that you may be asked to them for clearance    You may be asked to stop blood thinners such as Coumadin, Plavix, Fragmin, Lovenox, etc., or any anti-inflammatories such as:  Aspirin, Ibuprofen, Advil, Naproxen prior to your surgery. We also ask that you stop any OTC medications such as fish oil, vitamin E, glucosamine, garlic, Multivitamins, COQ 10, etc.    We ask that you do not smoke 24 hours prior to surgery  We ask that you do not  drink any alcoholic beverages 24 hours prior to surgery     You must make arrangements for a responsible adult to take you home after your surgery. For your safety you will not be allowed to leave alone or drive yourself home. Your surgery will be cancelled if you do not have a ride home. Also for your safety, it is strongly suggested that someone stay with you the first 24 hours after your surgery. A parent or legal guardian must accompany a child scheduled for surgery and plan to stay at the hospital until the child is discharged. Please do not bring other children with you. For your comfort, please wear simple loose fitting clothing to the hospital.  Please do not bring valuables.     Do not wear any make-up or nail polish on your fingers or toes      For your safety, please do not wear any jewelry or body piercing's on the day of surgery. All jewelry must be removed. If you have dentures, they will be removed before going to operating room. For your convenience, we will provide you with a container. If you wear contact lenses or glasses, they will be removed, please bring a case for them. If you have a living will and a durable power of  for healthcare, please bring in a copy. As part of our patient safety program to minimize surgical site infections, we ask you to do the following:    · Please notify your surgeon if you develop any illness between         now and the  day of your surgery. · This includes a cough, cold, fever, sore throat, nausea,         or vomiting, and diarrhea, etc.  ·  Please notify your surgeon if you experience dizziness, shortness         of breath or blurred vision between now and the time of your surgery. Do not shave your operative site 96 hours prior to surgery. For face and neck surgery, men may use an electric razor 48 hours   prior to surgery. You may shower the night before surgery or the morning of   your surgery with an antibacterial soap. You will need to bring a photo ID and insurance card    [facility] has an onsite pharmacy, would you like to utilize our pharmacy     If you will be staying overnight and use a C-pap machine, please bring   your C-pap to hospital     Our goal is to provide you with excellent care, therefore, visitors will be limited to two(2) in the room at a time so that we may focus on providing this care for you. Please contact pre-admission testing if you have any further questions. [facility] phone number:  [phone] PAT fax number:  [phone]  Please note these are generalized instructions for all surgical cases, you may be provided with more specific instructions according to your surgery.

## 2019-06-11 ENCOUNTER — ANESTHESIA (OUTPATIENT)
Dept: OPERATING ROOM | Age: 64
End: 2019-06-11
Payer: MEDICARE

## 2019-06-11 ENCOUNTER — HOSPITAL ENCOUNTER (OUTPATIENT)
Age: 64
Setting detail: OUTPATIENT SURGERY
Discharge: HOME | End: 2019-06-11
Attending: ORTHOPAEDIC SURGERY | Admitting: ORTHOPAEDIC SURGERY
Payer: MEDICARE

## 2019-06-11 VITALS
HEART RATE: 83 BPM | RESPIRATION RATE: 16 BRPM | WEIGHT: 315 LBS | HEIGHT: 75 IN | DIASTOLIC BLOOD PRESSURE: 85 MMHG | OXYGEN SATURATION: 94 % | TEMPERATURE: 96.9 F | SYSTOLIC BLOOD PRESSURE: 133 MMHG | BODY MASS INDEX: 39.17 KG/M2

## 2019-06-11 VITALS
RESPIRATION RATE: 1 BRPM | SYSTOLIC BLOOD PRESSURE: 115 MMHG | OXYGEN SATURATION: 100 % | DIASTOLIC BLOOD PRESSURE: 76 MMHG

## 2019-06-11 LAB
GLUCOSE BLD-MCNC: 95 MG/DL (ref 70–99)
PERFORMED ON: NORMAL

## 2019-06-11 PROCEDURE — 3700000001 HC ADD 15 MINUTES (ANESTHESIA): Performed by: ORTHOPAEDIC SURGERY

## 2019-06-11 PROCEDURE — 3600000015 HC SURGERY LEVEL 5 ADDTL 15MIN: Performed by: ORTHOPAEDIC SURGERY

## 2019-06-11 PROCEDURE — 2580000003 HC RX 258: Performed by: ANESTHESIOLOGY

## 2019-06-11 PROCEDURE — 7100000000 HC PACU RECOVERY - FIRST 15 MIN: Performed by: ORTHOPAEDIC SURGERY

## 2019-06-11 PROCEDURE — 3600000005 HC SURGERY LEVEL 5 BASE: Performed by: ORTHOPAEDIC SURGERY

## 2019-06-11 PROCEDURE — 6360000002 HC RX W HCPCS: Performed by: NURSE ANESTHETIST, CERTIFIED REGISTERED

## 2019-06-11 PROCEDURE — 3700000000 HC ANESTHESIA ATTENDED CARE: Performed by: ORTHOPAEDIC SURGERY

## 2019-06-11 PROCEDURE — 2709999900 HC NON-CHARGEABLE SUPPLY: Performed by: ORTHOPAEDIC SURGERY

## 2019-06-11 PROCEDURE — 7100000010 HC PHASE II RECOVERY - FIRST 15 MIN: Performed by: ORTHOPAEDIC SURGERY

## 2019-06-11 PROCEDURE — 2500000003 HC RX 250 WO HCPCS: Performed by: ORTHOPAEDIC SURGERY

## 2019-06-11 PROCEDURE — 2500000003 HC RX 250 WO HCPCS: Performed by: NURSE ANESTHETIST, CERTIFIED REGISTERED

## 2019-06-11 PROCEDURE — 7100000001 HC PACU RECOVERY - ADDTL 15 MIN: Performed by: ORTHOPAEDIC SURGERY

## 2019-06-11 PROCEDURE — 7100000011 HC PHASE II RECOVERY - ADDTL 15 MIN: Performed by: ORTHOPAEDIC SURGERY

## 2019-06-11 RX ORDER — ONDANSETRON 2 MG/ML
4 INJECTION INTRAMUSCULAR; INTRAVENOUS
Status: DISCONTINUED | OUTPATIENT
Start: 2019-06-11 | End: 2019-06-11 | Stop reason: HOSPADM

## 2019-06-11 RX ORDER — MIDAZOLAM HYDROCHLORIDE 1 MG/ML
INJECTION INTRAMUSCULAR; INTRAVENOUS PRN
Status: DISCONTINUED | OUTPATIENT
Start: 2019-06-11 | End: 2019-06-11 | Stop reason: SDUPTHER

## 2019-06-11 RX ORDER — FENTANYL CITRATE 50 UG/ML
25 INJECTION, SOLUTION INTRAMUSCULAR; INTRAVENOUS EVERY 5 MIN PRN
Status: DISCONTINUED | OUTPATIENT
Start: 2019-06-11 | End: 2019-06-11 | Stop reason: HOSPADM

## 2019-06-11 RX ORDER — LIDOCAINE HYDROCHLORIDE 10 MG/ML
1 INJECTION, SOLUTION INFILTRATION; PERINEURAL
Status: DISCONTINUED | OUTPATIENT
Start: 2019-06-11 | End: 2019-06-11 | Stop reason: HOSPADM

## 2019-06-11 RX ORDER — FENTANYL CITRATE 50 UG/ML
50 INJECTION, SOLUTION INTRAMUSCULAR; INTRAVENOUS EVERY 5 MIN PRN
Status: DISCONTINUED | OUTPATIENT
Start: 2019-06-11 | End: 2019-06-11 | Stop reason: HOSPADM

## 2019-06-11 RX ORDER — LIDOCAINE HYDROCHLORIDE 20 MG/ML
INJECTION, SOLUTION EPIDURAL; INFILTRATION; INTRACAUDAL; PERINEURAL PRN
Status: DISCONTINUED | OUTPATIENT
Start: 2019-06-11 | End: 2019-06-11 | Stop reason: SDUPTHER

## 2019-06-11 RX ORDER — PROPOFOL 10 MG/ML
INJECTION, EMULSION INTRAVENOUS CONTINUOUS PRN
Status: DISCONTINUED | OUTPATIENT
Start: 2019-06-11 | End: 2019-06-11 | Stop reason: SDUPTHER

## 2019-06-11 RX ORDER — SODIUM CHLORIDE 0.9 % (FLUSH) 0.9 %
10 SYRINGE (ML) INJECTION EVERY 12 HOURS SCHEDULED
Status: DISCONTINUED | OUTPATIENT
Start: 2019-06-11 | End: 2019-06-11 | Stop reason: HOSPADM

## 2019-06-11 RX ORDER — SODIUM CHLORIDE 0.9 % (FLUSH) 0.9 %
10 SYRINGE (ML) INJECTION PRN
Status: DISCONTINUED | OUTPATIENT
Start: 2019-06-11 | End: 2019-06-11 | Stop reason: HOSPADM

## 2019-06-11 RX ORDER — ESMOLOL HYDROCHLORIDE 10 MG/ML
INJECTION INTRAVENOUS PRN
Status: DISCONTINUED | OUTPATIENT
Start: 2019-06-11 | End: 2019-06-11 | Stop reason: SDUPTHER

## 2019-06-11 RX ORDER — KETAMINE HCL IN NACL, ISO-OSM 100MG/10ML
SYRINGE (ML) INJECTION PRN
Status: DISCONTINUED | OUTPATIENT
Start: 2019-06-11 | End: 2019-06-11 | Stop reason: SDUPTHER

## 2019-06-11 RX ORDER — PROPOFOL 10 MG/ML
INJECTION, EMULSION INTRAVENOUS PRN
Status: DISCONTINUED | OUTPATIENT
Start: 2019-06-11 | End: 2019-06-11 | Stop reason: SDUPTHER

## 2019-06-11 RX ORDER — SODIUM CHLORIDE 9 MG/ML
INJECTION, SOLUTION INTRAVENOUS CONTINUOUS
Status: DISCONTINUED | OUTPATIENT
Start: 2019-06-11 | End: 2019-06-11 | Stop reason: HOSPADM

## 2019-06-11 RX ADMIN — SODIUM CHLORIDE: 9 INJECTION, SOLUTION INTRAVENOUS at 08:17

## 2019-06-11 RX ADMIN — ESMOLOL HYDROCHLORIDE 30 MG: 100 INJECTION, SOLUTION INTRAVENOUS at 09:15

## 2019-06-11 RX ADMIN — PROPOFOL 200 MG: 10 INJECTION, EMULSION INTRAVENOUS at 09:09

## 2019-06-11 RX ADMIN — SODIUM CHLORIDE: 9 INJECTION, SOLUTION INTRAVENOUS at 09:03

## 2019-06-11 RX ADMIN — LIDOCAINE HYDROCHLORIDE 100 MG: 20 INJECTION, SOLUTION EPIDURAL; INFILTRATION; INTRACAUDAL; PERINEURAL at 09:09

## 2019-06-11 RX ADMIN — PROPOFOL 180 MCG/KG/MIN: 10 INJECTION, EMULSION INTRAVENOUS at 09:09

## 2019-06-11 RX ADMIN — MIDAZOLAM 2 MG: 1 INJECTION INTRAMUSCULAR; INTRAVENOUS at 09:11

## 2019-06-11 RX ADMIN — Medication 30 MG: at 09:12

## 2019-06-11 ASSESSMENT — PULMONARY FUNCTION TESTS
PIF_VALUE: 0

## 2019-06-11 ASSESSMENT — PAIN SCALES - GENERAL
PAINLEVEL_OUTOF10: 0

## 2019-06-11 ASSESSMENT — PAIN - FUNCTIONAL ASSESSMENT
PAIN_FUNCTIONAL_ASSESSMENT: 0-10
PAIN_FUNCTIONAL_ASSESSMENT: PREVENTS OR INTERFERES SOME ACTIVE ACTIVITIES AND ADLS

## 2019-06-11 ASSESSMENT — PAIN DESCRIPTION - DESCRIPTORS: DESCRIPTORS: ACHING

## 2019-06-11 NOTE — PROGRESS NOTES
Pt to pacu from OR. Pt sleeping with oral airway in place. Dressing to right hand dry and intact. Right radial pulse palpable. Right hand elevated on pillow. IV infusing. Monitor in a fib.

## 2019-06-11 NOTE — ANESTHESIA POSTPROCEDURE EVALUATION
Department of Anesthesiology  Postprocedure Note    Patient: Analisa Leigh  MRN: [id number]  YOB: 1955  Date of evaluation: 6/11/2019  Time:  12:37 PM     Procedure Summary     Date:  06/11/19 Room / Location:  [facility] OR 03 / [facility] OR    Anesthesia Start:  0905 Anesthesia Stop:  3733    Procedure:  RIGHT CARPAL TUNNEL RELEASE AND RIGHT MIDDLE FINGER TRIGGER FINGER RELEASE (Right ) Diagnosis:       Trigger middle finger of right hand      (RIGHT CARPAL TUNNEL SYNDROME AND RIGHT MIDDLE FINGER TRIGGER FINGER)    Surgeon:  Bennett Murphy MD Responsible Provider:  Faiza Baeza MD    Anesthesia Type:  MAC, TIVA ASA Status:  3          Anesthesia Type: MAC, TIVA    Wong Phase I: Wong Score: 10    Wong Phase II: Wong Score: 10    Last vitals: Reviewed and per EMR flowsheets.        Anesthesia Post Evaluation    Patient location during evaluation: PACU  Patient participation: complete - patient participated  Level of consciousness: awake and alert  Pain score: 2  Airway patency: patent  Nausea & Vomiting: no nausea and no vomiting  Complications: no  Cardiovascular status: blood pressure returned to baseline  Respiratory status: acceptable  Hydration status: euvolemic

## 2019-06-11 NOTE — OP NOTE
surgical field. His Right upper extremity was prepped and draped in the usual sterile fashion. After Esmarch exsanguination, the pneumotourniquet was inflated to 250 mm of mercury. A 2 cm longitudinal incision was fashioned at the base of the palm, paralleling the longitudinal thenar crease. Dissection was carried carefully through the subcutaneous tissue identifying and protecting the neurovascular structures. The palmar fascia was incised longitudinally, exposing the transverse carpal ligament. The transverse carpal ligament was incised from its proximal to distal most extent, under direct visualization. The terminal 2 cm of antebrachial fascia was similarly incised under direct visualization. The contents of the carpal tunnel were inspected and found to be free of mass, lesion or other abnormality. Digital palpation revealed no further constriction about the median nerve. Attention was turned to the fingers. A 1 centimeter oblique incision was fashioned over the base of the flexor tendon sheath of the Right Middle Finger. Dissection was carried carefully through the subcutaneous tissues, taking great care to identify and protect the neurovascular structures. The flexor tendon sheath was carefully identified and cleared of surrounding soft tissue. The A1 pulley was identified and incised longitudinally along its entire length under direct visualization. The flexor tendons were gently withdrawn from the sheath and inspected. They were found to be in fair condition with substantial surface fraying and fibrilation. The tendons were returned to their appropriate location and the finger was placed through a full range of motion. There was no evidence of residual stenosis or triggering. The wound was irrigated copiously with sterile saline for irrigation and the pneumotourniquet was deflated after a period of 4 minutes elevation. The fingers were immediately pink & well perfused.   Hemostasis was easily obtained with direct pressure and electrocautery and the wound was closed with interrupted sutures. The wound was dressed with adaptic, dry sterile dressings and a bulky soft hand & wrist dressing was applied. Mr. Jaye Denson  was awakened from light sedation, having tolerated the procedure without apparent complication. He  was returned to the recovery room in stable condition. At the conclusion of the procedure all needle, instrument, and sponge counts were correct. Bruce Boswell MD   6/11/2019, 9:22 AM

## 2019-06-11 NOTE — ANESTHESIA PRE PROCEDURE
[facility] Department of Anesthesiology  Pre-Anesthesia Evaluation/Consultation       Name:  Coretha Jeans  : 1955  Age:  62 y.o.                                            MRN:  [id number]  Date: 2019           Surgeon: Surgeon(s):  Blanca Decker MD    Procedure: Procedure(s):  RIGHT CARPAL TUNNEL RELEASE AND RIGHT MIDDLE FINGER TRIGGER FINGER RELEASE     Allergies   Allergen Reactions    Bactrim      hallucinations     Patient Active Problem List   Diagnosis    Chronic atrial fibrillation (HCC)    COPD (chronic obstructive pulmonary disease) ([address]    Sleep apnea    Essential hypertension    Hyperlipidemia    Chronic idiopathic gout of multiple sites    Hand arthritis    Chronic diastolic congestive heart failure (HCC)    Obesity    S/P transmetatarsal amputation of foot, right ([facility])     Past Medical History:   Diagnosis Date    Anxiety     Arthritis     Asthma     Atrial fibrillation ([address]     Blood circulation, collateral     CAD (coronary artery disease)     CHF (congestive heart failure) ([facility])     Colon cancer ([address]     COPD (chronic obstructive pulmonary disease) ([address]     Diabetic foot infection ([address]     Hernia     Hyperlipidemia     Hypertension     Movement disorder     Muscle cramps     Neuromuscular disorder ([address]     marvin feet numbness/neuropathy    Neuropathy     Pneumonia     Renal insufficiency     Sleep apnea     no cpap    Thyroid disease     Tinnitus aurium, bilateral     Unspecified cerebral artery occlusion with cerebral infarction     mini stroke    Unspecified diseases of blood and blood-forming organs      Past Surgical History:   Procedure Laterality Date    ABDOMEN SURGERY      APPENDECTOMY  10/9/2012    CARPAL TUNNEL RELEASE Left 14    COLON SURGERY      for Colon Ca 10 inches removed from colon    COLONOSCOPY      COLONOSCOPY  3/13/14    COLONOSCOPY  2018    CYSTOSCOPY Right 5/3/2019    CYSTOSCOPY, RIGHT URETEROSCOPY, STONE EXTRACTION WITH STONE BASKET performed by Samuel Dixon MD at [address] (UPPER)  6/4/14    ENDOSCOPY, COLON, DIAGNOSTIC      FOOT SURGERY Right 07/13/2017    Amputation toes 2,3,5, right foot Flap closure, Exostectomy 1st metatarsal right foot    FOOT SURGERY Right 08/17/2017    INCISION AND DRAINAGE OF RIGHT FOOT WITH FLAP CLOSURE    HERNIA REPAIR      umbilical, hiatal hernia repairs    KNEE ARTHROSCOPY Right 08/23/2018    partial medial/ lateral menisectomy    SKIN BIOPSY      TOE AMPUTATION  3/7/11    RIGHT FOURTH TOE    TOE SURGERY      ULNAR TUNNEL RELEASE Left 12-11-14    Left ulnar nerve decompression and left CTR    UPPER GASTROINTESTINAL ENDOSCOPY  1/28/2014    with biopsies    UPPER GASTROINTESTINAL ENDOSCOPY  3/13/14    UPPER GASTROINTESTINAL ENDOSCOPY  6/4/14    with biopsy    UPPER GASTROINTESTINAL ENDOSCOPY  03/02/2018    VENTRICULAR ABLATION SURGERY       Social History     Tobacco Use    Smoking status: Never Smoker    Smokeless tobacco: Never Used   Substance Use Topics    Alcohol use: No     Comment: quit 2006    Drug use: No     Medications  No current facility-administered medications on file prior to encounter.       Current Outpatient Medications on File Prior to Encounter   Medication Sig Dispense Refill    colchicine (COLCRYS) 0.6 MG tablet TAKE 1 TABLET BY MOUTH TWICE A DAY AS NEEDED FOR PAIN 60 tablet 1    potassium chloride (KLOR-CON M20) 20 MEQ extended release tablet Taking 3 tablets  tablet 1    furosemide (LASIX) 40 MG tablet TAKE 2 TABLETS BY MOUTH 2 TIMES DAILY 360 tablet 1    Diclofenac Sodium POWD Apply 1-2 g topically 4 times daily Formula #5 Diclo 3% Vasiliy 6% lido 2% Prilo 2% 1 Bottle 11    diltiazem (CARDIZEM CD) 180 MG extended release capsule TAKE ONE CAPSULE BY MOUTH TWICE A  capsule 1    atorvastatin (LIPITOR) 40 MG tablet Take 1 tablet by mouth daily (Patient taking differently: Take 40 97.3 °F (36.3 °C)   Min taken time: 19  Max: 97.3 °F (36.3 °C)   Max taken time: 19  Pulse  Av  Min: 89   Min taken time: 19  Max: 80   Max taken time: 19  Resp  Av  Min: 12   Min taken time: 19  Max: 16   Max taken time: 19  BP  Min: 139/99   Min taken time: 19  Max: 139/99   Max taken time: 19  SpO2  Av %  Min: 97 %   Min taken time: 19  Max: 97 %   Max taken time: 19  BP Readings from Last 3 Encounters:   19 (!) 139/99   19 127/87   19 124/76       BMI  Body mass index is 40.37 kg/m². Estimated body mass index is 40.37 kg/m² as calculated from the following:    Height as of this encounter: 6' 3\" (1.905 m). Weight as of this encounter: 322 lb 15.6 oz (146.5 kg). CBC   Lab Results   Component Value Date    WBC 15.9 2019    RBC 4.72 2019    HGB 12.9 2019    HCT 40.9 2019    MCV 86.8 2019    RDW 15.7 2019     2019     CMP    Lab Results   Component Value Date     2019    K 4.5 2019    K 3.6 2018     2019    CO2 24 2019    BUN 28 2019    CREATININE 1.3 2019    GFRAA >60 2019    GFRAA >60 2012    AGRATIO 1.3 2019    LABGLOM 56 2019    GLUCOSE 165 2019    PROT 7.5 2019    PROT 7.4 2012    CALCIUM 9.1 2019    BILITOT 0.5 2019    ALKPHOS 143 2019    AST 28 2019    ALT 41 2019     BMP    Lab Results   Component Value Date     2019    K 4.5 2019    K 3.6 2018     2019    CO2 24 2019    BUN 28 2019    CREATININE 1.3 2019    CALCIUM 9.1 2019    GFRAA >60 2019    GFRAA >60 2012    LABGLOM 56 2019    GLUCOSE 165 2019     POCGlucose  No results for input(s): GLUCOSE in the last 72 hours.    [facility]    Lab Results   Component Value Date    PROTIME 12.3 05/03/2019    PROTIME 40.8 11/18/2013    INR 1.08 05/03/2019    APTT 29.7 07/68/6175     HCG (If Applicable) No results found for: PREGTESTUR, PREGSERUM, HCG, HCGQUANT   ABGs No results found for: PHART, PO2ART, ABU2VZC, DQM5HGH, BEART, A7LUUNIQ   Type & Screen (If Applicable)  No results found for: LABABO, LABRH                         BMI: Wt Readings from Last 3 Encounters:       NPO Status:   Date of last liquid consumption: 06/10/19   Time of last liquid consumption: 2300   Date of last solid food consumption: 06/10/19      Time of last solid consumption: 1500       Anesthesia Evaluation  Patient summary reviewed no history of anesthetic complications:   Airway: Mallampati: II  TM distance: >3 FB     Mouth opening: > = 3 FB Dental:          Pulmonary:   (+) COPD:  sleep apnea: on CPAP,  asthma:                            Cardiovascular:  Exercise tolerance: good (>4 METS),   (+) hypertension:, CAD:, CHF:,     (-) pacemaker, past MI and CABG/stent      Rhythm: regular  Rate: normal                    Neuro/Psych:   (+) CVA:, TIA,    (-) seizures           GI/Hepatic/Renal:   (+) morbid obesity     (-) GERD, liver disease and no renal disease       Endo/Other:    (+) DiabetesType II DM, , .                 Abdominal:   (+) obese,         Vascular:     - DVT. Anesthesia Plan      MAC and TIVA     ASA 3       Induction: intravenous. Anesthetic plan and risks discussed with patient. Plan discussed with CRNA. This pre-anesthesia assessment may be used as a history and physical.    DOS STAFF ADDENDUM:    Pt seen and examined, chart reviewed (including anesthesia, drug and allergy history). No interval changes to history and physical examination. Anesthetic plan, risks, benefits, alternatives, and personnel involved discussed with patient. Patient verbalized an understanding and agrees to proceed.       Danni Valdez MD  June 11, 2019  8:22 AM

## 2019-06-11 NOTE — PROGRESS NOTES
Pt does not want anything to eat/drink, denies nausea. Wants to go home. Sitting on side of bed to obtain vitals.

## 2019-06-17 ENCOUNTER — TELEPHONE (OUTPATIENT)
Dept: ORTHOPEDIC SURGERY | Age: 64
End: 2019-06-17

## 2019-06-17 NOTE — TELEPHONE ENCOUNTER
Patient called to cancel his post op appt for today. He rescheduled for next week at the [facility] office. He states that the incision and stitches look good. He also reports that his home nurse rewrapped his wrist due to the previous bandages become dirty.

## 2019-06-26 ENCOUNTER — OFFICE VISIT (OUTPATIENT)
Dept: ORTHOPEDIC SURGERY | Age: 64
End: 2019-06-26

## 2019-06-26 VITALS — BODY MASS INDEX: 39.17 KG/M2 | WEIGHT: 315 LBS | HEIGHT: 75 IN | RESPIRATION RATE: 18 BRPM

## 2019-06-26 DIAGNOSIS — G56.01 CARPAL TUNNEL SYNDROME OF RIGHT WRIST: Primary | ICD-10-CM

## 2019-06-26 PROCEDURE — 99024 POSTOP FOLLOW-UP VISIT: CPT | Performed by: ORTHOPAEDIC SURGERY

## 2019-06-26 NOTE — PATIENT INSTRUCTIONS
incision with Vitamin E (may use Vitamin E lotion or contents of Vitamin E capsule). 10. Work hard on motion of the fingers and wrist, straightening each finger fully and bending each finger fully, bending wrist forward and bending wrist backwards. Do not be concerned if you experience discomfort. This will not damage the surgery. 11. You may begin using the hand as it feels comfortable beginning 12-14 days from the day of surgery. You may not feel entirely comfortable gripping or lifting heavy objects for several weeks. 12. You may expect to see some skin peel off around the incision. You may be left with a small area of pink baby skin. This is quite normal.    Thank you for choosing [facility]icians for your Hand and Upper Extremity needs. If we can be of any further assistance to you, please do not hesitate to contact us.     Office Phone Number:  [phone]  or  [phone]

## 2019-06-26 NOTE — PROGRESS NOTES
Mr. Juan C Jung returns today in follow-up of his recent right Carpal Tunnel Release done approximately 1 week ago. He has done well noting mild discomfort and no other reported complications. He notes pre-operative symptoms to be significantly improved at this time. Physical Exam:  Skin incision is healing well, without erythema, drainage or sign of infection. Digital range of motion is limited by Osteoarthritis Right greater than Left   Wrist range of motion is equal bilaterally. Sensation is significantly improved from preoperatvely  Vascular examination reveals normal and good capillary refill. Swelling is minimal.  There is no clinical evidence of persistant Median Nerve compression. Impression:  Mr. Juan C Jung is doing well after recent right Carpal Tunnel Release. Plan:  Mr. Juan C Jung is instructed in work on Active & Passive range of motion of the digits, wrist, & elbow. These modalities were specifically demonstrated to him today. We discussed the appropriateness of gradual resumption of use of the operated hand and the return to normal use as comfort allows. He is given instructions regarding management of the fresh surgical incision and progressive use of desensitization and tissue massage techniques. We discussed the appropriate expectations and timeline for symptom improvement. He is provided a written patient instruction sheet titled: Postoperative Instructions After Carpal Tunnel Release. I have explained to Mr. Juan C Jung that despite successful treatment (surgical or otherwise) of his current presenting condition, that due to his coexistent Digital Arthritis, that he is likely to have some permanent residual symptoms related to these conditions that do not improve long term. I have also explained that maximal recovery of function & symptom improvement may take a full year or longer to realize. He voiced a clear understanding of this.         I

## 2019-06-26 NOTE — Clinical Note
Dear  Fritz Krueger MD,Thank you very much for your referral or Mr. Sadi Stewart to me for evaluation and treatment of his Hand & Wrist condition. I appreciate your confidence in me and thank you for allowing me the opportunity to care for your patients. If I can be of any further assistance to you on this or any other patient, please do not hesitate to contact me. Sincerely,Ramiro Bejarano MD

## 2019-06-28 ENCOUNTER — CARE COORDINATION (OUTPATIENT)
Dept: CARE COORDINATION | Age: 64
End: 2019-06-28

## 2019-06-28 NOTE — CARE COORDINATION
RNCC attempted to reach patient for F/U call. HIPAA compliant VM message left, requesting a return phone call at patient's earliest convenience. - CT surgery- issues?  -Did pt f/u with assistance info provided? ([facility]?

## 2019-07-12 RX ORDER — COLCHICINE 0.6 MG/1
TABLET ORAL
Qty: 60 TABLET | Refills: 1 | Status: SHIPPED | OUTPATIENT
Start: 2019-07-12 | End: 2019-08-31 | Stop reason: SDUPTHER

## 2019-08-15 DIAGNOSIS — I50.32 CHRONIC DIASTOLIC CONGESTIVE HEART FAILURE (HCC): ICD-10-CM

## 2019-08-15 DIAGNOSIS — I10 ESSENTIAL HYPERTENSION: ICD-10-CM

## 2019-08-23 ENCOUNTER — CARE COORDINATION (OUTPATIENT)
Dept: CARE COORDINATION | Age: 64
End: 2019-08-23

## 2019-09-03 RX ORDER — COLCHICINE 0.6 MG/1
TABLET ORAL
Qty: 60 TABLET | Refills: 1 | Status: SHIPPED | OUTPATIENT
Start: 2019-09-03 | End: 2019-10-30 | Stop reason: SDUPTHER

## 2019-09-11 ENCOUNTER — CARE COORDINATION (OUTPATIENT)
Dept: CARE COORDINATION | Age: 64
End: 2019-09-11

## 2019-09-19 ENCOUNTER — OFFICE VISIT (OUTPATIENT)
Dept: INTERNAL MEDICINE CLINIC | Age: 64
End: 2019-09-19
Payer: MEDICARE

## 2019-09-19 VITALS
WEIGHT: 305 LBS | HEART RATE: 68 BPM | DIASTOLIC BLOOD PRESSURE: 64 MMHG | HEIGHT: 75 IN | TEMPERATURE: 97.5 F | SYSTOLIC BLOOD PRESSURE: 94 MMHG | BODY MASS INDEX: 37.92 KG/M2

## 2019-09-19 DIAGNOSIS — R10.9 ABDOMINAL CRAMPING: ICD-10-CM

## 2019-09-19 DIAGNOSIS — R19.7 DIARRHEA, UNSPECIFIED TYPE: Primary | ICD-10-CM

## 2019-09-19 DIAGNOSIS — R11.0 NAUSEA: ICD-10-CM

## 2019-09-19 DIAGNOSIS — R19.7 DIARRHEA, UNSPECIFIED TYPE: ICD-10-CM

## 2019-09-19 LAB
A/G RATIO: 1.1 (ref 1.1–2.2)
ALBUMIN SERPL-MCNC: 3.5 G/DL (ref 3.4–5)
ALP BLD-CCNC: 120 U/L (ref 40–129)
ALT SERPL-CCNC: 16 U/L (ref 10–40)
AMYLASE: 54 U/L (ref 25–115)
ANION GAP SERPL CALCULATED.3IONS-SCNC: 19 MMOL/L (ref 3–16)
AST SERPL-CCNC: 21 U/L (ref 15–37)
BASOPHILS ABSOLUTE: 0.1 K/UL (ref 0–0.2)
BASOPHILS RELATIVE PERCENT: 0.7 %
BILIRUB SERPL-MCNC: 0.5 MG/DL (ref 0–1)
BILIRUBIN, POC: NORMAL
BLOOD URINE, POC: NORMAL
BUN BLDV-MCNC: 32 MG/DL (ref 7–20)
CALCIUM SERPL-MCNC: 8.9 MG/DL (ref 8.3–10.6)
CHLORIDE BLD-SCNC: 103 MMOL/L (ref 99–110)
CLARITY, POC: NORMAL
CO2: 17 MMOL/L (ref 21–32)
COLOR, POC: NORMAL
CREAT SERPL-MCNC: 2.5 MG/DL (ref 0.8–1.3)
EOSINOPHILS ABSOLUTE: 0.2 K/UL (ref 0–0.6)
EOSINOPHILS RELATIVE PERCENT: 1.2 %
GFR AFRICAN AMERICAN: 32
GFR NON-AFRICAN AMERICAN: 26
GLOBULIN: 3.2 G/DL
GLUCOSE BLD-MCNC: 85 MG/DL (ref 70–99)
GLUCOSE URINE, POC: NORMAL
HCT VFR BLD CALC: 39.4 % (ref 40.5–52.5)
HEMOGLOBIN: 12.6 G/DL (ref 13.5–17.5)
KETONES, POC: NORMAL
LEUKOCYTE EST, POC: NORMAL
LIPASE: 46 U/L (ref 13–60)
LYMPHOCYTES ABSOLUTE: 1.1 K/UL (ref 1–5.1)
LYMPHOCYTES RELATIVE PERCENT: 8.2 %
MCH RBC QN AUTO: 27.1 PG (ref 26–34)
MCHC RBC AUTO-ENTMCNC: 31.9 G/DL (ref 31–36)
MCV RBC AUTO: 85.1 FL (ref 80–100)
MONOCYTES ABSOLUTE: 1.4 K/UL (ref 0–1.3)
MONOCYTES RELATIVE PERCENT: 10.3 %
NEUTROPHILS ABSOLUTE: 10.7 K/UL (ref 1.7–7.7)
NEUTROPHILS RELATIVE PERCENT: 79.6 %
NITRITE, POC: NORMAL
PDW BLD-RTO: 19.6 % (ref 12.4–15.4)
PH, POC: 5.5
PLATELET # BLD: 499 K/UL (ref 135–450)
PMV BLD AUTO: 8.2 FL (ref 5–10.5)
POTASSIUM SERPL-SCNC: 5.5 MMOL/L (ref 3.5–5.1)
PROTEIN, POC: NORMAL
RBC # BLD: 4.63 M/UL (ref 4.2–5.9)
SODIUM BLD-SCNC: 139 MMOL/L (ref 136–145)
SPECIFIC GRAVITY, POC: 1.02
TOTAL PROTEIN: 6.7 G/DL (ref 6.4–8.2)
UROBILINOGEN, POC: 0.2
WBC # BLD: 13.4 K/UL (ref 4–11)

## 2019-09-19 PROCEDURE — G8427 DOCREV CUR MEDS BY ELIG CLIN: HCPCS | Performed by: NURSE PRACTITIONER

## 2019-09-19 PROCEDURE — 1036F TOBACCO NON-USER: CPT | Performed by: NURSE PRACTITIONER

## 2019-09-19 PROCEDURE — 99214 OFFICE O/P EST MOD 30 MIN: CPT | Performed by: NURSE PRACTITIONER

## 2019-09-19 PROCEDURE — G8417 CALC BMI ABV UP PARAM F/U: HCPCS | Performed by: NURSE PRACTITIONER

## 2019-09-19 PROCEDURE — 81002 URINALYSIS NONAUTO W/O SCOPE: CPT | Performed by: NURSE PRACTITIONER

## 2019-09-19 PROCEDURE — 3017F COLORECTAL CA SCREEN DOC REV: CPT | Performed by: NURSE PRACTITIONER

## 2019-09-19 RX ORDER — DICYCLOMINE HYDROCHLORIDE 10 MG/1
10 CAPSULE ORAL 4 TIMES DAILY
Qty: 120 CAPSULE | Refills: 3 | Status: SHIPPED | OUTPATIENT
Start: 2019-09-19 | End: 2019-10-14

## 2019-09-19 RX ORDER — ONDANSETRON 4 MG/1
4 TABLET, FILM COATED ORAL DAILY PRN
Qty: 30 TABLET | Refills: 0 | Status: SHIPPED | OUTPATIENT
Start: 2019-09-19 | End: 2019-10-14

## 2019-09-19 ASSESSMENT — ENCOUNTER SYMPTOMS
DIARRHEA: 1
COUGH: 0
VOMITING: 1
BLOATING: 0
FLATUS: 1
ABDOMINAL PAIN: 1

## 2019-09-19 NOTE — PROGRESS NOTES
180 MG extended release capsule TAKE ONE CAPSULE BY MOUTH TWICE A  capsule 1    atorvastatin (LIPITOR) 40 MG tablet Take 1 tablet by mouth daily (Patient taking differently: Take 40 mg by mouth every evening ) 90 tablet 3    ferrous sulfate 325 (65 Fe) MG tablet Take 1 tablet by mouth daily 90 tablet 1    sertraline (ZOLOFT) 50 MG tablet TAKE 1 TABLET BY MOUTH EVERY DAY 90 tablet 1    metoprolol tartrate (LOPRESSOR) 25 MG tablet Take 1 tablet by mouth 2 times daily 180 tablet 3    pantoprazole (PROTONIX) 40 MG tablet TAKE 1 TABLET BY MOUTH TWICE DAILY 60 tablet 5    albuterol sulfate HFA (PROVENTIL HFA) 108 (90 BASE) MCG/ACT inhaler Inhale 2 puffs into the lungs every 4 hours as needed for Wheezing 1 Inhaler 3    budesonide-formoterol (SYMBICORT) 160-4.5 MCG/ACT AERO Inhale 2 puffs into the lungs 2 times daily. 3 Inhaler 3    albuterol-ipratropium (COMBIVENT RESPIMAT)  MCG/ACT AERS inhaler Inhale 1 puff into the lungs every 6 hours. 3 Inhaler 3    OXYGEN Inhale  into the lungs. 2 liters/ nasal cannula prn      therapeutic multivitamin-minerals (THERAGRAN-M) tablet Take 1 tablet by mouth daily. No current facility-administered medications for this visit. Review of Systems   Constitutional: Positive for weight loss. Negative for chills. Respiratory: Negative for cough. Gastrointestinal: Positive for abdominal pain (cramping), diarrhea, flatus and vomiting. Negative for bloating. Musculoskeletal: Negative for arthralgias and myalgias. Neurological: Negative for headaches. Physical Exam   Constitutional: He is oriented to person, place, and time. He appears well-developed and well-nourished. No distress. HENT:   Head: Normocephalic and atraumatic. Cardiovascular: Normal rate, regular rhythm and normal heart sounds. No murmur heard. Pulmonary/Chest: Effort normal and breath sounds normal. No respiratory distress. He has no wheezes. Abdominal: Soft.  Bowel sounds are

## 2019-09-20 DIAGNOSIS — R19.7 DIARRHEA, UNSPECIFIED TYPE: Primary | ICD-10-CM

## 2019-09-23 DIAGNOSIS — R19.7 DIARRHEA, UNSPECIFIED TYPE: ICD-10-CM

## 2019-09-23 LAB
ALBUMIN SERPL-MCNC: 3.4 G/DL (ref 3.4–5)
ANION GAP SERPL CALCULATED.3IONS-SCNC: 16 MMOL/L (ref 3–16)
BUN BLDV-MCNC: 29 MG/DL (ref 7–20)
CALCIUM SERPL-MCNC: 8.6 MG/DL (ref 8.3–10.6)
CHLORIDE BLD-SCNC: 104 MMOL/L (ref 99–110)
CO2: 20 MMOL/L (ref 21–32)
CREAT SERPL-MCNC: 2.3 MG/DL (ref 0.8–1.3)
GFR AFRICAN AMERICAN: 35
GFR NON-AFRICAN AMERICAN: 29
GLUCOSE BLD-MCNC: 83 MG/DL (ref 70–99)
PHOSPHORUS: 3.9 MG/DL (ref 2.5–4.9)
POTASSIUM SERPL-SCNC: 5.4 MMOL/L (ref 3.5–5.1)
SODIUM BLD-SCNC: 140 MMOL/L (ref 136–145)

## 2019-09-30 ENCOUNTER — TELEPHONE (OUTPATIENT)
Dept: CARDIOLOGY CLINIC | Age: 64
End: 2019-09-30

## 2019-10-02 RX ORDER — DILTIAZEM HYDROCHLORIDE 180 MG/1
CAPSULE, COATED, EXTENDED RELEASE ORAL
Qty: 180 CAPSULE | Refills: 0 | OUTPATIENT
Start: 2019-10-02

## 2019-10-07 RX ORDER — FUROSEMIDE 40 MG/1
TABLET ORAL
Qty: 360 TABLET | Refills: 1 | Status: SHIPPED | OUTPATIENT
Start: 2019-10-07 | End: 2020-04-08

## 2019-10-14 ENCOUNTER — OFFICE VISIT (OUTPATIENT)
Dept: INTERNAL MEDICINE CLINIC | Age: 64
End: 2019-10-14
Payer: MEDICARE

## 2019-10-14 VITALS
HEIGHT: 75 IN | HEART RATE: 88 BPM | DIASTOLIC BLOOD PRESSURE: 78 MMHG | BODY MASS INDEX: 36.8 KG/M2 | WEIGHT: 296 LBS | SYSTOLIC BLOOD PRESSURE: 118 MMHG

## 2019-10-14 DIAGNOSIS — F33.1 MODERATE EPISODE OF RECURRENT MAJOR DEPRESSIVE DISORDER (HCC): ICD-10-CM

## 2019-10-14 DIAGNOSIS — E78.2 MIXED HYPERLIPIDEMIA: ICD-10-CM

## 2019-10-14 DIAGNOSIS — Z13.31 POSITIVE DEPRESSION SCREENING: ICD-10-CM

## 2019-10-14 DIAGNOSIS — I50.32 CHRONIC DIASTOLIC CONGESTIVE HEART FAILURE (HCC): ICD-10-CM

## 2019-10-14 DIAGNOSIS — N28.9 RENAL INSUFFICIENCY: ICD-10-CM

## 2019-10-14 DIAGNOSIS — I10 ESSENTIAL HYPERTENSION: Primary | ICD-10-CM

## 2019-10-14 DIAGNOSIS — R63.4 WEIGHT LOSS: ICD-10-CM

## 2019-10-14 LAB
ALBUMIN SERPL-MCNC: 4.1 G/DL (ref 3.4–5)
ANION GAP SERPL CALCULATED.3IONS-SCNC: 17 MMOL/L (ref 3–16)
BASOPHILS ABSOLUTE: 0 K/UL (ref 0–0.2)
BASOPHILS RELATIVE PERCENT: 0.4 %
BUN BLDV-MCNC: 38 MG/DL (ref 7–20)
CALCIUM SERPL-MCNC: 9.3 MG/DL (ref 8.3–10.6)
CHLORIDE BLD-SCNC: 99 MMOL/L (ref 99–110)
CHOLESTEROL, TOTAL: 166 MG/DL (ref 0–199)
CO2: 24 MMOL/L (ref 21–32)
CREAT SERPL-MCNC: 2.1 MG/DL (ref 0.8–1.3)
EOSINOPHILS ABSOLUTE: 0.3 K/UL (ref 0–0.6)
EOSINOPHILS RELATIVE PERCENT: 4 %
GFR AFRICAN AMERICAN: 39
GFR NON-AFRICAN AMERICAN: 32
GLUCOSE BLD-MCNC: 83 MG/DL (ref 70–99)
HCT VFR BLD CALC: 39.5 % (ref 40.5–52.5)
HDLC SERPL-MCNC: 34 MG/DL (ref 40–60)
HEMOGLOBIN: 12.6 G/DL (ref 13.5–17.5)
LDL CHOLESTEROL CALCULATED: 100 MG/DL
LYMPHOCYTES ABSOLUTE: 1.1 K/UL (ref 1–5.1)
LYMPHOCYTES RELATIVE PERCENT: 13.4 %
MCH RBC QN AUTO: 27.2 PG (ref 26–34)
MCHC RBC AUTO-ENTMCNC: 31.9 G/DL (ref 31–36)
MCV RBC AUTO: 85.1 FL (ref 80–100)
MONOCYTES ABSOLUTE: 0.9 K/UL (ref 0–1.3)
MONOCYTES RELATIVE PERCENT: 11.1 %
NEUTROPHILS ABSOLUTE: 6 K/UL (ref 1.7–7.7)
NEUTROPHILS RELATIVE PERCENT: 71.1 %
PDW BLD-RTO: 18.3 % (ref 12.4–15.4)
PHOSPHORUS: 4.1 MG/DL (ref 2.5–4.9)
PLATELET # BLD: 334 K/UL (ref 135–450)
PMV BLD AUTO: 8.3 FL (ref 5–10.5)
POTASSIUM SERPL-SCNC: 4.7 MMOL/L (ref 3.5–5.1)
PRO-BNP: 1728 PG/ML (ref 0–124)
RBC # BLD: 4.64 M/UL (ref 4.2–5.9)
SODIUM BLD-SCNC: 140 MMOL/L (ref 136–145)
TRIGL SERPL-MCNC: 161 MG/DL (ref 0–150)
VLDLC SERPL CALC-MCNC: 32 MG/DL
WBC # BLD: 8.4 K/UL (ref 4–11)

## 2019-10-14 PROCEDURE — G8417 CALC BMI ABV UP PARAM F/U: HCPCS | Performed by: INTERNAL MEDICINE

## 2019-10-14 PROCEDURE — 1036F TOBACCO NON-USER: CPT | Performed by: INTERNAL MEDICINE

## 2019-10-14 PROCEDURE — G0444 DEPRESSION SCREEN ANNUAL: HCPCS | Performed by: INTERNAL MEDICINE

## 2019-10-14 PROCEDURE — 99214 OFFICE O/P EST MOD 30 MIN: CPT | Performed by: INTERNAL MEDICINE

## 2019-10-14 PROCEDURE — 3017F COLORECTAL CA SCREEN DOC REV: CPT | Performed by: INTERNAL MEDICINE

## 2019-10-14 PROCEDURE — G8427 DOCREV CUR MEDS BY ELIG CLIN: HCPCS | Performed by: INTERNAL MEDICINE

## 2019-10-14 PROCEDURE — G8431 POS CLIN DEPRES SCRN F/U DOC: HCPCS | Performed by: INTERNAL MEDICINE

## 2019-10-14 PROCEDURE — G8484 FLU IMMUNIZE NO ADMIN: HCPCS | Performed by: INTERNAL MEDICINE

## 2019-10-14 ASSESSMENT — PATIENT HEALTH QUESTIONNAIRE - PHQ9
SUM OF ALL RESPONSES TO PHQ9 QUESTIONS 1 & 2: 3
2. FEELING DOWN, DEPRESSED OR HOPELESS: 1
9. THOUGHTS THAT YOU WOULD BE BETTER OFF DEAD, OR OF HURTING YOURSELF: 0
5. POOR APPETITE OR OVEREATING: 3
SUM OF ALL RESPONSES TO PHQ QUESTIONS 1-9: 12
SUM OF ALL RESPONSES TO PHQ QUESTIONS 1-9: 12
10. IF YOU CHECKED OFF ANY PROBLEMS, HOW DIFFICULT HAVE THESE PROBLEMS MADE IT FOR YOU TO DO YOUR WORK, TAKE CARE OF THINGS AT HOME, OR GET ALONG WITH OTHER PEOPLE: 1
3. TROUBLE FALLING OR STAYING ASLEEP: 2
7. TROUBLE CONCENTRATING ON THINGS, SUCH AS READING THE NEWSPAPER OR WATCHING TELEVISION: 0
4. FEELING TIRED OR HAVING LITTLE ENERGY: 2
6. FEELING BAD ABOUT YOURSELF - OR THAT YOU ARE A FAILURE OR HAVE LET YOURSELF OR YOUR FAMILY DOWN: 1
1. LITTLE INTEREST OR PLEASURE IN DOING THINGS: 2
8. MOVING OR SPEAKING SO SLOWLY THAT OTHER PEOPLE COULD HAVE NOTICED. OR THE OPPOSITE, BEING SO FIGETY OR RESTLESS THAT YOU HAVE BEEN MOVING AROUND A LOT MORE THAN USUAL: 1

## 2019-10-18 ENCOUNTER — TELEPHONE (OUTPATIENT)
Dept: RHEUMATOLOGY | Age: 64
End: 2019-10-18

## 2019-10-21 ENCOUNTER — HOSPITAL ENCOUNTER (OUTPATIENT)
Dept: ULTRASOUND IMAGING | Age: 64
Discharge: HOME | End: 2019-10-21
Payer: MEDICARE

## 2019-10-21 DIAGNOSIS — N28.9 RENAL INSUFFICIENCY: ICD-10-CM

## 2019-10-21 PROCEDURE — 76770 US EXAM ABDO BACK WALL COMP: CPT

## 2019-10-29 ENCOUNTER — OFFICE VISIT (OUTPATIENT)
Dept: CARDIOLOGY CLINIC | Age: 64
End: 2019-10-29
Payer: MEDICARE

## 2019-10-29 VITALS
SYSTOLIC BLOOD PRESSURE: 102 MMHG | BODY MASS INDEX: 35.62 KG/M2 | OXYGEN SATURATION: 98 % | WEIGHT: 285 LBS | DIASTOLIC BLOOD PRESSURE: 62 MMHG | HEART RATE: 91 BPM

## 2019-10-29 DIAGNOSIS — I10 ESSENTIAL HYPERTENSION: ICD-10-CM

## 2019-10-29 DIAGNOSIS — I48.20 CHRONIC ATRIAL FIBRILLATION (HCC): Primary | ICD-10-CM

## 2019-10-29 DIAGNOSIS — I50.32 CHRONIC DIASTOLIC CONGESTIVE HEART FAILURE (HCC): ICD-10-CM

## 2019-10-29 PROCEDURE — G8427 DOCREV CUR MEDS BY ELIG CLIN: HCPCS | Performed by: NURSE PRACTITIONER

## 2019-10-29 PROCEDURE — G8484 FLU IMMUNIZE NO ADMIN: HCPCS | Performed by: NURSE PRACTITIONER

## 2019-10-29 PROCEDURE — 1036F TOBACCO NON-USER: CPT | Performed by: NURSE PRACTITIONER

## 2019-10-29 PROCEDURE — G8417 CALC BMI ABV UP PARAM F/U: HCPCS | Performed by: NURSE PRACTITIONER

## 2019-10-29 PROCEDURE — 93000 ELECTROCARDIOGRAM COMPLETE: CPT | Performed by: NURSE PRACTITIONER

## 2019-10-29 PROCEDURE — 3017F COLORECTAL CA SCREEN DOC REV: CPT | Performed by: NURSE PRACTITIONER

## 2019-10-29 PROCEDURE — 99214 OFFICE O/P EST MOD 30 MIN: CPT | Performed by: NURSE PRACTITIONER

## 2019-10-31 RX ORDER — COLCHICINE 0.6 MG/1
TABLET ORAL
Qty: 60 TABLET | Refills: 3 | Status: SHIPPED | OUTPATIENT
Start: 2019-10-31 | End: 2020-02-04 | Stop reason: SDUPTHER

## 2019-12-02 ENCOUNTER — TELEPHONE (OUTPATIENT)
Dept: INTERNAL MEDICINE CLINIC | Age: 64
End: 2019-12-02

## 2020-01-07 RX ORDER — DICYCLOMINE HYDROCHLORIDE 10 MG/1
10 CAPSULE ORAL 4 TIMES DAILY PRN
Qty: 120 CAPSULE | Refills: 3 | Status: SHIPPED | OUTPATIENT
Start: 2020-01-07 | End: 2022-03-11

## 2020-01-30 ENCOUNTER — APPOINTMENT (OUTPATIENT)
Dept: GENERAL RADIOLOGY | Age: 65
End: 2020-01-30
Payer: MEDICARE

## 2020-01-30 ENCOUNTER — HOSPITAL ENCOUNTER (EMERGENCY)
Age: 65
Discharge: HOME | End: 2020-01-30
Payer: MEDICARE

## 2020-01-30 VITALS
HEART RATE: 105 BPM | DIASTOLIC BLOOD PRESSURE: 83 MMHG | OXYGEN SATURATION: 96 % | TEMPERATURE: 98 F | RESPIRATION RATE: 16 BRPM | SYSTOLIC BLOOD PRESSURE: 115 MMHG

## 2020-01-30 PROCEDURE — 99283 EMERGENCY DEPT VISIT LOW MDM: CPT

## 2020-01-30 PROCEDURE — 6370000000 HC RX 637 (ALT 250 FOR IP): Performed by: PHYSICIAN ASSISTANT

## 2020-01-30 PROCEDURE — 73110 X-RAY EXAM OF WRIST: CPT

## 2020-01-30 RX ORDER — PREDNISONE 10 MG/1
TABLET ORAL
Qty: 30 TABLET | Refills: 0 | Status: SHIPPED | OUTPATIENT
Start: 2020-01-30 | End: 2020-02-09

## 2020-01-30 RX ORDER — PREDNISONE 20 MG/1
60 TABLET ORAL ONCE
Status: COMPLETED | OUTPATIENT
Start: 2020-01-30 | End: 2020-01-30

## 2020-01-30 RX ORDER — HYDROCODONE BITARTRATE AND ACETAMINOPHEN 5; 325 MG/1; MG/1
1 TABLET ORAL EVERY 6 HOURS PRN
Qty: 15 TABLET | Refills: 0 | Status: SHIPPED | OUTPATIENT
Start: 2020-01-30 | End: 2020-02-02

## 2020-01-30 RX ORDER — ACETAMINOPHEN 325 MG/1
650 TABLET ORAL ONCE
Status: COMPLETED | OUTPATIENT
Start: 2020-01-30 | End: 2020-01-30

## 2020-01-30 RX ADMIN — PREDNISONE 60 MG: 20 TABLET ORAL at 13:49

## 2020-01-30 RX ADMIN — ACETAMINOPHEN 650 MG: 325 TABLET, FILM COATED ORAL at 13:49

## 2020-01-30 ASSESSMENT — ENCOUNTER SYMPTOMS
CHEST TIGHTNESS: 0
DIARRHEA: 0
NAUSEA: 0
ABDOMINAL PAIN: 0
VOMITING: 0
SORE THROAT: 0
SHORTNESS OF BREATH: 0

## 2020-01-30 ASSESSMENT — PAIN DESCRIPTION - PAIN TYPE: TYPE: ACUTE PAIN

## 2020-01-30 ASSESSMENT — PAIN SCALES - GENERAL
PAINLEVEL_OUTOF10: 10
PAINLEVEL_OUTOF10: 10

## 2020-01-30 NOTE — ED PROVIDER NOTES
**EVALUATED BY ADVANCED PRACTICE PROVIDER**        [address] ENCOUNTER      Pt Name: Rosa Mcnamara  RTJ:[id number]  [place] 1955  Date of evaluation: 1/30/2020  Provider: Jessee Hall PA-C      Chief Complaint:    Chief Complaint   Patient presents with    Gout     right wrist swelling with no injury, patient reporting he has had a hx of Gout,        Nursing Notes, Past Medical Hx, Past Surgical Hx, Social Hx, Allergies, and Family Hx were all reviewed and agreed with or any disagreements were addressed in the HPI.    HPI:  (Location, Duration, Timing, Severity, Quality, Assoc Sx, Context, Modifying factors)  This is a  62 y.o. male presents the emergency department with difficulties as a pertains to the right wrist pain and swelling has been ongoing for approximately 3 days. Patient states that he has had increasing pain and discomfort in and around the area without any particular injury that was sustained in and around the region of this dominant wrist.  He goes on to report he does have a history of difficulty with gout and when it started this initially what he thought that it was but states that the pain is getting dramatically worse. Patient goes on to report he recently discontinued taking both colchicine as well as allopurinol because he has not had any difficulties with the gout that he normally gets in his feet. He has had no history of diabetes. He has had no recent changes. He states that he has had mild warmth and swelling with out significant redness. He goes on to report he has not had gout in his wrist before. He goes on to report that he did make an appointment to be seen and evaluated by Dr. Hussain Pacheco for this but will not be able to be seen for a couple of days. Patient states he did call the local pharmacy to get allopurinol and colchicine refilled and plans on picking that up later today.   Unfortunately because of the increasing levels of pain and discomfort he presents to the emergency department for evaluation and treatment. He denies any is having fevers and or chills. He denies that he is experiencing chest pain, palpitations, lightheadedness, shortness of breath. He denies any additional GI or  complaints at present.     PastMedical/Surgical History:      Diagnosis Date    Anxiety     Arthritis     Asthma     Atrial fibrillation (HCC)     Blood circulation, collateral     CAD (coronary artery disease)     CHF (congestive heart failure) (HCC)     Colon cancer (HCC)     COPD (chronic obstructive pulmonary disease) (HCC)     Diabetic foot infection (HCC)     Hernia     Hyperlipidemia     Hypertension     Movement disorder     Muscle cramps     Neuromuscular disorder (HCC)     marvin feet numbness/neuropathy    Neuropathy     Pneumonia     Renal insufficiency     Sleep apnea     no cpap    Thyroid disease     Tinnitus aurium, bilateral     Unspecified cerebral artery occlusion with cerebral infarction     mini stroke    Unspecified diseases of blood and blood-forming organs          Procedure Laterality Date    ABDOMEN SURGERY      APPENDECTOMY  10/9/2012    CARPAL TUNNEL RELEASE Left 12/11/14    CARPAL TUNNEL RELEASE Right 6/11/2019    RIGHT CARPAL TUNNEL RELEASE AND RIGHT MIDDLE FINGER TRIGGER FINGER RELEASE performed by Laurel Rider MD at [address] [facility]      for Colon Ca 10 inches removed from colon    COLONOSCOPY      COLONOSCOPY  3/13/14    COLONOSCOPY  03/02/2018    CYSTOSCOPY Right 5/3/2019    CYSTOSCOPY, RIGHT URETEROSCOPY, STONE EXTRACTION WITH STONE BASKET performed by Anel Lomeli MD at [address] (UPPER)  6/4/14    ENDOSCOPY, COLON, DIAGNOSTIC      FOOT SURGERY Right 07/13/2017    Amputation toes 2,3,5, right foot Flap closure, Exostectomy 1st metatarsal right foot    FOOT SURGERY Right 08/17/2017    INCISION AND DRAINAGE OF RIGHT FOOT MCG/ACT AERO Inhale 2 puffs into the lungs 2 times daily. , Disp-3 Inhaler, R-3      albuterol-ipratropium (COMBIVENT RESPIMAT)  MCG/ACT AERS inhaler Inhale 1 puff into the lungs every 6 hours. , Disp-3 Inhaler, R-3      OXYGEN Inhale  into the lungs. 2 liters/ nasal cannula prn      therapeutic multivitamin-minerals (THERAGRAN-M) tablet Take 1 tablet by mouth daily. Review of Systems:  Review of Systems   Constitutional: Negative for activity change, chills and fever. HENT: Negative for ear pain and sore throat. Respiratory: Negative for chest tightness and shortness of breath. Cardiovascular: Negative for chest pain. Gastrointestinal: Negative for abdominal pain, diarrhea, nausea and vomiting. Genitourinary: Negative for dysuria, flank pain and hematuria. Musculoskeletal: Positive for arthralgias and joint swelling. Skin: Negative for rash and wound. Neurological: Negative for seizures, syncope and headaches. Hematological: Does not bruise/bleed easily. Positives and Pertinent negatives as per HPI. Except as noted above in the ROS, problem specific ROS was completed and is negative. Physical Exam:  Physical Exam  Vitals signs and nursing note reviewed. Constitutional:       General: He is not in acute distress. Appearance: He is well-developed. He is not diaphoretic. HENT:      Head: Normocephalic and atraumatic. Right Ear: External ear normal.      Left Ear: External ear normal.   Eyes:      General: No scleral icterus. Right eye: No discharge. Left eye: No discharge. Conjunctiva/sclera: Conjunctivae normal.   Neck:      Musculoskeletal: Normal range of motion. Vascular: No JVD. Cardiovascular:      Rate and Rhythm: Normal rate and regular rhythm. Heart sounds: No murmur. No friction rub. No gallop. Pulmonary:      Effort: Pulmonary effort is normal. No accessory muscle usage or respiratory distress.       Breath sounds: tachycardic upon arrival he is no longer tachycardic at the time of my examination. He had been medicated as above. Medically the patient is demonstrating evidence of gout within the right wrist.  There is no significant erythema to be suggestive of concomitant cellulitis. I did proceed with radiographs of the right wrist which demonstrate the soft tissue swelling as well as degenerative change of the no evidence of acute bony abnormality. Patient has already requested medication feels of his colchicine and allopurinol in the home environment. He will be placed on prednisone as well as Norco for pain and will continue with Tylenol as needed for ongoing care management. Patient will keep his regularly scheduled appointment with Dr. Margie Rodriguez to ensure that this flare is resolved. He is also been given strict potential instructions for return. The patient has been made aware of the signs and symptoms which would necessitate an immediate return to the emergency department and verbalizes an understanding of these signs and symptoms. My suspicion is low for myocarditis, atrial fibrillation, ventricular tachycardia, sepsis, dehydration, PE, MI, anxiety, or illicit drug use. I estimate there is low risk for concomitant cellulitis, septic arthritis compartment syndrome, deep venous thrombosis, limb-threatening infectious, tendon and/or neurovascular injury and therefore I consider the discharge disposition reasonable. Eliana Garcia and I have discussed the diagnosis and risks, and we agree with discharging home to follow-up with their primary care provider and/or the referring orthopedist on call. We also discussed returning to the emergency department immediately if new or worsening symptoms occur. We have discussed the symptoms which are most concerning (e.g., changing or worsening pain, numbness, weakness) that necessitate immediate return. The patient tolerated their visit well. I evaluated the patient.

## 2020-02-04 ENCOUNTER — OFFICE VISIT (OUTPATIENT)
Dept: INTERNAL MEDICINE CLINIC | Age: 65
End: 2020-02-04
Payer: MEDICARE

## 2020-02-04 VITALS
SYSTOLIC BLOOD PRESSURE: 114 MMHG | BODY MASS INDEX: 36.8 KG/M2 | WEIGHT: 296 LBS | HEART RATE: 68 BPM | HEIGHT: 75 IN | DIASTOLIC BLOOD PRESSURE: 76 MMHG

## 2020-02-04 PROBLEM — F33.42 RECURRENT MAJOR DEPRESSIVE DISORDER, IN FULL REMISSION (HCC): Status: ACTIVE | Noted: 2020-02-04

## 2020-02-04 PROCEDURE — G8417 CALC BMI ABV UP PARAM F/U: HCPCS | Performed by: INTERNAL MEDICINE

## 2020-02-04 PROCEDURE — 3017F COLORECTAL CA SCREEN DOC REV: CPT | Performed by: INTERNAL MEDICINE

## 2020-02-04 PROCEDURE — G8510 SCR DEP NEG, NO PLAN REQD: HCPCS | Performed by: INTERNAL MEDICINE

## 2020-02-04 PROCEDURE — G8427 DOCREV CUR MEDS BY ELIG CLIN: HCPCS | Performed by: INTERNAL MEDICINE

## 2020-02-04 PROCEDURE — G8484 FLU IMMUNIZE NO ADMIN: HCPCS | Performed by: INTERNAL MEDICINE

## 2020-02-04 PROCEDURE — 99214 OFFICE O/P EST MOD 30 MIN: CPT | Performed by: INTERNAL MEDICINE

## 2020-02-04 PROCEDURE — 1036F TOBACCO NON-USER: CPT | Performed by: INTERNAL MEDICINE

## 2020-02-04 RX ORDER — COLCHICINE 0.6 MG/1
0.6 TABLET ORAL 2 TIMES DAILY PRN
Qty: 30 TABLET | Refills: 1 | Status: SHIPPED | OUTPATIENT
Start: 2020-02-04 | End: 2020-02-20

## 2020-02-04 RX ORDER — ALLOPURINOL 300 MG/1
300 TABLET ORAL DAILY
Qty: 90 TABLET | Refills: 3 | Status: SHIPPED | OUTPATIENT
Start: 2020-02-04 | End: 2021-03-05 | Stop reason: SDUPTHER

## 2020-02-04 ASSESSMENT — PATIENT HEALTH QUESTIONNAIRE - PHQ9
SUM OF ALL RESPONSES TO PHQ QUESTIONS 1-9: 0
2. FEELING DOWN, DEPRESSED OR HOPELESS: 0
1. LITTLE INTEREST OR PLEASURE IN DOING THINGS: 0
SUM OF ALL RESPONSES TO PHQ9 QUESTIONS 1 & 2: 0
SUM OF ALL RESPONSES TO PHQ QUESTIONS 1-9: 0

## 2020-02-04 NOTE — PROGRESS NOTES
Chief Complaint   Patient presents with    Hypertension    Hyperlipidemia    Congestive Heart Failure       HPI:  HTN: The patient is tolerating blood pressure medication well and taking them as directed. BP control outside of the office is reported as not monitored. No symptoms concerning for end organ damage are present. HLD: he is taking atorvastatin daily. He denies side effects. CHF: he is breathing well. His legs are not swelling. Gout: he stopped allopurinol about a month and a half ago. Last week his right wrist was painful and swollen. Symptoms resolved with colchicine. He has resumed allopurinol. Depression: he has resumed sertraline. He feels like this is helpful. He denies depressed mood. [facility]  H/o transmetarsal amputation of right foot    Social History     Tobacco Use    Smoking status: Never Smoker    Smokeless tobacco: Never Used   Substance Use Topics    Alcohol use: No     Comment: quit 2006    Drug use: No      ROS:  CV: Neg for chest pain  RESP: neg for dyspnea   GI: reg BM's  : no urinary problems    EXAM:  /76 (Site: Left Upper Arm, Position: Sitting, Cuff Size: Large Adult)   Pulse 68   Ht 6' 3\" (1.905 m)   Wt 296 lb (134.3 kg)   BMI 37.00 kg/m²    Wt Readings from Last 3 Encounters:   02/04/20 296 lb (134.3 kg)   10/29/19 285 lb (129.3 kg)   10/14/19 296 lb (134.3 kg)      GEN: WN/WD, NAD  CV: irregular rhythm, no murmurs rubs or gallops  Resp: normal effort, clear auscultation bilaterally  No peripheral edema   Abd: soft, nontender to palpation. MSK: sp transmetatarsal amputation of the right foot. Scar is well healed.       Lab Results   Component Value Date    CREATININE 2.1 (H) 10/14/2019    BUN 38 (H) 10/14/2019     10/14/2019    K 4.7 10/14/2019    CL 99 10/14/2019    CO2 24 10/14/2019          Lab Results   Component Value Date    CHOL 166 10/14/2019    CHOL 167 09/14/2018    CHOL 167 11/15/2016     Lab Results   Component Value Date TRIG 161 (H) 10/14/2019    TRIG 112 09/14/2018    TRIG 79 11/15/2016     Lab Results   Component Value Date    HDL 34 (L) 10/14/2019    HDL 34 (L) 09/14/2018    HDL 41 11/15/2016     Lab Results   Component Value Date    LDLCALC 100 (H) 10/14/2019    LDLCALC 111 (H) 09/14/2018    LDLCALC 110 (H) 11/15/2016     Lab Results   Component Value Date    LABVLDL 32 10/14/2019    LABVLDL 22 09/14/2018    LABVLDL 16 11/15/2016     No results found for: CHOLHDLRATIO     A/P  1. Essential hypertension  Blood pressure is under optimal control. He will continue current medications. Blood testing is up-to-date. 2. Mixed hyperlipidemia  Stable and controlled. Continue atorvastatin. He will use colchicine as needed only to minimize this drug interaction. 3. Chronic diastolic congestive heart failure ([facility] [address]  Well compensated. Continue Lasix and metoprolol. 4. Idiopathic chronic gout of multiple sites without tophus  With recent gout attack affecting his right wrist.  Symptoms resolved with colchicine. He has since resumed allopurinol. We discussed the importance of daily use of allopurinol. Allopurinol prescribed today. 5. Recurrent major depressive disorder, in full remission ([address]  Doing well on sertraline. Continue current treatment. 6. S/P transmetatarsal amputation of foot, right (HCC)  Chronic, stable. Additional care is not needed at this time.

## 2020-02-18 ENCOUNTER — TELEPHONE (OUTPATIENT)
Dept: INTERNAL MEDICINE CLINIC | Age: 65
End: 2020-02-18

## 2020-02-18 NOTE — TELEPHONE ENCOUNTER
Patient received a letter that insurance will no  Longer colchicine they will only cover colcrys or  Mitigare 0.6 mg. Please call patient back  [phone]

## 2020-02-18 NOTE — TELEPHONE ENCOUNTER
Contacted pharmacy. Was told he is not due to have this med refilled for 2 more days but when she tries to run it thru it just shows its too soon to fill. Called to advise. Called Mail order was told he can get the colcryss as a tier 3 which would be $38.00 or we could do an authorization exception. They will fax over the form. Called pt to advise.

## 2020-02-20 RX ORDER — COLCHICINE 0.6 MG/1
0.6 CAPSULE ORAL 2 TIMES DAILY PRN
Qty: 30 CAPSULE | Refills: 1 | Status: SHIPPED | OUTPATIENT
Start: 2020-02-20 | End: 2020-02-21

## 2020-02-21 RX ORDER — COLCHICINE 0.6 MG/1
TABLET ORAL
Qty: 180 TABLET | Refills: 1 | Status: SHIPPED | OUTPATIENT
Start: 2020-02-21 | End: 2021-03-05

## 2020-02-26 ENCOUNTER — TELEPHONE (OUTPATIENT)
Dept: RHEUMATOLOGY | Age: 65
End: 2020-02-26

## 2020-04-08 RX ORDER — FUROSEMIDE 40 MG/1
TABLET ORAL
Qty: 360 TABLET | Refills: 1 | Status: SHIPPED | OUTPATIENT
Start: 2020-04-08 | End: 2020-11-11

## 2020-04-14 ENCOUNTER — TELEPHONE (OUTPATIENT)
Dept: INTERNAL MEDICINE CLINIC | Age: 65
End: 2020-04-14

## 2020-04-14 NOTE — TELEPHONE ENCOUNTER
Pt called and stated his mother and father passed away recently and he needs a letter from Dr Kristin Meraz stating he was the caregiver for both for 2 years.  Please call back at  [phone]

## 2020-04-14 NOTE — TELEPHONE ENCOUNTER
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 He can receive a tax break. Patient also states he would like to get a prescription for Viagra sent to [facility] on file.

## 2020-04-15 ENCOUNTER — TELEPHONE (OUTPATIENT)
Dept: INTERNAL MEDICINE CLINIC | Age: 65
End: 2020-04-15

## 2020-04-15 NOTE — TELEPHONE ENCOUNTER
He states he was not a continuous caregiver but he was there and made sure he had food to eat, went to grocery store for them, brought them to the doctor. He doesn't have proof that he did those things other than his word. He will ask the tax annemarie to see about sending over a form for Dr Sanjay Jimenez to look at. Patient also asked for a script be sent for Treatsiegra to the pharmacy.

## 2020-04-15 NOTE — TELEPHONE ENCOUNTER
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 I would need to see the details of what I am being asked to write to determine if this is something I can do.

## 2020-04-15 NOTE — TELEPHONE ENCOUNTER
OK. I am not comfortable providing a letter stating that he was the caregiver at this time. I don't see that I have prescribed Viagra before. I recommend that he have an appointment to discuss if this medication is appropriate for him.

## 2020-04-22 ENCOUNTER — VIRTUAL VISIT (OUTPATIENT)
Dept: INTERNAL MEDICINE CLINIC | Age: 65
End: 2020-04-22
Payer: MEDICARE

## 2020-04-22 PROBLEM — N52.8 MIXED ERECTILE DYSFUNCTION: Status: ACTIVE | Noted: 2020-04-22

## 2020-04-22 PROCEDURE — G8417 CALC BMI ABV UP PARAM F/U: HCPCS | Performed by: NURSE PRACTITIONER

## 2020-04-22 PROCEDURE — 3017F COLORECTAL CA SCREEN DOC REV: CPT | Performed by: NURSE PRACTITIONER

## 2020-04-22 PROCEDURE — 1036F TOBACCO NON-USER: CPT | Performed by: NURSE PRACTITIONER

## 2020-04-22 PROCEDURE — G8427 DOCREV CUR MEDS BY ELIG CLIN: HCPCS | Performed by: NURSE PRACTITIONER

## 2020-04-22 PROCEDURE — 99214 OFFICE O/P EST MOD 30 MIN: CPT | Performed by: NURSE PRACTITIONER

## 2020-04-22 RX ORDER — SILDENAFIL CITRATE 20 MG/1
20 TABLET ORAL DAILY PRN
Qty: 6 TABLET | Refills: 0 | Status: SHIPPED | OUTPATIENT
Start: 2020-04-22 | End: 2022-03-11

## 2020-04-22 NOTE — PROGRESS NOTES
2020    TELEHEALTH EVALUATION -- Audio/Visual (During SMKVI-96 public health emergency)    HPI:    Agustina Guardado (:  1955) has requested an audio/video evaluation for the following concern(s): Would like to discuss starting sildenafil for ED  Has been having concerns with erectile dysfunction   Ongoing concern for the past 1-1.5 yr  He denies any other concerns including increased urinary frequency, urgency, hematuria. Has a new girlfriend so this has become a concern for him. Pt denies any CP, shortness of breath. He denies a history of stroke or MI. HTN - has been well controlled   Doing well on medications      Review of Systems   Negative other than HPI    Prior to Visit Medications    Medication Sig Taking?  Authorizing Provider   sildenafil (REVATIO) 20 MG tablet Take 1 tablet by mouth daily as needed (ED) Yes MANDY Solano CNP   furosemide (LASIX) 40 MG tablet TAKE 2 TABLETS BY MOUTH TWICE A DAY  MANDY Mackay CNP   sertraline (ZOLOFT) 50 MG tablet TAKE 1 TABLET BY MOUTH EVERY DAY  MANDY Mackay CNP   colchicine (COLCRYS) 0.6 MG tablet TAKE 1 TABLET BY MOUTH TWICE A DAY AS NEEDED FOR PAIN  MANDY Solano CNP   allopurinol (ZYLOPRIM) 300 MG tablet Take 1 tablet by mouth daily  Jessica Shore MD   dicyclomine (BENTYL) 10 MG capsule Take 1 capsule by mouth 4 times daily as needed (abdominal pain)  Jessica Shore MD   potassium chloride (KLOR-CON M20) 20 MEQ extended release tablet Taking 3 tablets BID  MANDY Yanez CNP   diclofenac sodium 1 % GEL APPLY 4 G TOPICALLY 4 TIMES DAILY  Jessica Shore MD   diltiazem (CARDIZEM CD) 180 MG extended release capsule TAKE ONE CAPSULE BY MOUTH TWICE A Marvin Arellano MD   atorvastatin (LIPITOR) 40 MG tablet Take 1 tablet by mouth daily  Patient taking differently: Take 40 mg by mouth every evening   Jessica Shore MD   metoprolol tartrate (LOPRESSOR) 25 MG Take 1 tablet by mouth daily as needed (ED)  Dispense: 6 tablet; Refill: 0    Reviewed criteria for follow-up and when to seek emergency medical attention. Follow-up as scheduled for chronic conditions    Irene Donovan is a 60 y.o. male being evaluated by a Virtual Visit (video visit) encounter to address concerns as mentioned above. A caregiver was present when appropriate. Due to this being a TeleHealth encounter (During [facility]- public health emergency), evaluation of the following organ systems was limited: Vitals/Constitutional/EENT/Resp/CV/GI//MS/Neuro/Skin/Heme-Lymph-Imm. Pursuant to the emergency declaration under the [address] [place] [address] authority and the [employer] and [employer] Act, this Virtual Visit was conducted with patient's (and/or legal guardian's) consent, to reduce the patient's risk of exposure to COVID-19 and provide necessary medical care. The patient (and/or legal guardian) has also been advised to contact this office for worsening conditions or problems, and seek emergency medical treatment and/or call 911 if deemed necessary. Services were provided through a video synchronous discussion virtually to substitute for in-person clinic visit. Patient and provider were located at their individual homes. --MANDY Liu CNP on 4/22/2020 at 9:11 AM    An electronic signature was used to authenticate this note.

## 2020-09-08 ENCOUNTER — TELEPHONE (OUTPATIENT)
Dept: ADMINISTRATIVE | Age: 65
End: 2020-09-08

## 2020-09-08 RX ORDER — COLCHICINE 0.6 MG/1
0.6 CAPSULE ORAL 2 TIMES DAILY PRN
Qty: 60 CAPSULE | Refills: 1 | Status: SHIPPED | OUTPATIENT
Start: 2020-09-08 | End: 2021-03-05

## 2020-09-08 RX ORDER — FUROSEMIDE 40 MG/1
TABLET ORAL
Qty: 360 TABLET | Refills: 1 | OUTPATIENT
Start: 2020-09-08

## 2020-09-08 NOTE — TELEPHONE ENCOUNTER
Pharmacy  is calling with questions regarding medication     Medication:  Free style Karthik Monitor  Question or Error: Faxed over request. Please confirm faxed was received  Date of last visit 4/22/2020    Pharmacy confirmed personal cell [phone]

## 2020-11-11 ENCOUNTER — NURSE TRIAGE (OUTPATIENT)
Dept: OTHER | Facility: CLINIC | Age: 65
End: 2020-11-11

## 2020-11-11 RX ORDER — FUROSEMIDE 40 MG/1
TABLET ORAL
Qty: 360 TABLET | Refills: 1 | Status: SHIPPED | OUTPATIENT
Start: 2020-11-11 | End: 2021-05-18

## 2020-11-11 NOTE — TELEPHONE ENCOUNTER
kidney disease, liver failure, or cancer? \"      CHF    9. RECURRENT SYMPTOM: \"Have you had leg swelling before? \" If so, ask: \"When was the last time? \" \"What happened that time? \"      Yes but not this bad     10. OTHER SYMPTOMS: \"Do you have any other symptoms? \" (e.g., chest pain, difficulty breathing)        If he walks very far, sob is present     11. PREGNANCY: \"Is there any chance you are pregnant? \" \"When was your last menstrual period? \"        N/a    Protocols used: LEG SWELLING AND EDEMA-ADULT-OH    Patient states he is taking his lasix twice a day, has not missed a dose, but is sure his weight is up due to the swelling in his legs, does have some sob.

## 2020-11-12 ENCOUNTER — TELEPHONE (OUTPATIENT)
Dept: INTERNAL MEDICINE CLINIC | Age: 65
End: 2020-11-12

## 2020-11-12 NOTE — TELEPHONE ENCOUNTER
Patient is requesting a prescription refill of metoprolol tartrate (LOPRESSOR) 25 MG tablet sent to [facility] on [address] [facility] [address] [facility].

## 2021-03-05 ENCOUNTER — OFFICE VISIT (OUTPATIENT)
Dept: INTERNAL MEDICINE CLINIC | Age: 66
End: 2021-03-05
Payer: MEDICARE

## 2021-03-05 VITALS
SYSTOLIC BLOOD PRESSURE: 132 MMHG | HEIGHT: 75 IN | WEIGHT: 311 LBS | TEMPERATURE: 96.2 F | HEART RATE: 92 BPM | BODY MASS INDEX: 38.67 KG/M2 | DIASTOLIC BLOOD PRESSURE: 84 MMHG | OXYGEN SATURATION: 98 %

## 2021-03-05 DIAGNOSIS — J43.8 OTHER EMPHYSEMA (HCC): ICD-10-CM

## 2021-03-05 DIAGNOSIS — R73.09 ELEVATED GLUCOSE: ICD-10-CM

## 2021-03-05 DIAGNOSIS — F33.42 RECURRENT MAJOR DEPRESSIVE DISORDER, IN FULL REMISSION (HCC): ICD-10-CM

## 2021-03-05 DIAGNOSIS — L03.115 CELLULITIS OF RIGHT LEG: ICD-10-CM

## 2021-03-05 DIAGNOSIS — M1A.09X0 IDIOPATHIC CHRONIC GOUT OF MULTIPLE SITES WITHOUT TOPHUS: ICD-10-CM

## 2021-03-05 DIAGNOSIS — N18.32 STAGE 3B CHRONIC KIDNEY DISEASE (HCC): ICD-10-CM

## 2021-03-05 DIAGNOSIS — E66.9 OBESITY, UNSPECIFIED CLASSIFICATION, UNSPECIFIED OBESITY TYPE, UNSPECIFIED WHETHER SERIOUS COMORBIDITY PRESENT: ICD-10-CM

## 2021-03-05 DIAGNOSIS — Z91.81 AT HIGH RISK FOR FALLS: ICD-10-CM

## 2021-03-05 DIAGNOSIS — E78.2 MIXED HYPERLIPIDEMIA: ICD-10-CM

## 2021-03-05 DIAGNOSIS — Z89.431 S/P TRANSMETATARSAL AMPUTATION OF FOOT, RIGHT (HCC): ICD-10-CM

## 2021-03-05 DIAGNOSIS — I10 ESSENTIAL HYPERTENSION: ICD-10-CM

## 2021-03-05 DIAGNOSIS — I50.32 CHRONIC DIASTOLIC CONGESTIVE HEART FAILURE (HCC): ICD-10-CM

## 2021-03-05 DIAGNOSIS — R53.1 WEAKNESS: ICD-10-CM

## 2021-03-05 DIAGNOSIS — R29.6 FALLING: Primary | ICD-10-CM

## 2021-03-05 DIAGNOSIS — G47.33 OBSTRUCTIVE SLEEP APNEA SYNDROME: ICD-10-CM

## 2021-03-05 DIAGNOSIS — I48.20 CHRONIC ATRIAL FIBRILLATION (HCC): ICD-10-CM

## 2021-03-05 DIAGNOSIS — S91.301A WOUND, OPEN, FOOT WITH COMPLICATION, RIGHT, INITIAL ENCOUNTER: ICD-10-CM

## 2021-03-05 DIAGNOSIS — I73.9 PVD (PERIPHERAL VASCULAR DISEASE) (HCC): ICD-10-CM

## 2021-03-05 LAB
A/G RATIO: 1.4 (ref 1.1–2.2)
ALBUMIN SERPL-MCNC: 4 G/DL (ref 3.4–5)
ALP BLD-CCNC: 151 U/L (ref 40–129)
ALT SERPL-CCNC: 26 U/L (ref 10–40)
ANION GAP SERPL CALCULATED.3IONS-SCNC: 14 MMOL/L (ref 3–16)
AST SERPL-CCNC: 31 U/L (ref 15–37)
BILIRUB SERPL-MCNC: 0.8 MG/DL (ref 0–1)
BUN BLDV-MCNC: 20 MG/DL (ref 7–20)
CALCIUM SERPL-MCNC: 9.1 MG/DL (ref 8.3–10.6)
CHLORIDE BLD-SCNC: 105 MMOL/L (ref 99–110)
CHOLESTEROL, TOTAL: 156 MG/DL (ref 0–199)
CO2: 23 MMOL/L (ref 21–32)
CREAT SERPL-MCNC: 1.3 MG/DL (ref 0.8–1.3)
GFR AFRICAN AMERICAN: >60
GFR NON-AFRICAN AMERICAN: 55
GLOBULIN: 2.9 G/DL
GLUCOSE BLD-MCNC: 88 MG/DL (ref 70–99)
HCT VFR BLD CALC: 46.7 % (ref 40.5–52.5)
HDLC SERPL-MCNC: 50 MG/DL (ref 40–60)
HEMOGLOBIN: 15.5 G/DL (ref 13.5–17.5)
LDL CHOLESTEROL CALCULATED: 91 MG/DL
MCH RBC QN AUTO: 32.5 PG (ref 26–34)
MCHC RBC AUTO-ENTMCNC: 33.3 G/DL (ref 31–36)
MCV RBC AUTO: 97.7 FL (ref 80–100)
PDW BLD-RTO: 15.7 % (ref 12.4–15.4)
PLATELET # BLD: 211 K/UL (ref 135–450)
PMV BLD AUTO: 8.5 FL (ref 5–10.5)
POTASSIUM SERPL-SCNC: 4.4 MMOL/L (ref 3.5–5.1)
RBC # BLD: 4.78 M/UL (ref 4.2–5.9)
SODIUM BLD-SCNC: 142 MMOL/L (ref 136–145)
TOTAL PROTEIN: 6.9 G/DL (ref 6.4–8.2)
TRIGL SERPL-MCNC: 73 MG/DL (ref 0–150)
URIC ACID, SERUM: 4.1 MG/DL (ref 3.5–7.2)
VLDLC SERPL CALC-MCNC: 15 MG/DL
WBC # BLD: 7.1 K/UL (ref 4–11)

## 2021-03-05 PROCEDURE — G8427 DOCREV CUR MEDS BY ELIG CLIN: HCPCS | Performed by: NURSE PRACTITIONER

## 2021-03-05 PROCEDURE — G8417 CALC BMI ABV UP PARAM F/U: HCPCS | Performed by: NURSE PRACTITIONER

## 2021-03-05 PROCEDURE — 1036F TOBACCO NON-USER: CPT | Performed by: NURSE PRACTITIONER

## 2021-03-05 PROCEDURE — 3017F COLORECTAL CA SCREEN DOC REV: CPT | Performed by: NURSE PRACTITIONER

## 2021-03-05 PROCEDURE — 4040F PNEUMOC VAC/ADMIN/RCVD: CPT | Performed by: NURSE PRACTITIONER

## 2021-03-05 PROCEDURE — 99215 OFFICE O/P EST HI 40 MIN: CPT | Performed by: NURSE PRACTITIONER

## 2021-03-05 PROCEDURE — G8926 SPIRO NO PERF OR DOC: HCPCS | Performed by: NURSE PRACTITIONER

## 2021-03-05 PROCEDURE — 3023F SPIROM DOC REV: CPT | Performed by: NURSE PRACTITIONER

## 2021-03-05 PROCEDURE — 1123F ACP DISCUSS/DSCN MKR DOCD: CPT | Performed by: NURSE PRACTITIONER

## 2021-03-05 PROCEDURE — G8484 FLU IMMUNIZE NO ADMIN: HCPCS | Performed by: NURSE PRACTITIONER

## 2021-03-05 RX ORDER — ALLOPURINOL 300 MG/1
300 TABLET ORAL 2 TIMES DAILY
Qty: 180 TABLET | Refills: 3 | Status: SHIPPED | OUTPATIENT
Start: 2021-03-05 | End: 2022-04-19

## 2021-03-05 RX ORDER — DILTIAZEM HYDROCHLORIDE 180 MG/1
180 CAPSULE, COATED, EXTENDED RELEASE ORAL 2 TIMES DAILY
Qty: 180 CAPSULE | Refills: 1 | Status: SHIPPED | OUTPATIENT
Start: 2021-03-05 | End: 2021-09-28

## 2021-03-05 RX ORDER — COLCHICINE 0.6 MG/1
0.6 CAPSULE ORAL 2 TIMES DAILY PRN
Qty: 60 CAPSULE | Refills: 1 | Status: CANCELLED | OUTPATIENT
Start: 2021-03-05

## 2021-03-05 RX ORDER — ATORVASTATIN CALCIUM 40 MG/1
40 TABLET, FILM COATED ORAL EVERY EVENING
Qty: 90 TABLET | Refills: 1 | Status: SHIPPED | OUTPATIENT
Start: 2021-03-05 | End: 2021-09-02

## 2021-03-05 RX ORDER — CLINDAMYCIN HYDROCHLORIDE 300 MG/1
300 CAPSULE ORAL 3 TIMES DAILY
Qty: 21 CAPSULE | Refills: 0 | Status: SHIPPED | OUTPATIENT
Start: 2021-03-05 | End: 2021-03-12

## 2021-03-05 ASSESSMENT — ENCOUNTER SYMPTOMS
CHEST TIGHTNESS: 0
SHORTNESS OF BREATH: 0
COUGH: 0
WHEEZING: 0

## 2021-03-05 NOTE — PROGRESS NOTES
3/5/21     Chief Complaint   Patient presents with    Other     face to face for wheelchair     HPI     Here for follow up - over due overdue for appointment  Having trouble getting around recently   History of partial foot amputation 7-8 years ago     Has had a 3 falls recently   Yesterday at the [employer] - tripped and needed help getting up. Denies any pain or injury. [place] [employer] [place] fell [place] - denies injury   1.5 months ago fell on the kitchen floor in bare feet - unsure what happened but denies injury. Had to crawl to the couch to get up. Has had some other 'close' calls on falls   Using furniture for walking   Taking stairs slowly   He is her because he wants a wheelchair to use sometimes. Has a walker at home, not using. Has a new sore on right foot - stepped on a hot sausage malcolm about 5 weeks ago. Did not feel it initially. Had a large blister. Has been doing wound care on his own at home. The sore is not heeling but reports it is improving. Patient is a history of transmetatarsal amputation of the right foot about 7 to 8 years ago. He has a history of chronic PVD. He is not wearing compression. He reports that his vascular swelling is always worse in the winter because he is less active. Patient is some chronic edema and he does not wear compression. Has afib, chf, htn, hld - denies any worsening sob, cp, palpitations, dizziness. Reports he is doing well on his medications. He is not checking his blood pressure at home. He has been off anticoagulation for years. Patient has not seen cardiology since October 2019. COPD/emphysema/OLEG -patient reports he is doing well on inhalers and denies any worsening shortness of breath. Patient used to see nephrology for his chronic kidney disease. But he has not seen recently.     Allergies   Allergen Reactions    Bactrim      hallucinations     Current Outpatient Medications   Medication Sig Dispense Refill    swelling. Neurological: Negative for dizziness, tremors, light-headedness and headaches. Vitals:    03/05/21 0806   BP: 132/84   Pulse: 92   Temp: 96.2 °F (35.7 °C)   SpO2: 98%   Weight: (!) 311 lb (141.1 kg)   Height: 6' 3\" (1.905 m)      Physical Exam  Vitals signs reviewed. Constitutional:       General: He is not in acute distress. Appearance: He is well-developed. He is ill-appearing (Chronically). He is not diaphoretic. HENT:      Head: Normocephalic and atraumatic. Cardiovascular:      Rate and Rhythm: Normal rate and regular rhythm. Heart sounds: Normal heart sounds. Pulmonary:      Effort: Pulmonary effort is normal. No respiratory distress. Breath sounds: Normal breath sounds. Musculoskeletal:      Right lower leg: Edema present. Left lower leg: Edema present. Comments: Bilateral lower extremity edema and erythema. Right lower extremity with 4 to 5 cm open wound. Wound bed with granulating tissue although surrounding skin is erythematous and hot -going up to just above the ankle   Skin:     General: Skin is warm and dry. Neurological:      General: No focal deficit present. Mental Status: He is alert and oriented to person, place, and time. Psychiatric:         Mood and Affect: Mood and affect normal.         Behavior: Behavior normal.       Assessment/Plan:  Falling/  Weakness  Stable, uncontrolled  Denies any red flag symptoms  Patient has had a significant lack of activity over the past year especially the past few months.   Declining PT - referral given and strongly encouraged   Strongly encouraged walker use   Patient is requesting a wheelchair -I am recommending physical therapy first to see if we can prevent falling and work on generalized weakness  - Ambulatory referral to Physical Therapy    Wound, open, foot with complication, right, initial encounter/  Cellulitis of right leg/ S/P transmetatarsal amputation of foot, right ([facility] [address]  Stable, uncontrolled  Covering with antibiotics  Reviewed wound care in detail  Checking culture  Wound bed does have granulating tissue  Declines wound clinic consult -we discussed the benefits patient continued to decline  Patient reports that he will become more active as the weather warms up with the ball field and will not have time  - clindamycin (CLEOCIN) 300 MG capsule; Take 1 capsule by mouth 3 times daily for 7 days  Dispense: 21 capsule; Refill: 0  - Culture, Wound  - CBC; Future    PVD (peripheral vascular disease) (HCC)  Stable, uncontrolled   Strongly encouraged compression  - Ambulatory referral to Physical Therapy    Recurrent major depressive disorder, in full remission ([facility] [address]  Stable, controlled on current regimen. Essential hypertension  Stable, controlled on current regimen. - Comprehensive Metabolic Panel; Future    Mixed hyperlipidemia  Stable, controlled on current regimen.     - Lipid Panel; Future    Chronic diastolic congestive heart failure (HCC)  Stable, controlled on current regimen. Obstructive sleep apnea syndrome  Stable, patient is moderately compliant with CPAP    12. Obesity, unspecified classification, unspecified obesity type, unspecified whether serious comorbidity present  Stable, uncontrolled  Reviewed healthy lifestyle modifications    Other emphysema ([facility]  Stable, controlled on current regimen. Idiopathic chronic gout of multiple sites without tophus  Stable, controlled on current regimen. - Uric Acid; Future    Chronic atrial fibrillation (HCC)  Stable, controlled on current regimen. At high risk for falls  See above  - Ambulatory referral to Physical Therapy    Elevated glucose  - Hemoglobin A1C; Future    Stage 3b chronic kidney disease  Stable, checking labs    Discussed medications with patient, who voiced understanding of their use and indications. All questions answered.     Follow-up in 1 week for wound check    Electronically signed by [facility] MANDY HU CNP on 3/5/2021 at 12:35 PM     45 min spent with patient- >50 % continuity of care and/or counseling.

## 2021-03-05 NOTE — PROGRESS NOTES
On the basis of positive falls risk screening, assessment and plan is as follows: referral to physical therapy provided for strength and balance training.

## 2021-03-06 LAB
ESTIMATED AVERAGE GLUCOSE: 96.8 MG/DL
HBA1C MFR BLD: 5 %

## 2021-03-08 LAB
GRAM STAIN RESULT: ABNORMAL
ORGANISM: ABNORMAL
WOUND/ABSCESS: ABNORMAL
WOUND/ABSCESS: ABNORMAL

## 2021-03-15 ENCOUNTER — OFFICE VISIT (OUTPATIENT)
Dept: INTERNAL MEDICINE CLINIC | Age: 66
End: 2021-03-15
Payer: MEDICARE

## 2021-03-15 VITALS
WEIGHT: 315 LBS | BODY MASS INDEX: 40 KG/M2 | OXYGEN SATURATION: 98 % | HEART RATE: 74 BPM | TEMPERATURE: 96.4 F | DIASTOLIC BLOOD PRESSURE: 82 MMHG | SYSTOLIC BLOOD PRESSURE: 132 MMHG

## 2021-03-15 DIAGNOSIS — S91.301A WOUND, OPEN, FOOT WITH COMPLICATION, RIGHT, INITIAL ENCOUNTER: ICD-10-CM

## 2021-03-15 DIAGNOSIS — R53.1 WEAKNESS: ICD-10-CM

## 2021-03-15 DIAGNOSIS — L03.115 CELLULITIS OF RIGHT LEG: Primary | ICD-10-CM

## 2021-03-15 DIAGNOSIS — I73.9 PVD (PERIPHERAL VASCULAR DISEASE) (HCC): ICD-10-CM

## 2021-03-15 PROCEDURE — G8417 CALC BMI ABV UP PARAM F/U: HCPCS | Performed by: NURSE PRACTITIONER

## 2021-03-15 PROCEDURE — 99213 OFFICE O/P EST LOW 20 MIN: CPT | Performed by: NURSE PRACTITIONER

## 2021-03-15 PROCEDURE — 1036F TOBACCO NON-USER: CPT | Performed by: NURSE PRACTITIONER

## 2021-03-15 PROCEDURE — G8427 DOCREV CUR MEDS BY ELIG CLIN: HCPCS | Performed by: NURSE PRACTITIONER

## 2021-03-15 PROCEDURE — 1123F ACP DISCUSS/DSCN MKR DOCD: CPT | Performed by: NURSE PRACTITIONER

## 2021-03-15 PROCEDURE — 3017F COLORECTAL CA SCREEN DOC REV: CPT | Performed by: NURSE PRACTITIONER

## 2021-03-15 PROCEDURE — 4040F PNEUMOC VAC/ADMIN/RCVD: CPT | Performed by: NURSE PRACTITIONER

## 2021-03-15 PROCEDURE — G8484 FLU IMMUNIZE NO ADMIN: HCPCS | Performed by: NURSE PRACTITIONER

## 2021-03-15 NOTE — PROGRESS NOTES
3/15/21     Chief Complaint   Patient presents with    Follow-up     states foot is doing a lot better but that he had diarrhea for a week     HPI     Foot is much better  Had diarrhea really bad from abx   Completed abx on Saturday - diarrhea is much better since stopping     Denies any more falls  Has not scheduled with PT - reports he is not sure he will have time with ball season starting. Reports weakness is improving significantly. Allergies   Allergen Reactions    Bactrim      hallucinations    Clindamycin/Lincomycin Diarrhea     Current Outpatient Medications   Medication Sig Dispense Refill    allopurinol (ZYLOPRIM) 300 MG tablet Take 1 tablet by mouth 2 times daily 180 tablet 3    dilTIAZem (CARDIZEM CD) 180 MG extended release capsule Take 1 capsule by mouth 2 times daily 180 capsule 1    atorvastatin (LIPITOR) 40 MG tablet Take 1 tablet by mouth every evening 90 tablet 1    diclofenac sodium (VOLTAREN) 1 % GEL Apply 4 g topically 4 times daily as needed for Pain 500 g 2    metoprolol tartrate (LOPRESSOR) 25 MG tablet Take 1 tablet by mouth 2 times daily 180 tablet 3    furosemide (LASIX) 40 MG tablet TAKE 2 TABLETS BY MOUTH TWICE A  tablet 1    sildenafil (REVATIO) 20 MG tablet Take 1 tablet by mouth daily as needed (ED) 6 tablet 0    sertraline (ZOLOFT) 50 MG tablet TAKE 1 TABLET BY MOUTH EVERY DAY 90 tablet 1    dicyclomine (BENTYL) 10 MG capsule Take 1 capsule by mouth 4 times daily as needed (abdominal pain) 120 capsule 3    potassium chloride (KLOR-CON M20) 20 MEQ extended release tablet Taking 3 tablets  tablet 1    pantoprazole (PROTONIX) 40 MG tablet TAKE 1 TABLET BY MOUTH TWICE DAILY 60 tablet 5    budesonide-formoterol (SYMBICORT) 160-4.5 MCG/ACT AERO Inhale 2 puffs into the lungs 2 times daily. 3 Inhaler 3    albuterol-ipratropium (COMBIVENT RESPIMAT)  MCG/ACT AERS inhaler Inhale 1 puff into the lungs every 6 hours.  (Patient taking differently: Inhale 1 puff into the lungs every 6 hours as needed ) 3 Inhaler 3    OXYGEN Inhale  into the lungs. 2 liters/ nasal cannula prn      therapeutic multivitamin-minerals (THERAGRAN-M) tablet Take 1 tablet by mouth daily. No current facility-administered medications for this visit. Review of Systems   Negative other than HPI     Vitals:    03/15/21 0723   BP: 132/82   Pulse: 74   Temp: 96.4 °F (35.8 °C)   SpO2: 98%   Weight: (!) 320 lb (145.2 kg)      Physical Exam  Constitutional:       General: He is not in acute distress. Appearance: Normal appearance. He is not ill-appearing. HENT:      Head: Normocephalic and atraumatic. Pulmonary:      Effort: Pulmonary effort is normal. No respiratory distress. Skin:     Comments: RLE   Wound bed improving, smaller  Improved erythema, warmth   No drainage  + granulated tissue  Leg redness has improved - chronic vascular changes noted but without warmth and improving edema back to baseline. Neurological:      General: No focal deficit present. Mental Status: He is alert and oriented to person, place, and time. Mental status is at baseline. Psychiatric:         Mood and Affect: Mood normal.         Behavior: Behavior normal.        Assessment/Plan:  1. Cellulitis of right leg  Stable, improving   Had diarrhea from abx - resolved     2. Wound, open, foot with complication, right, initial encounter  Stable,improving  No further signs of infection   We discussed seeing wound care again - patient declined  Patient is agreeable to following up in office for wound management  Wound care reviewed in detail    3. PVD (peripheral vascular disease) (HCC)  Stable, not compliant with compression. Reviewed importance of compression and control of edema, verbalized understanding    4.  Weakness  Stable, improving  [place] ball [place]  Patient denies any further falls  Strongly encouraged PT     Discussed medications with patient, who voiced understanding

## 2021-03-18 ENCOUNTER — TELEPHONE (OUTPATIENT)
Dept: INTERNAL MEDICINE CLINIC | Age: 66
End: 2021-03-18

## 2021-03-18 NOTE — TELEPHONE ENCOUNTER
Deepa Pichardo from [facility] advised pt will be doing PT and not getting a chair.

## 2021-03-18 NOTE — TELEPHONE ENCOUNTER
[facility] received a call from:    Name of Caller: Jess Cooper    Relationship to patient:Other    Organization name: [email or web address]    Best contact number: [phone]  Ref. # [id number]    Reason for call: They are needing to see if they can get the chart notes for the patient from his appt. On 3/5/21 for his mobility check. Please fax to: [phone].

## 2021-04-19 ENCOUNTER — APPOINTMENT (OUTPATIENT)
Dept: CT IMAGING | Age: 66
End: 2021-04-19
Payer: MEDICARE

## 2021-04-19 ENCOUNTER — APPOINTMENT (OUTPATIENT)
Dept: ULTRASOUND IMAGING | Age: 66
End: 2021-04-19
Payer: MEDICARE

## 2021-04-19 ENCOUNTER — NURSE TRIAGE (OUTPATIENT)
Dept: OTHER | Facility: CLINIC | Age: 66
End: 2021-04-19

## 2021-04-19 ENCOUNTER — HOSPITAL ENCOUNTER (EMERGENCY)
Age: 66
Discharge: LEFT BEFORE DISCHARGE | End: 2021-04-19
Attending: EMERGENCY MEDICINE
Payer: MEDICARE

## 2021-04-19 VITALS
HEIGHT: 75 IN | DIASTOLIC BLOOD PRESSURE: 84 MMHG | TEMPERATURE: 97.4 F | HEART RATE: 68 BPM | WEIGHT: 295 LBS | SYSTOLIC BLOOD PRESSURE: 113 MMHG | OXYGEN SATURATION: 97 % | BODY MASS INDEX: 36.68 KG/M2 | RESPIRATION RATE: 18 BRPM

## 2021-04-19 DIAGNOSIS — N50.89 SCROTAL MASS: ICD-10-CM

## 2021-04-19 DIAGNOSIS — S30.22XA SCROTAL HEMATOMA: Primary | ICD-10-CM

## 2021-04-19 DIAGNOSIS — N17.9 AKI (ACUTE KIDNEY INJURY) (HCC): ICD-10-CM

## 2021-04-19 DIAGNOSIS — N45.1 CHRONIC EPIDIDYMITIS: ICD-10-CM

## 2021-04-19 LAB
A/G RATIO: 0.9 (ref 1.1–2.2)
ALBUMIN SERPL-MCNC: 3.2 G/DL (ref 3.4–5)
ALP BLD-CCNC: 147 U/L (ref 40–129)
ALT SERPL-CCNC: 14 U/L (ref 10–40)
ANION GAP SERPL CALCULATED.3IONS-SCNC: 14 MMOL/L (ref 3–16)
AST SERPL-CCNC: 23 U/L (ref 15–37)
BACTERIA: ABNORMAL /HPF
BASOPHILS ABSOLUTE: 0 K/UL (ref 0–0.2)
BASOPHILS RELATIVE PERCENT: 0.5 %
BILIRUB SERPL-MCNC: 0.6 MG/DL (ref 0–1)
BILIRUBIN URINE: ABNORMAL
BLOOD, URINE: ABNORMAL
BUN BLDV-MCNC: 42 MG/DL (ref 7–20)
CALCIUM SERPL-MCNC: 8.6 MG/DL (ref 8.3–10.6)
CHLORIDE BLD-SCNC: 102 MMOL/L (ref 99–110)
CLARITY: ABNORMAL
CO2: 22 MMOL/L (ref 21–32)
COLOR: ABNORMAL
COMMENT UA: ABNORMAL
CREAT SERPL-MCNC: 3.5 MG/DL (ref 0.8–1.3)
EOSINOPHILS ABSOLUTE: 0.2 K/UL (ref 0–0.6)
EOSINOPHILS RELATIVE PERCENT: 2.2 %
EPITHELIAL CELLS, UA: 1 /HPF (ref 0–5)
GFR AFRICAN AMERICAN: 21
GFR NON-AFRICAN AMERICAN: 18
GLOBULIN: 3.7 G/DL
GLUCOSE BLD-MCNC: 86 MG/DL (ref 70–99)
GLUCOSE URINE: NEGATIVE MG/DL
HCT VFR BLD CALC: 47.5 % (ref 40.5–52.5)
HEMOGLOBIN: 15.2 G/DL (ref 13.5–17.5)
KETONES, URINE: ABNORMAL MG/DL
LEUKOCYTE ESTERASE, URINE: ABNORMAL
LYMPHOCYTES ABSOLUTE: 0.7 K/UL (ref 1–5.1)
LYMPHOCYTES RELATIVE PERCENT: 7.7 %
MCH RBC QN AUTO: 32.5 PG (ref 26–34)
MCHC RBC AUTO-ENTMCNC: 32.1 G/DL (ref 31–36)
MCV RBC AUTO: 101.4 FL (ref 80–100)
MICROSCOPIC EXAMINATION: YES
MONOCYTES ABSOLUTE: 1 K/UL (ref 0–1.3)
MONOCYTES RELATIVE PERCENT: 11.3 %
NEUTROPHILS ABSOLUTE: 6.8 K/UL (ref 1.7–7.7)
NEUTROPHILS RELATIVE PERCENT: 78.3 %
NITRITE, URINE: NEGATIVE
PDW BLD-RTO: 15.9 % (ref 12.4–15.4)
PH UA: 5.5 (ref 5–8)
PLATELET # BLD: 200 K/UL (ref 135–450)
PMV BLD AUTO: 7.8 FL (ref 5–10.5)
POTASSIUM REFLEX MAGNESIUM: 4 MMOL/L (ref 3.5–5.1)
PROTEIN UA: 100 MG/DL
RBC # BLD: 4.69 M/UL (ref 4.2–5.9)
RBC UA: ABNORMAL /HPF (ref 0–4)
SODIUM BLD-SCNC: 138 MMOL/L (ref 136–145)
SPECIFIC GRAVITY UA: 1.02 (ref 1–1.03)
TOTAL CK: 50 U/L (ref 39–308)
TOTAL PROTEIN: 6.9 G/DL (ref 6.4–8.2)
URINE REFLEX TO CULTURE: YES
URINE TYPE: ABNORMAL
UROBILINOGEN, URINE: 0.2 E.U./DL
WBC # BLD: 8.7 K/UL (ref 4–11)
WBC UA: >900 /HPF (ref 0–5)

## 2021-04-19 PROCEDURE — 93975 VASCULAR STUDY: CPT

## 2021-04-19 PROCEDURE — 87591 N.GONORRHOEAE DNA AMP PROB: CPT

## 2021-04-19 PROCEDURE — 87186 SC STD MICRODIL/AGAR DIL: CPT

## 2021-04-19 PROCEDURE — 82550 ASSAY OF CK (CPK): CPT

## 2021-04-19 PROCEDURE — 76870 US EXAM SCROTUM: CPT

## 2021-04-19 PROCEDURE — 87491 CHLMYD TRACH DNA AMP PROBE: CPT

## 2021-04-19 PROCEDURE — 80053 COMPREHEN METABOLIC PANEL: CPT

## 2021-04-19 PROCEDURE — 99283 EMERGENCY DEPT VISIT LOW MDM: CPT

## 2021-04-19 PROCEDURE — 74176 CT ABD & PELVIS W/O CONTRAST: CPT

## 2021-04-19 PROCEDURE — 87088 URINE BACTERIA CULTURE: CPT

## 2021-04-19 PROCEDURE — 81001 URINALYSIS AUTO W/SCOPE: CPT

## 2021-04-19 PROCEDURE — 85025 COMPLETE CBC W/AUTO DIFF WBC: CPT

## 2021-04-19 PROCEDURE — 87086 URINE CULTURE/COLONY COUNT: CPT

## 2021-04-19 RX ORDER — ACETAMINOPHEN 325 MG/1
650 TABLET ORAL EVERY 6 HOURS PRN
Qty: 120 TABLET | Refills: 0 | Status: ON HOLD | OUTPATIENT
Start: 2021-04-19 | End: 2022-11-01 | Stop reason: HOSPADM

## 2021-04-19 RX ORDER — LEVOFLOXACIN 250 MG/1
TABLET ORAL
Qty: 11 TABLET | Refills: 0 | Status: SHIPPED | OUTPATIENT
Start: 2021-04-19 | End: 2021-04-29

## 2021-04-19 ASSESSMENT — ENCOUNTER SYMPTOMS
DIARRHEA: 0
RESPIRATORY NEGATIVE: 1
NAUSEA: 0
CONSTIPATION: 0
COUGH: 0
ABDOMINAL PAIN: 1
SHORTNESS OF BREATH: 0
VOMITING: 0
BACK PAIN: 0

## 2021-04-19 ASSESSMENT — PAIN DESCRIPTION - LOCATION: LOCATION: OTHER (COMMENT)

## 2021-04-19 ASSESSMENT — PAIN DESCRIPTION - DESCRIPTORS: DESCRIPTORS: SHARP

## 2021-04-19 NOTE — ED PROVIDER NOTES
[facility] Emergency Department      Pt Name: Mc Royal  MRN: [id number]  [place] 1955  Date of evaluation: 4/19/2021  Provider: Ariana Gregg MD  I independently performed a history and physical on Mc Royal. All diagnostic, treatment, and disposition decisions were made by myself in conjunction with the advanced practice provider. HPI: Mc Royal presented with   Chief Complaint   Patient presents with    Groin Swelling     pt injured rt testicle last week. He was doing laundry and the laundry cart went into rt testicle. Mc Royal has a past medical history of Anxiety, Arthritis, Asthma, Atrial fibrillation ([address] Blood circulation, collateral, CAD (coronary artery disease), CHF (congestive heart failure) ([address] Colon cancer ([address] COPD (chronic obstructive pulmonary disease) ([address] Diabetic foot infection ([address] Hernia, Hyperlipidemia, Hypertension, Movement disorder, Muscle cramps, Neuromuscular disorder ([address] Neuropathy, Pneumonia, Renal insufficiency, Sleep apnea, Thyroid disease, Tinnitus aurium, bilateral, Unspecified cerebral artery occlusion with cerebral infarction, and Unspecified diseases of blood and blood-forming organs. He has a past surgical history that includes Toe Surgery; Ventricular ablation surgery; Colon surgery; Toe amputation (3/7/11); Appendectomy (10/9/2012); Abdomen surgery; Colonoscopy; Endoscopy, colon, diagnostic; skin biopsy; Upper gastrointestinal endoscopy (1/28/2014); Upper gastrointestinal endoscopy (3/13/14); Colonoscopy (3/13/14); endoscopic ultrasound (upper) (6/4/14); Upper gastrointestinal endoscopy (6/4/14); Ulnar tunnel release (Left, 12-11-14); Carpal tunnel release (Left, 12/11/14); hernia repair; Foot surgery (Right, 07/13/2017); Foot surgery (Right, 08/17/2017); Upper gastrointestinal endoscopy (03/02/2018); Colonoscopy (03/02/2018); Knee arthroscopy (Right, 08/23/2018);  Cystoscopy (Right, 5/3/2019); and Carpal tunnel release (Right, 6/11/2019). No current facility-administered medications on file prior to encounter. Current Outpatient Medications on File Prior to Encounter   Medication Sig Dispense Refill    allopurinol (ZYLOPRIM) 300 MG tablet Take 1 tablet by mouth 2 times daily 180 tablet 3    dilTIAZem (CARDIZEM CD) 180 MG extended release capsule Take 1 capsule by mouth 2 times daily 180 capsule 1    atorvastatin (LIPITOR) 40 MG tablet Take 1 tablet by mouth every evening 90 tablet 1    diclofenac sodium (VOLTAREN) 1 % GEL Apply 4 g topically 4 times daily as needed for Pain 500 g 2    metoprolol tartrate (LOPRESSOR) 25 MG tablet Take 1 tablet by mouth 2 times daily 180 tablet 3    furosemide (LASIX) 40 MG tablet TAKE 2 TABLETS BY MOUTH TWICE A  tablet 1    sildenafil (REVATIO) 20 MG tablet Take 1 tablet by mouth daily as needed (ED) 6 tablet 0    sertraline (ZOLOFT) 50 MG tablet TAKE 1 TABLET BY MOUTH EVERY DAY 90 tablet 1    dicyclomine (BENTYL) 10 MG capsule Take 1 capsule by mouth 4 times daily as needed (abdominal pain) 120 capsule 3    potassium chloride (KLOR-CON M20) 20 MEQ extended release tablet Taking 3 tablets  tablet 1    pantoprazole (PROTONIX) 40 MG tablet TAKE 1 TABLET BY MOUTH TWICE DAILY 60 tablet 5    budesonide-formoterol (SYMBICORT) 160-4.5 MCG/ACT AERO Inhale 2 puffs into the lungs 2 times daily. 3 Inhaler 3    albuterol-ipratropium (COMBIVENT RESPIMAT)  MCG/ACT AERS inhaler Inhale 1 puff into the lungs every 6 hours. (Patient taking differently: Inhale 1 puff into the lungs every 6 hours as needed ) 3 Inhaler 3    OXYGEN Inhale  into the lungs. 2 liters/ nasal cannula prn      therapeutic multivitamin-minerals (THERAGRAN-M) tablet Take 1 tablet by mouth daily.        PHYSICAL EXAM  Vitals: /84   Pulse 68   Temp 97.4 °F (36.3 °C) (Oral)   Resp 18   Ht 6' 3\" (1.905 m)   Wt 295 lb (133.8 kg)   SpO2 97%   BMI 36.87 kg/m²   Constitutional:  66 y.o. male alert  HENT:  Atraumatic, oral mucosa moist  Neck:  No visible JVD, supple  Chest/Lungs:  Respiratory effort normal, clear, regular  Abdomen:  Non-distended, soft, NT  :  See note by SACHIN Arthur  Back:  No gross deformity  Extremities:  Normal tone and perfusion    Medical Decision Making and Plan: Briefly, this is an 73 y. o.male who presented with groin swelling, injury to testicle last week. ARF noted. Pt insisted on d/c prior to UA results. He was informed of imaging and kidney function results -- possible dialysis if worsening kidney function. He does indicate he will return to the hospital after he makes arrangements for assistance for his female  who has visiting health care assistance. The patient has decided to leave against medical advice. He has normal mental status and adequate capacity to make medical decisions. The risks have been explained to him, including worsening illness, chronic pain, permanent disability, and death. The benefits of further care have also been explained, including the proximity of nurses, physicians, monitoring, diagnostic testing, treatment, and acute intervention in the event that his condition worsens. Francee Nissen was able to discuss the risks, benefits, and alternatives to treatment in a meaningful way. This discussion was witnessed by the nurse and me. We treated him to the extent that he would allow and we encouraged him to return to the hospital for care at any time. For further details of [address] Emergency Department encounter, please see documentation by advanced practice provider SACHIN Arthur.     Labs Reviewed   CBC WITH AUTO DIFFERENTIAL - Abnormal; Notable for the following components:       Result Value    .4 (*)     RDW 15.9 (*)     Lymphocytes Absolute 0.7 (*)     All other components within normal limits    Narrative:     Performed at:  [facility]  [address] [place] [address], [place] [facility] [employer]   Phone [phone]   COMPREHENSIVE METABOLIC PANEL W/ REFLEX TO MG FOR LOW K - Abnormal; Notable for the following components:    BUN 42 (*)     CREATININE 3.5 (*)     GFR Non- 18 (*)     GFR  21 (*)     Albumin 3.2 (*)     Albumin/Globulin Ratio 0.9 (*)     Alkaline Phosphatase 147 (*)     All other components within normal limits    Narrative:     Performed at:  [facility]  [address], [facility] [employer]   Phone [phone]   URINE RT REFLEX TO CULTURE - Abnormal; Notable for the following components:    Clarity, UA TURBID (*)     Bilirubin Urine SMALL (*)     Ketones, Urine TRACE (*)     Blood, Urine LARGE (*)     Protein,  (*)     Leukocyte Esterase, Urine LARGE (*)     All other components within normal limits    Narrative:     Performed at:  [facility] [address] [facility] [employer] [facility] [employer]   Phone [phone]   MICROSCOPIC URINALYSIS - Abnormal; Notable for the following components:    Bacteria, UA 4+ (*)     WBC, UA >900 (*)     All other components within normal limits    Narrative:     Performed at:  [facility]  [address], [employer]   Phone [phone] DNA, URINE   CULTURE, URINE   CK    Narrative:     Performed at:  [facility] [address] [facility] [employer]   Phone [phone]     Previous BUN/crea:    BUN   Date/Time Value Ref Range Status   04/19/2021 10:59 AM 42 (H) 7 - 20 mg/dL Final   03/05/2021 08:56 AM 20 7 - 20 mg/dL Final   10/14/2019 10:08 AM 38 (H) 7 - 20 mg/dL Final     CREATININE   Date/Time Value Ref Range Status   04/19/2021 10:59 AM 3.5 (H) 0.8 - 1.3 mg/dL Final   03/05/2021 08:56 AM 1.3 0.8 - 1.3 mg/dL Final   10/14/2019 10:08 AM 2.1 (H) 0.8 - 1.3 mg/dL Final     RADIOLOGY:     US SCROTUM AND TESTICLES   Final Result   1. Small left scrotal hematoma. 2. Chronic right epididymitis. 3. Indeterminate 1.6 cm right extratesticular/intrascrotal mass. Follow-up   nonemergent MRI recommended for further evaluation. US DUP ABD PEL RETRO SCROT COMPLETE   Final Result   1. Small left scrotal hematoma. 2. Chronic right epididymitis. 3. Indeterminate 1.6 cm right extratesticular/intrascrotal mass. Follow-up   nonemergent MRI recommended for further evaluation. CT ABDOMEN PELVIS WO CONTRAST Additional Contrast? None   Final Result   Nonobstructing bilateral nephrolithiasis. Diverticulosis. Cirrhosis. Discharge Medication List as of 4/19/2021  1:02 PM      START taking these medications    Details   acetaminophen (AMINOFEN) 325 MG tablet Take 2 tablets by mouth every 6 hours as needed for Pain, Disp-120 tablet, R-0Print      levoFLOXacin (LEVAQUIN) 250 MG tablet Take 500 mg Day 1 then 250 mg Day 2-10., Disp-11 tablet, R-0Print           FOLLOW UP:    Abhilash Gilbert MD  [address] [place] [address] [address] [facility]  [phone]    Schedule an appointment as soon as possible for a visit   For a Re-check in  2-3     days. Terrance Roldan MD  [facility]  [phone]    Schedule an appointment as soon as possible for a visit   For a Re-check in   3-5   days. [facility] Emergency [facility]  [address]  [address] [address]  [phone]  Go to   As needed, If symptoms worsen    FINAL IMPRESSION:    1. Scrotal hematoma    2. Chronic epididymitis    3. Scrotal mass    4.  BRIAN (acute kidney injury) ([facility] [address]       DISPOSITION [place] 04/19/2021 01:03:33 PM       Cassandra Julio MD  04/19/21 [id number]

## 2021-04-19 NOTE — ED PROVIDER NOTES
[facility]        Pt Name: Laina Dixon  MRN: [id number]  [place] 1955  Date of evaluation: 4/19/2021  Provider: SACHIN Bella  PCP: Huseyin Singh MD     I have seen and evaluated this patient with my supervising physician Ike Espinoza       Chief Complaint   Patient presents with    Groin Swelling     pt injured rt testicle last week. He was doing laundry and the laundry cart went into rt testicle. HISTORY OF PRESENT ILLNESS   (Location, Timing/Onset, Context/Setting, Quality, Duration, Modifying Factors, Severity, Associated Signs and Symptoms)  Note limiting factors. Laina Dixon is a 73 y.o. male with past medical history of atrial fibrillation, CAD, CHF, COPD, hyperlipidemia, hypertension and renal insufficiency who presents to the ED with complaint of groin pain. Patient states last week on either Wednesday or Thursday he was doing laundry. States he bent over to  a towel off of a laundry cart. States laundry cart was on wheels and it rolled out in front of him. Patient is a hit into his right testicle/groin. States he fell backwards and landed on his back/flank. Patient states since then he has had swelling to the right testicle with associated pain. Patient denies any pain with sitting but states pain only present with standing up and ambulating. Patient states pain is described as a sharp aching rated 9/10. Patient dates he has taken diclofenac at home with improvement of symptoms. Patient states he is noticed that his urine has been dark in color and almost looks like \"[employer]\". Patient denies any bright red blood in the urine. Denies hematuria, frequency, urgency or difficulty urinating. Patient has any swelling to the left testicle. Denies any rectal pain, changes in bowel movements, abdominal pain, chest pain or shortness of breath.   Denies fever chills. Denies rashes or lesions. Denies any head injury, neck pain or lightheadedness/dizziness. Denies any nausea or vomiting. Patient states he is not on any blood thinners at home. Denies any history of UTI. Patient denies any foreign body insertion into the urethra. Patient denies any self-catheterization or recent urinary procedures. States he is sexually active with one partner. Patient says he has not had sexual intercourse in the past 6 months. Denies any concern for sexually transmitted infection. Nursing Notes were all reviewed and agreed with or any disagreements were addressed in the HPI. REVIEW OF SYSTEMS    (2-9 systems for level 4, 10 or more for level 5)     Review of Systems   Constitutional: Negative for activity change, appetite change, chills, diaphoresis, fatigue and fever. Respiratory: Negative. Negative for cough and shortness of breath. Cardiovascular: Negative. Negative for chest pain, palpitations and leg swelling. Gastrointestinal: Positive for abdominal pain. Negative for constipation, diarrhea, nausea and vomiting. Genitourinary: Positive for flank pain, scrotal swelling and testicular pain. Negative for decreased urine volume, difficulty urinating, discharge, dysuria, frequency, genital sores, hematuria, penile pain, penile swelling and urgency. Musculoskeletal: Negative for arthralgias, back pain, myalgias, neck pain and neck stiffness. Neurological: Negative for dizziness, light-headedness and headaches. Positives and Pertinent negatives as per HPI. Except as noted above in the ROS, all other systems were reviewed and negative.        PAST MEDICAL HISTORY     Past Medical History:   Diagnosis Date    Anxiety     Arthritis     Asthma     [facility] [address]     Blood circulation, collateral     CAD (coronary artery disease)     CHF (congestive heart failure) (HCC)     Colon cancer (HCC)     [facility] disease[facility] [address]     Diabetic foot infection ([facility] [address]     Hernia     Hyperlipidemia     Hypertension     Movement disorder     Muscle cramps     Neuromuscular disorder (HCC)     marvin feet numbness/neuropathy    Neuropathy     Pneumonia     Renal insufficiency     Sleep apnea     no cpap    Thyroid disease     Tinnitus aurium, bilateral     Unspecified cerebral artery occlusion with cerebral infarction     mini stroke    Unspecified diseases of blood and blood-forming organs          SURGICAL HISTORY     Past Surgical History:   Procedure Laterality Date    ABDOMEN SURGERY      APPENDECTOMY  10/9/2012    CARPAL TUNNEL RELEASE Left 12/11/14    CARPAL TUNNEL RELEASE Right 6/11/2019    RIGHT CARPAL TUNNEL RELEASE AND RIGHT MIDDLE FINGER TRIGGER FINGER RELEASE performed by Mahin Terry MD at [address] [facility]      for Colon Ca 10 inches removed from colon    COLONOSCOPY      COLONOSCOPY  3/13/14    COLONOSCOPY  03/02/2018    CYSTOSCOPY Right 5/3/2019    CYSTOSCOPY, RIGHT URETEROSCOPY, STONE EXTRACTION WITH STONE BASKET performed by Thomas Song MD at [address] (UPPER)  6/4/14    ENDOSCOPY, COLON, DIAGNOSTIC      FOOT SURGERY Right 07/13/2017    Amputation toes 2,3,5, right foot Flap closure, Exostectomy 1st metatarsal right foot    FOOT SURGERY Right 08/17/2017    INCISION AND DRAINAGE OF RIGHT FOOT WITH FLAP CLOSURE    HERNIA REPAIR      umbilical, hiatal hernia repairs    KNEE ARTHROSCOPY Right 08/23/2018    partial medial/ lateral menisectomy    SKIN BIOPSY      TOE AMPUTATION  3/7/11    RIGHT FOURTH TOE    TOE SURGERY      ULNAR TUNNEL RELEASE Left 12-11-14    Left ulnar nerve decompression and left CTR    UPPER GASTROINTESTINAL ENDOSCOPY  1/28/2014    with biopsies    UPPER GASTROINTESTINAL ENDOSCOPY  3/13/14    UPPER GASTROINTESTINAL ENDOSCOPY  6/4/14    with biopsy    UPPER GASTROINTESTINAL ENDOSCOPY  03/02/2018    VENTRICULAR ABLATION SURGERY           CURRENTMEDICATIONS       Discharge Medication List as of 4/19/2021  1:02 PM      CONTINUE these medications which have NOT CHANGED    Details   allopurinol (ZYLOPRIM) 300 MG tablet Take 1 tablet by mouth 2 times daily, Disp-180 tablet, R-3Normal      dilTIAZem (CARDIZEM CD) 180 MG extended release capsule Take 1 capsule by mouth 2 times daily, Disp-180 capsule, R-1Normal      atorvastatin (LIPITOR) 40 MG tablet Take 1 tablet by mouth every evening, Disp-90 tablet, R-1Normal      diclofenac sodium (VOLTAREN) 1 % GEL Apply 4 g topically 4 times daily as needed for Pain, Topical, 4 TIMES DAILY PRN Starting Fri 3/5/2021, Disp-500 g, R-2, Normal      metoprolol tartrate (LOPRESSOR) 25 MG tablet Take 1 tablet by mouth 2 times daily, Disp-180 tablet,R-3Normal      furosemide (LASIX) 40 MG tablet TAKE 2 TABLETS BY MOUTH TWICE A DAY, Disp-360 tablet, R-1Normal      sildenafil (REVATIO) 20 MG tablet Take 1 tablet by mouth daily as needed (ED), Disp-6 tablet, R-0Normal      sertraline (ZOLOFT) 50 MG tablet TAKE 1 TABLET BY MOUTH EVERY DAY, Disp-90 tablet, R-1Normal      dicyclomine (BENTYL) 10 MG capsule Take 1 capsule by mouth 4 times daily as needed (abdominal pain), Disp-120 capsule, R-3Normal      potassium chloride (KLOR-CON M20) 20 MEQ extended release tablet Taking 3 tablets BID, Disp-540 tablet, R-1Normal      pantoprazole (PROTONIX) 40 MG tablet TAKE 1 TABLET BY MOUTH TWICE DAILY, Disp-60 tablet, R-5Normal      budesonide-formoterol (SYMBICORT) 160-4.5 MCG/ACT AERO Inhale 2 puffs into the lungs 2 times daily. , Disp-3 Inhaler, R-3      albuterol-ipratropium (COMBIVENT RESPIMAT)  MCG/ACT AERS inhaler Inhale 1 puff into the lungs every 6 hours. , Disp-3 Inhaler, R-3      OXYGEN Inhale  into the lungs. 2 liters/ nasal cannula prn      therapeutic multivitamin-minerals (THERAGRAN-M) tablet Take 1 tablet by mouth daily.                ALLERGIES     Bactrim and Clindamycin/lincomycin    FAMILYHISTORY Family History   Problem Relation Age of Onset    Cancer Father         colon    High Blood Pressure Father     Heart Disease Father     High Blood Pressure Mother     Heart Disease Mother           SOCIAL HISTORY       Social History     Tobacco Use    Smoking status: Never Smoker    Smokeless tobacco: Never Used   Substance Use Topics    Alcohol use: No     Comment: quit 2006    Drug use: No       SCREENINGS             PHYSICAL EXAM    (up to 7 for level 4, 8 or more for level 5)     ED Triage Vitals [04/19/21 1022]   BP Temp Temp Source Pulse Resp SpO2 Height Weight   113/84 97.4 °F (36.3 °C) Oral 68 18 97 % 6' 3\" (1.905 m) 295 lb (133.8 kg)       Physical Exam  Constitutional:       General: He is not in acute distress. Appearance: Normal appearance. He is well-developed. He is not ill-appearing, toxic-appearing or diaphoretic. HENT:      Head: Normocephalic and atraumatic. Comments: Atraumatic. No raccoon eyes or naylor sign. Right Ear: External ear normal.      Left Ear: External ear normal.   Eyes:      General:         Right eye: No discharge. Left eye: No discharge. Neck:      Musculoskeletal: Normal range of motion and neck supple. Cardiovascular:      Rate and Rhythm: Normal rate and regular rhythm. Pulses: Normal pulses. Heart sounds: Normal heart sounds. No murmur. No friction rub. No gallop. Pulmonary:      Effort: Pulmonary effort is normal. No respiratory distress. Breath sounds: Normal breath sounds. No stridor. No wheezing, rhonchi or rales. Chest:      Chest wall: No tenderness. Abdominal:      General: Abdomen is flat. Bowel sounds are normal. There is no distension. Palpations: Abdomen is soft. There is no mass. Tenderness: There is no abdominal tenderness. There is no right CVA tenderness, left CVA tenderness, guarding or rebound. Negative signs include Blair's sign, Rovsing's sign and McBurney's sign.       Hernia: No hernia is present. There is no hernia in the left inguinal area or right inguinal area. Comments: Previous surgical scars noted to the abdomen. Genitourinary:     Penis: Normal. No phimosis, paraphimosis, hypospadias, erythema, tenderness, discharge, swelling or lesions. Testes:         Right: Tenderness and swelling present. Mass, testicular hydrocele or varicocele not present. Right testis is descended. Cremasteric reflex is present. Left: Mass, tenderness, swelling, testicular hydrocele or varicocele not present. Left testis is descended. Cremasteric reflex is present. Epididymis:      Right: Enlarged. Not inflamed. Tenderness present. No mass. Left: Normal. Not inflamed or enlarged. No mass or tenderness. Comments:  examination performed myself. Patient has no lymphadenopathy or obvious hernia noted. There is no genital rashes or lesions noted. Right testicle and epididymis appears to be swollen and tender. There is no bruising noted to the groin or scrotum. No large fluid collection. No evidence of hydrocele. Cremasteric reflex appears present bilaterally. Left testicle and epididymis unremarkable. Musculoskeletal: Normal range of motion. Lymphadenopathy:      Lower Body: No right inguinal adenopathy. No left inguinal adenopathy. Skin:     General: Skin is warm and dry. Coloration: Skin is not pale. Findings: No erythema. Neurological:      Mental Status: He is alert and oriented to person, place, and time.    Psychiatric:         Behavior: Behavior normal.         DIAGNOSTIC RESULTS   LABS:    Labs Reviewed   CBC WITH AUTO DIFFERENTIAL - Abnormal; Notable for the following components:       Result Value    .4 (*)     RDW 15.9 (*)     Lymphocytes Absolute 0.7 (*)     All other components within normal limits    Narrative:     Performed at:  [facility] [address], [place], [address]   Phone [phone]   COMPREHENSIVE METABOLIC PANEL W/ REFLEX TO MG FOR LOW K - Abnormal; Notable for the following components:    BUN 42 (*)     CREATININE 3.5 (*)     GFR Non- 18 (*)     GFR  21 (*)     Albumin 3.2 (*)     Albumin/Globulin Ratio 0.9 (*)     Alkaline Phosphatase 147 (*)     All other components within normal limits    Narrative:     Performed at:  [facility] [address] [facility] [employer]   Phone [phone]   URINE RT REFLEX TO CULTURE - Abnormal; Notable for the following components:    Clarity, UA TURBID (*)     Bilirubin Urine SMALL (*)     Ketones, Urine TRACE (*)     Blood, Urine LARGE (*)     Protein,  (*)     Leukocyte Esterase, Urine LARGE (*)     All other components within normal limits    Narrative:     Performed at:  [facility] [address], [place], [facility] [employer]   Phone [phone]   MICROSCOPIC URINALYSIS - Abnormal; Notable for the following components:    Bacteria, UA 4+ (*)     WBC, UA >900 (*)     All other components within normal limits    Narrative:     Performed at:  [facility] [address] [facility], [facility] [employer]   Phone [phone] DNA, URINE   CULTURE, URINE   CK    Narrative:     Performed at:  [facility] [address], [place], [address] [employer]   Phone [phone]       All other labs were within normal range or not returned as of this dictation. EKG: All EKG's are interpreted by the Emergency Department Physician in the absence of a cardiologist.  Please see their note for interpretation of EKG.       RADIOLOGY:   Non-plain film images such as CT, Ultrasound and MRI are read by the radiologist. Plain radiographic images are visualized and preliminarily interpreted by the ED Provider with the below findings:        Interpretation per the Radiologist below, if available at the time of this note:    [address]   Final Result   1. Small left scrotal hematoma. 2. Chronic right epididymitis. 3. Indeterminate 1.6 cm right extratesticular/intrascrotal mass. Follow-up   nonemergent MRI recommended for further evaluation. US DUP ABD PEL RETRO SCROT COMPLETE   Final Result   1. Small left scrotal hematoma. 2. Chronic right epididymitis. 3. Indeterminate 1.6 cm right extratesticular/intrascrotal mass. Follow-up   nonemergent MRI recommended for further evaluation. CT ABDOMEN PELVIS WO CONTRAST Additional Contrast? None   Final Result   Nonobstructing bilateral nephrolithiasis. Diverticulosis. Cirrhosis. No results found. PROCEDURES   Unless otherwise noted below, none     Procedures    CRITICAL CARE TIME   N/A    CONSULTS:  None      EMERGENCY DEPARTMENT COURSE and DIFFERENTIAL DIAGNOSIS/MDM:   Vitals:    Vitals:    04/19/21 1022   BP: 113/84   Pulse: 68   Resp: 18   Temp: 97.4 °F (36.3 °C)   TempSrc: Oral   SpO2: 97%   Weight: 295 lb (133.8 kg)   Height: 6' 3\" (1.905 m)       Patient was given the following medications:  Medications - No data to display        Patient is a 46-year-old male who presents to the ED with complaint of a testicular injury. Patient states he injured his right testicle last week. States he continues to have swelling and pain to his right testicle. Upon examination patient has tender palpation over the testicle and epididymis. Does not appear to be any significant bruising or fluid to the scrotum. Patient states he also fell onto his flank and states he has had some dark-colored urine. IV established and blood work obtained. Urinalysis showed 4+ bacteria with greater than 900 white blood cells concerning for acute cystitis. CBC showed normal white count, hemoglobin and platelets. CMP showed acute kidney injury with creatinine of 3.5 and BUN of 42.   Appears to be elevated from patient's previous kidney function. CK normal.  Ultrasound of the scrotum and testicles showed small left scrotal hematoma with chronic right epididymitis and a 1.6 cm right extratesticular/intrascrotal mass. There was normal Doppler flow to the bilateral testicles arguing against torsion. CT of the abdomen pelvis without contrast given kidney function showed nonobstructing bilateral nephrolithiasis but otherwise unremarkable. Given history and physical examination patient suffering from what appears to be scrotal hematoma, chronic epididymitis, scrotal mass, BRIAN and associated urinary tract infection. We did recommend admission for further evaluation and treatment given BRIAN. Patient dates he has to go home because he does take care of significant other at his house. Patient states he will return later today after he finishes up some stuff at home. Patient will sign out AMA given the fact that we recommend admission for AKA, UTI and scrotal injury/epididymitis. Patient states he will return later this evening. Patient informed of findings here in the ED and if he does not return he needs to follow-up with urology and given referral.  Patient be also discharged home with Levaquin and acetaminophen. Patient will be given Levaquin 500 mg on day 1 and 250 on day 2 through 10 given kidney dysfunction. FINAL IMPRESSION      1. Scrotal hematoma    2. Chronic epididymitis    3. Scrotal mass    4. BRIAN (acute kidney injury) [facility])          DISPOSITION/PLAN   DISPOSITION [facility] 04/19/2021 01:03:33 PM      PATIENT REFERREDTO:  Moises Ontiveros MD  [address] [place] [address]  [phone]    Schedule an appointment as soon as possible for a visit   For a Re-check in  2-3     days. Radha Jackson MD  [facility]  [phone]    Schedule an appointment as soon as possible for a visit   For a Re-check in   3-5   days.     [facility] Emergency Department  [address] [address] [phone]  Go to   As needed, If symptoms worsen      DISCHARGE MEDICATIONS:  Discharge Medication List as of 4/19/2021  1:02 PM      START taking these medications    Details   acetaminophen (AMINOFEN) 325 MG tablet Take 2 tablets by mouth every 6 hours as needed for Pain, Disp-120 tablet, R-0Print      levoFLOXacin (LEVAQUIN) 250 MG tablet Take 500 mg Day 1 then 250 mg Day 2-10., Disp-11 tablet, R-0Print             DISCONTINUED MEDICATIONS:  Discharge Medication List as of 4/19/2021  1:02 PM                 (Please note that portions of this note were completed with a voice recognition program.  Efforts were made to edit the dictations but occasionally words are mis-transcribed.)    SACHIN Osorio (electronically signed)          SACHIN Sanderson  04/19/21 0723

## 2021-04-19 NOTE — TELEPHONE ENCOUNTER
Reason for Disposition   MODERATE-SEVERE pain in penis, scrotum, or testicle lasts > 1 hour    Answer Assessment - Initial Assessment Questions  1. MECHANISM: \"How did the injury happen? \" (Injuries to the penis can occur during sexual intercourse or masturbation; the caller may not be willing to mention this)       He fell in the garage and jammed a piece of PVC pipe into his groin are on the right side    2. ONSET: \"When did the injury happen? \" (Minutes or hours ago)       Onset was Thursday the 15th    3. LOCATION: \"What part of the genitals are injured? \"       Groin area - right testicle    4. APPEARANCE of INJURY: \"What does the injury look like? \"       Testicle is the size of baseball    5. BLEEDING: \"Is the injury still bleeding? \" If so, ask: \"Is it difficult to stop? \"       No bleeding    6. SIZE: For cuts, bruises, or swelling, ask: \"How large is it? \" (e.g., inches or centimeters)       No breaks in the skin    7. PAIN: \"Is it painful? \" If so, ask: \"How bad is the pain? \"    (e.g., Scale 1-10; or mild, moderate, severe)      Current pain level with movement (9/10) - taking Tylenol with some results    8. TETANUS: For any breaks in the skin, ask: \"When was the last tetanus booster? \"      N/a    9. OTHER SYMPTOMS: \"Do you have any other symptoms? \" (e.g., blood from tip of penis, bloody urine)      Dark urine (root beer colored); hip is sore    Protocols used: GENITAL INJURY - MALE-ADULT-[place]    Call came in from [address] at the [facility]; patient is reaching out to his PCP at the [place] [address] location    See triage above:  Patient denies fever or bruising. Recommended patient be seen in the ED. Provided care advice.

## 2021-04-19 NOTE — ED NOTES
Pt verbalized understanding of discharge instructions and AMA, pt will return after he gets his home affairs in order.       Shahzad Ojeda RN  04/19/21 0244

## 2021-04-21 LAB
ORGANISM: ABNORMAL
URINE CULTURE, ROUTINE: ABNORMAL

## 2021-04-22 LAB
C. TRACHOMATIS DNA ,URINE: NORMAL
N. GONORRHOEAE DNA, URINE: NORMAL

## 2021-04-23 NOTE — ED NOTES
[place]   Emergency Department Culture Follow-Up       Yonis Christensen (CSN: [id number]) was seen and evaluated at [facility] Emergency Department on 4/19/21 by provider  Bonnie STEPHENSON. An STD culture result in inconclusive/indeterminate. Treatment Course: The patient left AMA and was discharged on levofloxacin. Recommendation:    The patient was discussed with Dr. Sarah Pollock. The patient should be contacted and asked to return to the ED for evaluation/recollection. This recommendation was reviewed with and agreed by ED provider Dr Sarah Pollock. Follow-Up:    The patient was contacted and notified of the inconclusive results and recommendation to return to the ED. The patient stated he had follow-up appointments with Urology and Nephrology scheduled. The patient was advised to return to the ED should his symptoms acutely change or worsen. Patient verbalized understanding.     Thank you,    Qasim Quispe, PharmD  ED Pharmacist [id number]  4/23/2021

## 2021-04-28 PROBLEM — R60.0 LOCALIZED EDEMA: Status: ACTIVE | Noted: 2021-04-28

## 2021-05-18 RX ORDER — FUROSEMIDE 40 MG/1
TABLET ORAL
Qty: 360 TABLET | Refills: 1 | Status: SHIPPED | OUTPATIENT
Start: 2021-05-18 | End: 2021-12-15

## 2021-09-02 RX ORDER — ATORVASTATIN CALCIUM 40 MG/1
TABLET, FILM COATED ORAL
Qty: 90 TABLET | Refills: 1 | Status: SHIPPED | OUTPATIENT
Start: 2021-09-02 | End: 2022-01-17

## 2021-09-03 ENCOUNTER — OFFICE VISIT (OUTPATIENT)
Dept: INTERNAL MEDICINE CLINIC | Age: 66
End: 2021-09-03
Payer: MEDICARE

## 2021-09-03 VITALS
DIASTOLIC BLOOD PRESSURE: 86 MMHG | WEIGHT: 292 LBS | HEART RATE: 90 BPM | BODY MASS INDEX: 36.5 KG/M2 | SYSTOLIC BLOOD PRESSURE: 138 MMHG | OXYGEN SATURATION: 98 %

## 2021-09-03 DIAGNOSIS — I73.9 PVD (PERIPHERAL VASCULAR DISEASE) (HCC): Primary | ICD-10-CM

## 2021-09-03 DIAGNOSIS — L03.115 CELLULITIS OF RIGHT LEG: ICD-10-CM

## 2021-09-03 DIAGNOSIS — N18.32 STAGE 3B CHRONIC KIDNEY DISEASE (HCC): ICD-10-CM

## 2021-09-03 PROCEDURE — G8417 CALC BMI ABV UP PARAM F/U: HCPCS | Performed by: NURSE PRACTITIONER

## 2021-09-03 PROCEDURE — 1123F ACP DISCUSS/DSCN MKR DOCD: CPT | Performed by: NURSE PRACTITIONER

## 2021-09-03 PROCEDURE — 1036F TOBACCO NON-USER: CPT | Performed by: NURSE PRACTITIONER

## 2021-09-03 PROCEDURE — 4040F PNEUMOC VAC/ADMIN/RCVD: CPT | Performed by: NURSE PRACTITIONER

## 2021-09-03 PROCEDURE — 99213 OFFICE O/P EST LOW 20 MIN: CPT | Performed by: NURSE PRACTITIONER

## 2021-09-03 PROCEDURE — 3017F COLORECTAL CA SCREEN DOC REV: CPT | Performed by: NURSE PRACTITIONER

## 2021-09-03 PROCEDURE — G8427 DOCREV CUR MEDS BY ELIG CLIN: HCPCS | Performed by: NURSE PRACTITIONER

## 2021-09-03 RX ORDER — CEPHALEXIN 500 MG/1
500 CAPSULE ORAL 2 TIMES DAILY
Qty: 14 CAPSULE | Refills: 0 | Status: SHIPPED | OUTPATIENT
Start: 2021-09-03 | End: 2021-09-10

## 2021-09-03 SDOH — ECONOMIC STABILITY: FOOD INSECURITY: WITHIN THE PAST 12 MONTHS, THE FOOD YOU BOUGHT JUST DIDN'T LAST AND YOU DIDN'T HAVE MONEY TO GET MORE.: NEVER TRUE

## 2021-09-03 SDOH — ECONOMIC STABILITY: FOOD INSECURITY: WITHIN THE PAST 12 MONTHS, YOU WORRIED THAT YOUR FOOD WOULD RUN OUT BEFORE YOU GOT MONEY TO BUY MORE.: NEVER TRUE

## 2021-09-03 ASSESSMENT — SOCIAL DETERMINANTS OF HEALTH (SDOH): HOW HARD IS IT FOR YOU TO PAY FOR THE VERY BASICS LIKE FOOD, HOUSING, MEDICAL CARE, AND HEATING?: NOT HARD AT ALL

## 2021-09-03 NOTE — PROGRESS NOTES
extended release tablet Taking 3 tablets  tablet 1    pantoprazole (PROTONIX) 40 MG tablet TAKE 1 TABLET BY MOUTH TWICE DAILY 60 tablet 5    budesonide-formoterol (SYMBICORT) 160-4.5 MCG/ACT AERO Inhale 2 puffs into the lungs 2 times daily. 3 Inhaler 3    albuterol-ipratropium (COMBIVENT RESPIMAT)  MCG/ACT AERS inhaler Inhale 1 puff into the lungs every 6 hours. (Patient taking differently: Inhale 1 puff into the lungs every 6 hours as needed ) 3 Inhaler 3    OXYGEN Inhale  into the lungs. 2 liters/ nasal cannula prn      therapeutic multivitamin-minerals (THERAGRAN-M) tablet Take 1 tablet by mouth daily. No current facility-administered medications for this visit. Review of Systems      Vitals:    09/03/21 0802 09/03/21 0804 09/03/21 0832   BP: (!) 164/110 (!) 148/90 138/86   Pulse: 90     SpO2: 98%     Weight: 292 lb (132.5 kg)        Physical Exam  Constitutional:       General: He is not in acute distress. Appearance: Normal appearance. He is obese. HENT:      Head: Normocephalic and atraumatic. Pulmonary:      Effort: Pulmonary effort is normal. No respiratory distress. Musculoskeletal:      Comments: BLE with chronic vascular changes, trace to 1+ edema  Right lower ext with wound/ scabbed over, no drainage noted. Surrounding erythema and warmth. Mild tenderness   Neurological:      General: No focal deficit present. Mental Status: He is alert and oriented to person, place, and time. Mental status is at baseline. Psychiatric:         Mood and Affect: Mood normal.         Behavior: Behavior normal.       Assessment/Plan:  PVD (peripheral vascular disease) (HCC)/  Cellulitis of right leg  uncontrolled   Wear compression   Reviewed supportive care  Recurrent cellulitis - reviewed wound care in detail   - cephALEXin (KEFLEX) 500 MG capsule; Take 1 capsule by mouth 2 times daily for 7 days  Dispense: 14 capsule;  Refill: 0    Stage 3b chronic kidney disease (HCC)  Stable, most recent labs stable. Continue with plan per nephrology     Discussed medications with patient, who voiced understanding of their use and indications. All questions answered.     Follow up for AWV     Electronically signed by MANDY Michael CNP on 9/3/2021 at 8:42 AM

## 2021-09-28 RX ORDER — DILTIAZEM HYDROCHLORIDE 180 MG/1
CAPSULE, COATED, EXTENDED RELEASE ORAL
Qty: 180 CAPSULE | Refills: 1 | Status: ON HOLD | OUTPATIENT
Start: 2021-09-28 | End: 2022-01-15 | Stop reason: SDUPTHER

## 2021-12-08 ENCOUNTER — NURSE TRIAGE (OUTPATIENT)
Dept: OTHER | Facility: CLINIC | Age: 66
End: 2021-12-08

## 2021-12-08 ENCOUNTER — TELEPHONE (OUTPATIENT)
Dept: INTERNAL MEDICINE CLINIC | Age: 66
End: 2021-12-08

## 2021-12-08 NOTE — TELEPHONE ENCOUNTER
Received call from Glenroy Ferreira at [facility] with [employer] Complaint. NO TRIAGE indicated. Pt verbalized that he was disconnected from [facility] ([facility]) after speaking with triager. Writer provided warm transfer to [facility] at [facility] for appointment scheduling. Attention Provider: Thank you for allowing me to participate in the care of your patient. The patient was connected to triage in response to information provided to the ECC/PSC. Please do not respond through this encounter as the response is not directed to a shared pool. Reason for Disposition  Christy Bhaskar Caller has already spoken with another triager or PCP (or office), and has further questions and triager able to answer questions.     Protocols used: NO CONTACT OR DUPLICATE CONTACT CALL-ADULT-[place]

## 2021-12-08 NOTE — TELEPHONE ENCOUNTER
Received call from Cheyenne Emerson at [facility] with [employer] Complaint. Brief description of triage: Leg swelling and discoloration     Triage indicates for patient to See in Office within 3 Days    Care advice provided, patient verbalizes understanding; denies any other questions or concerns; instructed to call back for any new or worsening symptoms. Writer provided warm transfer to [place] at [facility] for appointment scheduling. Attention Provider: Thank you for allowing me to participate in the care of your patient. The patient was connected to triage in response to information provided to the ECC/PSC. Please do not respond through this encounter as the response is not directed to a shared pool. Reason for Disposition   MILD swelling of both ankles (i.e., pedal edema) AND new onset or worsening    Answer Assessment - Initial Assessment Questions  1. ONSET: \"When did the swelling start? \" (e.g., minutes, hours, days)      About a year and worsening     2. LOCATION: \"What part of the leg is swollen? \"  \"Are both legs swollen or just one leg? \"      R is worse then L about mid calf to ankle, denies swelling or discoloration to feet,     3. SEVERITY: \"How bad is the swelling? \" (e.g., localized; mild, moderate, severe)   - Localized - small area of swelling localized to one leg   - MILD pedal edema - swelling limited to foot and ankle, pitting edema < 1/4 inch (6 mm) deep, rest and elevation eliminate most or all swelling   - MODERATE edema - swelling of lower leg to knee, pitting edema > 1/4 inch (6 mm) deep, rest and elevation only partially reduce swelling   - SEVERE edema - swelling extends above knee, facial or hand swelling present       Moderate     4. REDNESS: \"Does the swelling look red or infected? \"      Denies, but pt states he gets blisters and is \"scaley\" and R leg is \"tight\"     5. PAIN: \"Is the swelling painful to touch? \" If so, ask: \"How painful is it? \"   (Scale 1-10; mild,

## 2021-12-08 NOTE — TELEPHONE ENCOUNTER
Pt calling right leg swelling (part of foot is amputated) not sore or anything it is purple but he said its always that way---can anyone work him in in next day or so? Please call pt. thanks.

## 2021-12-10 ENCOUNTER — OFFICE VISIT (OUTPATIENT)
Dept: INTERNAL MEDICINE CLINIC | Age: 66
End: 2021-12-10
Payer: MEDICARE

## 2021-12-10 VITALS
WEIGHT: 295 LBS | BODY MASS INDEX: 36.87 KG/M2 | SYSTOLIC BLOOD PRESSURE: 134 MMHG | HEART RATE: 84 BPM | OXYGEN SATURATION: 98 % | DIASTOLIC BLOOD PRESSURE: 88 MMHG

## 2021-12-10 DIAGNOSIS — I73.9 PVD (PERIPHERAL VASCULAR DISEASE) (HCC): Primary | ICD-10-CM

## 2021-12-10 DIAGNOSIS — Z89.431 S/P TRANSMETATARSAL AMPUTATION OF FOOT, RIGHT (HCC): ICD-10-CM

## 2021-12-10 DIAGNOSIS — N18.32 STAGE 3B CHRONIC KIDNEY DISEASE (HCC): ICD-10-CM

## 2021-12-10 PROCEDURE — 3017F COLORECTAL CA SCREEN DOC REV: CPT | Performed by: NURSE PRACTITIONER

## 2021-12-10 PROCEDURE — 99213 OFFICE O/P EST LOW 20 MIN: CPT | Performed by: NURSE PRACTITIONER

## 2021-12-10 PROCEDURE — G8427 DOCREV CUR MEDS BY ELIG CLIN: HCPCS | Performed by: NURSE PRACTITIONER

## 2021-12-10 PROCEDURE — 4040F PNEUMOC VAC/ADMIN/RCVD: CPT | Performed by: NURSE PRACTITIONER

## 2021-12-10 PROCEDURE — G8417 CALC BMI ABV UP PARAM F/U: HCPCS | Performed by: NURSE PRACTITIONER

## 2021-12-10 PROCEDURE — G8484 FLU IMMUNIZE NO ADMIN: HCPCS | Performed by: NURSE PRACTITIONER

## 2021-12-10 PROCEDURE — 1036F TOBACCO NON-USER: CPT | Performed by: NURSE PRACTITIONER

## 2021-12-10 PROCEDURE — 1123F ACP DISCUSS/DSCN MKR DOCD: CPT | Performed by: NURSE PRACTITIONER

## 2021-12-10 NOTE — PROGRESS NOTES
12/10/21     Chief Complaint   Patient presents with    Leg Swelling     right leg x years no pain      HPI     Chronic PVD with edema and discoloration R>L   Had the leg wrapped last night which helps, he is not complaint with compression. Used to take lasix as needed - has not been taking due to previous BRIAN - seeing nephrology. Reports he has not needed lasix as swelling has been pretty controlled. Weight has been stable and denies any dyspnea today. Today he reports he is not having any worsening redness, swelling or warmth. He has a history of cellulitis. He saw a commercial on TV and thought he should follow up.      Allergies   Allergen Reactions    Bactrim      hallucinations    Clindamycin/Lincomycin Diarrhea       Current Outpatient Medications   Medication Sig Dispense Refill    dilTIAZem (CARDIZEM CD) 180 MG extended release capsule TAKE 1 CAPSULE BY MOUTH TWICE A  capsule 1    diclofenac sodium (VOLTAREN) 1 % GEL APPLY 4 GRAMS TOPICALLY 4 TIMES DAILY AS NEEDED FOR PAIN 500 g 2    atorvastatin (LIPITOR) 40 MG tablet TAKE 1 TABLET BY MOUTH EVERY DAY IN THE EVENING 90 tablet 1    acetaminophen (AMINOFEN) 325 MG tablet Take 2 tablets by mouth every 6 hours as needed for Pain 120 tablet 0    allopurinol (ZYLOPRIM) 300 MG tablet Take 1 tablet by mouth 2 times daily 180 tablet 3    metoprolol tartrate (LOPRESSOR) 25 MG tablet Take 1 tablet by mouth 2 times daily 180 tablet 3    sildenafil (REVATIO) 20 MG tablet Take 1 tablet by mouth daily as needed (ED) 6 tablet 0    sertraline (ZOLOFT) 50 MG tablet TAKE 1 TABLET BY MOUTH EVERY DAY 90 tablet 1    dicyclomine (BENTYL) 10 MG capsule Take 1 capsule by mouth 4 times daily as needed (abdominal pain) 120 capsule 3    potassium chloride (KLOR-CON M20) 20 MEQ extended release tablet Taking 3 tablets  tablet 1    pantoprazole (PROTONIX) 40 MG tablet TAKE 1 TABLET BY MOUTH TWICE DAILY 60 tablet 5    budesonide-formoterol (SYMBICORT) 160-4.5 MCG/ACT AERO Inhale 2 puffs into the lungs 2 times daily. 3 Inhaler 3    albuterol-ipratropium (COMBIVENT RESPIMAT)  MCG/ACT AERS inhaler Inhale 1 puff into the lungs every 6 hours. (Patient taking differently: Inhale 1 puff into the lungs every 6 hours as needed ) 3 Inhaler 3    OXYGEN Inhale  into the lungs. 2 liters/ nasal cannula prn      therapeutic multivitamin-minerals (THERAGRAN-M) tablet Take 1 tablet by mouth daily.  furosemide (LASIX) 40 MG tablet TAKE 2 TABLETS BY MOUTH TWICE A  tablet 1     No current facility-administered medications for this visit. Review of Systems  Negative other than HPI    Vitals:    12/10/21 0954   BP: 134/88   Pulse: 84   SpO2: 98%   Weight: 295 lb (133.8 kg)      Physical Exam  Constitutional:       General: He is not in acute distress. Appearance: Normal appearance. HENT:      Head: Normocephalic and atraumatic. Pulmonary:      Effort: Pulmonary effort is normal. No respiratory distress. Musculoskeletal:      Right lower leg: Edema present. Left lower leg: Edema present. Comments: Trace to 1+ BLE with discoloration. No warmth or drainage noted. Non tender. Neurological:      General: No focal deficit present. Mental Status: He is alert and oriented to person, place, and time. Mental status is at baseline. Psychiatric:         Mood and Affect: Mood normal.         Behavior: Behavior normal.       Assessment/Plan:  PVD (peripheral vascular disease) ([facility])/ S/P transmetatarsal amputation of foot, right ([facility])   Moderately controlled, less swelling compared to previous visits. Encouraged compression. No signs of infection or cellulitis today. Stage 3b chronic kidney disease [facility])  Seeing nephrology - has labs ordered    Discussed medications with patient, who voiced understanding of their use and indications. All questions answered. Return for AWV.        Electronically signed by Omar Bassett APRN - CNP on 12/10/2021 at 10:19 AM

## 2021-12-15 RX ORDER — FUROSEMIDE 40 MG/1
TABLET ORAL
Qty: 360 TABLET | Refills: 1 | Status: SHIPPED | OUTPATIENT
Start: 2021-12-15 | End: 2022-01-14 | Stop reason: ALTCHOICE

## 2021-12-20 ENCOUNTER — TELEPHONE (OUTPATIENT)
Dept: INTERNAL MEDICINE CLINIC | Age: 66
End: 2021-12-20

## 2021-12-20 NOTE — TELEPHONE ENCOUNTER
Called pt to offer an appt today at 3 pm. Patient states this is something that he addressed with Joi Fall at his visit on December 10th. I advised there is no documentation of this being addressed. He states he would prefer to wait for Joi Fall to do the referral next week when she returns. He also states he will come in tomorrow if we have something but today he has a home care nurse coming for his roommate and cannot come in today.

## 2021-12-26 DIAGNOSIS — I50.32 CHRONIC DIASTOLIC CONGESTIVE HEART FAILURE (HCC): ICD-10-CM

## 2021-12-26 DIAGNOSIS — I10 ESSENTIAL HYPERTENSION: ICD-10-CM

## 2021-12-27 ENCOUNTER — TELEPHONE (OUTPATIENT)
Dept: INTERNAL MEDICINE CLINIC | Age: 66
End: 2021-12-27

## 2021-12-27 NOTE — TELEPHONE ENCOUNTER
Pt calling in because he was here with Zora Petty 12/10/2021 and he states he was told she would refer him to a surgeon. Please call patient back with information on this.

## 2021-12-27 NOTE — TELEPHONE ENCOUNTER
He told me he had a long standing unchanged lipoma that he was not concerned about - if area of concern has changed he should be seen for it. See other phone call on 12/20.    Starr Cheatham

## 2022-01-14 ENCOUNTER — APPOINTMENT (OUTPATIENT)
Dept: CT IMAGING | Age: 67
DRG: 690 | End: 2022-01-14
Payer: MEDICARE

## 2022-01-14 ENCOUNTER — HOSPITAL ENCOUNTER (INPATIENT)
Age: 67
LOS: 1 days | Discharge: HOME | DRG: 690 | End: 2022-01-15
Attending: EMERGENCY MEDICINE | Admitting: FAMILY MEDICINE
Payer: MEDICARE

## 2022-01-14 ENCOUNTER — APPOINTMENT (OUTPATIENT)
Dept: GENERAL RADIOLOGY | Age: 67
DRG: 690 | End: 2022-01-14
Payer: MEDICARE

## 2022-01-14 ENCOUNTER — NURSE TRIAGE (OUTPATIENT)
Dept: OTHER | Facility: CLINIC | Age: 67
End: 2022-01-14

## 2022-01-14 DIAGNOSIS — I50.32 CHRONIC DIASTOLIC CONGESTIVE HEART FAILURE (HCC): ICD-10-CM

## 2022-01-14 DIAGNOSIS — R51.9 ACUTE INTRACTABLE HEADACHE, UNSPECIFIED HEADACHE TYPE: ICD-10-CM

## 2022-01-14 DIAGNOSIS — I10 ESSENTIAL HYPERTENSION: ICD-10-CM

## 2022-01-14 DIAGNOSIS — R31.0 GROSS HEMATURIA: ICD-10-CM

## 2022-01-14 DIAGNOSIS — N39.0 ACUTE UTI: Primary | ICD-10-CM

## 2022-01-14 DIAGNOSIS — I16.0 HYPERTENSIVE URGENCY: ICD-10-CM

## 2022-01-14 LAB
A/G RATIO: 1.1 (ref 1.1–2.2)
ALBUMIN SERPL-MCNC: 3.5 G/DL (ref 3.4–5)
ALP BLD-CCNC: 112 U/L (ref 40–129)
ALT SERPL-CCNC: 18 U/L (ref 10–40)
ANION GAP SERPL CALCULATED.3IONS-SCNC: 14 MMOL/L (ref 3–16)
AST SERPL-CCNC: 33 U/L (ref 15–37)
BACTERIA: ABNORMAL /HPF
BASOPHILS ABSOLUTE: 0 K/UL (ref 0–0.2)
BASOPHILS RELATIVE PERCENT: 0.6 %
BILIRUB SERPL-MCNC: 0.7 MG/DL (ref 0–1)
BILIRUBIN URINE: NEGATIVE
BLOOD, URINE: ABNORMAL
BUN BLDV-MCNC: 14 MG/DL (ref 7–20)
CALCIUM SERPL-MCNC: 9.1 MG/DL (ref 8.3–10.6)
CHLORIDE BLD-SCNC: 103 MMOL/L (ref 99–110)
CLARITY: ABNORMAL
CO2: 18 MMOL/L (ref 21–32)
COLOR: YELLOW
COMMENT UA: ABNORMAL
CREAT SERPL-MCNC: 1.2 MG/DL (ref 0.8–1.3)
EKG ATRIAL RATE: 100 BPM
EKG DIAGNOSIS: NORMAL
EKG Q-T INTERVAL: 372 MS
EKG QRS DURATION: 88 MS
EKG QTC CALCULATION (BAZETT): 460 MS
EKG R AXIS: -14 DEGREES
EKG T AXIS: 21 DEGREES
EKG VENTRICULAR RATE: 92 BPM
EOSINOPHILS ABSOLUTE: 0.1 K/UL (ref 0–0.6)
EOSINOPHILS RELATIVE PERCENT: 3.3 %
EPITHELIAL CELLS, UA: 1 /HPF (ref 0–5)
GFR AFRICAN AMERICAN: >60
GFR NON-AFRICAN AMERICAN: >60
GLUCOSE BLD-MCNC: 99 MG/DL (ref 70–99)
GLUCOSE URINE: NEGATIVE MG/DL
HCT VFR BLD CALC: 46.7 % (ref 40.5–52.5)
HEMOGLOBIN: 15.4 G/DL (ref 13.5–17.5)
HYALINE CASTS: 1 /LPF (ref 0–8)
INR BLD: 0.99 (ref 0.88–1.12)
KETONES, URINE: NEGATIVE MG/DL
LACTIC ACID: 1 MMOL/L (ref 0.4–2)
LEUKOCYTE ESTERASE, URINE: ABNORMAL
LYMPHOCYTES ABSOLUTE: 0.4 K/UL (ref 1–5.1)
LYMPHOCYTES RELATIVE PERCENT: 17.6 %
MCH RBC QN AUTO: 31.5 PG (ref 26–34)
MCHC RBC AUTO-ENTMCNC: 32.9 G/DL (ref 31–36)
MCV RBC AUTO: 95.6 FL (ref 80–100)
MICROSCOPIC EXAMINATION: YES
MONOCYTES ABSOLUTE: 0.4 K/UL (ref 0–1.3)
MONOCYTES RELATIVE PERCENT: 17.7 %
NEUTROPHILS ABSOLUTE: 1.3 K/UL (ref 1.7–7.7)
NEUTROPHILS RELATIVE PERCENT: 60.8 %
NITRITE, URINE: POSITIVE
PDW BLD-RTO: 13.2 % (ref 12.4–15.4)
PH UA: 7 (ref 5–8)
PLATELET # BLD: 134 K/UL (ref 135–450)
PMV BLD AUTO: 8.6 FL (ref 5–10.5)
POTASSIUM REFLEX MAGNESIUM: 4.7 MMOL/L (ref 3.5–5.1)
PRO-BNP: 1057 PG/ML (ref 0–124)
PROTEIN UA: >=300 MG/DL
PROTHROMBIN TIME: 11.2 SEC (ref 9.9–12.7)
RBC # BLD: 4.88 M/UL (ref 4.2–5.9)
RBC UA: 27 /HPF (ref 0–4)
REASON FOR REJECTION: NORMAL
REJECTED TEST: NORMAL
SODIUM BLD-SCNC: 135 MMOL/L (ref 136–145)
SPECIFIC GRAVITY UA: 1.02 (ref 1–1.03)
TOTAL PROTEIN: 6.7 G/DL (ref 6.4–8.2)
TROPONIN: <0.01 NG/ML
URINE REFLEX TO CULTURE: YES
URINE TYPE: ABNORMAL
UROBILINOGEN, URINE: 0.2 E.U./DL
WBC # BLD: 2.2 K/UL (ref 4–11)
WBC UA: 187 /HPF (ref 0–5)

## 2022-01-14 PROCEDURE — 87077 CULTURE AEROBIC IDENTIFY: CPT

## 2022-01-14 PROCEDURE — 70450 CT HEAD/BRAIN W/O DYE: CPT

## 2022-01-14 PROCEDURE — G0378 HOSPITAL OBSERVATION PER HR: HCPCS

## 2022-01-14 PROCEDURE — 81001 URINALYSIS AUTO W/SCOPE: CPT

## 2022-01-14 PROCEDURE — 6360000002 HC RX W HCPCS: Performed by: EMERGENCY MEDICINE

## 2022-01-14 PROCEDURE — 83605 ASSAY OF LACTIC ACID: CPT

## 2022-01-14 PROCEDURE — 2060000000 HC ICU INTERMEDIATE R&B

## 2022-01-14 PROCEDURE — 85610 PROTHROMBIN TIME: CPT

## 2022-01-14 PROCEDURE — 87086 URINE CULTURE/COLONY COUNT: CPT

## 2022-01-14 PROCEDURE — 93010 ELECTROCARDIOGRAM REPORT: CPT | Performed by: INTERNAL MEDICINE

## 2022-01-14 PROCEDURE — 84484 ASSAY OF TROPONIN QUANT: CPT

## 2022-01-14 PROCEDURE — 36415 COLL VENOUS BLD VENIPUNCTURE: CPT

## 2022-01-14 PROCEDURE — 96361 HYDRATE IV INFUSION ADD-ON: CPT

## 2022-01-14 PROCEDURE — 80053 COMPREHEN METABOLIC PANEL: CPT

## 2022-01-14 PROCEDURE — 87186 SC STD MICRODIL/AGAR DIL: CPT

## 2022-01-14 PROCEDURE — 85025 COMPLETE CBC W/AUTO DIFF WBC: CPT

## 2022-01-14 PROCEDURE — 83880 ASSAY OF NATRIURETIC PEPTIDE: CPT

## 2022-01-14 PROCEDURE — 6370000000 HC RX 637 (ALT 250 FOR IP): Performed by: EMERGENCY MEDICINE

## 2022-01-14 PROCEDURE — 6370000000 HC RX 637 (ALT 250 FOR IP): Performed by: FAMILY MEDICINE

## 2022-01-14 PROCEDURE — 99283 EMERGENCY DEPT VISIT LOW MDM: CPT

## 2022-01-14 PROCEDURE — 93005 ELECTROCARDIOGRAM TRACING: CPT | Performed by: NURSE PRACTITIONER

## 2022-01-14 PROCEDURE — 2580000003 HC RX 258: Performed by: NURSE PRACTITIONER

## 2022-01-14 PROCEDURE — 2580000003 HC RX 258: Performed by: FAMILY MEDICINE

## 2022-01-14 PROCEDURE — 96374 THER/PROPH/DIAG INJ IV PUSH: CPT

## 2022-01-14 PROCEDURE — 71045 X-RAY EXAM CHEST 1 VIEW: CPT

## 2022-01-14 PROCEDURE — 96372 THER/PROPH/DIAG INJ SC/IM: CPT

## 2022-01-14 PROCEDURE — 6360000002 HC RX W HCPCS: Performed by: FAMILY MEDICINE

## 2022-01-14 PROCEDURE — 6360000002 HC RX W HCPCS: Performed by: NURSE PRACTITIONER

## 2022-01-14 PROCEDURE — 96375 TX/PRO/DX INJ NEW DRUG ADDON: CPT

## 2022-01-14 RX ORDER — 0.9 % SODIUM CHLORIDE 0.9 %
1000 INTRAVENOUS SOLUTION INTRAVENOUS ONCE
Status: COMPLETED | OUTPATIENT
Start: 2022-01-14 | End: 2022-01-14

## 2022-01-14 RX ORDER — DIPHENHYDRAMINE HYDROCHLORIDE 50 MG/ML
25 INJECTION INTRAMUSCULAR; INTRAVENOUS ONCE
Status: COMPLETED | OUTPATIENT
Start: 2022-01-14 | End: 2022-01-14

## 2022-01-14 RX ORDER — POLYETHYLENE GLYCOL 3350 17 G/17G
17 POWDER, FOR SOLUTION ORAL DAILY PRN
Status: DISCONTINUED | OUTPATIENT
Start: 2022-01-14 | End: 2022-01-15 | Stop reason: HOSPADM

## 2022-01-14 RX ORDER — LISINOPRIL 10 MG/1
10 TABLET ORAL DAILY
Status: DISCONTINUED | OUTPATIENT
Start: 2022-01-14 | End: 2022-01-15

## 2022-01-14 RX ORDER — ONDANSETRON 4 MG/1
4 TABLET, ORALLY DISINTEGRATING ORAL EVERY 8 HOURS PRN
Status: DISCONTINUED | OUTPATIENT
Start: 2022-01-14 | End: 2022-01-15 | Stop reason: HOSPADM

## 2022-01-14 RX ORDER — SODIUM CHLORIDE 0.9 % (FLUSH) 0.9 %
5-40 SYRINGE (ML) INJECTION EVERY 12 HOURS SCHEDULED
Status: DISCONTINUED | OUTPATIENT
Start: 2022-01-14 | End: 2022-01-15 | Stop reason: HOSPADM

## 2022-01-14 RX ORDER — ACETAMINOPHEN 325 MG/1
650 TABLET ORAL EVERY 6 HOURS PRN
Status: DISCONTINUED | OUTPATIENT
Start: 2022-01-14 | End: 2022-01-15 | Stop reason: HOSPADM

## 2022-01-14 RX ORDER — METOCLOPRAMIDE HYDROCHLORIDE 5 MG/ML
10 INJECTION INTRAMUSCULAR; INTRAVENOUS ONCE
Status: COMPLETED | OUTPATIENT
Start: 2022-01-14 | End: 2022-01-14

## 2022-01-14 RX ORDER — SODIUM CHLORIDE 9 MG/ML
25 INJECTION, SOLUTION INTRAVENOUS PRN
Status: DISCONTINUED | OUTPATIENT
Start: 2022-01-14 | End: 2022-01-15 | Stop reason: HOSPADM

## 2022-01-14 RX ORDER — ONDANSETRON 2 MG/ML
4 INJECTION INTRAMUSCULAR; INTRAVENOUS EVERY 6 HOURS PRN
Status: DISCONTINUED | OUTPATIENT
Start: 2022-01-14 | End: 2022-01-15 | Stop reason: HOSPADM

## 2022-01-14 RX ORDER — SODIUM CHLORIDE 0.9 % (FLUSH) 0.9 %
5-40 SYRINGE (ML) INJECTION PRN
Status: DISCONTINUED | OUTPATIENT
Start: 2022-01-14 | End: 2022-01-15 | Stop reason: HOSPADM

## 2022-01-14 RX ORDER — ACETAMINOPHEN 650 MG/1
650 SUPPOSITORY RECTAL EVERY 6 HOURS PRN
Status: DISCONTINUED | OUTPATIENT
Start: 2022-01-14 | End: 2022-01-15 | Stop reason: HOSPADM

## 2022-01-14 RX ORDER — ATORVASTATIN CALCIUM 40 MG/1
40 TABLET, FILM COATED ORAL NIGHTLY
Status: DISCONTINUED | OUTPATIENT
Start: 2022-01-14 | End: 2022-01-15 | Stop reason: HOSPADM

## 2022-01-14 RX ORDER — HYDRALAZINE HYDROCHLORIDE 20 MG/ML
10 INJECTION INTRAMUSCULAR; INTRAVENOUS EVERY 4 HOURS PRN
Status: DISCONTINUED | OUTPATIENT
Start: 2022-01-14 | End: 2022-01-15 | Stop reason: HOSPADM

## 2022-01-14 RX ORDER — PANTOPRAZOLE SODIUM 40 MG/1
1 TABLET, DELAYED RELEASE ORAL 2 TIMES DAILY
Status: DISCONTINUED | OUTPATIENT
Start: 2022-01-14 | End: 2022-01-14 | Stop reason: ALTCHOICE

## 2022-01-14 RX ORDER — DILTIAZEM HYDROCHLORIDE 180 MG/1
180 CAPSULE, COATED, EXTENDED RELEASE ORAL 2 TIMES DAILY
Status: DISCONTINUED | OUTPATIENT
Start: 2022-01-14 | End: 2022-01-15 | Stop reason: HOSPADM

## 2022-01-14 RX ORDER — AMLODIPINE BESYLATE 5 MG/1
10 TABLET ORAL NIGHTLY
Status: DISCONTINUED | OUTPATIENT
Start: 2022-01-14 | End: 2022-01-15

## 2022-01-14 RX ORDER — HYDRALAZINE HYDROCHLORIDE 20 MG/ML
10 INJECTION INTRAMUSCULAR; INTRAVENOUS ONCE
Status: COMPLETED | OUTPATIENT
Start: 2022-01-14 | End: 2022-01-14

## 2022-01-14 RX ADMIN — METOPROLOL 25 MG: 25 TABLET ORAL at 20:04

## 2022-01-14 RX ADMIN — DILTIAZEM HYDROCHLORIDE 180 MG: 180 CAPSULE, COATED, EXTENDED RELEASE ORAL at 20:04

## 2022-01-14 RX ADMIN — ATORVASTATIN CALCIUM 40 MG: 40 TABLET, FILM COATED ORAL at 20:04

## 2022-01-14 RX ADMIN — Medication 1000 MG: at 10:23

## 2022-01-14 RX ADMIN — DIPHENHYDRAMINE HYDROCHLORIDE 25 MG: 50 INJECTION, SOLUTION INTRAMUSCULAR; INTRAVENOUS at 13:42

## 2022-01-14 RX ADMIN — SODIUM CHLORIDE, PRESERVATIVE FREE 10 ML: 5 INJECTION INTRAVENOUS at 22:57

## 2022-01-14 RX ADMIN — SODIUM CHLORIDE 1000 ML: 9 INJECTION, SOLUTION INTRAVENOUS at 10:28

## 2022-01-14 RX ADMIN — ENOXAPARIN SODIUM 40 MG: 40 INJECTION SUBCUTANEOUS at 22:57

## 2022-01-14 RX ADMIN — HYDRALAZINE HYDROCHLORIDE 10 MG: 20 INJECTION INTRAMUSCULAR; INTRAVENOUS at 16:07

## 2022-01-14 RX ADMIN — AMLODIPINE BESYLATE 10 MG: 5 TABLET ORAL at 20:04

## 2022-01-14 RX ADMIN — METOPROLOL TARTRATE 25 MG: 25 TABLET, FILM COATED ORAL at 13:42

## 2022-01-14 RX ADMIN — METOCLOPRAMIDE 10 MG: 5 INJECTION, SOLUTION INTRAMUSCULAR; INTRAVENOUS at 13:42

## 2022-01-14 RX ADMIN — ACETAMINOPHEN 650 MG: 325 TABLET ORAL at 19:55

## 2022-01-14 RX ADMIN — LISINOPRIL 10 MG: 10 TABLET ORAL at 22:57

## 2022-01-14 ASSESSMENT — PAIN SCALES - GENERAL: PAINLEVEL_OUTOF10: 8

## 2022-01-14 NOTE — Clinical Note
Patient Class: Inpatient [101]   REQUIRED: Diagnosis: UTI (urinary tract infection) [901423]   Estimated Length of Stay: Estimated stay of more than 2 midnights

## 2022-01-14 NOTE — ED PROVIDER NOTES
[facility]        Pt Name: Adri Campuzano  MRN: [id number]  [place] 1955  Date of evaluation: 1/14/2022  Provider: MANDY Jeffers - CNP  PCP: Cristy De Santiago MD  Note Started: 9:08 AM EST        I have seen and evaluated this patient with my supervising physician Amol Kebede, *. CHIEF COMPLAINT       Chief Complaint   Patient presents with    Hematuria     pt c/o blood in urine off and on since the weekend. denies passing any clots. not on any bood thinners. HISTORY OF PRESENT ILLNESS   (Location, Timing/Onset, Context/Setting, Quality, Duration, Modifying Factors, Severity, Associated Signs and Symptoms)  Note limiting factors. Chief Complaint: Hematuria    Adri Campuzano is a 73 y.o. male with medical history of anxiety, arthritis, asthma, A. fib, CAD, CHF, colon cancer, COPD, HTN, HLD, movement disorder, neuropathy, pneumonia, renal insufficiency, sleep apnea, TIA, appendectomy, EGD, ventricular ablation surgery who presents emergency department with complaints of hematuria intermittent for the past week. He denies any associated urinary symptoms to include any dysuria, urinary frequency, urinary urgency, urinary hesitancy, or urinary retention. He also complains of associated generalized headache for the past 10 days. He denies taking medication for the symptoms. Patient was informed on the extremely elevated blood pressure reading 189/120. Patient states he has hypertension, although he has not taken his medicine in over 1 year. Said he had surgery for an appendectomy and they told him to stop taking his medicine before surgery and he never started it since then.  Denies any associated ataxia, dysphagia, neck pain, back pain, paresthesias, loss of bowel or bladder function, visual disturbance, abdominal pain, chest pain, cough, wheezing, palpitations, nausea, vomiting, diarrhea, unilateral leg weakness, lightheaded, dizzy, syncope, rashes, or fevers. He does note bilateral lower extremity edema that has been chronic and unchanged. He had stopped taking his diuretic due to his renal function. States he is followed by nephrology, although cannot give me additional information. Denies receiving the COVID-19 vaccine. Unsure if he had any positive contact with any was tested positive for COVID-19. He denies any recent trauma, accidents, injuries, or falls. Nursing Notes were all reviewed and agreed with or any disagreements were addressed in the HPI. REVIEW OF SYSTEMS    (2-9 systems for level 4, 10 or more for level 5)     Review of Systems    Positives and Pertinent negatives as per HPI. Except as noted above in the ROS, all other systems were reviewed and negative.        PAST MEDICAL HISTORY     Past Medical History:   Diagnosis Date    Anxiety     Arthritis     Asthma     Atrial fibrillation ([facility] [address]     Blood circulation, collateral     CAD (coronary artery disease)     CHF (congestive heart failure) (HCC)     Colon cancer (HCC)     COPD (chronic obstructive pulmonary disease) (HCC)     Diabetic foot infection (HCC)     Hernia     Hyperlipidemia     Hypertension     Movement disorder     Muscle cramps     Neuromuscular disorder (HCC)     marvin feet numbness/neuropathy    Neuropathy     Pneumonia     Renal insufficiency     Sleep apnea     no cpap    Thyroid disease     Tinnitus aurium, bilateral     Unspecified cerebral artery occlusion with cerebral infarction     mini stroke    Unspecified diseases of blood and blood-forming organs          SURGICAL HISTORY     Past Surgical History:   Procedure Laterality Date    ABDOMEN SURGERY      APPENDECTOMY  10/9/2012    CARPAL TUNNEL RELEASE Left 12/11/14    CARPAL TUNNEL RELEASE Right 6/11/2019    RIGHT CARPAL TUNNEL RELEASE AND RIGHT MIDDLE FINGER TRIGGER FINGER RELEASE performed by Vinny Novoa MD at [address] [place] [address] COLON SURGERY      for Colon Ca 10 inches removed from colon    COLONOSCOPY      COLONOSCOPY  3/13/14    COLONOSCOPY  03/02/2018    CYSTOSCOPY Right 5/3/2019    CYSTOSCOPY, RIGHT URETEROSCOPY, STONE EXTRACTION WITH STONE BASKET performed by Suzie Briones MD at [address] (UPPER)  6/4/14    ENDOSCOPY, COLON, DIAGNOSTIC      FOOT SURGERY Right 07/13/2017    Amputation toes 2,3,5, right foot Flap closure, Exostectomy 1st metatarsal right foot    FOOT SURGERY Right 08/17/2017    INCISION AND DRAINAGE OF RIGHT FOOT WITH FLAP CLOSURE    HERNIA REPAIR      umbilical, hiatal hernia repairs    KNEE ARTHROSCOPY Right 08/23/2018    partial medial/ lateral menisectomy    SKIN BIOPSY      TOE AMPUTATION  3/7/11    RIGHT FOURTH TOE    TOE SURGERY      ULNAR TUNNEL RELEASE Left 12-11-14    Left ulnar nerve decompression and left CTR    UPPER GASTROINTESTINAL ENDOSCOPY  1/28/2014    with biopsies    UPPER GASTROINTESTINAL ENDOSCOPY  3/13/14    UPPER GASTROINTESTINAL ENDOSCOPY  6/4/14    with biopsy    UPPER GASTROINTESTINAL ENDOSCOPY  03/02/2018    VENTRICULAR ABLATION SURGERY           CURRENTMEDICATIONS       Previous Medications    ACETAMINOPHEN (AMINOFEN) 325 MG TABLET    Take 2 tablets by mouth every 6 hours as needed for Pain    ALBUTEROL-IPRATROPIUM (COMBIVENT RESPIMAT)  MCG/ACT AERS INHALER    Inhale 1 puff into the lungs every 6 hours. ALLOPURINOL (ZYLOPRIM) 300 MG TABLET    Take 1 tablet by mouth 2 times daily    ATORVASTATIN (LIPITOR) 40 MG TABLET    TAKE 1 TABLET BY MOUTH EVERY DAY IN THE EVENING    BUDESONIDE-FORMOTEROL (SYMBICORT) 160-4.5 MCG/ACT AERO    Inhale 2 puffs into the lungs 2 times daily.     DICLOFENAC SODIUM (VOLTAREN) 1 % GEL    APPLY 4 GRAMS TOPICALLY 4 TIMES DAILY AS NEEDED FOR PAIN    DICYCLOMINE (BENTYL) 10 MG CAPSULE    Take 1 capsule by mouth 4 times daily as needed (abdominal pain)    DILTIAZEM (CARDIZEM CD) 180 MG EXTENDED RELEASE CAPSULE TAKE 1 CAPSULE BY MOUTH TWICE A DAY    METOPROLOL TARTRATE (LOPRESSOR) 25 MG TABLET    TAKE 1 TABLET BY MOUTH TWICE A DAY    OXYGEN    Inhale  into the lungs. 2 liters/ nasal cannula prn    PANTOPRAZOLE (PROTONIX) 40 MG TABLET    TAKE 1 TABLET BY MOUTH TWICE DAILY    POTASSIUM CHLORIDE (KLOR-CON M20) 20 MEQ EXTENDED RELEASE TABLET    Taking 3 tablets BID    SILDENAFIL (REVATIO) 20 MG TABLET    Take 1 tablet by mouth daily as needed (ED)    THERAPEUTIC MULTIVITAMIN-MINERALS (THERAGRAN-M) TABLET    Take 1 tablet by mouth daily. ALLERGIES     Bactrim and Clindamycin/lincomycin    FAMILYHISTORY       Family History   Problem Relation Age of Onset    Cancer Father         colon    High Blood Pressure Father     Heart Disease Father     High Blood Pressure Mother     Heart Disease Mother           SOCIAL HISTORY       Social History     Tobacco Use    Smoking status: Never Smoker    Smokeless tobacco: Never Used   Vaping Use    Vaping Use: Never used   Substance Use Topics    Alcohol use: No     Comment: quit 2006    Drug use: No       SCREENINGS             PHYSICAL EXAM    (up to 7 for level 4, 8 or more for level 5)     ED Triage Vitals [01/14/22 0910]   Enc Vitals Group      BP (!) 189/120      Pulse 95      Resp 20      Temp 98.9 °F (37.2 °C)      Temp Source Infrared      SpO2 96 %      Weight 290 lb (131.5 kg)      Height 6' 3\" (1.905 m)         Physical Exam  Vitals and nursing note reviewed. Constitutional:       General: He is awake. Appearance: Normal appearance. He is well-developed. He is morbidly obese. HENT:      Head: Normocephalic and atraumatic. Right Ear: External ear normal. Decreased hearing noted. Left Ear: External ear normal. Decreased hearing noted. Nose: Nose normal. No congestion or rhinorrhea. Right Sinus: No maxillary sinus tenderness or frontal sinus tenderness. Left Sinus: No maxillary sinus tenderness or frontal sinus tenderness. Mouth/Throat:      Dentition: Abnormal dentition. Dental caries present. Eyes:      General:         Right eye: No discharge. Left eye: No discharge. Cardiovascular:      Rate and Rhythm: Normal rate and regular rhythm. Heart sounds: Normal heart sounds. Pulmonary:      Effort: Pulmonary effort is normal. No respiratory distress. Breath sounds: Normal breath sounds. Abdominal:      General: Bowel sounds are normal.      Palpations: Abdomen is soft. Tenderness: There is no abdominal tenderness. There is no right CVA tenderness or left CVA tenderness. Musculoskeletal:         General: Normal range of motion. Cervical back: Normal range of motion. Right lower leg: Edema (trace) present. Left lower leg: Edema (trace) present. Skin:     General: Skin is warm and dry. Coloration: Skin is not pale. Neurological:      General: No focal deficit present. Mental Status: He is alert and oriented to person, place, and time. Cranial Nerves: Cranial nerves are intact. Sensory: Sensation is intact. Motor: Motor function is intact. Coordination: Coordination is intact. Gait: Gait is intact. Psychiatric:         Behavior: Behavior normal. Behavior is cooperative.          DIAGNOSTIC RESULTS   LABS:    Labs Reviewed   COMPREHENSIVE METABOLIC PANEL W/ REFLEX TO MG FOR LOW K - Abnormal; Notable for the following components:       Result Value    Sodium 135 (*)     CO2 18 (*)     All other components within normal limits    Narrative:     Performed at:  [facility] [address], [place], [facility] [employer]   Phone [phone] - Abnormal; Notable for the following components:    Pro-BNP 1,057 (*)     All other components within normal limits    Narrative:     Performed at:  [facility] [address] [facility], [facility] [employer]   Phone [phone]   URINE RT REFLEX TO CULTURE - Abnormal; Notable for the following components:    Clarity, UA CLOUDY (*)     Blood, Urine MODERATE (*)     Protein, UA >=300 (*)     Nitrite, Urine POSITIVE (*)     Leukocyte Esterase, Urine MODERATE (*)     All other components within normal limits    Narrative:     Performed at:  [facility] [address], [place], [address] [employer]   Phone [phone]   MICROSCOPIC URINALYSIS - Abnormal; Notable for the following components:    Bacteria, UA 1+ (*)     WBC,  (*)     RBC, UA 27 (*)     All other components within normal limits    Narrative:     Performed at:  [facility] [address], [place], [address] [employer]   Phone [phone]   CBC WITH AUTO DIFFERENTIAL - Abnormal; Notable for the following components:    WBC 2.2 (*)     Platelets 413 (*)     Neutrophils Absolute 1.3 (*)     Lymphocytes Absolute 0.4 (*)     All other components within normal limits    Narrative:     Performed at:  [facility] [address], [place], [address] [employer]   Phone [phone]   CULTURE, URINE   TROPONIN    Narrative:     Performed at:  [facility] [address], [place], [address] [employer]   Phone [phone]   PROTIME-INR    Narrative:     Performed at:  [facility] [address], [place], [address] [employer]   Phone [phone]   LACTIC ACID, PLASMA    Narrative:     Performed at:  [facility] [address], [place], [address] [employer]   Phone [phone]    Narrative:     Pina Steward. [id number],  Rejected Test Name/Called to:siobhan/lisa Briones, 01/14/2022 10:48, by Adeline Diane  Performed at:  [facility] [address], [place], [address]   Phone [phone]       When ordered only abnormal lab results are displayed.  All other labs were within normal presented to the ER with complaints of hematuria. Vital signs were reviewed. Exam well-developed, well-nourished male who appears uncomfortable. Peripheral IV placed. Labs, Imaging ordered. - Pertinent Labs & Imaging studies reviewed. (See chart for details)   -  Patient seen and evaluated in the emergency department. -  Triage and nursing notes reviewed and incorporated. -  Old chart records reviewed and incorporated.  -  Dr. Pieter Rodriguez emergency department attending assessed the patient. -  Differential diagnosis includes: kidney stone, pyelonephritis, UTI, peritonitis, hepatitis, Crohn's disease, ulcerative colitis, IBD, enteric adenitis, toxic megacolon, H. pylori, PUD, dissection, C. difficile, appendicitis, bowel obstruction, diverticulitis, hernia, gastroenteritis, colitis vs COVID-19  -  Work-up included:  See above  -  ED treatment included:   Normal saline, Rocephin, metoprolol, hydralazine  -  Results discussed with patient. Gaurang Forrester is a 61-year-old male with complaints of headache x10 days and hematuria intermittent x7 days. He is also concerned for possible COVID-19 infection. He was informed of the elevated blood pressure reading states he has not taken his blood pressure medicine over 1 year as this was stopped after his appendectomy and he never restarted this. On exam he is neurovascular status intact. He is hard of hearing. No gait abnormality detected. There is lower extremity edema at baseline. Lungs clear to auscultate bilateral. Lab work and imaging is completed. Urinalysis with cloudy clarity, moderate blood, greater than 300 protein, nitrate positive, moderate leukocytes, bacteria 1+, , RBC 27. Yasir Rich CMP with sodium 135. Troponin negative. BNP 1057. Pro time 11.2, INR 0.99. Lactic acid 1. CT head without contrast is no acute intercranial abnormality. CBC with WBC 2.2, platelets 826, absolute neutrophils 1.3, absolute lymphocyte 0.4.   CXR shows no acute

## 2022-01-14 NOTE — TELEPHONE ENCOUNTER
Received call from Marialuisa Rivero at [facility] with [employer] Complaint. Subjective: Caller states \"worst headache of his life, no appetite, productive cough white phylum, urinating dark red blood and before that the urine was dark brown color which started 6 days ago - 4 -5 quarts measuring it in a urinal bottle\"     Current Symptoms: headache front to of head, no appetite    Onset: 2 weeks    Associated Symptoms: NA    Pain Severity: 7/10; throbbing; constant    Temperature: no temp    What has been tried: Tylenol    LMP: NA Pregnant: NA    Recommended disposition: 2nd level triage completed with Renetta Delacruz at [facility]. Care advice provided, patient verbalizes understanding; denies any other questions or concerns; instructed to call back for any new or worsening symptoms. Writer provided warm transfer to Renetta Delacruz at [facility] for 2nd level triage. Attention Provider: Thank you for allowing me to participate in the care of your patient. The patient was connected to triage in response to information provided to the ECC/PSC. Please do not respond through this encounter as the response is not directed to a shared pool.       Reason for Disposition   SEVERE headache, states 'worst headache' of life    Protocols used: HEADACHE-ADULT-[place]

## 2022-01-14 NOTE — ED NOTES
Bed: 19  Expected date:   Expected time:   Means of arrival:   Comments:  Will Black RN  01/14/22 3096

## 2022-01-15 VITALS
OXYGEN SATURATION: 95 % | TEMPERATURE: 97.8 F | HEIGHT: 76 IN | SYSTOLIC BLOOD PRESSURE: 116 MMHG | BODY MASS INDEX: 34.44 KG/M2 | HEART RATE: 73 BPM | RESPIRATION RATE: 17 BRPM | DIASTOLIC BLOOD PRESSURE: 76 MMHG | WEIGHT: 282.8 LBS

## 2022-01-15 LAB
ANION GAP SERPL CALCULATED.3IONS-SCNC: 14 MMOL/L (ref 3–16)
BUN BLDV-MCNC: 13 MG/DL (ref 7–20)
CALCIUM SERPL-MCNC: 9 MG/DL (ref 8.3–10.6)
CHLORIDE BLD-SCNC: 105 MMOL/L (ref 99–110)
CO2: 18 MMOL/L (ref 21–32)
CREAT SERPL-MCNC: 1 MG/DL (ref 0.8–1.3)
GFR AFRICAN AMERICAN: >60
GFR NON-AFRICAN AMERICAN: >60
GLUCOSE BLD-MCNC: 98 MG/DL (ref 70–99)
HCT VFR BLD CALC: 50.3 % (ref 40.5–52.5)
HEMOGLOBIN: 16.7 G/DL (ref 13.5–17.5)
MCH RBC QN AUTO: 31.8 PG (ref 26–34)
MCHC RBC AUTO-ENTMCNC: 33.2 G/DL (ref 31–36)
MCV RBC AUTO: 95.7 FL (ref 80–100)
PDW BLD-RTO: 13.6 % (ref 12.4–15.4)
PLATELET # BLD: 155 K/UL (ref 135–450)
PMV BLD AUTO: 8.9 FL (ref 5–10.5)
POTASSIUM SERPL-SCNC: 4.2 MMOL/L (ref 3.5–5.1)
RBC # BLD: 5.26 M/UL (ref 4.2–5.9)
SODIUM BLD-SCNC: 137 MMOL/L (ref 136–145)
WBC # BLD: 3.2 K/UL (ref 4–11)

## 2022-01-15 PROCEDURE — 80048 BASIC METABOLIC PNL TOTAL CA: CPT

## 2022-01-15 PROCEDURE — 85027 COMPLETE CBC AUTOMATED: CPT

## 2022-01-15 PROCEDURE — 6370000000 HC RX 637 (ALT 250 FOR IP): Performed by: FAMILY MEDICINE

## 2022-01-15 PROCEDURE — 6360000002 HC RX W HCPCS: Performed by: FAMILY MEDICINE

## 2022-01-15 PROCEDURE — 96365 THER/PROPH/DIAG IV INF INIT: CPT

## 2022-01-15 PROCEDURE — 96375 TX/PRO/DX INJ NEW DRUG ADDON: CPT

## 2022-01-15 PROCEDURE — G0378 HOSPITAL OBSERVATION PER HR: HCPCS

## 2022-01-15 PROCEDURE — 36415 COLL VENOUS BLD VENIPUNCTURE: CPT

## 2022-01-15 PROCEDURE — 2580000003 HC RX 258: Performed by: FAMILY MEDICINE

## 2022-01-15 RX ORDER — CARVEDILOL 6.25 MG/1
6.25 TABLET ORAL 2 TIMES DAILY WITH MEALS
Status: DISCONTINUED | OUTPATIENT
Start: 2022-01-15 | End: 2022-01-15 | Stop reason: HOSPADM

## 2022-01-15 RX ORDER — DILTIAZEM HYDROCHLORIDE 180 MG/1
180 CAPSULE, COATED, EXTENDED RELEASE ORAL 2 TIMES DAILY
Qty: 180 CAPSULE | Refills: 1 | Status: ON HOLD | OUTPATIENT
Start: 2022-01-15 | End: 2022-11-01 | Stop reason: HOSPADM

## 2022-01-15 RX ORDER — LEVOFLOXACIN 500 MG/1
500 TABLET, FILM COATED ORAL DAILY
Qty: 10 TABLET | Refills: 0 | Status: SHIPPED | OUTPATIENT
Start: 2022-01-15 | End: 2022-01-22

## 2022-01-15 RX ADMIN — HYDRALAZINE HYDROCHLORIDE 10 MG: 20 INJECTION INTRAMUSCULAR; INTRAVENOUS at 05:06

## 2022-01-15 RX ADMIN — LISINOPRIL 10 MG: 10 TABLET ORAL at 07:42

## 2022-01-15 RX ADMIN — ACETAMINOPHEN 650 MG: 325 TABLET ORAL at 06:16

## 2022-01-15 RX ADMIN — CEFEPIME HYDROCHLORIDE 2000 MG: 2 INJECTION, POWDER, FOR SOLUTION INTRAVENOUS at 05:22

## 2022-01-15 RX ADMIN — METOPROLOL 25 MG: 25 TABLET ORAL at 07:42

## 2022-01-15 RX ADMIN — DILTIAZEM HYDROCHLORIDE 180 MG: 180 CAPSULE, COATED, EXTENDED RELEASE ORAL at 07:42

## 2022-01-15 RX ADMIN — SODIUM CHLORIDE, PRESERVATIVE FREE 10 ML: 5 INJECTION INTRAVENOUS at 07:43

## 2022-01-15 RX ADMIN — SODIUM CHLORIDE 25 ML: 9 INJECTION, SOLUTION INTRAVENOUS at 05:20

## 2022-01-15 ASSESSMENT — PAIN DESCRIPTION - ONSET: ONSET: ON-GOING

## 2022-01-15 ASSESSMENT — PAIN SCALES - GENERAL
PAINLEVEL_OUTOF10: 4
PAINLEVEL_OUTOF10: 4
PAINLEVEL_OUTOF10: 6
PAINLEVEL_OUTOF10: 0

## 2022-01-15 ASSESSMENT — PAIN DESCRIPTION - PAIN TYPE: TYPE: ACUTE PAIN

## 2022-01-15 ASSESSMENT — PAIN DESCRIPTION - FREQUENCY: FREQUENCY: INTERMITTENT

## 2022-01-15 ASSESSMENT — PAIN DESCRIPTION - PROGRESSION: CLINICAL_PROGRESSION: NOT CHANGED

## 2022-01-15 ASSESSMENT — PAIN DESCRIPTION - DESCRIPTORS: DESCRIPTORS: ACHING

## 2022-01-15 ASSESSMENT — PAIN - FUNCTIONAL ASSESSMENT: PAIN_FUNCTIONAL_ASSESSMENT: ACTIVITIES ARE NOT PREVENTED

## 2022-01-15 ASSESSMENT — PAIN DESCRIPTION - LOCATION: LOCATION: HEAD

## 2022-01-15 NOTE — PROGRESS NOTES
Data- discharge order received, pt verbalized agreement to discharge, disposition to previous residence, no needs for HHC/DME. Action- discharge instructions prepared and given to pt, pt verbalized understanding. Medication information packet given r/t NEW and/or CHANGED prescriptions emphasizing name/purpose/side effects, pt verbalized understanding. Discharge instruction summary: Diet- general, Activity- as tolerated, Primary Care Physician as follows: Margarito Trejo [phone] f/u appointment as needed, Urology in 2 weeks, immunizations reviewed, prescription medications filled at [place] [id number]. 1. WEIGHT: Admit Weight: 290 lb (131.5 kg) (01/14/22 0910)        Today  Weight: 282 lb 12.8 oz (128.3 kg) (01/14/22 1955)       2. O2 SAT.: SpO2: 95 % (01/15/22 1123)    Response- Pt belongings gathered, IV removed. Disposition is home (no HHC/DME needs), transported with family, taken to lobby via w/c w/ PCA, no complications.

## 2022-01-15 NOTE — H&P
HOSPITALISTS HISTORY AND PHYSICAL    1/14/2022 8:44 PM    Patient Information:  Fina Pettit is a 73 y.o. male [id number]  PCP:  Margarito Trejo MD (Tel: [phone] )    Chief complaint:    Chief Complaint   Patient presents with    Hematuria     pt c/o blood in urine off and on since the weekend. denies passing any clots. not on any bood thinners. History of Present Illness:  Samaria Riley is a 73 y.o. male with h/o Afib, CAD, HTN, OLEG, Colon CA, CHF presented with c/o hematuria . He noticed that intermittently over the past one week. He is not anticoagulated . Not compliant with his other medications. UA showed moderate hematuria and positive nitrites. He receieved a dose of Ceftriaxone in the ED . He was found to be hypertensive in the ED with BP  189/120. He was given a dose of metoprolol and hydralazine in the ED. Resulting in BP readings trending down . HEad CT is neg for hemorrhage or edema       History obtained from patient and   Old medical records show        REVIEW OF SYSTEMS:   Constitutional: Negative for fever,chills or night sweats  ENT: Negative for rhinorrhea, epistaxis, hoarseness, sore throat. Respiratory: Negative for shortness of breath,wheezing  Cardiovascular: Negative for chest pain, palpitations   Gastrointestinal: Negative for nausea, vomiting, diarrhea  Genitourinary: Negative for polyuria, dysuria   Hematologic/Lymphatic: Negative for bleeding tendency, easy bruising  Musculoskeletal: Negative for myalgias and arthralgias  Neurologic: Negative for confusion,dysarthria. Skin: Negative for itching,rash  Psychiatric: Negative for depression,anxiety, agitation. Endocrine: Negative for polydipsia,polyuria,heat /cold intolerance.     Past Medical History:   has a past medical history of Anxiety, Arthritis, Asthma, Atrial fibrillation ([facility] [address] Blood circulation, collateral, CAD (coronary artery disease), CHF (congestive heart failure) ([facility] [address] Colon cancer ([facility] [address] COPD (chronic obstructive pulmonary disease) ([facility] [address] Diabetic foot infection ([facility] [address] Hernia, Hyperlipidemia, Hypertension, Movement disorder, Muscle cramps, Neuromuscular disorder ([facility] [address] Neuropathy, Pneumonia, Renal insufficiency, Sleep apnea, Thyroid disease, Tinnitus aurium, bilateral, Unspecified cerebral artery occlusion with cerebral infarction, and Unspecified diseases of blood and blood-forming organs. Past Surgical History:   has a past surgical history that includes Toe Surgery; Ventricular ablation surgery; Colon surgery; Toe amputation (3/7/11); Appendectomy (10/9/2012); Abdomen surgery; Colonoscopy; Endoscopy, colon, diagnostic; skin biopsy; Upper gastrointestinal endoscopy (1/28/2014); Upper gastrointestinal endoscopy (3/13/14); Colonoscopy (3/13/14); endoscopic ultrasound (upper) (6/4/14); Upper gastrointestinal endoscopy (6/4/14); Ulnar tunnel release (Left, 12-11-14); Carpal tunnel release (Left, 12/11/14); hernia repair; Foot surgery (Right, 07/13/2017); Foot surgery (Right, 08/17/2017); Upper gastrointestinal endoscopy (03/02/2018); Colonoscopy (03/02/2018); Knee arthroscopy (Right, 08/23/2018); Cystoscopy (Right, 5/3/2019); and Carpal tunnel release (Right, 6/11/2019). Medications:  No current facility-administered medications on file prior to encounter. Current Outpatient Medications on File Prior to Encounter   Medication Sig Dispense Refill    diclofenac sodium (VOLTAREN) 1 % GEL APPLY 4 GRAMS TOPICALLY 4 TIMES DAILY AS NEEDED FOR PAIN 500 g 2    acetaminophen (AMINOFEN) 325 MG tablet Take 2 tablets by mouth every 6 hours as needed for Pain 120 tablet 0    potassium chloride (KLOR-CON M20) 20 MEQ extended release tablet Taking 3 tablets  tablet 1    budesonide-formoterol (SYMBICORT) 160-4.5 MCG/ACT AERO Inhale 2 puffs into the lungs 2 times daily.  3 Inhaler 3  albuterol-ipratropium (COMBIVENT RESPIMAT)  MCG/ACT AERS inhaler Inhale 1 puff into the lungs every 6 hours. (Patient taking differently: Inhale 1 puff into the lungs every 6 hours as needed ) 3 Inhaler 3    therapeutic multivitamin-minerals (THERAGRAN-M) tablet Take 1 tablet by mouth daily.  metoprolol tartrate (LOPRESSOR) 25 MG tablet TAKE 1 TABLET BY MOUTH TWICE A  tablet 3    dilTIAZem (CARDIZEM CD) 180 MG extended release capsule TAKE 1 CAPSULE BY MOUTH TWICE A  capsule 1    atorvastatin (LIPITOR) 40 MG tablet TAKE 1 TABLET BY MOUTH EVERY DAY IN THE EVENING 90 tablet 1    allopurinol (ZYLOPRIM) 300 MG tablet Take 1 tablet by mouth 2 times daily 180 tablet 3    sildenafil (REVATIO) 20 MG tablet Take 1 tablet by mouth daily as needed (ED) 6 tablet 0    dicyclomine (BENTYL) 10 MG capsule Take 1 capsule by mouth 4 times daily as needed (abdominal pain) 120 capsule 3    pantoprazole (PROTONIX) 40 MG tablet TAKE 1 TABLET BY MOUTH TWICE DAILY 60 tablet 5    OXYGEN Inhale  into the lungs.  2 liters/ nasal cannula prn       Current Facility-Administered Medications   Medication Dose Route Frequency Provider Last Rate Last Admin    cefepime (MAXIPIME) 2000 mg IVPB minibag  2,000 mg IntraVENous Q12H Jasmin Jordan MD        atorvastatin (LIPITOR) tablet 40 mg  40 mg Oral Nightly Jasmin Jordan MD   40 mg at 01/14/22 2004    dilTIAZem (CARDIZEM CD) extended release capsule 180 mg  180 mg Oral BID Jasmin Jordan MD   180 mg at 01/14/22 2004    metoprolol tartrate (LOPRESSOR) tablet 25 mg  25 mg Oral BID Jasmin Jordan MD   25 mg at 01/14/22 2004    amLODIPine (NORVASC) tablet 10 mg  10 mg Oral Nightly Jasmin Jordan MD   10 mg at 01/14/22 2004    lisinopril (PRINIVIL;ZESTRIL) tablet 10 mg  10 mg Oral Daily Jasmin Jordan MD   10 mg at 01/14/22 2257    sodium chloride flush 0.9 % injection 5-40 mL  5-40 mL IntraVENous 2 times per day Jasmin Jordan MD   10 mL at 01/14/22 2257    sodium chloride flush 0.9 % injection 5-40 mL  5-40 mL IntraVENous PRN Gustavo Strauss MD        0.9 % sodium chloride infusion  25 mL IntraVENous PRN Gustavo Strauss MD        enoxaparin (LOVENOX) injection 40 mg  40 mg SubCUTAneous Nightly Katie Peralta MD   40 mg at 01/14/22 2183    ondansetron (ZOFRAN-ODT) disintegrating tablet 4 mg  4 mg Oral Q8H PRN Gustavo Strauss MD        Or    ondansetron (ZOFRAN) injection 4 mg  4 mg IntraVENous Q6H PRN Gustavo Strauss MD        polyethylene glycol (GLYCOLAX) packet 17 g  17 g Oral Daily PRN Gustavo Strauss MD        acetaminophen (TYLENOL) tablet 650 mg  650 mg Oral Q6H PRN Gustavo Strauss MD   650 mg at 01/14/22 1955    Or    acetaminophen (TYLENOL) suppository 650 mg  650 mg Rectal Q6H PRN Gustavo Strauss MD        hydrALAZINE (APRESOLINE) injection 10 mg  10 mg IntraVENous Q4H PRN Gustavo Strauss MD   10 mg at 01/15/22 0506     Allergies: Allergies   Allergen Reactions    Bactrim      hallucinations    Clindamycin/Lincomycin Diarrhea        Social History:  Patient Lives    reports that he has never smoked. He has never used smokeless tobacco. He reports that he does not drink alcohol and does not use drugs. Family History:  family history includes Cancer in his father; Heart Disease in his father and mother; High Blood Pressure in his father and mother. ,     Physical Exam:  BP (!) 182/112   Pulse 88   Temp 97.7 °F (36.5 °C) (Oral)   Resp 16   Ht 6' 3.5\" (1.918 m)   Wt 282 lb 12.8 oz (128.3 kg)   SpO2 97%   BMI 34.88 kg/m²     General appearance:  Appears comfortable. Well nourished  Eyes: Sclera clear, pupils equal  ENT: Moist mucus membranes, no thrush. Trachea midline. Cardiovascular: Regular rhythm, normal S1, S2. No murmur, gallop, rub.  No edema in lower extremities  Respiratory: Clear to auscultation bilaterally, no wheeze, good inspiratory effort  Gastrointestinal: Abdomen soft, non-tender, not distended, normal bowel sounds  Musculoskeletal: No cyanosis in digits, neck supple  Neurology: Cranial nerves grossly intact. Alert and oriented in time, place and person. No speech or motor deficits  Psychiatry: Appropriate affect. Not agitated  Skin: Warm, dry, normal turgor, no rash  Brisk capillary refill, peripheral pulses palpable   Labs:  CBC:   Lab Results   Component Value Date    WBC 2.2 01/14/2022    RBC 4.88 01/14/2022    HGB 15.4 01/14/2022    HCT 46.7 01/14/2022    MCV 95.6 01/14/2022    MCH 31.5 01/14/2022    MCHC 32.9 01/14/2022    RDW 13.2 01/14/2022     01/14/2022    MPV 8.6 01/14/2022     BMP:    Lab Results   Component Value Date     01/14/2022    K 4.7 01/14/2022     01/14/2022    CO2 18 01/14/2022    BUN 14 01/14/2022    CREATININE 1.2 01/14/2022    CALCIUM 9.1 01/14/2022    GFRAA >60 01/14/2022    GFRAA >60 12/13/2012    LABGLOM >60 01/14/2022    GLUCOSE 99 01/14/2022     CT Head WO Contrast   Final Result   No acute intracranial abnormality. RECOMMENDATIONS:   Unavailable         XR CHEST PORTABLE   Final Result   No acute cardiopulmonary disease. Stable mild cardiomegaly with mild   prominence of the central pulmonary vascularity. Chest Xray:   EKG:    I visualized CXR images and EKG strips    Discussed case  with     Problem List  Active Problems:    UTI (urinary tract infection)  Resolved Problems:    * No resolved hospital problems. *        Assessment/Plan:   Acute cystitis with hematuria  Previous urine cultures dated 05/2019 grew multiple MDR organisms  resistant to Ceftriaxone  Sensitive to Cefepime  Will treat with Cefepime  Urine and blood cultures pending    Hypertension uncontrolled  D/t non compliance  Started On Norvasc, Lopressor,  Lisinopril and prn Hydralazine    DVT prophylaxisLovenox  Code status Full  Diet   IV access   Olson Catheter    Admit as inpati. I anticipate hospitalization spanning more than two midnights for investigation and treatment of the above medically necessary diagnoses.     Please note that some part of this chart was generated using Dragon dictation software. Although every effort was made to ensure the accuracy of this automated transcription, some errors in transcription may have occurred inadvertently. If you may need any clarification, please do not hesitate to contact me through [facility].        Marylu Linares MD    1/14/2022 8:44 PM

## 2022-01-15 NOTE — DISCHARGE SUMMARY
Patient: Jason Mattson     Gender: male  : 1955   Age: 73 y.o.   MRN: [id number]    Admitting Physician: Guanakito Baron MD  Discharge Physician: Shantanu Arguello MD     Code Status: Full Code     Admit Date: 2022   Discharge Date: 1/15/2022       Disposition:  Home    Discharge Diagnoses:  Urinary tract infection  Hematuria secondary to above  Hypertension    Active Hospital Problems    Diagnosis Date Noted    UTI (urinary tract infection) [N39.0] 2022       Follow-up appointments:  one week    Outpatient to do list: Please follow-up with urology      Condition at Discharge:  [address] Course:   43-year-old admitted with intermittent hematuria  Did not have any fevers  UA was abnormal  Urine cultures grew out gram-negative rods greater than 100,000  Patient otherwise was well  Prior to admission he reports not having any hematuria for the last 2 days  Last urine cultures grew out E. coli which was pansensitive  Was started on cefepime  Patient was pretty insistent he had to go home to take care of his daughter  He clearly understood that I did not have the urine sensitivities and hence I may not be able to prescribe the exact antibiotic that the E. coli was sensitive to but and patient voiced understanding but insistent on leaving  Since he was hemodynamically stable I chose to prescribe him antibiotics and not let him sign out AMA  I have discharged him on levofloxacin 500 mg for 7 days  More importantly he has not had an hematuria for the last 2 days and nothing in the hospital  Patient understands that if his any further hematuria he needs to go to the ED in the meantime I have given him a urology consultant name and address for a follow-up  Patient voiced understanding  Also his blood pressure was severely elevated  He was restarted on his home Cardizem and metoprolol  Reports that he has not been taking his blood pressure medications as they were stopped on previous hospitalization and he was not sure what to do with that  I have asked him to resume it and have sent all these prescriptions to his pharmacy  Also his EKG showed a QTC of 460 hence I am giving him levofloxacin      Discharge Medications:   Current Discharge Medication List      START taking these medications    Details   levoFLOXacin (LEVAQUIN) 500 MG tablet Take 1 tablet by mouth daily for 7 days  Qty: 10 tablet, Refills: 0           Current Discharge Medication List      CONTINUE these medications which have CHANGED    Details   metoprolol tartrate (LOPRESSOR) 25 MG tablet Take 1 tablet by mouth 2 times daily  Qty: 180 tablet, Refills: 3    Associated Diagnoses: Chronic diastolic congestive heart failure ([facility] [address] Essential hypertension      dilTIAZem (CARDIZEM CD) 180 MG extended release capsule Take 1 capsule by mouth 2 times daily  Qty: 180 capsule, Refills: 1           Current Discharge Medication List      CONTINUE these medications which have NOT CHANGED    Details   diclofenac sodium (VOLTAREN) 1 % GEL APPLY 4 GRAMS TOPICALLY 4 TIMES DAILY AS NEEDED FOR PAIN  Qty: 500 g, Refills: 2      acetaminophen (AMINOFEN) 325 MG tablet Take 2 tablets by mouth every 6 hours as needed for Pain  Qty: 120 tablet, Refills: 0      potassium chloride (KLOR-CON M20) 20 MEQ extended release tablet Taking 3 tablets BID  Qty: 540 tablet, Refills: 1      budesonide-formoterol (SYMBICORT) 160-4.5 MCG/ACT AERO Inhale 2 puffs into the lungs 2 times daily. Qty: 3 Inhaler, Refills: 3      albuterol-ipratropium (COMBIVENT RESPIMAT)  MCG/ACT AERS inhaler Inhale 1 puff into the lungs every 6 hours. Qty: 3 Inhaler, Refills: 3      therapeutic multivitamin-minerals (THERAGRAN-M) tablet Take 1 tablet by mouth daily.       atorvastatin (LIPITOR) 40 MG tablet TAKE 1 TABLET BY MOUTH EVERY DAY IN THE EVENING  Qty: 90 tablet, Refills: 1      allopurinol (ZYLOPRIM) 300 MG tablet Take 1 tablet by mouth 2 times daily  Qty: 180 tablet, Refills: 3 sildenafil (REVATIO) 20 MG tablet Take 1 tablet by mouth daily as needed (ED)  Qty: 6 tablet, Refills: 0    Associated Diagnoses: Mixed erectile dysfunction      dicyclomine (BENTYL) 10 MG capsule Take 1 capsule by mouth 4 times daily as needed (abdominal pain)  Qty: 120 capsule, Refills: 3    Associated Diagnoses: Abdominal cramping      pantoprazole (PROTONIX) 40 MG tablet TAKE 1 TABLET BY MOUTH TWICE DAILY  Qty: 60 tablet, Refills: 5      OXYGEN Inhale  into the lungs. 2 liters/ nasal cannula prn           Current Discharge Medication List      STOP taking these medications       sertraline (ZOLOFT) 50 MG tablet Comments:   Reason for Stopping:               Discharge ROS:  A complete review of systems was asked and negative      Discharge Exam:    /76   Pulse 73   Temp 97.8 °F (36.6 °C) (Oral)   Resp 17   Ht 6' 3.5\" (1.918 m)   Wt 282 lb 12.8 oz (128.3 kg)   SpO2 95%   BMI 34.88 kg/m²   General appearance:  NAD  HEENT:   Normal cephalic, atraumatic, moist mucous membranes, no oropharyngeal erythema or exudate  Heart[de-identified] Normal s1/s2, RRR, no murmurs, gallops, or rubs. no leg edema  Lungs:  Normal respiratory effort. Clear to auscultation, bilaterally without Rales/Wheezes/Rhonchi. Abdomen: Soft, non-tender, non-distended, bowel sounds present, no masses  Musculoskeletal:  No clubbing, no cyanosis, *  Neurologic:  Neurovascularly intact without any focal sensory/motor deficits. Cranial nerves: II-XII intact, grossly non-focal.    Labs:  For convenience and continuity at follow-up the following most recent labs are provided:    Lab Results   Component Value Date    WBC 3.2 01/15/2022    HGB 16.7 01/15/2022    HCT 50.3 01/15/2022    MCV 95.7 01/15/2022     01/15/2022     01/15/2022    K 4.2 01/15/2022    K 4.7 01/14/2022     01/15/2022    CO2 18 01/15/2022    BUN 13 01/15/2022    CREATININE 1.0 01/15/2022    CALCIUM 9.0 01/15/2022    PHOS 2.7 06/21/2021     01/23/2014 ALKPHOS 112 01/14/2022    ALT 18 01/14/2022    AST 33 01/14/2022    BILITOT 0.7 01/14/2022    BILIDIR <0.2 08/29/2017    LABALBU 3.5 01/14/2022    LDLCALC 91 03/05/2021    TRIG 73 03/05/2021     Lab Results   Component Value Date    INR 0.99 01/14/2022    INR 1.08 05/03/2019    INR 1.04 08/15/2018           The patient was seen and examined on day of discharge and this discharge summary is in conjunction with any daily progress note from day of discharge. Time Spent on discharge is 45 minutes  in the examination, evaluation, counseling and review of medications and discharge plan. Note that greater  than 30 minutes was spent in preparing discharge papers, discussing discharge with patient, medication review, etc.       Signed:    Jose Miguel Landry MD   1/15/2022      Thank you Mary Herbert MD for the opportunity to be involved in this patient's care.  If you have any questions or concerns please feel free to contact me

## 2022-01-16 LAB
ORGANISM: ABNORMAL
URINE CULTURE, ROUTINE: ABNORMAL

## 2022-01-17 RX ORDER — ATORVASTATIN CALCIUM 40 MG/1
TABLET, FILM COATED ORAL
Qty: 90 TABLET | Refills: 1 | Status: SHIPPED | OUTPATIENT
Start: 2022-01-17 | End: 2022-07-18

## 2022-01-17 NOTE — TELEPHONE ENCOUNTER
Last OV: 12/10  Next OV: None     Diclofenac last fill: 9/13/21 w/ 2 refills   Liptior last fill: 9/2/21 90 w/1 refill

## 2022-01-18 NOTE — PROGRESS NOTES
Physician Progress Note      PATIENT:               Perez Deras  CSN #:                  [id number]  :                       1955  ADMIT DATE:       2022 9:02 AM  DISCH DATE:        1/15/2022 3:31 PM  RESPONDING  PROVIDER #:        Janeen Guthrie MD          QUERY TEXT:    Patient admitted with intermittent hematuria. Noted documentation of UTI on   22 H&P. In order to support the diagnosis of UTI, please include   additional clinical indicators in your documentation. Or please document if   the diagnosis of UTI has been ruled out after further study. The medical record reflects the following:  Risk Factors: Hematuria  Clinical Indicators: \"He denies any associated urinary symptoms to include any   dysuria, urinary frequency, urinary urgency, urinary hesitancy, or urinary   retention. \" \"pt c/o blood in urine off and on since the weekend. \"  UA- mod   blood, cloudy, moderate leuks, positive nitrites, 1+ bacteria, RBC- 27, WBC-   187. CX- >100,000 CFU/ml Proteus vulgaris  Treatment: UA, urine cx, Cefepime. DC on levofloxacin 500 mg for 7 days. Options provided:  -- UTI present as evidenced by, Please document evidence.   -- UTI was ruled out  -- Other - I will add my own diagnosis  -- Disagree - Not applicable / Not valid  -- Disagree - Clinically unable to determine / Unknown  -- Refer to Clinical Documentation Reviewer    PROVIDER RESPONSE TEXT:    UTI is present as evidenced by urine cultures psoitive for proteus with   hematuria    Query created by: Watson Hernández on 2022 12:47 PM      Electronically signed by:  Janeen Guthrie MD 2022 12:53 PM

## 2022-02-13 PROBLEM — N39.0 UTI (URINARY TRACT INFECTION): Status: RESOLVED | Noted: 2022-01-14 | Resolved: 2022-02-13

## 2022-02-14 ENCOUNTER — TELEPHONE (OUTPATIENT)
Dept: INTERNAL MEDICINE CLINIC | Age: 67
End: 2022-02-14

## 2022-02-14 NOTE — TELEPHONE ENCOUNTER
----- Message from Sai Garza sent at 2/14/2022 11:18 AM EST -----  Subject: Message to Provider    QUESTIONS  Information for Provider? Pt would like some antibiotics for his [employer]     ---------------------------------------------------------------------------  --------------  CALL BACK INFO  What is the best way for the office to contact you? OK to leave message on   voicemail  Preferred Call Back Phone Number? [phone]  ---------------------------------------------------------------------------  --------------  SCRIPT ANSWERS  Relationship to Patient?  Self

## 2022-02-17 ENCOUNTER — OFFICE VISIT (OUTPATIENT)
Dept: INTERNAL MEDICINE CLINIC | Age: 67
End: 2022-02-17
Payer: COMMERCIAL

## 2022-02-17 VITALS
OXYGEN SATURATION: 100 % | HEIGHT: 76 IN | SYSTOLIC BLOOD PRESSURE: 138 MMHG | DIASTOLIC BLOOD PRESSURE: 88 MMHG | WEIGHT: 302 LBS | BODY MASS INDEX: 36.77 KG/M2 | HEART RATE: 88 BPM

## 2022-02-17 DIAGNOSIS — I73.9 PVD (PERIPHERAL VASCULAR DISEASE) (HCC): ICD-10-CM

## 2022-02-17 DIAGNOSIS — S81.801A OPEN WOUND OF RIGHT LOWER EXTREMITY, INITIAL ENCOUNTER: ICD-10-CM

## 2022-02-17 DIAGNOSIS — Z00.00 INITIAL MEDICARE ANNUAL WELLNESS VISIT: Primary | ICD-10-CM

## 2022-02-17 DIAGNOSIS — Z91.199 NONCOMPLIANCE: ICD-10-CM

## 2022-02-17 DIAGNOSIS — L03.115 CELLULITIS OF RIGHT LEG: ICD-10-CM

## 2022-02-17 DIAGNOSIS — I48.20 CHRONIC ATRIAL FIBRILLATION (HCC): ICD-10-CM

## 2022-02-17 DIAGNOSIS — I10 ESSENTIAL HYPERTENSION: ICD-10-CM

## 2022-02-17 PROCEDURE — G0438 PPPS, INITIAL VISIT: HCPCS | Performed by: NURSE PRACTITIONER

## 2022-02-17 PROCEDURE — 99214 OFFICE O/P EST MOD 30 MIN: CPT | Performed by: NURSE PRACTITIONER

## 2022-02-17 RX ORDER — CEPHALEXIN 500 MG/1
500 CAPSULE ORAL 2 TIMES DAILY
Qty: 14 CAPSULE | Refills: 0 | Status: SHIPPED | OUTPATIENT
Start: 2022-02-17 | End: 2022-06-15 | Stop reason: SDUPTHER

## 2022-02-17 ASSESSMENT — PATIENT HEALTH QUESTIONNAIRE - PHQ9
1. LITTLE INTEREST OR PLEASURE IN DOING THINGS: 0
2. FEELING DOWN, DEPRESSED OR HOPELESS: 0
SUM OF ALL RESPONSES TO PHQ9 QUESTIONS 1 & 2: 0
SUM OF ALL RESPONSES TO PHQ QUESTIONS 1-9: 0

## 2022-02-17 ASSESSMENT — LIFESTYLE VARIABLES
HOW OFTEN DO YOU HAVE A DRINK CONTAINING ALCOHOL: MONTHLY OR LESS
HOW MANY STANDARD DRINKS CONTAINING ALCOHOL DO YOU HAVE ON A TYPICAL DAY: 1 OR 2

## 2022-02-17 NOTE — PROGRESS NOTES
Medicare Annual Wellness Visit    Estee Booth is here for Cellulitis (x1 year, has been seen before for this. ) and Medicare AWV    Assessment & Plan   Hira Hidalgo was seen today for cellulitis and medicare awv. Diagnoses and all orders for this visit:    Initial Medicare annual wellness visit  AWV complete today   Declined all HM     PVD (peripheral vascular disease) (HCC)/ Cellulitis of right leg  -     Culture, Wound  -     cephALEXin (KEFLEX) 500 MG capsule; Take 1 capsule by mouth 2 times daily for 7 days  - wear compression stockings   - HH for wound care [facility])   - discussed wound clinic/ center - pt declined today, is agreeable if not improving at 1 week FU       Noncompliance  Strongly encouraged compliance    Hypertension/Chronic atrial fibrillation  Stable, controlled. Continue current treatment plan. Estee Booth will require the following home care treatments or therapies: nursing for wound care. Home care will be necessary because of open wound x2, PVD, CHF, COPD, HTN, HLD, afib. The patient is in agreement to receiving home care. Recommendations for Preventive Services Due: see orders and patient instructions/AVS.  Recommended screening schedule for the next 5-10 years is provided to the patient in written form: see Patient Instructions/AVS.     Return in 1 week (on 2/24/2022), or if symptoms worsen or fail to improve, for Medicare Annual Wellness Visit in 1 year. Reviewed and updated this visit by clinical staff:  Tobacco  Allergies  Meds  Problems  Med Hx  Surg Hx  Soc Hx  Fam Hx        Subjective   The following acute and/or chronic problems were also addressed today:    Recurrent cellulitis/PVD - right lower leg on and off over past year. States in the past month that two areas started to blister and then opened up. Has noted clear drainage intermittently. Denies pain, chronic neuropathy knee-down.  States has been washing daily and applying OTC triple antibiotic ointment. MRSA right foot 3/5/21. Left lower leg redness noted over past few weeks. Denies open areas/pain. HTN - doesn't check BP at home. 138/88 in the office today. Denies CP/palpitations. Patient's complete Health Risk Assessment and screening values have been reviewed and are found in Flowsheets. The following problems were reviewed today and where indicated follow up appointments were made and/or referrals ordered. Positive Risk Factor Screenings with Interventions:              Health Habits/Nutrition:     Physical Activity: Inactive    Days of Exercise per Week: 0 days    Minutes of Exercise per Session: 0 min     Have you lost any weight without trying in the past 3 months?: No    Body mass index: (!) 36.76    Have you seen the dentist within the past year?: (!) No      Health Habits/Nutrition Interventions:  · Dental exam overdue:  patient encouraged to make appointment with his/her dentist          Objective         Physical Exam  Vitals reviewed. Constitutional:       General: He is not in acute distress. Appearance: He is well-developed. He is not ill-appearing or diaphoretic. HENT:      Head: Normocephalic and atraumatic. Cardiovascular:      Rate and Rhythm: Normal rate and regular rhythm. Heart sounds: Normal heart sounds. No murmur heard. Pulmonary:      Effort: Pulmonary effort is normal. No respiratory distress. Breath sounds: Normal breath sounds. No wheezing or rhonchi. Musculoskeletal:         General: Normal range of motion. Right lower leg: Swelling present. No tenderness. Left lower leg: Swelling present. No tenderness. Skin:     General: Skin is warm and dry. Findings: Erythema (RLE> LLE) and wound (x2 open areas RLE, scant serosang drainage) present. Neurological:      General: No focal deficit present. Mental Status: He is alert and oriented to person, place, and time.    Psychiatric:         Mood and Affect: Mood and affect normal.         Behavior: Behavior normal.           Allergies   Allergen Reactions    Bactrim      hallucinations    Clindamycin/Lincomycin Diarrhea     Prior to Visit Medications    Medication Sig Taking? Authorizing Provider   cephALEXin (KEFLEX) 500 MG capsule Take 1 capsule by mouth 2 times daily for 7 days Yes MANDY Yo CNP   diclofenac sodium (VOLTAREN) 1 % GEL APPLY 4 GRAMS TOPICALLY 4 TIMES DAILY AS NEEDED FOR PAIN Yes Jc Thompson MD   atorvastatin (LIPITOR) 40 MG tablet TAKE 1 TABLET BY MOUTH EVERY DAY IN THE EVENING Yes Jc Thompson MD   metoprolol tartrate (LOPRESSOR) 25 MG tablet Take 1 tablet by mouth 2 times daily Yes Mali Gaffney MD   dilTIAZem (CARDIZEM CD) 180 MG extended release capsule Take 1 capsule by mouth 2 times daily Yes Mali Gaffney MD   acetaminophen (AMINOFEN) 325 MG tablet Take 2 tablets by mouth every 6 hours as needed for Pain Yes SACHIN Denis   allopurinol (ZYLOPRIM) 300 MG tablet Take 1 tablet by mouth 2 times daily Yes MANDY Yo CNP   sildenafil (REVATIO) 20 MG tablet Take 1 tablet by mouth daily as needed (ED) Yes MANDY Yo CNP   dicyclomine (BENTYL) 10 MG capsule Take 1 capsule by mouth 4 times daily as needed (abdominal pain) Yes Jc Thompson MD   potassium chloride (KLOR-CON M20) 20 MEQ extended release tablet Taking 3 tablets BID Yes MANDY Yo CNP   pantoprazole (PROTONIX) 40 MG tablet TAKE 1 TABLET BY MOUTH TWICE DAILY Yes Jc Thompson MD   budesonide-formoterol (SYMBICORT) 160-4.5 MCG/ACT AERO Inhale 2 puffs into the lungs 2 times daily. Yes Darnell López MD   albuterol-ipratropium (COMBIVENT RESPIMAT)  MCG/ACT AERS inhaler Inhale 1 puff into the lungs every 6 hours. Patient taking differently: Inhale 1 puff into the lungs every 6 hours as needed  Yes Darnell López MD   OXYGEN Inhale  into the lungs.  2 liters/ nasal cannula prn Yes Historical Provider, MD   therapeutic multivitamin-minerals (THERAGRAN-M) tablet Take 1 tablet by mouth daily.  Yes Historical Provider, MD       [facility] (Including outside providers/suppliers regularly involved in providing care):   Patient Care Team:  Valentin Roberts MD as PCP - General (Internal Medicine)  Valentin Roberts MD as PCP - [facility]  Zach Jacobs MD as Consulting Physician (Cardiology)  MANDY Sevilla - CNP as Nurse Practitioner (Nurse Practitioner)  Sally Pennington MD as Surgeon (Orthopedic Surgery)  Bonnie Khoury MD as Consulting Physician (Nephrology)

## 2022-02-17 NOTE — PATIENT INSTRUCTIONS
Personalized Preventive Plan for Mac Valeriy - 2/17/2022  Medicare offers a range of preventive health benefits. Some of the tests and screenings are paid in full while other may be subject to a deductible, co-insurance, and/or copay. Some of these benefits include a comprehensive review of your medical history including lifestyle, illnesses that may run in your family, and various assessments and screenings as appropriate. After reviewing your medical record and screening and assessments performed today your provider may have ordered immunizations, labs, imaging, and/or referrals for you. A list of these orders (if applicable) as well as your Preventive Care list are included within your After Visit Summary for your review. Other Preventive Recommendations:    · A preventive eye exam performed by an eye specialist is recommended every 1-2 years to screen for glaucoma; cataracts, macular degeneration, and other eye disorders. · A preventive dental visit is recommended every 6 months. · Try to get at least 150 minutes of exercise per week or 10,000 steps per day on a pedometer . · Order or download the FREE \"Exercise & Physical Activity: Your Everyday Guide\" from [employer]. Call 8-647.137.8916 or search [employer] online. · You need 6281-7258 mg of calcium and 2786-6970 IU of vitamin D per day. It is possible to meet your calcium requirement with diet alone, but a vitamin D supplement is usually necessary to meet this goal.  · When exposed to the sun, use a sunscreen that protects against both UVA and UVB radiation with an SPF of 30 or greater. Reapply every 2 to 3 hours or after sweating, drying off with a towel, or swimming. · Always wear a seat belt when traveling in a car. Always wear a helmet when riding a bicycle or motorcycle.

## 2022-02-18 ENCOUNTER — TELEPHONE (OUTPATIENT)
Dept: INTERNAL MEDICINE CLINIC | Age: 67
End: 2022-02-18

## 2022-02-18 NOTE — TELEPHONE ENCOUNTER
Rosie Fernández from [facility] is calling---pt called them said he talked to you yesterday about getting homecare---they need order faxed over for skilled nursing ev al & treatment---also need H&P faxed over also---please fax to [phone] attn: Chary Kirby. Thanks.

## 2022-02-20 LAB
GRAM STAIN RESULT: ABNORMAL
ORGANISM: ABNORMAL
WOUND/ABSCESS: ABNORMAL

## 2022-02-25 ENCOUNTER — OFFICE VISIT (OUTPATIENT)
Dept: INTERNAL MEDICINE CLINIC | Age: 67
End: 2022-02-25
Payer: MEDICARE

## 2022-02-25 VITALS
SYSTOLIC BLOOD PRESSURE: 138 MMHG | DIASTOLIC BLOOD PRESSURE: 88 MMHG | WEIGHT: 293 LBS | BODY MASS INDEX: 35.67 KG/M2 | OXYGEN SATURATION: 97 % | HEART RATE: 92 BPM

## 2022-02-25 DIAGNOSIS — S81.801D OPEN WOUND OF RIGHT LOWER EXTREMITY, SUBSEQUENT ENCOUNTER: Primary | ICD-10-CM

## 2022-02-25 DIAGNOSIS — Z91.199 NONCOMPLIANCE: ICD-10-CM

## 2022-02-25 DIAGNOSIS — K52.1 ANTIBIOTIC-ASSOCIATED DIARRHEA: ICD-10-CM

## 2022-02-25 DIAGNOSIS — N18.32 STAGE 3B CHRONIC KIDNEY DISEASE (HCC): ICD-10-CM

## 2022-02-25 DIAGNOSIS — I73.9 PVD (PERIPHERAL VASCULAR DISEASE) (HCC): ICD-10-CM

## 2022-02-25 DIAGNOSIS — T36.95XA ANTIBIOTIC-ASSOCIATED DIARRHEA: ICD-10-CM

## 2022-02-25 DIAGNOSIS — L03.115 CELLULITIS OF LEG, RIGHT: ICD-10-CM

## 2022-02-25 DIAGNOSIS — Z89.431 S/P TRANSMETATARSAL AMPUTATION OF FOOT, RIGHT (HCC): ICD-10-CM

## 2022-02-25 PROCEDURE — 99214 OFFICE O/P EST MOD 30 MIN: CPT | Performed by: NURSE PRACTITIONER

## 2022-02-25 ASSESSMENT — ENCOUNTER SYMPTOMS
WHEEZING: 0
COUGH: 0
CHEST TIGHTNESS: 0
SHORTNESS OF BREATH: 0
DIARRHEA: 1

## 2022-02-25 NOTE — PROGRESS NOTES
2/25/22     Chief Complaint   Patient presents with    Foot Pain     \"doing better\"     HPI     In for a 1 week follow-up on his RLE cellulitis/ leg wounds. Has completed one week course of Keflex and tolerated well with mild diarrhea. Has been wrapping his RLE with an ace bandage at night, denies using compression stockings. States he's been putting a \"cream\" on the wounds two times daily - is unsure of what the cream is. States he hasn't been washing the area daily. Wound cultures of BLE wounds obtained 2/17 positive for MRSA. Wound care to come to his house for first visit today. Pt reports back to baseline discoloration and edema     Is due to see Dr Jamee Junior on 3/9/22 for his CDK3.     Allergies   Allergen Reactions    Bactrim      hallucinations    Clindamycin/Lincomycin Diarrhea       Current Outpatient Medications   Medication Sig Dispense Refill    diclofenac sodium (VOLTAREN) 1 % GEL APPLY 4 GRAMS TOPICALLY 4 TIMES DAILY AS NEEDED FOR PAIN 500 g 2    atorvastatin (LIPITOR) 40 MG tablet TAKE 1 TABLET BY MOUTH EVERY DAY IN THE EVENING 90 tablet 1    metoprolol tartrate (LOPRESSOR) 25 MG tablet Take 1 tablet by mouth 2 times daily 180 tablet 3    dilTIAZem (CARDIZEM CD) 180 MG extended release capsule Take 1 capsule by mouth 2 times daily 180 capsule 1    acetaminophen (AMINOFEN) 325 MG tablet Take 2 tablets by mouth every 6 hours as needed for Pain 120 tablet 0    allopurinol (ZYLOPRIM) 300 MG tablet Take 1 tablet by mouth 2 times daily 180 tablet 3    sildenafil (REVATIO) 20 MG tablet Take 1 tablet by mouth daily as needed (ED) 6 tablet 0    dicyclomine (BENTYL) 10 MG capsule Take 1 capsule by mouth 4 times daily as needed (abdominal pain) 120 capsule 3    potassium chloride (KLOR-CON M20) 20 MEQ extended release tablet Taking 3 tablets  tablet 1    pantoprazole (PROTONIX) 40 MG tablet TAKE 1 TABLET BY MOUTH TWICE DAILY 60 tablet 5    budesonide-formoterol (SYMBICORT) 160-4.5 MCG/ACT AERO Inhale 2 puffs into the lungs 2 times daily. 3 Inhaler 3    albuterol-ipratropium (COMBIVENT RESPIMAT)  MCG/ACT AERS inhaler Inhale 1 puff into the lungs every 6 hours. (Patient taking differently: Inhale 1 puff into the lungs every 6 hours as needed ) 3 Inhaler 3    OXYGEN Inhale  into the lungs. 2 liters/ nasal cannula prn      therapeutic multivitamin-minerals (THERAGRAN-M) tablet Take 1 tablet by mouth daily. No current facility-administered medications for this visit. Review of Systems   Constitutional: Negative for chills, fatigue and fever. Respiratory: Negative for cough, chest tightness, shortness of breath and wheezing. Cardiovascular: Negative for chest pain, palpitations and leg swelling. Gastrointestinal: Positive for diarrhea (improved). Skin: Positive for wound (RLE, non-painful, \"feeling better\"). Neurological: Negative for dizziness, tremors, light-headedness and headaches. Vitals:    02/25/22 0818   BP: 138/88   Pulse: 92   SpO2: 97%   Weight: 293 lb (132.9 kg)      Physical Exam  Vitals reviewed. Constitutional:       General: He is not in acute distress. Appearance: He is well-developed. He is not ill-appearing or diaphoretic. HENT:      Head: Normocephalic and atraumatic. Cardiovascular:      Rate and Rhythm: Normal rate and regular rhythm. Heart sounds: Normal heart sounds. No murmur heard. Pulmonary:      Effort: Pulmonary effort is normal. No respiratory distress. Breath sounds: Normal breath sounds. No wheezing or rhonchi. Abdominal:      Palpations: Abdomen is soft. Tenderness: There is no abdominal tenderness. Musculoskeletal:         General: Normal range of motion. Right lower leg: Edema present. Left lower leg: Edema present. Comments: Back to baseline trace to 1+ BLE edema   Skin:     General: Skin is warm and dry.       Findings: Erythema (RLE, no heat - improved and back to baseline) and wound (x2 healing BLE wounds, healing no longer open, no drainage or heat noted. ) present. Neurological:      General: No focal deficit present. Mental Status: He is alert and oriented to person, place, and time. Psychiatric:         Mood and Affect: Mood and affect normal.         Behavior: Behavior normal.       Assessment/Plan:  Open wound of right lower extremity, subsequent encounter/ Cellulitis of leg, right/ PVD (peripheral vascular disease) ([facility])/ S/P transmetatarsal amputation of foot, right ([facility])/ Antibiotic-associated diarrhea  Resolved cellulitis and healing wounds x2   Completed abx - had some loose stool which has resolved. Discussed changing abx based on culture however since resolution of symptoms pt agreeable to following up for any new/worsening symptoms. Discussed importance of following wound care and compression stocking use. If RLE symptoms worsen, make an appointment for follow-up. Discussed medications with patient, who voiced understanding of their use and indications. All questions answered. Stage 3b chronic kidney disease (HCC)/Noncompliance  Stable, controlled. Reminded of appointment on 3/9/22. Encouraged to keep appointment.     FU when due for CC     Electronically signed by MANDY Luo CNP on 2/25/2022 at 9:57 AM

## 2022-03-02 NOTE — CARE COORDINATION
[facility] call to pt re: seeing NP for his tinnitus. Pt states he's had it for years and it's worse lately. Pt states that he will have Dr. Vi Figueredo check his ears when he sees him in May.      POC: RNCC to f/u x1 month, assess educational and or community resource needs [de-identified] : URI symptoms and ? ear infection [FreeTextEntry6] : \par Seen by Dr. Ferreira 10 days ago for congestion and then symptoms resolved the next day but then 2 days ago started w cough and congestion again, as well as fever to 101.3. +tugging at ears, +teething.  No resp distress. Appetite decreased but drinking a little less.  R eye w/ injection.  Mom doing zyrtec and nasal saline/ suction as well as tylenol PRN.

## 2022-03-11 ENCOUNTER — OFFICE VISIT (OUTPATIENT)
Dept: INTERNAL MEDICINE CLINIC | Age: 67
End: 2022-03-11
Payer: MEDICARE

## 2022-03-11 VITALS
HEART RATE: 88 BPM | BODY MASS INDEX: 35.54 KG/M2 | DIASTOLIC BLOOD PRESSURE: 82 MMHG | SYSTOLIC BLOOD PRESSURE: 138 MMHG | WEIGHT: 292 LBS

## 2022-03-11 DIAGNOSIS — J43.8 OTHER EMPHYSEMA (HCC): ICD-10-CM

## 2022-03-11 DIAGNOSIS — I48.20 CHRONIC ATRIAL FIBRILLATION (HCC): ICD-10-CM

## 2022-03-11 DIAGNOSIS — I50.32 CHRONIC DIASTOLIC CONGESTIVE HEART FAILURE (HCC): ICD-10-CM

## 2022-03-11 DIAGNOSIS — Z91.199 NONCOMPLIANCE: ICD-10-CM

## 2022-03-11 DIAGNOSIS — I73.9 PVD (PERIPHERAL VASCULAR DISEASE) (HCC): Primary | ICD-10-CM

## 2022-03-11 DIAGNOSIS — N18.32 STAGE 3B CHRONIC KIDNEY DISEASE (HCC): ICD-10-CM

## 2022-03-11 DIAGNOSIS — G47.33 OBSTRUCTIVE SLEEP APNEA SYNDROME: ICD-10-CM

## 2022-03-11 DIAGNOSIS — I10 ESSENTIAL HYPERTENSION: ICD-10-CM

## 2022-03-11 DIAGNOSIS — E66.9 OBESITY WITH SERIOUS COMORBIDITY, UNSPECIFIED CLASSIFICATION, UNSPECIFIED OBESITY TYPE: ICD-10-CM

## 2022-03-11 DIAGNOSIS — E78.2 MIXED HYPERLIPIDEMIA: ICD-10-CM

## 2022-03-11 PROCEDURE — 99214 OFFICE O/P EST MOD 30 MIN: CPT | Performed by: NURSE PRACTITIONER

## 2022-03-11 NOTE — PROGRESS NOTES
3/11/22     Chief Complaint   Patient presents with    Hypertension    Hyperlipidemia     HPI     Here for follow up. Overall doing well. Still has Dayan 79 coming for PVD and cellulitis. Recent cellulitis has resolved. Had blood work complete with nephrology earlier this week. Canceled his appt with nephrology and states he needs reschedule. HTN - controlled. Not monitoring at home. HLD - taking atorvastatin daily. Afib - on metoprolol, diltiazem, asa. Has not been on [facility] for a long time. Over due to see cardiology - Dr. Mehdi Leggett   COPD - not using inhalers. Has rescue inhaler to use if needed. Denies any change in chronic SOB - lost 10 lbs and this has helped. OLEG- Not wearing oxygen at night, intolerant of cpap. Allergies   Allergen Reactions    Bactrim      hallucinations    Clindamycin/Lincomycin Diarrhea       Current Outpatient Medications   Medication Sig Dispense Refill    diclofenac sodium (VOLTAREN) 1 % GEL APPLY 4 GRAMS TOPICALLY 4 TIMES DAILY AS NEEDED FOR PAIN 500 g 2    atorvastatin (LIPITOR) 40 MG tablet TAKE 1 TABLET BY MOUTH EVERY DAY IN THE EVENING 90 tablet 1    metoprolol tartrate (LOPRESSOR) 25 MG tablet Take 1 tablet by mouth 2 times daily 180 tablet 3    dilTIAZem (CARDIZEM CD) 180 MG extended release capsule Take 1 capsule by mouth 2 times daily 180 capsule 1    acetaminophen (AMINOFEN) 325 MG tablet Take 2 tablets by mouth every 6 hours as needed for Pain 120 tablet 0    allopurinol (ZYLOPRIM) 300 MG tablet Take 1 tablet by mouth 2 times daily 180 tablet 3    pantoprazole (PROTONIX) 40 MG tablet TAKE 1 TABLET BY MOUTH TWICE DAILY 60 tablet 5    budesonide-formoterol (SYMBICORT) 160-4.5 MCG/ACT AERO Inhale 2 puffs into the lungs 2 times daily. 3 Inhaler 3    albuterol-ipratropium (COMBIVENT RESPIMAT)  MCG/ACT AERS inhaler Inhale 1 puff into the lungs every 6 hours.  (Patient taking differently: Inhale 1 puff into the lungs every 6 hours as needed ) 3 classification, unspecified obesity type  Improving - down 10 lbs   Continue to work on weight loss     Stage 3b chronic kidney disease ([facility] [address]  Reviewed recent labs. Re-schedule appt with nephrology. Noncompliance    Discussed medications with patient, who voiced understanding of their use and indications. All questions answered. Return in about 6 months (around 9/11/2022), or if symptoms worsen or fail to improve.        Electronically signed by MANDY De León CNP on 3/11/2022 at 8:50 AM

## 2022-04-19 RX ORDER — ALLOPURINOL 300 MG/1
300 TABLET ORAL 2 TIMES DAILY
Qty: 180 TABLET | Refills: 3 | Status: SHIPPED | OUTPATIENT
Start: 2022-04-19

## 2022-04-21 NOTE — ED PROVIDER NOTES
[facility] Emergency Department      Pt Name: Donaldo Matamoros  MRN: [id number]  [place] 1955  Date of evaluation: 1/14/2022  Provider: Oneida Gandhi MD  I independently performed a history and physical on Donaldo Matamoros. All diagnostic, treatment, and disposition decisions were made by myself in conjunction with the advanced practice provider. HPI: Donaldo Matamoros presented with   Chief Complaint   Patient presents with    Hematuria     pt c/o blood in urine off and on since the weekend. denies passing any clots. not on any bood thinners. Donaldo Matamoros has a past medical history of Anxiety, Arthritis, Asthma, Atrial fibrillation ([address] Blood circulation, collateral, CAD (coronary artery disease), CHF (congestive heart failure) ([address] Colon cancer ([address] COPD (chronic obstructive pulmonary disease) ([address] Diabetic foot infection ([address] Hernia, Hyperlipidemia, Hypertension, Movement disorder, Muscle cramps, Neuromuscular disorder ([address] Neuropathy, Pneumonia, Renal insufficiency, Sleep apnea, Thyroid disease, Tinnitus aurium, bilateral, Unspecified cerebral artery occlusion with cerebral infarction, and Unspecified diseases of blood and blood-forming organs. He has a past surgical history that includes Toe Surgery; Ventricular ablation surgery; Colon surgery; Toe amputation (3/7/11); Appendectomy (10/9/2012); Abdomen surgery; Colonoscopy; Endoscopy, colon, diagnostic; skin biopsy; Upper gastrointestinal endoscopy (1/28/2014); Upper gastrointestinal endoscopy (3/13/14); Colonoscopy (3/13/14); endoscopic ultrasound (upper) (6/4/14); Upper gastrointestinal endoscopy (6/4/14); Ulnar tunnel release (Left, 12-11-14); Carpal tunnel release (Left, 12/11/14); hernia repair; Foot surgery (Right, 07/13/2017); Foot surgery (Right, 08/17/2017); Upper gastrointestinal endoscopy (03/02/2018); Colonoscopy (03/02/2018); Knee arthroscopy (Right, 08/23/2018);  Cystoscopy (Right, 5/3/2019); and Carpal Results and recommendations conveyed through [employer] #[id number].   tunnel release (Right, 6/11/2019). No current facility-administered medications on file prior to encounter. Current Outpatient Medications on File Prior to Encounter   Medication Sig Dispense Refill    diclofenac sodium (VOLTAREN) 1 % GEL APPLY 4 GRAMS TOPICALLY 4 TIMES DAILY AS NEEDED FOR PAIN 500 g 2    acetaminophen (AMINOFEN) 325 MG tablet Take 2 tablets by mouth every 6 hours as needed for Pain 120 tablet 0    potassium chloride (KLOR-CON M20) 20 MEQ extended release tablet Taking 3 tablets  tablet 1    budesonide-formoterol (SYMBICORT) 160-4.5 MCG/ACT AERO Inhale 2 puffs into the lungs 2 times daily. 3 Inhaler 3    albuterol-ipratropium (COMBIVENT RESPIMAT)  MCG/ACT AERS inhaler Inhale 1 puff into the lungs every 6 hours. (Patient taking differently: Inhale 1 puff into the lungs every 6 hours as needed ) 3 Inhaler 3    therapeutic multivitamin-minerals (THERAGRAN-M) tablet Take 1 tablet by mouth daily.  metoprolol tartrate (LOPRESSOR) 25 MG tablet TAKE 1 TABLET BY MOUTH TWICE A  tablet 3    dilTIAZem (CARDIZEM CD) 180 MG extended release capsule TAKE 1 CAPSULE BY MOUTH TWICE A  capsule 1    atorvastatin (LIPITOR) 40 MG tablet TAKE 1 TABLET BY MOUTH EVERY DAY IN THE EVENING 90 tablet 1    allopurinol (ZYLOPRIM) 300 MG tablet Take 1 tablet by mouth 2 times daily 180 tablet 3    sildenafil (REVATIO) 20 MG tablet Take 1 tablet by mouth daily as needed (ED) 6 tablet 0    dicyclomine (BENTYL) 10 MG capsule Take 1 capsule by mouth 4 times daily as needed (abdominal pain) 120 capsule 3    pantoprazole (PROTONIX) 40 MG tablet TAKE 1 TABLET BY MOUTH TWICE DAILY 60 tablet 5    OXYGEN Inhale  into the lungs.  2 liters/ nasal cannula prn       PHYSICAL EXAM  Vitals: BP (!) 178/125   Pulse 95   Temp 98.9 °F (37.2 °C) (Infrared)   Resp 20   Ht 6' 3\" (1.905 m)   Wt 290 lb (131.5 kg)   SpO2 94%   BMI 36.25 kg/m²   Constitutional:  73 y.o. male alert, cooperative  HENT: Atraumatic scalp, mucous membranes moist  Eyes:   Conjunctiva clear, no icterus  Neck:  Supple, no visible JVD, no signs of injury  Cardiovascular:  Regular, no rubs  Thorax & Lungs:  No accessory muscle usage, clear  Abdomen:  Soft, non distended, NT  Back:  No deformity  Genitalia:  Deferred  Rectal:  Deferred  Extremities:  No cyanosis, no edema, adequate perfusion  Skin:  Warm, dry  Neurologic:  Alert, speech normal, mentation normal, pupils equal, normal coordination of extremities, no facial asymmetry   Psychiatric:  Affect appropriate    Medical Decision Making and Plan:  Briefly, this is an 73 y. o.male who presented with hematuria, headache. Been off of hypertensive and A. fib meds for over a year. Markedly hypertensive and continues to be so after oral metoprolol. Continues to have headache. CT brain without acute findings. He does have findings of UTI with hematuria. Plan is to admit for further care.      For further details of [address] Emergency Department encounter, please see documentation by advanced practice provider Rosie Camargo CNP    Labs Reviewed   CULTURE, URINE - Abnormal; Notable for the following components:       Result Value    Organism Gram negative levi (*)     All other components within normal limits    Narrative:     ORDER#: [id number]                          ORDERED BY: Shirlene Hernandez  SOURCE: Urine Clean Catch                  COLLECTED:  01/14/22 09:30  ANTIBIOTICS AT [facility]ADIM.:                      RECEIVED :  01/14/22 20:38  Performed at:  [address]   Phone [phone]   COMPREHENSIVE METABOLIC PANEL W/ REFLEX TO MG FOR LOW K - Abnormal; Notable for the following components:    Sodium 135 (*)     CO2 18 (*)     All other components within normal limits    Narrative:     Performed at:  [facility] [address], [place], [address]   Phone 21  - Abnormal; Notable for the following components:    Pro-BNP 1,057 (*)     All other components within normal limits    Narrative:     Performed at:  [facility] [address]   Phone [phone]   URINE RT REFLEX TO CULTURE - Abnormal; Notable for the following components:    Clarity, UA CLOUDY (*)     Blood, Urine MODERATE (*)     Protein, UA >=300 (*)     Nitrite, Urine POSITIVE (*)     Leukocyte Esterase, Urine MODERATE (*)     All other components within normal limits    Narrative:     Performed at:  [facility] [address]   Phone [phone]   MICROSCOPIC URINALYSIS - Abnormal; Notable for the following components:    Bacteria, UA 1+ (*)     WBC,  (*)     RBC, UA 27 (*)     All other components within normal limits    Narrative:     Performed at:  [facility] [address]   Phone [phone]   CBC WITH AUTO DIFFERENTIAL - Abnormal; Notable for the following components:    WBC 2.2 (*)     Platelets 038 (*)     Neutrophils Absolute 1.3 (*)     Lymphocytes Absolute 0.4 (*)     All other components within normal limits    Narrative:     Performed at:  [facility] [address]   Phone [phone]   CBC - Abnormal; Notable for the following components:    WBC 3.2 (*)     All other components within normal limits    Narrative:     Performed at:  [facility] [address]   Phone [phone]   BASIC METABOLIC PANEL - Abnormal; Notable for the following components:    CO2 18 (*)     All other components within normal limits    Narrative:     Performed at:  [facility] [address]   Phone [phone]   TROPONIN Narrative:     Performed at:  [facility]  [address] [facility],  [place], [address]   Phone [phone]   PROTIME-INR    Narrative:     Performed at:  [facility]  [address] [facility],  [place], [address]   Phone [phone]   LACTIC ACID, PLASMA    Narrative:     Performed at:  [facility]  [address] [facility],  [place], [address]   Phone [phone]    Narrative:     Umer Sepulveda [id number],  Rejected Test Name/Called to:siobhan/lisa Briones, 01/14/2022 10:48, by Hayde Disla  Performed at:  [facility]  [address] [facility],  [place], [address]   Phone [phone]     RADIOLOGY:     Plain x-rays were viewed by me:   CT Head WO Contrast   Final Result   No acute intracranial abnormality. RECOMMENDATIONS:   Unavailable         XR CHEST PORTABLE   Final Result   No acute cardiopulmonary disease. Stable mild cardiomegaly with mild   prominence of the central pulmonary vascularity. EKG:  Read by me in the absence of a cardiologist shows:   Atrial fibrillation, rate 92, RSR prime pattern in V1, nonspecific ST-T wave abnormality, prior EKG also with A. fib with faster heart rate    CRITICAL CARE:  Total critical care time of 31 minutes (excludes any time for procedures). This includes but not limited to vital sign monitoring, medication, clinical response to medications, interpretation of diagnostics, review of nursing notes, pertinent record review, discussions about patient condition, consultation time, documentation time. Critical care due to the patient's hypertensive urgency.      Medications administered:  Medications   atorvastatin (LIPITOR) tablet 40 mg (40 mg Oral Given 1/14/22 2004)   dilTIAZem (CARDIZEM CD) extended release capsule 180 mg (180 mg Oral Given 1/15/22 0742)   sodium chloride flush 0.9 % injection 5-40 mL (10 mLs IntraVENous Given 1/15/22 0743)   sodium chloride flush 0.9 % injection 5-40 mL (has no administration in time range)   0.9 % sodium chloride infusion (25 mLs IntraVENous New Bag 1/15/22 0520)   enoxaparin (LOVENOX) injection 40 mg (40 mg SubCUTAneous Given 1/14/22 3877)   ondansetron (ZOFRAN-ODT) disintegrating tablet 4 mg (has no administration in time range)     Or   ondansetron (ZOFRAN) injection 4 mg (has no administration in time range)   polyethylene glycol (GLYCOLAX) packet 17 g (has no administration in time range)   acetaminophen (TYLENOL) tablet 650 mg (650 mg Oral Given 1/15/22 0616)     Or   acetaminophen (TYLENOL) suppository 650 mg ( Rectal See Alternative 1/15/22 0616)   hydrALAZINE (APRESOLINE) injection 10 mg (10 mg IntraVENous Given 1/15/22 0506)   cefepime (MAXIPIME) 2000 mg IVPB minibag (0 mg IntraVENous Stopped 1/15/22 0650)   carvedilol (COREG) tablet 6.25 mg (has no administration in time range)   0.9 % sodium chloride bolus (0 mLs IntraVENous Stopped 1/14/22 1131)   cefTRIAXone (ROCEPHIN) 1000 mg in sterile water 10 mL IV syringe (1,000 mg IntraVENous Given 1/14/22 1023)   metoclopramide (REGLAN) injection 10 mg (10 mg IntraVENous Given 1/14/22 1342)   diphenhydrAMINE (BENADRYL) injection 25 mg (25 mg IntraVENous Given 1/14/22 1342)   metoprolol tartrate (LOPRESSOR) tablet 25 mg (25 mg Oral Given 1/14/22 1342)   hydrALAZINE (APRESOLINE) injection 10 mg (10 mg IntraVENous Given 1/14/22 1607)     FINAL IMPRESSION:    1. Acute UTI    2. Gross hematuria    3. Hypertensive urgency    4.  Acute intractable headache, unspecified headache type         Steven Prtaher MD  01/15/22 6539

## 2022-06-13 ENCOUNTER — TELEPHONE (OUTPATIENT)
Dept: INTERNAL MEDICINE CLINIC | Age: 67
End: 2022-06-13

## 2022-06-13 NOTE — TELEPHONE ENCOUNTER
----- Message from [id number] [facility] sent at 6/13/2022  9:08 AM EDT -----  Subject: Message to Provider    QUESTIONS  Information for Provider? Patient has cellulitis and was assigned a home   care nurse. Home care nurse thinks antibiotic is needed. Patient is   willing to see Nilson Gomez for appointment if necessary, but if possible   to call in antibiotic for cellulitis without needing an appt, that would   be preferred. Patient says Nilson Gomez has seen him for this condition   before and prescribed antibiotic for this for him before, though he cannot   remember specific name of antibiotic. Please follow up with patient.  ---------------------------------------------------------------------------  --------------  CALL BACK INFO  What is the best way for the office to contact you? OK to leave message on   voicemail  Preferred Call Back Phone Number? [phone]  ---------------------------------------------------------------------------  --------------  SCRIPT ANSWERS  Relationship to Patient?  Self

## 2022-06-15 ENCOUNTER — OFFICE VISIT (OUTPATIENT)
Dept: INTERNAL MEDICINE CLINIC | Age: 67
End: 2022-06-15
Payer: MEDICARE

## 2022-06-15 VITALS
OXYGEN SATURATION: 95 % | SYSTOLIC BLOOD PRESSURE: 138 MMHG | WEIGHT: 302 LBS | DIASTOLIC BLOOD PRESSURE: 86 MMHG | BODY MASS INDEX: 36.76 KG/M2 | HEART RATE: 87 BPM

## 2022-06-15 DIAGNOSIS — L03.115 CELLULITIS OF RIGHT LEG: ICD-10-CM

## 2022-06-15 DIAGNOSIS — I73.9 PVD (PERIPHERAL VASCULAR DISEASE) (HCC): ICD-10-CM

## 2022-06-15 PROCEDURE — 99213 OFFICE O/P EST LOW 20 MIN: CPT | Performed by: NURSE PRACTITIONER

## 2022-06-15 PROCEDURE — 1123F ACP DISCUSS/DSCN MKR DOCD: CPT | Performed by: NURSE PRACTITIONER

## 2022-06-15 RX ORDER — CEPHALEXIN 500 MG/1
500 CAPSULE ORAL 2 TIMES DAILY
Qty: 14 CAPSULE | Refills: 0 | Status: SHIPPED | OUTPATIENT
Start: 2022-06-15 | End: 2022-06-22

## 2022-06-15 ASSESSMENT — ENCOUNTER SYMPTOMS
WHEEZING: 0
CHEST TIGHTNESS: 0
SHORTNESS OF BREATH: 0
COUGH: 0

## 2022-06-15 NOTE — PROGRESS NOTES
6/15/22     Chief Complaint   Patient presents with    Cellulitis     right leg      HPI     Here for recurrent right leg cellulitis   Has Dayan Gonzalez nurse coming out sill and this is going well  Redness and warmth restarted last week  Getting worse since onset  Small open area with clear thin drainage right above ankle   [facility] nurse has been wrapping to help with swelling   Not wrapped today - removed it before appt     Allergies   Allergen Reactions    Bactrim      hallucinations    Clindamycin/Lincomycin Diarrhea     Current Outpatient Medications   Medication Sig Dispense Refill    cephALEXin (KEFLEX) 500 MG capsule Take 1 capsule by mouth 2 times daily for 7 days 14 capsule 0    allopurinol (ZYLOPRIM) 300 MG tablet TAKE 1 TABLET BY MOUTH 2 TIMES DAILY 180 tablet 3    diclofenac sodium (VOLTAREN) 1 % GEL APPLY 4 GRAMS TOPICALLY 4 TIMES DAILY AS NEEDED FOR PAIN 500 g 2    atorvastatin (LIPITOR) 40 MG tablet TAKE 1 TABLET BY MOUTH EVERY DAY IN THE EVENING 90 tablet 1    metoprolol tartrate (LOPRESSOR) 25 MG tablet Take 1 tablet by mouth 2 times daily 180 tablet 3    dilTIAZem (CARDIZEM CD) 180 MG extended release capsule Take 1 capsule by mouth 2 times daily 180 capsule 1    acetaminophen (AMINOFEN) 325 MG tablet Take 2 tablets by mouth every 6 hours as needed for Pain 120 tablet 0    pantoprazole (PROTONIX) 40 MG tablet TAKE 1 TABLET BY MOUTH TWICE DAILY 60 tablet 5    budesonide-formoterol (SYMBICORT) 160-4.5 MCG/ACT AERO Inhale 2 puffs into the lungs 2 times daily. 3 Inhaler 3    albuterol-ipratropium (COMBIVENT RESPIMAT)  MCG/ACT AERS inhaler Inhale 1 puff into the lungs every 6 hours. (Patient taking differently: Inhale 1 puff into the lungs every 6 hours as needed ) 3 Inhaler 3    OXYGEN Inhale  into the lungs. 2 liters/ nasal cannula prn      therapeutic multivitamin-minerals (THERAGRAN-M) tablet Take 1 tablet by mouth daily. No current facility-administered medications for this visit. Review of Systems   Constitutional: Negative for chills, fatigue and fever. Respiratory: Negative for cough, chest tightness, shortness of breath and wheezing. Cardiovascular: Negative for chest pain, palpitations and leg swelling. Neurological: Negative for dizziness, tremors, light-headedness and headaches. Vitals:    06/15/22 0933   BP: 138/86   Pulse: 87   SpO2: 95%   Weight: (!) 302 lb (137 kg)      Physical Exam  Constitutional:       General: He is not in acute distress. Appearance: Normal appearance. HENT:      Head: Normocephalic and atraumatic. Pulmonary:      Effort: Pulmonary effort is normal. No respiratory distress. Musculoskeletal:      Comments: right lower ext edema, trace to 1+ today  Increased redness and warmth   Small open wound with clear thin drainage  No odor     Neurological:      Mental Status: He is alert and oriented to person, place, and time. Mental status is at baseline. Psychiatric:         Mood and Affect: Mood normal.         Behavior: Behavior normal.       Assessment/Plan:  PVD (peripheral vascular disease) (HCC)/ Cellulitis of right leg  Chronic, uncontrolled with acute cellulitis   Covering with abx  Sending fluid for culture  Reviewed wound care  Continue to wear compression as directed   - cephALEXin (KEFLEX) 500 MG capsule; Take 1 capsule by mouth 2 times daily for 7 days  Dispense: 14 capsule; Refill: 0  - Culture, Wound    Discussed medications with patient, who voiced understanding of their use and indications. All questions answered.     Reviewed strict criteria for follow up   FU for chronic disease management     Electronically signed by MANDY Funes CNP on 6/15/2022 at 9:52 AM

## 2022-06-18 LAB
GRAM STAIN RESULT: ABNORMAL
ORGANISM: ABNORMAL
WOUND/ABSCESS: ABNORMAL

## 2022-06-20 RX ORDER — DOXYCYCLINE HYCLATE 100 MG
100 TABLET ORAL 2 TIMES DAILY
Qty: 14 TABLET | Refills: 0 | Status: SHIPPED | OUTPATIENT
Start: 2022-06-20 | End: 2022-06-27

## 2022-07-18 RX ORDER — ATORVASTATIN CALCIUM 40 MG/1
TABLET, FILM COATED ORAL
Qty: 90 TABLET | Refills: 1 | Status: SHIPPED | OUTPATIENT
Start: 2022-07-18

## 2022-09-20 ENCOUNTER — TELEPHONE (OUTPATIENT)
Dept: PHARMACY | Facility: CLINIC | Age: 67
End: 2022-09-20

## 2022-09-20 NOTE — TELEPHONE ENCOUNTER
[facility]: ADHERENCE REVIEW  Identified care gap per [place]: fills at [facility]: Statin adherence    Last Visit: 7/18/22 same day    [id number] Records claims through 9.12.22 YTD [place] =  75%; Potential Fail Date: 9/23/22 ):   Atorvastatin 40mg Next refill due: 7/18/22    Per Reconciled Dispense Report:   last filled on 4/19/22 for 90 day supply. Per [facility]:   New script sent 7/18    Lab Results   Component Value Date    CHOL 156 03/05/2021    TRIG 73 03/05/2021    HDL 50 03/05/2021    LDLCALC 91 03/05/2021     ALT   Date Value Ref Range Status   01/14/2022 18 10 - 40 U/L Final     Comment:     Specimen hemolysis has exceeded the interference as defined by Roche. Result may be affected. Suggest recollection if clinically indicated. AST   Date Value Ref Range Status   01/14/2022 33 15 - 37 U/L Final     Comment:     Specimen hemolysis has exceeded the interference as defined by Roche. Value may be falsely increased. Suggest recollection if clinically  indicated. The 10-year ASCVD risk score (Gladis Wheeler, et al., 2013) is: 15.3%    Values used to calculate the score:      Age: 78 years      Sex: Male      Is Non- : No      Diabetic: No      Tobacco smoker: No      Systolic Blood Pressure: 243 mmHg      Is BP treated: Yes      HDL Cholesterol: 50 mg/dL      Total Cholesterol: 156 mg/dL     PLAN  The following are interventions that have been identified:   - Patient overdue refilling Atorvastatin 40mg . Unsure if patient is still prescribed this medication. Reached patient to review. Verified medication instructions, Education provided. , and Barrier(s) identified: patient hasn't been taking. Can't recall why or who stopped. He will start back taking daily and double check at next appt. Declined needing refill. No future appointments. Ana Cristina Rider CPhT.    Jordy Mccoy Lindsey free: [address] in place:  No  Recommendation Provided To: Patient/Caregiver: 1 via Telephone  Intervention Detail: Adherence Monitorin  Gap Closed?: Yes   Intervention Accepted By: Patient/Caregiver: 1  Time Spent (min): 15

## 2022-10-30 ENCOUNTER — HOSPITAL ENCOUNTER (INPATIENT)
Age: 67
LOS: 2 days | Discharge: HOME | DRG: 069 | End: 2022-11-01
Attending: EMERGENCY MEDICINE | Admitting: STUDENT IN AN ORGANIZED HEALTH CARE EDUCATION/TRAINING PROGRAM
Payer: MEDICARE

## 2022-10-30 ENCOUNTER — APPOINTMENT (OUTPATIENT)
Dept: GENERAL RADIOLOGY | Age: 67
DRG: 069 | End: 2022-10-30
Payer: MEDICARE

## 2022-10-30 ENCOUNTER — APPOINTMENT (OUTPATIENT)
Dept: CT IMAGING | Age: 67
DRG: 069 | End: 2022-10-30
Payer: MEDICARE

## 2022-10-30 DIAGNOSIS — Z86.79 HISTORY OF ATRIAL FIBRILLATION: ICD-10-CM

## 2022-10-30 DIAGNOSIS — R40.4 TRANSIENT ALTERATION OF AWARENESS: ICD-10-CM

## 2022-10-30 DIAGNOSIS — G45.9 TIA (TRANSIENT ISCHEMIC ATTACK): Primary | ICD-10-CM

## 2022-10-30 PROBLEM — I50.30 (HFPEF) HEART FAILURE WITH PRESERVED EJECTION FRACTION (HCC): Status: ACTIVE | Noted: 2022-10-30

## 2022-10-30 PROBLEM — R56.9 SEIZURE (HCC): Status: ACTIVE | Noted: 2022-10-30

## 2022-10-30 LAB
A/G RATIO: 1.5 (ref 1.1–2.2)
ACETAMINOPHEN LEVEL: <5 UG/ML (ref 10–30)
ALBUMIN SERPL-MCNC: 3.8 G/DL (ref 3.4–5)
ALP BLD-CCNC: 155 U/L (ref 40–129)
ALT SERPL-CCNC: 34 U/L (ref 10–40)
AMPHETAMINE SCREEN, URINE: NORMAL
ANION GAP SERPL CALCULATED.3IONS-SCNC: 10 MMOL/L (ref 3–16)
AST SERPL-CCNC: 39 U/L (ref 15–37)
BACTERIA: ABNORMAL /HPF
BARBITURATE SCREEN URINE: NORMAL
BASOPHILS ABSOLUTE: 0 K/UL (ref 0–0.2)
BASOPHILS RELATIVE PERCENT: 0.6 %
BENZODIAZEPINE SCREEN, URINE: NORMAL
BILIRUB SERPL-MCNC: 1.5 MG/DL (ref 0–1)
BILIRUBIN URINE: NEGATIVE
BLOOD, URINE: ABNORMAL
BUN BLDV-MCNC: 12 MG/DL (ref 7–20)
CALCIUM SERPL-MCNC: 9.2 MG/DL (ref 8.3–10.6)
CANNABINOID SCREEN URINE: NORMAL
CHLORIDE BLD-SCNC: 102 MMOL/L (ref 99–110)
CHP ED QC CHECK: YES
CLARITY: ABNORMAL
CO2: 26 MMOL/L (ref 21–32)
COCAINE METABOLITE SCREEN URINE: NORMAL
COLOR: YELLOW
CREAT SERPL-MCNC: 1 MG/DL (ref 0.8–1.3)
EOSINOPHILS ABSOLUTE: 0 K/UL (ref 0–0.6)
EOSINOPHILS RELATIVE PERCENT: 0.7 %
EPITHELIAL CELLS, UA: 0 /HPF (ref 0–5)
ETHANOL: NORMAL MG/DL (ref 0–0.08)
FENTANYL SCREEN, URINE: NORMAL
GFR SERPL CREATININE-BSD FRML MDRD: >60 ML/MIN/{1.73_M2}
GLUCOSE BLD-MCNC: 119 MG/DL
GLUCOSE BLD-MCNC: 119 MG/DL (ref 70–99)
GLUCOSE BLD-MCNC: 133 MG/DL (ref 70–99)
GLUCOSE URINE: NEGATIVE MG/DL
HCT VFR BLD CALC: 45 % (ref 40.5–52.5)
HEMOGLOBIN: 14.8 G/DL (ref 13.5–17.5)
HYALINE CASTS: 0 /LPF (ref 0–8)
INR BLD: 1.15 (ref 0.87–1.14)
KETONES, URINE: NEGATIVE MG/DL
LEUKOCYTE ESTERASE, URINE: ABNORMAL
LIPASE: 28 U/L (ref 13–60)
LYMPHOCYTES ABSOLUTE: 0.5 K/UL (ref 1–5.1)
LYMPHOCYTES RELATIVE PERCENT: 8.4 %
Lab: NORMAL
MCH RBC QN AUTO: 31.6 PG (ref 26–34)
MCHC RBC AUTO-ENTMCNC: 32.9 G/DL (ref 31–36)
MCV RBC AUTO: 96.1 FL (ref 80–100)
METHADONE SCREEN, URINE: NORMAL
MICROSCOPIC EXAMINATION: YES
MONOCYTES ABSOLUTE: 0.6 K/UL (ref 0–1.3)
MONOCYTES RELATIVE PERCENT: 9.6 %
NEUTROPHILS ABSOLUTE: 4.9 K/UL (ref 1.7–7.7)
NEUTROPHILS RELATIVE PERCENT: 80.7 %
NITRITE, URINE: POSITIVE
OPIATE SCREEN URINE: NORMAL
OXYCODONE URINE: NORMAL
PDW BLD-RTO: 13.5 % (ref 12.4–15.4)
PERFORMED ON: ABNORMAL
PH UA: 7
PH UA: 7.5 (ref 5–8)
PHENCYCLIDINE SCREEN URINE: NORMAL
PLATELET # BLD: 162 K/UL (ref 135–450)
PMV BLD AUTO: 7.3 FL (ref 5–10.5)
POTASSIUM REFLEX MAGNESIUM: 4 MMOL/L (ref 3.5–5.1)
PROTEIN UA: >=1000 MG/DL
PROTHROMBIN TIME: 14.6 SEC (ref 11.7–14.5)
RBC # BLD: 4.68 M/UL (ref 4.2–5.9)
RBC UA: 5 /HPF (ref 0–4)
SALICYLATE, SERUM: <0.3 MG/DL (ref 15–30)
SODIUM BLD-SCNC: 138 MMOL/L (ref 136–145)
SPECIFIC GRAVITY UA: 1.02 (ref 1–1.03)
TOTAL CK: 26 U/L (ref 39–308)
TOTAL PROTEIN: 6.4 G/DL (ref 6.4–8.2)
TROPONIN: <0.01 NG/ML
URIC ACID, SERUM: 10.7 MG/DL (ref 3.5–7.2)
URINE REFLEX TO CULTURE: YES
URINE TYPE: ABNORMAL
UROBILINOGEN, URINE: 1 E.U./DL
WBC # BLD: 6.1 K/UL (ref 4–11)
WBC UA: 103 /HPF (ref 0–5)

## 2022-10-30 PROCEDURE — 2060000000 HC ICU INTERMEDIATE R&B

## 2022-10-30 PROCEDURE — 84550 ASSAY OF BLOOD/URIC ACID: CPT

## 2022-10-30 PROCEDURE — 84443 ASSAY THYROID STIM HORMONE: CPT

## 2022-10-30 PROCEDURE — 71045 X-RAY EXAM CHEST 1 VIEW: CPT

## 2022-10-30 PROCEDURE — 96360 HYDRATION IV INFUSION INIT: CPT

## 2022-10-30 PROCEDURE — 85610 PROTHROMBIN TIME: CPT

## 2022-10-30 PROCEDURE — 36415 COLL VENOUS BLD VENIPUNCTURE: CPT

## 2022-10-30 PROCEDURE — 93005 ELECTROCARDIOGRAM TRACING: CPT | Performed by: EMERGENCY MEDICINE

## 2022-10-30 PROCEDURE — 82607 VITAMIN B-12: CPT

## 2022-10-30 PROCEDURE — 80179 DRUG ASSAY SALICYLATE: CPT

## 2022-10-30 PROCEDURE — 84146 ASSAY OF PROLACTIN: CPT

## 2022-10-30 PROCEDURE — 70496 CT ANGIOGRAPHY HEAD: CPT

## 2022-10-30 PROCEDURE — 87631 RESP VIRUS 3-5 TARGETS: CPT

## 2022-10-30 PROCEDURE — 80143 DRUG ASSAY ACETAMINOPHEN: CPT

## 2022-10-30 PROCEDURE — 81001 URINALYSIS AUTO W/SCOPE: CPT

## 2022-10-30 PROCEDURE — 80053 COMPREHEN METABOLIC PANEL: CPT

## 2022-10-30 PROCEDURE — 99285 EMERGENCY DEPT VISIT HI MDM: CPT

## 2022-10-30 PROCEDURE — 2580000003 HC RX 258: Performed by: EMERGENCY MEDICINE

## 2022-10-30 PROCEDURE — 82550 ASSAY OF CK (CPK): CPT

## 2022-10-30 PROCEDURE — 85025 COMPLETE CBC W/AUTO DIFF WBC: CPT

## 2022-10-30 PROCEDURE — 83690 ASSAY OF LIPASE: CPT

## 2022-10-30 PROCEDURE — 6370000000 HC RX 637 (ALT 250 FOR IP): Performed by: EMERGENCY MEDICINE

## 2022-10-30 PROCEDURE — 6360000004 HC RX CONTRAST MEDICATION: Performed by: EMERGENCY MEDICINE

## 2022-10-30 PROCEDURE — 70450 CT HEAD/BRAIN W/O DYE: CPT

## 2022-10-30 PROCEDURE — 82746 ASSAY OF FOLIC ACID SERUM: CPT

## 2022-10-30 PROCEDURE — 80307 DRUG TEST PRSMV CHEM ANLYZR: CPT

## 2022-10-30 PROCEDURE — 4A03X5D MEASUREMENT OF ARTERIAL FLOW, INTRACRANIAL, EXTERNAL APPROACH: ICD-10-PCS | Performed by: INTERNAL MEDICINE

## 2022-10-30 PROCEDURE — 96361 HYDRATE IV INFUSION ADD-ON: CPT

## 2022-10-30 PROCEDURE — 82077 ASSAY SPEC XCP UR&BREATH IA: CPT

## 2022-10-30 PROCEDURE — 6360000002 HC RX W HCPCS

## 2022-10-30 PROCEDURE — 87086 URINE CULTURE/COLONY COUNT: CPT

## 2022-10-30 PROCEDURE — 84484 ASSAY OF TROPONIN QUANT: CPT

## 2022-10-30 PROCEDURE — 83605 ASSAY OF LACTIC ACID: CPT

## 2022-10-30 RX ORDER — ENOXAPARIN SODIUM 100 MG/ML
40 INJECTION SUBCUTANEOUS DAILY
Status: DISCONTINUED | OUTPATIENT
Start: 2022-10-31 | End: 2022-10-31 | Stop reason: DRUGHIGH

## 2022-10-30 RX ORDER — ATORVASTATIN CALCIUM 40 MG/1
40 TABLET, FILM COATED ORAL NIGHTLY
Status: DISCONTINUED | OUTPATIENT
Start: 2022-10-31 | End: 2022-11-01 | Stop reason: HOSPADM

## 2022-10-30 RX ORDER — ONDANSETRON 2 MG/ML
4 INJECTION INTRAMUSCULAR; INTRAVENOUS EVERY 6 HOURS PRN
Status: DISCONTINUED | OUTPATIENT
Start: 2022-10-30 | End: 2022-11-01 | Stop reason: HOSPADM

## 2022-10-30 RX ORDER — HYDRALAZINE HYDROCHLORIDE 20 MG/ML
10 INJECTION INTRAMUSCULAR; INTRAVENOUS EVERY 6 HOURS PRN
Status: DISCONTINUED | OUTPATIENT
Start: 2022-10-30 | End: 2022-11-01 | Stop reason: HOSPADM

## 2022-10-30 RX ORDER — POTASSIUM CHLORIDE 7.45 MG/ML
10 INJECTION INTRAVENOUS PRN
Status: DISCONTINUED | OUTPATIENT
Start: 2022-10-30 | End: 2022-11-01 | Stop reason: HOSPADM

## 2022-10-30 RX ORDER — DILTIAZEM HYDROCHLORIDE 180 MG/1
180 CAPSULE, COATED, EXTENDED RELEASE ORAL 2 TIMES DAILY
Status: DISCONTINUED | OUTPATIENT
Start: 2022-10-30 | End: 2022-10-31

## 2022-10-30 RX ORDER — SODIUM CHLORIDE 0.9 % (FLUSH) 0.9 %
5-40 SYRINGE (ML) INJECTION EVERY 12 HOURS SCHEDULED
Status: DISCONTINUED | OUTPATIENT
Start: 2022-10-30 | End: 2022-11-01 | Stop reason: HOSPADM

## 2022-10-30 RX ORDER — SODIUM CHLORIDE, SODIUM LACTATE, POTASSIUM CHLORIDE, CALCIUM CHLORIDE 600; 310; 30; 20 MG/100ML; MG/100ML; MG/100ML; MG/100ML
INJECTION, SOLUTION INTRAVENOUS CONTINUOUS
Status: DISCONTINUED | OUTPATIENT
Start: 2022-10-30 | End: 2022-11-01 | Stop reason: HOSPADM

## 2022-10-30 RX ORDER — HYDRALAZINE HYDROCHLORIDE 20 MG/ML
INJECTION INTRAMUSCULAR; INTRAVENOUS
Status: COMPLETED
Start: 2022-10-30 | End: 2022-10-30

## 2022-10-30 RX ORDER — SODIUM CHLORIDE 0.9 % (FLUSH) 0.9 %
5-40 SYRINGE (ML) INJECTION PRN
Status: DISCONTINUED | OUTPATIENT
Start: 2022-10-30 | End: 2022-11-01 | Stop reason: HOSPADM

## 2022-10-30 RX ORDER — POTASSIUM CHLORIDE 20 MEQ/1
40 TABLET, EXTENDED RELEASE ORAL PRN
Status: DISCONTINUED | OUTPATIENT
Start: 2022-10-30 | End: 2022-11-01 | Stop reason: HOSPADM

## 2022-10-30 RX ORDER — 0.9 % SODIUM CHLORIDE 0.9 %
1000 INTRAVENOUS SOLUTION INTRAVENOUS ONCE
Status: COMPLETED | OUTPATIENT
Start: 2022-10-30 | End: 2022-10-30

## 2022-10-30 RX ORDER — ONDANSETRON 4 MG/1
4 TABLET, ORALLY DISINTEGRATING ORAL EVERY 8 HOURS PRN
Status: DISCONTINUED | OUTPATIENT
Start: 2022-10-30 | End: 2022-11-01 | Stop reason: HOSPADM

## 2022-10-30 RX ORDER — SODIUM CHLORIDE 9 MG/ML
INJECTION, SOLUTION INTRAVENOUS PRN
Status: DISCONTINUED | OUTPATIENT
Start: 2022-10-30 | End: 2022-11-01 | Stop reason: HOSPADM

## 2022-10-30 RX ORDER — ACETAMINOPHEN 325 MG/1
650 TABLET ORAL EVERY 6 HOURS PRN
Status: DISCONTINUED | OUTPATIENT
Start: 2022-10-30 | End: 2022-11-01 | Stop reason: HOSPADM

## 2022-10-30 RX ORDER — MAGNESIUM SULFATE IN WATER 40 MG/ML
2000 INJECTION, SOLUTION INTRAVENOUS PRN
Status: DISCONTINUED | OUTPATIENT
Start: 2022-10-30 | End: 2022-11-01 | Stop reason: HOSPADM

## 2022-10-30 RX ORDER — ACETAMINOPHEN 650 MG/1
650 SUPPOSITORY RECTAL EVERY 6 HOURS PRN
Status: DISCONTINUED | OUTPATIENT
Start: 2022-10-30 | End: 2022-11-01 | Stop reason: HOSPADM

## 2022-10-30 RX ORDER — POLYETHYLENE GLYCOL 3350 17 G/17G
17 POWDER, FOR SOLUTION ORAL DAILY PRN
Status: DISCONTINUED | OUTPATIENT
Start: 2022-10-30 | End: 2022-11-01 | Stop reason: HOSPADM

## 2022-10-30 RX ORDER — PANTOPRAZOLE SODIUM 40 MG/1
1 TABLET, DELAYED RELEASE ORAL 2 TIMES DAILY
Status: DISCONTINUED | OUTPATIENT
Start: 2022-10-30 | End: 2022-10-31

## 2022-10-30 RX ORDER — M-VIT,TX,IRON,MINS/CALC/FOLIC 27MG-0.4MG
1 TABLET ORAL DAILY
Status: DISCONTINUED | OUTPATIENT
Start: 2022-10-31 | End: 2022-11-01 | Stop reason: HOSPADM

## 2022-10-30 RX ORDER — ALLOPURINOL 300 MG/1
300 TABLET ORAL 2 TIMES DAILY
Status: DISCONTINUED | OUTPATIENT
Start: 2022-10-31 | End: 2022-11-01 | Stop reason: HOSPADM

## 2022-10-30 RX ORDER — ASPIRIN 325 MG
325 TABLET ORAL ONCE
Status: COMPLETED | OUTPATIENT
Start: 2022-10-30 | End: 2022-10-30

## 2022-10-30 RX ADMIN — ASPIRIN 325 MG: 325 TABLET ORAL at 19:57

## 2022-10-30 RX ADMIN — IOPAMIDOL 75 ML: 755 INJECTION, SOLUTION INTRAVENOUS at 19:28

## 2022-10-30 RX ADMIN — SODIUM CHLORIDE 1000 ML: 9 INJECTION, SOLUTION INTRAVENOUS at 19:57

## 2022-10-30 RX ADMIN — HYDRALAZINE HYDROCHLORIDE 10 MG: 20 INJECTION INTRAMUSCULAR; INTRAVENOUS at 22:14

## 2022-10-30 ASSESSMENT — PAIN SCALES - GENERAL: PAINLEVEL_OUTOF10: 0

## 2022-10-30 ASSESSMENT — LIFESTYLE VARIABLES: HOW OFTEN DO YOU HAVE A DRINK CONTAINING ALCOHOL: NEVER

## 2022-10-30 NOTE — ED PROVIDER NOTES
Emergency Department provider note    CHIEF COMPLAINT  Altered Mental Status (Per EMS, about an hour ago pt was sitting on the couch when the girlfriend noted seizure like activity. Pt was confused at that time. EMS noted left sided weakness and facial droop.)      HISTORY OF PRESENT ILLNESS  Candido Mallory is a 78 y.o. male  who presents to the ED after sudden onset of left-sided weakness and left-sided facial droop. Symptoms occurred just prior to being called by EMS and therefore he believes occurred at 7 PM.  Patient states that he was just watching TV at the time he. Wife also describes seizure-like activity that lasted for less than a minute. Patient at this time has no complaints. Fingerstick by EMS was 112. They also found him to be hypotensive with a systolic in the 619J. EMS reports when they arrived they found him to have left facial droop and left-sided weakness however those symptoms have mostly resolved just prior to arriving in the ER, patient denies any history of strokes or seizures in the past    Denies fever,  malaise, shortness of breath, cough, abdominal pain, nausea, vomiting, diarrhea, headache,  back pain, rash. No palliative/provocative factors. No other complaints, modifying factors or associated symptoms. I have reviewed the following from the nursing documentation.     Past Medical History:   Diagnosis Date    Anxiety     Arthritis     Asthma     Atrial fibrillation ([address]     Blood circulation, collateral     CAD (coronary artery disease)     CHF (congestive heart failure) (HCC)     Colon cancer (HCC)     COPD (chronic obstructive pulmonary disease) (HCC)     Diabetic foot infection ([address]     Hernia     Hyperlipidemia     Hypertension     Movement disorder     Muscle cramps     Neuromuscular disorder (HCC)     marvin feet numbness/neuropathy    Neuropathy     Pneumonia     Renal insufficiency     Sleep apnea     no cpap    Thyroid disease     Tinnitus aurium, bilateral Unspecified cerebral artery occlusion with cerebral infarction     mini stroke    Unspecified diseases of blood and blood-forming organs      Past Surgical History:   Procedure Laterality Date    ABDOMINAL SURGERY      APPENDECTOMY  10/9/2012    CARPAL TUNNEL RELEASE Left 12/11/14    CARPAL TUNNEL RELEASE Right 6/11/2019    RIGHT CARPAL TUNNEL RELEASE AND RIGHT MIDDLE FINGER TRIGGER FINGER RELEASE performed by Cherise Drake MD at [facility]      for Colon Ca 10 inches removed from colon    COLONOSCOPY      COLONOSCOPY  3/13/14    COLONOSCOPY  03/02/2018    CYSTOSCOPY Right 5/3/2019    CYSTOSCOPY, RIGHT URETEROSCOPY, STONE EXTRACTION WITH STONE BASKET performed by Tramaine Shaver MD at [address] [place] [address] ([facility])  6/4/14    ENDOSCOPY, COLON, DIAGNOSTIC      FOOT SURGERY Right 07/13/2017    Amputation toes 2,3,5, right foot Flap closure, Exostectomy 1st metatarsal right foot    FOOT SURGERY Right 08/17/2017    INCISION AND DRAINAGE OF RIGHT FOOT WITH FLAP CLOSURE    HERNIA REPAIR      umbilical, hiatal hernia repairs    KNEE ARTHROSCOPY Right 08/23/2018    partial medial/ lateral menisectomy    SKIN BIOPSY      TOE AMPUTATION  3/7/11    RIGHT FOURTH TOE    TOE SURGERY      ULNAR TUNNEL RELEASE Left 12-11-14    Left ulnar nerve decompression and left CTR    UPPER GASTROINTESTINAL ENDOSCOPY  1/28/2014    with biopsies    UPPER GASTROINTESTINAL ENDOSCOPY  3/13/14    UPPER GASTROINTESTINAL ENDOSCOPY  6/4/14    with biopsy    UPPER GASTROINTESTINAL ENDOSCOPY  03/02/2018    VENTRICULAR ABLATION SURGERY       Family History   Problem Relation Age of Onset    Cancer Father         colon    High Blood Pressure Father     Heart Disease Father     High Blood Pressure Mother     Heart Disease Mother      Social History     Socioeconomic History    Marital status:      Spouse name: Not on file    Number of children: 1    Years of education: 12    Highest education level: Not on file Occupational History    Not on file   Tobacco Use    Smoking status: Never    Smokeless tobacco: Never   Vaping Use    Vaping Use: Never used   Substance and Sexual Activity    Alcohol use: No     Comment: quit 2006    Drug use: No    Sexual activity: Yes   Other Topics Concern    Not on file   Social History Narrative    Not on file     Social Determinants of Health     Financial Resource Strain: Not on file   Food Insecurity: Not on file   Transportation Needs: Not on file   Physical Activity: Inactive    Days of Exercise per Week: 0 days    Minutes of Exercise per Session: 0 min   Stress: Not on file   Social Connections: Not on file   Intimate Partner Violence: Not on file   Housing Stability: Not on file     Current Facility-Administered Medications   Medication Dose Route Frequency Provider Last Rate Last Admin    0.9 % sodium chloride bolus  1,000 mL IntraVENous Once Louise Munoz MD        aspirin tablet 325 mg  325 mg Oral Once Louise Munoz MD        iopamidol (ISOVUE-370) 76 % injection 75 mL  75 mL IntraVENous ONCE PRN Louise Munoz MD         Current Outpatient Medications   Medication Sig Dispense Refill    atorvastatin (LIPITOR) 40 MG tablet TAKE 1 TABLET BY MOUTH EVERY DAY IN THE EVENING 90 tablet 1    allopurinol (ZYLOPRIM) 300 MG tablet TAKE 1 TABLET BY MOUTH 2 TIMES DAILY 180 tablet 3    diclofenac sodium (VOLTAREN) 1 % GEL APPLY 4 GRAMS TOPICALLY 4 TIMES DAILY AS NEEDED FOR PAIN 500 g 2    metoprolol tartrate (LOPRESSOR) 25 MG tablet Take 1 tablet by mouth 2 times daily 180 tablet 3    dilTIAZem (CARDIZEM CD) 180 MG extended release capsule Take 1 capsule by mouth 2 times daily 180 capsule 1    acetaminophen (AMINOFEN) 325 MG tablet Take 2 tablets by mouth every 6 hours as needed for Pain 120 tablet 0    pantoprazole (PROTONIX) 40 MG tablet TAKE 1 TABLET BY MOUTH TWICE DAILY 60 tablet 5    budesonide-formoterol (SYMBICORT) 160-4.5 MCG/ACT AERO Inhale 2 puffs into the lungs 2 times daily. 3 Inhaler 3    albuterol-ipratropium (COMBIVENT RESPIMAT)  MCG/ACT AERS inhaler Inhale 1 puff into the lungs every 6 hours. (Patient taking differently: Inhale 1 puff into the lungs every 6 hours as needed ) 3 Inhaler 3    OXYGEN Inhale  into the lungs. 2 liters/ nasal cannula prn      therapeutic multivitamin-minerals (THERAGRAN-M) tablet Take 1 tablet by mouth daily. Allergies   Allergen Reactions    Bactrim      hallucinations    Clindamycin/Lincomycin Diarrhea       REVIEW OF SYSTEMS  Unless otherwise stated in this report or unable to obtain because of the patient's clinical or mental status as evidenced by the medical record, this patient's positive and negative responses for review of systems, constitutional, psych, eyes, ENT, cardiovascular, respiratory, gastrointestinal, neurological, genitourinary, musculoskeletal, integument systems and systems related to the presenting problem are either stated in the preceding paragraph or were not pertinent or were negative for the symptoms and/or complaints related to the medical problem. PHYSICAL EXAM  BP (!) 180/106   Pulse (!) 118   Temp 98.5 °F (36.9 °C) (Oral)   Resp 18   SpO2 99%   GENERAL APPEARANCE: Awake and alert. Cooperative. No distress. HEAD: Normocephalic. Atraumatic. EYES: PERRL. EOM's grossly intact. ENT: Mucous membranes are moist.   NECK: Supple. HEART: RRR. No murmurs. LUNGS: Respirations unlabored. CTAB. Good air exchange. Speaking comfortably in full sentences. ABDOMEN: Soft. Non-distended. Non-tender. No masses. No organomegaly. No guarding or rebound. EXTREMITIES: No peripheral edema. SKIN: Warm and dry. No acute rashes. NEUROLOGICAL: Alert and oriented. Please see NIH Stroke Scale below. PSYCHIATRIC: Normal mood and affect. NIH Stroke Scale     Interval: Baseline  Time: 1915  Person Administering Scale: Paula Alcala MD   Administer stroke scale items in the order listed.  Record performance in each category after each subscale exam. Do not go back and change scores. Follow directions provided for each exam technique. Scores should reflect what the patient does, not what the clinician thinks the patient can do. The clinician should record answers while administering the exam and work quickly. Except where indicated, the patient should not be coached (i.e., repeated requests to patient to make a special effort). 1a  Level of consciousness: 0=alert; keenly responsive   1b. LOC questions:  1=Performs one task correctly   1c. LOC commands: 0=Performs both tasks correctly   2. Best Gaze: 0=normal   3. Visual: 0=No visual loss   4. Facial Palsy: 2=Partial paralysis (total or near total paralysis of the lower face)   5a. Motor left arm: 0=No drift, limb holds 90 (or 45) degrees for full 10 seconds   5b. Motor right arm: 0=No drift, limb holds 90 (or 45) degrees for full 10 seconds   6a. motor left le=No drift, limb holds 90 (or 45) degrees for full 10 seconds   6b  Motor right le=No drift, limb holds 90 (or 45) degrees for full 10 seconds   7. Limb Ataxia: 0=Absent   8. Sensory: 0=Normal; no sensory loss   9. Best Language:  0=No aphasia, normal   10. Dysarthria: 0=Normal   11. Extinction and Inattention: 0=No abnormality   12. Distal motor function: 0=Normal    Total:  3     Modified Sutton Score - Assessing Disability From Stroke    Score: 1 - No significant disability despite symptoms; able to carry out all usual duties and activities    LABS  I have reviewed all labs for this visit.    Results for orders placed or performed during the hospital encounter of 10/30/22   CBC Auto Differential   Result Value Ref Range    WBC 6.1 4.0 - 11.0 K/uL    RBC 4.68 4.20 - 5.90 M/uL    Hemoglobin 14.8 13.5 - 17.5 g/dL    Hematocrit 45.0 40.5 - 52.5 %    MCV 96.1 80.0 - 100.0 fL    MCH 31.6 26.0 - 34.0 pg    MCHC 32.9 31.0 - 36.0 g/dL    RDW 13.5 12.4 - 15.4 %    Platelets 597 214 - 979 K/uL    MPV 7.3 5.0 - 10.5 fL    Neutrophils % 80.7 %    Lymphocytes % 8.4 %    Monocytes % 9.6 %    Eosinophils % 0.7 %    Basophils % 0.6 %    Neutrophils Absolute 4.9 1.7 - 7.7 K/uL    Lymphocytes Absolute 0.5 (L) 1.0 - 5.1 K/uL    Monocytes Absolute 0.6 0.0 - 1.3 K/uL    Eosinophils Absolute 0.0 0.0 - 0.6 K/uL    Basophils Absolute 0.0 0.0 - 0.2 K/uL   Comprehensive Metabolic Panel w/ Reflex to MG   Result Value Ref Range    Sodium 138 136 - 145 mmol/L    Potassium reflex Magnesium 4.0 3.5 - 5.1 mmol/L    Chloride 102 99 - 110 mmol/L    CO2 26 21 - 32 mmol/L    Anion Gap 10 3 - 16    Glucose 133 (H) 70 - 99 mg/dL    BUN 12 7 - 20 mg/dL    Creatinine 1.0 0.8 - 1.3 mg/dL    Est, Glom Filt Rate >60 >60    Calcium 9.2 8.3 - 10.6 mg/dL    Total Protein 6.4 6.4 - 8.2 g/dL    Albumin 3.8 3.4 - 5.0 g/dL    Albumin/Globulin Ratio 1.5 1.1 - 2.2    Total Bilirubin 1.5 (H) 0.0 - 1.0 mg/dL    Alkaline Phosphatase 155 (H) 40 - 129 U/L    ALT 34 10 - 40 U/L    AST 39 (H) 15 - 37 U/L   Troponin   Result Value Ref Range    Troponin <0.01 <0.01 ng/mL   Urinalysis Reflex to Culture    Specimen: Urine   Result Value Ref Range    Color, UA Yellow Straw/Yellow    Clarity, UA CLOUDY (A) Clear    Glucose, Ur Negative Negative mg/dL    Bilirubin Urine Negative Negative    Ketones, Urine Negative Negative mg/dL    Specific Gravity, UA 1.020 1.005 - 1.030    Blood, Urine SMALL (A) Negative    pH, UA 7.5 5.0 - 8.0    Protein, UA >=1000 Negative mg/dL    Urobilinogen, Urine 1.0 <2.0 E.U./dL    Nitrite, Urine POSITIVE (A) Negative    Leukocyte Esterase, Urine LARGE (A) Negative    Microscopic Examination YES     Urine Type NotGiven     Urine Reflex to Culture Yes    Salicylate   Result Value Ref Range    Salicylate, Serum <1.0 (L) 15.0 - 30.0 mg/dL   Acetaminophen level   Result Value Ref Range    Acetaminophen Level <5 (L) 10 - 30 ug/mL   Drug screen multi urine   Result Value Ref Range    Drug Screen Comment: see below    Ethanol   Result Value Ref Range Ethanol Lvl None Detected mg/dL   Microscopic Urinalysis   Result Value Ref Range    Bacteria, UA Rare (A) None Seen /HPF    Hyaline Casts, UA 0 0 - 8 /LPF    WBC,  (H) 0 - 5 /HPF    RBC, UA 5 (H) 0 - 4 /HPF    Epithelial Cells, UA 0 0 - 5 /HPF   POCT Glucose   Result Value Ref Range    Glucose 119 mg/dL    QC OK? yes    POCT Glucose   Result Value Ref Range    POC Glucose 119 (H) 70 - 99 mg/dl    Performed on ACCU-CHEK    EKG 12 Lead   Result Value Ref Range    Ventricular Rate 109 BPM    Atrial Rate 109 BPM    P-R Interval 152 ms    QRS Duration 84 ms    Q-T Interval 334 ms    QTc Calculation (Bazett) 449 ms    R Axis -23 degrees    T Axis -29 degrees    Diagnosis       Sinus tachycardia with occasional Premature ventricular complexes and Fusion complexesAnterior infarct , age undeterminedST & T wave abnormality, consider inferolateral ischemiaAbnormal ECG       EKG  The Ekg interpreted by me shows  sinus tachycardia, xgif=[id number]  with a rate of 109, difficult to interpret EKG as there is significant amount of artifact due to patient's tremors  Axis is   Left axis deviation  QTc is  within an acceptable range  Intervals and Durations are unremarkable. ST Segments: no acute change  Delta waves, Brugada Syndrome, and Short VA are not present. Prior EKG to compare with was available. No significant changes compared to prior EKG from 14-JAN-2022      RADIOLOGY    XR CHEST PORTABLE   Final Result   1. No acute pulmonary abnormality. 2. Stable mild enlargement of the cardiac silhouette. CTA HEAD NECK W CONTRAST   Final Result   Negative for large vessel occlusion or hemodynamic stenosis. CT HEAD WO CONTRAST   Final Result   No acute intracranial abnormality. I discussed the CT imaging results with the radiologist at [address] [place] [address].       TIMES:  Last Known Well: 1900  Arrival to ED: 4274  CT First Slice: 7457  tPA Administration Time: NA  Door to Needle: NA  Time to ICU: NA  Stroke Team: Case discussed with  Stroke Team, at 7:25 PM EDT  . Reason for Delays:  [] Social/Anabaptism  [] Initial refusal of testing or treatment  [] Care team unable to identify eligibility  [] Hypertension requiring aggressive control with IV medications  [] Further diagnostic evaluation to confirm stroke for patients with hypoglycemia (blood glucose <50), seizures, or major metabolic disorders  [] Management of concomitant emergent/acute conditions such as cardiopulmonary arrest, respiratory failure (requiring intubation)  [] Investigational or experimental protocol for thrombolysis      t-PA NOT given due to the following EXCLUSION CRITERIA (only those checked):  [] Pregnancy  [] Symptoms > 3 hours of onset  [] Minor or isolated neurological signs  [x] Rapid improvement of stroke symptoms  [x] Seizure at the same time of stroke symptoms  [] Active bleeding or acute trauma (fracture)  [] Presentation consistent with acute MI or post-MI pericarditis  [] Known intracranial neoplasm, AV malformation or aneurysm  [] CT evidence of intracranial hemorrhage  [] Any prior history of intracranial hemorrhage  [] Symptoms suggestive of subarachnoid hemorrhage (even if head CT normal)  [] Persistent hypertension (SBP>185 or DBP>110)  [] Glucose < 50 or > 400.   [] Bleeding diathesis, including but not limited to:   -Platelets < 841,782   -Heparin within 48 hours with PTT > normal range   -Current or recent use of anticoagulants (dabagtran, rivaroxaban, or warfarin with     INR > 1.7)  [] Lumbar puncture in past 7 days  [] Arterial puncture at a noncompressible site in past 7 days  [] Major surgery in past 14 days  [] Gastrointestinal or urinary tract hemorrhage in past 21 days  [] Myocardial infarction in past 3 months  [] Stroke, intracranial surgery or serious head trauma in past 3 months    t-PA given with the following INCLUSION CRITERIA verified (only those checked):  [] Age 25 years or older  [] Clinical diagnosis of ischemic stroke causing measurable neurological deficit  [] Administration of t-PA can be initiated within 3 hours of onset of symptoms  [] A patient or family members who understand the potential risks and benefits:   Of every 100 patients treated with tPA:   72 will have the same outcome   32 will have a better outcome   3 will have a worse outcome (with 1 being severely disabled or fatal) due to t-PA    ED COURSE/MDM  Patient seen and evaluated. Old records reviewed. Labs and imaging reviewed and results discussed. Procedures/interventions/images ordered for this visit  Orders Placed This Encounter   Procedures    CT HEAD WO CONTRAST    CTA HEAD NECK W CONTRAST    XR CHEST PORTABLE    CBC Auto Differential    Comprehensive Metabolic Panel w/ Reflex to MG    Troponin    Protime-INR    Urinalysis Reflex to Culture    Diet NPO    Swallow assessment by nursing before diet and oral medications started    NIH Stroke Scale (NIHSS)    Inpatient consult to Stroke Team    Pharmacy to Dose: Other - See Comments: The pharmacist will review this patient's medication profile to evaluate IV medications and change all base solutions to 0.9% sodium chloride if possible based on compatibility and product availability. This. .. Pharmacy to change base solutions. Initiate Oxygen Therapy Protocol    POCT Glucose    EKG 12 Lead    Saline lock IV    Seizure precautions       Medications ordered for this visit  Orders Placed This Encounter   Medications    0.9 % sodium chloride bolus    aspirin tablet 325 mg       ED course notes for this visit       - Diagnostic studies reviewed and results discussed with patient      A secure text message was sent to Dr. Lillie Conde. Reviewed the history, physical exam, laboratory and imaging studies, as well as, emergency department course. Based upon that discussion, we've decided to admit Edwin Gómez for further observation and evaluation of Stephanie [facility] CVA-like symptoms.   As I have deemed necessary from their history, physical and studies, I have considered and evaluated Shaw Garza for the following diagnoses:  DIABETES, INTRACRANIAL HEMORRHAGE, MENINGITIS, SUBARACHNOID HEMORRHAGE, SUBDURAL HEMATOMA, & STROKE. CLINICAL IMPRESSION  1. TIA (transient ischemic attack)    2. History of atrial fibrillation        Blood pressure (!) 180/106, pulse (!) 118, temperature 98.5 °F (36.9 °C), temperature source Oral, resp. rate 18, SpO2 99 %. DISPOSITION  Pt is in stable condition upon Admit to med/surg floor. CRITICAL CARE TIME:  Please note, critical care time was at least 20 minutes, obtaining history, conducting a physical exam, performing and monitoring interventions, ordering, collecting and interpreting tests, and establishing medical decision-making and discussion with the patient and/or family, specifically for management of the presenting complaint and symptoms initially, direct bedside care, reevaluation, review of records, and consultation. There was a high probability of clinically significant life-threatening deterioration in the patient's condition, which required my urgent intervention. This time does not include separately billable procedures. Comment: Please note this report has been produced using speech recognition software and may contain errors related to that system including errors in grammar, punctuation, and spelling, as well as words and phrases that may be inappropriate. If there are any questions or concerns please feel free to contact the dictating provider for clarification.     Oh Osorio MD  [address] [place] MD  10/30/22 5100

## 2022-10-31 ENCOUNTER — APPOINTMENT (OUTPATIENT)
Dept: MRI IMAGING | Age: 67
DRG: 069 | End: 2022-10-31
Payer: MEDICARE

## 2022-10-31 LAB
ALBUMIN SERPL-MCNC: 3.4 G/DL (ref 3.4–5)
ANION GAP SERPL CALCULATED.3IONS-SCNC: 8 MMOL/L (ref 3–16)
BASOPHILS ABSOLUTE: 0 K/UL (ref 0–0.2)
BASOPHILS RELATIVE PERCENT: 0.6 %
BUN BLDV-MCNC: 11 MG/DL (ref 7–20)
CALCIUM SERPL-MCNC: 8.9 MG/DL (ref 8.3–10.6)
CHLORIDE BLD-SCNC: 105 MMOL/L (ref 99–110)
CO2: 25 MMOL/L (ref 21–32)
CREAT SERPL-MCNC: 1 MG/DL (ref 0.8–1.3)
EKG ATRIAL RATE: 109 BPM
EKG DIAGNOSIS: NORMAL
EKG P-R INTERVAL: 152 MS
EKG Q-T INTERVAL: 334 MS
EKG QRS DURATION: 84 MS
EKG QTC CALCULATION (BAZETT): 449 MS
EKG R AXIS: -23 DEGREES
EKG T AXIS: -29 DEGREES
EKG VENTRICULAR RATE: 109 BPM
EOSINOPHILS ABSOLUTE: 0.1 K/UL (ref 0–0.6)
EOSINOPHILS RELATIVE PERCENT: 0.9 %
FOLATE: 9.34 NG/ML (ref 4.78–24.2)
GFR SERPL CREATININE-BSD FRML MDRD: >60 ML/MIN/{1.73_M2}
GLUCOSE BLD-MCNC: 93 MG/DL (ref 70–99)
HCT VFR BLD CALC: 42.5 % (ref 40.5–52.5)
HEMOGLOBIN: 13.7 G/DL (ref 13.5–17.5)
LACTIC ACID: 1.2 MMOL/L (ref 0.4–2)
LV EF: 58 %
LVEF MODALITY: NORMAL
LYMPHOCYTES ABSOLUTE: 0.6 K/UL (ref 1–5.1)
LYMPHOCYTES RELATIVE PERCENT: 10.3 %
MAGNESIUM: 2.1 MG/DL (ref 1.8–2.4)
MCH RBC QN AUTO: 31.2 PG (ref 26–34)
MCHC RBC AUTO-ENTMCNC: 32.2 G/DL (ref 31–36)
MCV RBC AUTO: 96.9 FL (ref 80–100)
MONOCYTES ABSOLUTE: 0.7 K/UL (ref 0–1.3)
MONOCYTES RELATIVE PERCENT: 11.6 %
NEUTROPHILS ABSOLUTE: 4.4 K/UL (ref 1.7–7.7)
NEUTROPHILS RELATIVE PERCENT: 76.6 %
PDW BLD-RTO: 13.8 % (ref 12.4–15.4)
PHOSPHORUS: 2.4 MG/DL (ref 2.5–4.9)
PLATELET # BLD: 166 K/UL (ref 135–450)
PMV BLD AUTO: 7.4 FL (ref 5–10.5)
POTASSIUM SERPL-SCNC: 3.8 MMOL/L (ref 3.5–5.1)
PROLACTIN: 24.7 NG/ML
RAPID INFLUENZA  B AGN: NEGATIVE
RAPID INFLUENZA A AGN: NEGATIVE
RBC # BLD: 4.39 M/UL (ref 4.2–5.9)
SODIUM BLD-SCNC: 138 MMOL/L (ref 136–145)
TSH REFLEX: 4 UIU/ML (ref 0.27–4.2)
URINE CULTURE, ROUTINE: NORMAL
VITAMIN B-12: 729 PG/ML (ref 211–911)
WBC # BLD: 5.7 K/UL (ref 4–11)

## 2022-10-31 PROCEDURE — 87804 INFLUENZA ASSAY W/OPTIC: CPT

## 2022-10-31 PROCEDURE — 6360000002 HC RX W HCPCS: Performed by: STUDENT IN AN ORGANIZED HEALTH CARE EDUCATION/TRAINING PROGRAM

## 2022-10-31 PROCEDURE — 36415 COLL VENOUS BLD VENIPUNCTURE: CPT

## 2022-10-31 PROCEDURE — 85025 COMPLETE CBC W/AUTO DIFF WBC: CPT

## 2022-10-31 PROCEDURE — 2580000003 HC RX 258: Performed by: STUDENT IN AN ORGANIZED HEALTH CARE EDUCATION/TRAINING PROGRAM

## 2022-10-31 PROCEDURE — 2060000000 HC ICU INTERMEDIATE R&B

## 2022-10-31 PROCEDURE — 83735 ASSAY OF MAGNESIUM: CPT

## 2022-10-31 PROCEDURE — 6370000000 HC RX 637 (ALT 250 FOR IP): Performed by: STUDENT IN AN ORGANIZED HEALTH CARE EDUCATION/TRAINING PROGRAM

## 2022-10-31 PROCEDURE — 92526 ORAL FUNCTION THERAPY: CPT

## 2022-10-31 PROCEDURE — 93010 ELECTROCARDIOGRAM REPORT: CPT | Performed by: INTERNAL MEDICINE

## 2022-10-31 PROCEDURE — 99223 1ST HOSP IP/OBS HIGH 75: CPT | Performed by: PSYCHIATRY & NEUROLOGY

## 2022-10-31 PROCEDURE — 80069 RENAL FUNCTION PANEL: CPT

## 2022-10-31 PROCEDURE — 94640 AIRWAY INHALATION TREATMENT: CPT

## 2022-10-31 PROCEDURE — 6360000004 HC RX CONTRAST MEDICATION: Performed by: STUDENT IN AN ORGANIZED HEALTH CARE EDUCATION/TRAINING PROGRAM

## 2022-10-31 PROCEDURE — C8929 TTE W OR WO FOL WCON,DOPPLER: HCPCS

## 2022-10-31 PROCEDURE — 92610 EVALUATE SWALLOWING FUNCTION: CPT

## 2022-10-31 PROCEDURE — 94761 N-INVAS EAR/PLS OXIMETRY MLT: CPT

## 2022-10-31 RX ORDER — ENOXAPARIN SODIUM 100 MG/ML
30 INJECTION SUBCUTANEOUS 2 TIMES DAILY
Status: DISCONTINUED | OUTPATIENT
Start: 2022-10-31 | End: 2022-11-01 | Stop reason: HOSPADM

## 2022-10-31 RX ORDER — ALBUTEROL SULFATE 90 UG/1
2 AEROSOL, METERED RESPIRATORY (INHALATION) EVERY 6 HOURS PRN
Status: DISCONTINUED | OUTPATIENT
Start: 2022-10-31 | End: 2022-11-01 | Stop reason: HOSPADM

## 2022-10-31 RX ADMIN — DESMOPRESSIN ACETATE 40 MG: 0.2 TABLET ORAL at 21:19

## 2022-10-31 RX ADMIN — ALLOPURINOL 300 MG: 300 TABLET ORAL at 01:01

## 2022-10-31 RX ADMIN — METOPROLOL TARTRATE 25 MG: 25 TABLET, FILM COATED ORAL at 21:18

## 2022-10-31 RX ADMIN — ENOXAPARIN SODIUM 30 MG: 100 INJECTION SUBCUTANEOUS at 21:19

## 2022-10-31 RX ADMIN — Medication 10 ML: at 10:34

## 2022-10-31 RX ADMIN — ENOXAPARIN SODIUM 30 MG: 100 INJECTION SUBCUTANEOUS at 09:50

## 2022-10-31 RX ADMIN — CEFTRIAXONE SODIUM 1000 MG: 1 INJECTION, POWDER, FOR SOLUTION INTRAMUSCULAR; INTRAVENOUS at 23:20

## 2022-10-31 RX ADMIN — ALLOPURINOL 300 MG: 300 TABLET ORAL at 21:19

## 2022-10-31 RX ADMIN — Medication 2 PUFF: at 09:12

## 2022-10-31 RX ADMIN — MULTIPLE VITAMINS W/ MINERALS TAB 1 TABLET: TAB at 09:50

## 2022-10-31 RX ADMIN — CEFTRIAXONE SODIUM 1000 MG: 1 INJECTION, POWDER, FOR SOLUTION INTRAMUSCULAR; INTRAVENOUS at 00:54

## 2022-10-31 RX ADMIN — ALLOPURINOL 300 MG: 300 TABLET ORAL at 09:50

## 2022-10-31 RX ADMIN — Medication 10 ML: at 21:20

## 2022-10-31 RX ADMIN — METOPROLOL TARTRATE 25 MG: 25 TABLET, FILM COATED ORAL at 01:01

## 2022-10-31 RX ADMIN — Medication 10 ML: at 01:01

## 2022-10-31 RX ADMIN — PERFLUTREN 1.65 MG: 6.52 INJECTION, SUSPENSION INTRAVENOUS at 15:10

## 2022-10-31 RX ADMIN — METOPROLOL TARTRATE 25 MG: 25 TABLET, FILM COATED ORAL at 09:49

## 2022-10-31 RX ADMIN — SODIUM CHLORIDE, POTASSIUM CHLORIDE, SODIUM LACTATE AND CALCIUM CHLORIDE: 600; 310; 30; 20 INJECTION, SOLUTION INTRAVENOUS at 00:55

## 2022-10-31 RX ADMIN — DESMOPRESSIN ACETATE 40 MG: 0.2 TABLET ORAL at 01:00

## 2022-10-31 ASSESSMENT — PAIN SCALES - GENERAL: PAINLEVEL_OUTOF10: 0

## 2022-10-31 NOTE — PROGRESS NOTES
Facility/Department: [address]  Initial Assessment  DYSPHAGIA BEDSIDE SWALLOW EVALUATION     Patient: Xiomara Dominguez   : 1955   MRN: [id number]      Evaluation Date: 10/31/2022   Admitting Diagnosis: Transient alteration of awareness [R40.4]  TIA (transient ischemic attack) [G45.9]  History of atrial fibrillation [Z86.79]  Pain: Denies                                                       H&P: 78 y.o. male with PMHx of CAD, COPD, hyperlipidemia, hypertension who presented to [facility] with complaints of altered mental status, possible seizure, weakness and facial droop. Patient states he was watching a movie at home when he passed out. The next thing the patient remembers is he was waking up in an ambulance on the way to the hospital.  Patient states that his girlfriend noticed that he was shaking at home with seizure-like activity. On his way to the hospital, patient did have some facial asymmetry, but that resolved prior to coming to the ED. This is never happened to the patient before. Patient does not have any history of seizures or strokes. He denies any chest pain, shortness of breath, nausea, vomiting, GI/ symptoms. Patient does have chronic lower extremity lymphedema. Imaging:  Chest X-ray:  10/30/22  Impression   1. No acute pulmonary abnormality. 2. Stable mild enlargement of the cardiac silhouette. Head CT:  10/30/22  Impression   No acute intracranial abnormality. History/Prior Level of Function:   Living Status: Pt in from home. Prior Dysphagia History: Per chart review and pt report, no prior history of dysphagia. Reason for referral: SLP evaluation orders received due to CVA protocol     Dysphagia Impressions/Diagnosis: Oropharyngeal Dysphagia   Pt alert and upright on side of bed for swallow evaluation. Pt denies onset of dysphagia or speech-language cognitive deficits. RN reported symptoms resolved once pt was brought into ED.  Oral motor exam revealed no overt asymmetry and functional strength, ROM and coordination. Various textures provided to assess swallow function. Oral phase characterized by prolonged mastication and suspected premature bolus loss to pharynx. Thin liquids revealed clinical s/s of delayed swallow initiation. Pt able to achieve good oral clearance of regular solids with independent use of a liquid wash. No overt clinical symptoms of aspiration assessed across PO intake. Pt may benefit from completion of a speech-language cognitive evaluation pending results from MRI. Recommend continuation of a regular texture diet with thin liquids, meds whole with water and follow-up 1-2x pending results from MRI. Recommended Diet and Intervention 10/31/2022:  Diet Solids Recommendation:  Regular texture diet  Liquid Consistency Recommendation: Thin liquids  Recommended form of Meds: Meds whole with water         Compensatory Swallowing Strategies:  Upright as possible with all PO intake , Small bites/sips , Eat/feed slowly    SHORT TERM DYSPHAGIA GOALS/PLAN OF CARE: Speech therapy for dysphagia tx 1-2 times to ensure diet tolerance.   Pt will functionally tolerate recommended diet with no overt clinical s/s of aspiration     Dysphagia Therapeutic Intervention:  Diet Tolerance Monitoring , Patient/Family Education     Discharge Recommendations: Further speech/dysphagia treatment follow-up is pending MRI results with further treatment as indicated     Patient Positioning: Upright in bed     Current Diet Level (prior to evaluation): NPO        Respiratory Status:   [x]Room Air   []O2 via nasal cannula   []Other:    Dentition:  []Adequate  []Dentures   []Missing Many Teeth  [x]Edentulous  []Other:    Baseline Vocal Quality:  [x]Normal  []Dysphonic   []Aphonic   []Hoarse  []Wet  []Weak  []Other:    Volitional Cough:  Elicited: Strong     Volitional Swallow:   []Absent   []Delayed     [x]Adequate     []Required use of drink     Oral Mechanism Exam:  [x]WFL []Mild   [] Moderate  []Severe  []To be assessed  Impaired:   []Left side      []Right side    []Labial ROM/Coordination    []Labial Symmetry   []Lingual ROM/Coordination   []Lingual Symmetry  []Gag  []Other:     Oral Phase: []WFL [x]Mild   [] Moderate  []Severe  []To be assessed   [x]Impaired/Prolonged Mastication:   []Oral Holding:   []Spillage Left:   []Spillage Right:  []Pocketing Left:   []Pocketing Right:   []Decreased Anterior to Posterior Transit:   [x]Suspected Premature Bolus Loss:   []Lingual/Palatal Residue:   []Other:     Pharyngeal Phase: []WFL [x]Mild   [] Moderate  []Severe  []To be assessed   [x]Delayed Swallow:   []Suspected Pharyngeal Pooling:   []Decreased Laryngeal Elevation:   []Absent Swallow:  []Wet Vocal Quality:   []Throat Clearing-Immediate:   []Throat Clearing-Delayed:   []Cough-Immediate:   []Cough-Delayed:  []Change in Vital Signs:  []Suspected Delayed Pharyngeal Clearing:  []Other:     Eating Assistance:  [x]Independent  []Setup or clean-up assistance   [] Supervision or touching assistance   [] Partial or moderate assistance   [] Substantial or maximal assistance  [] Dependent     EDUCATION:   Provided education regarding role of SLP, results of assessment, recommendations and general speech pathology plan of care. [x] Pt verbalized understanding and agreement   [] Pt requires ongoing learning   [] No evidence of comprehension     If patient discharges prior to next visit, this note will serve as discharge.      Treatment time:  Timed Code Treatment Minutes: 0  Total Treatment time: 24 min    Electronically signed by:    Elizabeth Uribe M.A., [address] [place] [address]  Speech-Language Pathologist

## 2022-10-31 NOTE — ED NOTES
Pt transferred to admission room via stretcher with ED RN on cardiac monitor. IV flushed prior to transport. Pt stable upon transport.      Michelle Perdue RN  10/30/22 1085

## 2022-10-31 NOTE — PROGRESS NOTES
Pt admitted to 3380. Telemetry on. Afib on telemetry. Pt alert and oriented x4. NIHSS complete. BP elevated but all other vital signs stable. Respirations labored but patient denies any shortness of breath. BLE swollen and red. Pt did not wish to change into hospital pants and gown at time of admission. Plan of care went over with patient. During admission pt did say he would need something for MRI d/t claustrophobia. Pt denies any other needs at this time. Call light within reach.

## 2022-10-31 NOTE — H&P
Hospital Medicine History & Physical        Name:  Jackie Akers  /Age/Sex: 1955  (78 y.o. male)  MRN & CSN:  [id number] & [id number]     PCP: Steve Teixeira MD    Date of Admission: 10/30/2022    Date of Service: Pt seen/examined on 10/30/2022    Patient Status:  Inpatient - Patient will most likely require more than 48 hours of treatment and requires intensive medical treatment/monitoring     Chief Complaint:    Chief Complaint   Patient presents with    Altered Mental Status     Per EMS, about an hour ago pt was sitting on the couch when the girlfriend noted seizure like activity. Pt was confused at that time. EMS noted left sided weakness and facial droop. History Of Present Illness:      78 y.o. male with PMHx of CAD, COPD, hyperlipidemia, hypertension who presented to [facility] with complaints of altered mental status, possible seizure, weakness and facial droop. Patient states he was watching a movie at home when he passed out. The next thing the patient remembers is he was waking up in an ambulance on the way to the hospital.  Patient states that his girlfriend noticed that he was shaking at home with seizure-like activity. On his way to the hospital, patient did have some facial asymmetry, but that resolved prior to coming to the ED. This is never happened to the patient before. Patient does not have any history of seizures or strokes. He denies any chest pain, shortness of breath, nausea, vomiting, GI/ symptoms. Patient does have chronic lower extremity lymphedema. Patient denies tobacco or illicit drug use. Endorses occasional alcohol use.     Past Medical History:          Diagnosis Date    Anxiety     Arthritis     Asthma     Atrial fibrillation ([facility]     Blood circulation, collateral     CAD (coronary artery disease)     CHF (congestive heart failure) (HCC)     Colon cancer (HCC)     COPD (chronic obstructive pulmonary disease) ([facility]     Diabetic foot infection ([facility]     Hernia     Hyperlipidemia     Hypertension     Movement disorder     Muscle cramps     Neuromuscular disorder (HCC)     marvin feet numbness/neuropathy    Neuropathy     Pneumonia     Renal insufficiency     Sleep apnea     no cpap    Thyroid disease     Tinnitus aurium, bilateral     Unspecified cerebral artery occlusion with cerebral infarction     mini stroke    Unspecified diseases of blood and blood-forming organs        Past Surgical History:          Procedure Laterality Date    ABDOMEN SURGERY      APPENDECTOMY  10/9/2012    CARPAL TUNNEL RELEASE Left 12/11/14    CARPAL TUNNEL RELEASE Right 6/11/2019    RIGHT CARPAL TUNNEL RELEASE AND RIGHT MIDDLE FINGER TRIGGER FINGER RELEASE performed by Fay Piper MD at [address] [place] [address]      for Colon Ca 10 inches removed from colon    COLONOSCOPY      COLONOSCOPY  3/13/14    COLONOSCOPY  03/02/2018    CYSTOSCOPY Right 5/3/2019    CYSTOSCOPY, RIGHT URETEROSCOPY, STONE EXTRACTION WITH STONE BASKET performed by Robbin Samson MD at [address] [place] [address] (UPPER)  6/4/14    ENDOSCOPY, COLON, DIAGNOSTIC      FOOT SURGERY Right 07/13/2017    Amputation toes 2,3,5, right foot Flap closure, Exostectomy 1st metatarsal right foot    FOOT SURGERY Right 08/17/2017    INCISION AND DRAINAGE OF RIGHT FOOT WITH FLAP CLOSURE    HERNIA REPAIR      umbilical, hiatal hernia repairs    KNEE ARTHROSCOPY Right 08/23/2018    partial medial/ lateral menisectomy    SKIN BIOPSY      TOE AMPUTATION  3/7/11    RIGHT FOURTH TOE    TOE SURGERY      ULNAR TUNNEL RELEASE Left 12-11-14    Left ulnar nerve decompression and left CTR    UPPER GASTROINTESTINAL ENDOSCOPY  1/28/2014    with biopsies    UPPER GASTROINTESTINAL ENDOSCOPY  3/13/14    UPPER GASTROINTESTINAL ENDOSCOPY  6/4/14    with biopsy    UPPER GASTROINTESTINAL ENDOSCOPY  03/02/2018    VENTRICULAR ABLATION SURGERY         Medications Prior to Admission:      Prior to Admission medications Medication Sig Start Date End Date Taking? Authorizing Provider   atorvastatin (LIPITOR) 40 MG tablet TAKE 1 TABLET BY MOUTH EVERY DAY IN THE EVENING 7/18/22   Magalie Saleh MD   allopurinol (ZYLOPRIM) 300 MG tablet TAKE 1 TABLET BY MOUTH 2 TIMES DAILY 4/19/22   Glenny Meyer MD   diclofenac sodium (VOLTAREN) 1 % GEL APPLY 4 GRAMS TOPICALLY 4 TIMES DAILY AS NEEDED FOR PAIN 1/17/22   Magalie Saleh MD   metoprolol tartrate (LOPRESSOR) 25 MG tablet Take 1 tablet by mouth 2 times daily 1/15/22   Shahzad Nichols MD   dilTIAZem (CARDIZEM CD) 180 MG extended release capsule Take 1 capsule by mouth 2 times daily 1/15/22   Shahzad Nichols MD   acetaminophen (AMINOFEN) 325 MG tablet Take 2 tablets by mouth every 6 hours as needed for Pain 4/19/21   SACHIN Banks   pantoprazole (PROTONIX) 40 MG tablet TAKE 1 TABLET BY MOUTH TWICE DAILY 1/30/17   Magalie Saleh MD   budesonide-formoterol [facility]) 160-4.5 MCG/ACT AERO Inhale 2 puffs into the lungs 2 times daily. 4/20/15   Linnea Baca MD   albuterol-ipratropium (COMBIVENT RESPIMAT)  MCG/ACT AERS inhaler Inhale 1 puff into the lungs every 6 hours. Patient taking differently: Inhale 1 puff into the lungs every 6 hours as needed  4/20/15   Linnea Baca MD   OXYGEN Inhale  into the lungs. 2 liters/ nasal cannula prn    Historical Provider, MD   therapeutic multivitamin-minerals (THERAGRAN-M) tablet Take 1 tablet by mouth daily. Historical Provider, MD       Allergies:  Bactrim and Clindamycin/lincomycin    Social History:      The patient currently lives at home    TOBACCO:   reports that he has never smoked. He has never used smokeless tobacco.  ETOH:   reports current alcohol use.   E-cigarette/Vaping       Questions Responses    E-cigarette/Vaping Use Never User    Start Date     Passive Exposure     Quit Date     Counseling Given     Comments               Family History:      Reviewed and negative in regards to presenting illness/complaint. Problem Relation Age of Onset    Cancer Father         colon    High Blood Pressure Father     Heart Disease Father     High Blood Pressure Mother     Heart Disease Mother        REVIEW OF SYSTEMS COMPLETED:   Pertinent positives as noted in the HPI. All other systems reviewed and negative. PHYSICAL EXAM PERFORMED:    BP (!) 165/111   Pulse 91   Temp 98.5 °F (36.9 °C) (Oral)   Resp 17   SpO2 96%     General appearance:  No apparent distress, appears stated age and cooperative. HEENT:  Normal cephalic, atraumatic without obvious deformity. Pupils equal, round, and reactive to light. Extra ocular muscles intact. Conjunctivae/corneas clear. Neck: Supple, with full range of motion. No jugular venous distention. Trachea midline. Respiratory:  Normal respiratory effort. Clear to auscultation, bilaterally without Rales/Wheezes/Rhonchi. Cardiovascular:  Regular rate and rhythm with normal S1/S2 without murmurs, rubs or gallops. 1+ lower extremity peripheral edema bilaterally. Abdomen: Soft, non-tender, non-distended with normal bowel sounds. : No CVA tenderness  Musculoskeletal:  No clubbing or cyanosis. Full range of motion without deformity. Chronic lower extremity venous stasis changes. Skin: Skin color, texture, turgor normal.  No rashes or lesions. Neurologic:  Neurovascularly intact without any focal sensory/motor deficits.  Cranial nerves: II-XII intact, grossly non-focal.  Psychiatric:  Alert and oriented, thought content appropriate, normal insight  Peripheral Pulses: +2 palpable, equal bilaterally       Labs:     Recent Labs     10/30/22  1949   WBC 6.1   HGB 14.8   HCT 45.0        Recent Labs     10/30/22  1949      K 4.0      CO2 26   BUN 12   CREATININE 1.0   CALCIUM 9.2     Recent Labs     10/30/22  1949   AST 39*   ALT 34   BILITOT 1.5*   ALKPHOS 155*     Recent Labs     10/30/22  1949   INR 1.15*     Recent Labs     10/30/22  1949   Mac Emms <0.01       Urinalysis:      Lab Results   Component Value Date/Time    NITRU POSITIVE 10/30/2022 07:49 PM    WBCUA 103 10/30/2022 07:49 PM    BACTERIA Rare 10/30/2022 07:49 PM    RBCUA 5 10/30/2022 07:49 PM    BLOODU SMALL 10/30/2022 07:49 PM    SPECGRAV 1.020 10/30/2022 07:49 PM    GLUCOSEU Negative 10/30/2022 07:49 PM       Radiology:     CXR: I have reviewed the CXR with the following interpretation: Nonacute  EKG:  I have reviewed the EKG with the following interpretation: Sinus tachycardia    XR CHEST PORTABLE   Final Result   1. No acute pulmonary abnormality. 2. Stable mild enlargement of the cardiac silhouette. CTA HEAD NECK W CONTRAST   Final Result   Negative for large vessel occlusion or hemodynamic stenosis. CT HEAD WO CONTRAST   Final Result   No acute intracranial abnormality. MRI BRAIN W WO CONTRAST    (Results Pending)       Medications:  Not in a hospital admission.   Current Facility-Administered Medications   Medication Dose Route Frequency Provider Last Rate Last Admin    0.9 % sodium chloride bolus  1,000 mL IntraVENous Once Aranza Jeffers  mL/hr at 10/30/22 1957 1,000 mL at 10/30/22 1957    albuterol-ipratropium (COMBIVENT RESPIMAT)  MCG/ACT inhaler 1 puff  1 puff Inhalation Q6H PRN Brett Parnell, DO        allopurinol (ZYLOPRIM) tablet 300 mg  300 mg Oral BID Brett Parnell, DO        atorvastatin (LIPITOR) tablet 40 mg  40 mg Oral Nightly Maurizio Torres, DO        mometasone-formoterol (DULERA) 200-5 MCG/ACT inhaler 2 puff  2 puff Inhalation BID Brett Parnell, DO        dilTIAZem (CARDIZEM CD) extended release capsule 180 mg  180 mg Oral BID Brett Parnell, DO        metoprolol tartrate (LOPRESSOR) tablet 25 mg  25 mg Oral BID Brett Parnell, DO        OXYGEN 2 L  2 L Inhalation Continuous Maurizio Tercar, DO        pantoprazole (PROTONIX) tablet 40 mg  1 tablet Oral BID Brett Parnell, DO        [START ON 10/31/2022] therapeutic multivitamin-minerals 1 tablet  1 tablet Oral Daily Maurizio Torres,         cefTRIAXone (ROCEPHIN) 1,000 mg in dextrose 5 % 50 mL IVPB mini-bag  1,000 mg IntraVENous Q24H Maurizio Torres DO        perflutren lipid microspheres (DEFINITY) injection 1.65 mg  1.5 mL IntraVENous ONCE PRN Sushila Mayor, DO        sodium chloride flush 0.9 % injection 5-40 mL  5-40 mL IntraVENous 2 times per day Sushila Mayor, DO        sodium chloride flush 0.9 % injection 5-40 mL  5-40 mL IntraVENous PRN Maurizio Torres, DO        0.9 % sodium chloride infusion   IntraVENous PRN Sushila Nawafr, DO        [START ON 10/31/2022] enoxaparin (LOVENOX) injection 40 mg  40 mg SubCUTAneous Daily Maurizio Torres,         ondansetron (ZOFRAN-ODT) disintegrating tablet 4 mg  4 mg Oral Q8H PRN Sushila Mayor, DO        Or    ondansetron (ZOFRAN) injection 4 mg  4 mg IntraVENous Q6H PRN Sushila Mayor, DO        polyethylene glycol (GLYCOLAX) packet 17 g  17 g Oral Daily PRN Sushila Mayor, DO        acetaminophen (TYLENOL) tablet 650 mg  650 mg Oral Q6H PRN Sushila Mayor, DO        Or    acetaminophen (TYLENOL) suppository 650 mg  650 mg Rectal Q6H PRN Sushila Mayor, DO        lactated ringers infusion   IntraVENous Continuous Maurizio Torres,         magnesium sulfate 2000 mg in 50 mL IVPB premix  2,000 mg IntraVENous PRN Sushila Mayor, DO        potassium chloride (KLOR-CON M) extended release tablet 40 mEq  40 mEq Oral PRN Sushila Mayor, DO        Or    potassium bicarb-citric acid (EFFER-K) effervescent tablet 40 mEq  40 mEq Oral PRN Sushila Mayor, DO        Or    potassium chloride 10 mEq/100 mL IVPB (Peripheral Line)  10 mEq IntraVENous PRN Sushila Mayor, DO        hydrALAZINE (APRESOLINE) injection 10 mg  10 mg IntraVENous Q6H PRN Sushila Mayor, DO         Current Outpatient Medications   Medication Sig Dispense Refill    atorvastatin (LIPITOR) 40 MG tablet TAKE 1 TABLET BY MOUTH EVERY DAY IN THE EVENING 90 tablet 1    allopurinol (ZYLOPRIM) 300 MG tablet TAKE 1 TABLET BY MOUTH 2 TIMES DAILY 180 tablet 3    diclofenac sodium (VOLTAREN) 1 % GEL APPLY 4 GRAMS TOPICALLY 4 TIMES DAILY AS NEEDED FOR PAIN 500 g 2    metoprolol tartrate (LOPRESSOR) 25 MG tablet Take 1 tablet by mouth 2 times daily 180 tablet 3    dilTIAZem (CARDIZEM CD) 180 MG extended release capsule Take 1 capsule by mouth 2 times daily 180 capsule 1    acetaminophen (AMINOFEN) 325 MG tablet Take 2 tablets by mouth every 6 hours as needed for Pain 120 tablet 0    pantoprazole (PROTONIX) 40 MG tablet TAKE 1 TABLET BY MOUTH TWICE DAILY 60 tablet 5    budesonide-formoterol (SYMBICORT) 160-4.5 MCG/ACT AERO Inhale 2 puffs into the lungs 2 times daily. 3 Inhaler 3    albuterol-ipratropium (COMBIVENT RESPIMAT)  MCG/ACT AERS inhaler Inhale 1 puff into the lungs every 6 hours. (Patient taking differently: Inhale 1 puff into the lungs every 6 hours as needed ) 3 Inhaler 3    OXYGEN Inhale  into the lungs. 2 liters/ nasal cannula prn      therapeutic multivitamin-minerals (THERAGRAN-M) tablet Take 1 tablet by mouth daily.          Consults:    IP CONSULT TO STROKE TEAM  IP CONSULT TO PHARMACY  PHARMACY TO CHANGE BASE FLUIDS  IP CONSULT TO HOSPITALIST  IP CONSULT TO NEUROLOGY    ASSESSMENT:    Active Hospital Problems    Diagnosis Date Noted    Chronic atrial fibrillation ([facility] [I48.20] 07/12/2010     Priority: High    Seizure ([facility] [R56.9] 10/30/2022     Priority: Medium    TIA (transient ischemic attack) [G45.9] 10/30/2022     Priority: Medium    (HFpEF) heart failure with preserved ejection fraction ([facility] [I50.30] 10/30/2022     Priority: Medium    PVD (peripheral vascular disease) ([facility] [I73.9] 03/05/2021    Recurrent major depressive disorder, in full remission ([facility] [F33.42] 02/04/2020    S/P transmetatarsal amputation of foot, right ([facility] [Z89.431]     COPD (chronic obstructive pulmonary disease) ([facility] [J44.9] 07/12/2010         PLAN:  This is a 78 y.o. male who presented to [facility] and is being treated for:    TIA  -CT head nonacute  -CTA head neck nonacute  -MRI head ordered  -Statin  -N.p.o.  -Speech evaluation  -IVF  -Neurology consulted; appreciate recommendations    Seizure  -Witnessed by wife  -Possibly 2/2 UTI  -No seizure-like activity in the ED  -Hold off on Keppra at this time  -Monitor; supportive care    UTI  -UA indicative of UTI; urine culture pending  -Rocephin; de-escalate when able    HFpEF  -Echo 5/8/2017 shows LVEF 60% with no regional wall abnormalities; PA pressure 35; dilated left atrium with moderately dilated right heart  -Repeat echo  -I/Os    Atrial fibrillation  -Not on anticoagulation  -[id number] = 3    COPD  -Continue home inhaler therapy      DVT ppx: Lovenox  GI ppx: PPI  Diet: Diet NPO Exceptions are: Sips of Water with Meds  Code Status: Full Code    PT/OT Eval Status: Ordered    Disposition -home after medical stabilization and treatment       Ariana Chase DO  10/30/2022  9:56 PM    Please note that some part of this chart was generated using Dragon dictation software. Although every effort was made to ensure the accuracy of this automated transcription, some errors in transcription may have occurred inadvertently. If you may need any clarification, please do not hesitate to contact me through [facility].

## 2022-10-31 NOTE — CARE COORDINATION
CM reviewed chart for d/c planning. Pt from home, girlfriend was with him and noted seizure like activity. Echo, neurology consult and therapy evaluations pending at this time. CM will monitor for d/c needs.       Marybel Paz RN, BSN  [phone]

## 2022-10-31 NOTE — PROGRESS NOTES
Occupational/Physical Therapy  Misa Renner     Orders received and chart reviewed. Attempted to see patient for evaluation. Patient off the floor for testing. Will attempt to see tomorrow.    Thank you,   Fatoumata Batista, [facility] OTR/L [id number]  Marybel Valles PT, DPT, [id number]

## 2022-10-31 NOTE — CONSULTS
In patient Neurology consult        [facility] Neurology      MD Emmie Miller  1955    Date of Service: 10/31/2022    Referring Physician: Pieter Parmar MD      Reason for the consult and CC: Acute syncope    HPI:   The patient is a 78y.o.  years old male with history of hypertension, hyperlipidemia and COPD who was admitted to the hospital yesterday with possible syncope and facial droop. Symptoms started on the day of admission. He was watching movie at home when he suddenly passed out while sitting. When he woke up he was surrounded by paramedics. Degree was severe. No warning. Duration was minutes. No tongue biting or bladder incontinence or witnessed convulsion. No postictal state but family were concerned of possible left facial droop. When he came to ED, he was back to his baseline. No other relieving or aggravating factors or triggers. No prior history of seizure or syncope. Initial imaging showed no acute stroke or large vessel occlusion. He was admitted. Today he denies any other new symptoms. Other review of system was unremarkable.       Family History   Problem Relation Age of Onset    Cancer Father         colon    High Blood Pressure Father     Heart Disease Father     High Blood Pressure Mother     Heart Disease Mother      Past Surgical History:   Procedure Laterality Date    ABDOMEN SURGERY      APPENDECTOMY  10/9/2012    CARPAL TUNNEL RELEASE Left 12/11/14    CARPAL TUNNEL RELEASE Right 6/11/2019    RIGHT CARPAL TUNNEL RELEASE AND RIGHT MIDDLE FINGER TRIGGER FINGER RELEASE performed by Bravo Gary MD at [address]      for Colon Ca 10 inches removed from colon    COLONOSCOPY      COLONOSCOPY  3/13/14    COLONOSCOPY  03/02/2018    CYSTOSCOPY Right 5/3/2019    CYSTOSCOPY, RIGHT URETEROSCOPY, STONE EXTRACTION WITH STONE BASKET performed by Margarito Dumas MD at [address] [place] [address] (UPPER)  6/4/14    ENDOSCOPY, COLON, DIAGNOSTIC      FOOT SURGERY Right 07/13/2017    Amputation toes 2,3,5, right foot Flap closure, Exostectomy 1st metatarsal right foot    FOOT SURGERY Right 08/17/2017    INCISION AND DRAINAGE OF RIGHT FOOT WITH FLAP CLOSURE    HERNIA REPAIR      umbilical, hiatal hernia repairs    KNEE ARTHROSCOPY Right 08/23/2018    partial medial/ lateral menisectomy    SKIN BIOPSY      TOE AMPUTATION  3/7/11    RIGHT FOURTH TOE    TOE SURGERY      ULNAR TUNNEL RELEASE Left 12-11-14    Left ulnar nerve decompression and left CTR    UPPER GASTROINTESTINAL ENDOSCOPY  1/28/2014    with biopsies    UPPER GASTROINTESTINAL ENDOSCOPY  3/13/14    UPPER GASTROINTESTINAL ENDOSCOPY  6/4/14    with biopsy    UPPER GASTROINTESTINAL ENDOSCOPY  03/02/2018    VENTRICULAR ABLATION SURGERY          Past Medical History:   Diagnosis Date    Anxiety     Arthritis     Asthma     Atrial fibrillation (HCC)     Blood circulation, collateral     CAD (coronary artery disease)     CHF (congestive heart failure) (HCC)     Colon cancer (HCC)     COPD (chronic obstructive pulmonary disease) (HCC)     Diabetic foot infection ([facility] [address]     Hernia     Hyperlipidemia     Hypertension     Movement disorder     Muscle cramps     Neuromuscular disorder (HCC)     marvin feet numbness/neuropathy    Neuropathy     Pneumonia     Renal insufficiency     Sleep apnea     no cpap    Thyroid disease     Tinnitus aurium, bilateral     Unspecified cerebral artery occlusion with cerebral infarction     mini stroke    Unspecified diseases of blood and blood-forming organs      Social History     Tobacco Use    Smoking status: Never    Smokeless tobacco: Never   Vaping Use    Vaping Use: Never used   Substance Use Topics    Alcohol use: Yes     Comment: \"occasionally\"    Drug use: No     Allergies   Allergen Reactions    Bactrim      hallucinations    Clindamycin/Lincomycin Diarrhea     Current Facility-Administered Medications   Medication Dose Route Frequency Provider Last Rate Last Admin    albuterol sulfate HFA (PROVENTIL;VENTOLIN;PROAIR) 108 (90 Base) MCG/ACT inhaler 2 puff  2 puff Inhalation Q6H PRN Geoffery Nashville, DO        And    ipratropium (ATROVENT HFA) 17 MCG/ACT inhaler 2 puff  2 puff Inhalation Q6H PRN Maurizio Terlep, DO        enoxaparin Sodium (LOVENOX) injection 30 mg  30 mg SubCUTAneous BID Geoffery Nashville, DO   30 mg at 10/31/22 0950    allopurinol (ZYLOPRIM) tablet 300 mg  300 mg Oral BID Geoffery Nashville, DO   300 mg at 10/31/22 0950    atorvastatin (LIPITOR) tablet 40 mg  40 mg Oral Nightly Maurizio Terlep, DO   40 mg at 10/31/22 0100    mometasone-formoterol (DULERA) 200-5 MCG/ACT inhaler 2 puff  2 puff Inhalation BID Geoffery Nashville, DO   2 puff at 10/31/22 0912    metoprolol tartrate (LOPRESSOR) tablet 25 mg  25 mg Oral BID Geoffery Nashville, DO   25 mg at 10/31/22 0750    therapeutic multivitamin-minerals 1 tablet  1 tablet Oral Daily Geoffery Nashville, DO   1 tablet at 10/31/22 0950    cefTRIAXone (ROCEPHIN) 1,000 mg in dextrose 5 % 50 mL IVPB mini-bag  1,000 mg IntraVENous Q24H Geoffery Nashville, DO   Stopped at 10/31/22 0147    perflutren lipid microspheres (DEFINITY) injection 1.65 mg  1.5 mL IntraVENous ONCE PRN Geoffery Nashville, DO        sodium chloride flush 0.9 % injection 5-40 mL  5-40 mL IntraVENous 2 times per day Geoffery Nashville, DO   10 mL at 10/31/22 1034    sodium chloride flush 0.9 % injection 5-40 mL  5-40 mL IntraVENous PRN Maurizio Terlep, DO        0.9 % sodium chloride infusion   IntraVENous PRN Geoffery Nashville, DO        ondansetron (ZOFRAN-ODT) disintegrating tablet 4 mg  4 mg Oral Q8H PRN Geoffery Nashville, DO        Or    ondansetron (ZOFRAN) injection 4 mg  4 mg IntraVENous Q6H PRN Geoffery Nashville, DO        polyethylene glycol (GLYCOLAX) packet 17 g  17 g Oral Daily PRN Geoffery Nashville, DO        acetaminophen (TYLENOL) tablet 650 mg  650 mg Oral Q6H PRN Geoffery Nashville, DO        Or    acetaminophen (TYLENOL) suppository 650 mg  650 mg Rectal Q6H PRN Geoffery Nashville, DO lactated ringers infusion   IntraVENous Continuous Shearon Wall, DO   Stopped at 10/31/22 0301    magnesium sulfate 2000 mg in 50 mL IVPB premix  2,000 mg IntraVENous PRN Shearon Wall, DO        potassium chloride (KLOR-CON M) extended release tablet 40 mEq  40 mEq Oral PRN Shearon Wall, DO        Or    potassium bicarb-citric acid (EFFER-K) effervescent tablet 40 mEq  40 mEq Oral PRN Shearon Wall, DO        Or    potassium chloride 10 mEq/100 mL IVPB (Peripheral Line)  10 mEq IntraVENous PRN Shearon Wall, DO        hydrALAZINE (APRESOLINE) injection 10 mg  10 mg IntraVENous Q6H PRN Shearon Wall, DO   10 mg at 10/30/22 2214       ROS : A 10-14 system review of constitutional, cardiovascular, respiratory, eyes, musculoskeletal, endocrine, GI, ENT, skin, hematological, genitourinary, psychiatric and neurologic systems was obtained and updated today and is unremarkable except as mentioned in my HPI      Exam:     Constitutional:   Vitals:    10/31/22 0856 10/31/22 0914 10/31/22 0956 10/31/22 1245   BP:   (!) 152/95 (!) 157/102   Pulse:  81 86 74   Resp:  16 15    Temp:   98 °F (36.7 °C) 98.5 °F (36.9 °C)   TempSrc:   Oral Oral   SpO2:  96% 93% 94%   Weight: 297 lb (134.7 kg)      Height:           General appearance and observation: Normal development and appear in no acute distress. Eye:  Fundus: No blurring of optic disc. Neck: supple  Cardiovascular: No lower leg edema with good pulsation. Mental Status:   Oriented to person, place, problem, and time. Memory: Good immediate recall. Intact remote memory  Normal attention span and concentration. Language: intact naming, repeating and fluency   Good fund of Knowledge. Aware of current events and vocabulary   Cranial Nerves:   II: Visual fields: Full. Pupils: equal, round, reactive to light  III,IV,VI: Extra Ocular Movements are intact.  No nystagmus  V: Facial sensation is intact  VII: Facial strength and movements: intact and symmetric  VIII: Hearing: Intact  IX: Palate elevation is symmetric  XI: Shoulder shrug is intact  XII: Tongue movements are normal  Musculoskeletal: 5/5 in all 4 extremities. Tone: Normal tone. Reflexes: Symmetric 2+ in the arms and 2+ in the legs   Planters: flexor bilaterally. Coordination: no pronator drift, no dysmetria with FNF in upper extremities. Normal REM. Sensation: normal to all modalities in both arms and legs. Gait/Posture: steady gait and normal posturing and station. Data:  LABS:   Lab Results   Component Value Date/Time     10/31/2022 05:31 AM    K 3.8 10/31/2022 05:31 AM    K 4.0 10/30/2022 07:49 PM     10/31/2022 05:31 AM    CO2 25 10/31/2022 05:31 AM    BUN 11 10/31/2022 05:31 AM    CREATININE 1.0 10/31/2022 05:31 AM    GFRAA >60 03/08/2022 07:42 AM    GFRAA >60 12/13/2012 02:09 PM    LABGLOM >60 10/31/2022 05:31 AM    GLUCOSE 93 10/31/2022 05:31 AM    PHOS 2.4 10/31/2022 05:31 AM    MG 2.10 10/31/2022 05:31 AM    CALCIUM 8.9 10/31/2022 05:31 AM     Lab Results   Component Value Date/Time    WBC 5.7 10/31/2022 05:31 AM    RBC 4.39 10/31/2022 05:31 AM    HGB 13.7 10/31/2022 05:31 AM    HCT 42.5 10/31/2022 05:31 AM    MCV 96.9 10/31/2022 05:31 AM    RDW 13.8 10/31/2022 05:31 AM     10/31/2022 05:31 AM     Lab Results   Component Value Date    INR 1.15 (H) 10/30/2022    PROTIME 14.6 (H) 10/30/2022       Neuroimaging was independently reviewed by myself and discussed results with the patient   Reviewed notes from different physicians  Reviewed lab and blood testing    Impression:  New onset syncope with facial asymmetry seems to be improving. Less likely seizure. Possible syncope with post convulsive syncope. Rule out arrhythmia or vascular  Hypertension  Hyperlipidemia  A. fib.     Recommendation:  No need for AED  MRI brain  Echo  Telemetry  Aspirin  Statin  A1c, lipid and TFT  DVT and GI prophylaxis  Continue current blood pressure medications and blood pressure control  If symptoms recur, would recommend event monitor  Discussed driving restriction with the patient  Follow-up with cardiology regarding A. fib management  Stroke education provided  DC planning        Thank you for referring such patient. If you have any questions regarding my consult note, please don't hesitate to call me. Maryan Cantu MD  [phone]    This dictation was generated by voice recognition computer software.  Although all attempts are made to edit the dictation for accuracy, there may be errors in the  transcription that are not intended

## 2022-10-31 NOTE — RT PROTOCOL NOTE
RT Inhaler-Nebulizer Bronchodilator Protocol Note    There is a bronchodilator order in the chart from a provider indicating to follow the RT Bronchodilator Protocol and there is an Initiate RT Inhaler-Nebulizer Bronchodilator Protocol order as well (see protocol at bottom of note). CXR Findings:  XR CHEST PORTABLE    Result Date: 10/30/2022  1. No acute pulmonary abnormality. 2. Stable mild enlargement of the cardiac silhouette. The findings from the last RT Protocol Assessment were as follows:   History Pulmonary Disease: Chronic pulmonary disease  Respiratory Pattern: Regular pattern and RR 12-20 bpm  Breath Sounds: Clear breath sounds  Cough: Strong, spontaneous, non-productive  Indication for Bronchodilator Therapy: Decreased or absent breath sounds, On home bronchodilators  Bronchodilator Assessment Score: 2    Aerosolized bronchodilator medication orders have been revised according to the RT Inhaler-Nebulizer Bronchodilator Protocol below. Respiratory Therapist to perform RT Therapy Protocol Assessment initially then follow the protocol. Repeat RT Therapy Protocol Assessment PRN for score 0-3 or on second treatment, BID, and PRN for scores above 3. No Indications - adjust the frequency to every 6 hours PRN wheezing or bronchospasm, if no treatments needed after 48 hours then discontinue using Per Protocol order mode. If indication present, adjust the RT bronchodilator orders based on the Bronchodilator Assessment Score as indicated below. Use Inhaler orders unless patient has one or more of the following: on home nebulizer, not able to hold breath for 10 seconds, is not alert and oriented, cannot activate and use MDI correctly, or respiratory rate 25 breaths per minute or more, then use the equivalent nebulizer order(s) with same Frequency and PRN reasons based on the score. If a patient is on this medication at home then do not decrease Frequency below that used at home.     0-3 - enter or revise RT bronchodilator order(s) to equivalent RT Bronchodilator order with Frequency of every 4 hours PRN for wheezing or increased work of breathing using Per Protocol order mode. 4-6 - enter or revise RT Bronchodilator order(s) to two equivalent RT bronchodilator orders with one order with BID Frequency and one order with Frequency of every 4 hours PRN wheezing or increased work of breathing using Per Protocol order mode. 7-10 - enter or revise RT Bronchodilator order(s) to two equivalent RT bronchodilator orders with one order with TID Frequency and one order with Frequency of every 4 hours PRN wheezing or increased work of breathing using Per Protocol order mode. 11-13 - enter or revise RT Bronchodilator order(s) to one equivalent RT bronchodilator order with QID Frequency and an Albuterol order with Frequency of every 4 hours PRN wheezing or increased work of breathing using Per Protocol order mode. Greater than 13 - enter or revise RT Bronchodilator order(s) to one equivalent RT bronchodilator order with every 4 hours Frequency and an Albuterol order with Frequency of every 2 hours PRN wheezing or increased work of breathing using Per Protocol order mode. RT to enter RT Home Evaluation for COPD & MDI Assessment order using Per Protocol order mode.     Electronically signed by Romie Medley RCP on 10/31/2022 at 12:17 AM

## 2022-10-31 NOTE — PROGRESS NOTES
Attempted MRI twice with different coils to help with patients claustrophobia. Unable to take one scan due to patient being claustrophobic. Patient sent back to his room via transport. RN notified.       DIOMEDES Schaefer R.T. (MR)

## 2022-10-31 NOTE — PLAN OF CARE
Problem: Discharge Planning  Goal: Discharge to home or other facility with appropriate resources  Outcome: Progressing     Problem: Safety - Adult  Goal: Free from fall injury  Outcome: Progressing     Problem: Neurosensory - Adult  Goal: Achieves stable or improved neurological status  Outcome: Progressing

## 2022-10-31 NOTE — ACP (ADVANCE CARE PLANNING)
Advanced Care Planning Note. Purpose of Encounter: Advanced care planning in light of TIA and possible seizure  Parties In Attendance: Patient,   Decisional Capacity: Yes  Subjective: Weakness and left-sided facial droop. Objective: UA indicative of UTI-  Goals of Care Determination: Patient/POA wishes to seek full treatment  Plan: IVF. Imaging. Neurology consult. Labs. Code Status: Full code   Time spent on Advanced care Plannin minutes  Advanced Care Planning Documents: Completed advanced directives on chart, brother is the POA. Okay to also contact son to make medical decisions.     Chrissie Wall DO  10/30/2022 9:55 PM

## 2022-11-01 ENCOUNTER — TELEPHONE (OUTPATIENT)
Dept: INTERNAL MEDICINE CLINIC | Age: 67
End: 2022-11-01

## 2022-11-01 VITALS
DIASTOLIC BLOOD PRESSURE: 89 MMHG | SYSTOLIC BLOOD PRESSURE: 158 MMHG | BODY MASS INDEX: 36.17 KG/M2 | RESPIRATION RATE: 16 BRPM | WEIGHT: 297 LBS | HEART RATE: 81 BPM | TEMPERATURE: 97.9 F | HEIGHT: 76 IN | OXYGEN SATURATION: 97 %

## 2022-11-01 LAB
ALBUMIN SERPL-MCNC: 3.3 G/DL (ref 3.4–5)
ANION GAP SERPL CALCULATED.3IONS-SCNC: 8 MMOL/L (ref 3–16)
BASOPHILS ABSOLUTE: 0 K/UL (ref 0–0.2)
BASOPHILS RELATIVE PERCENT: 0.4 %
BUN BLDV-MCNC: 11 MG/DL (ref 7–20)
CALCIUM SERPL-MCNC: 8.9 MG/DL (ref 8.3–10.6)
CHLORIDE BLD-SCNC: 107 MMOL/L (ref 99–110)
CO2: 26 MMOL/L (ref 21–32)
CREAT SERPL-MCNC: 1.1 MG/DL (ref 0.8–1.3)
EOSINOPHILS ABSOLUTE: 0.1 K/UL (ref 0–0.6)
EOSINOPHILS RELATIVE PERCENT: 2.4 %
GFR SERPL CREATININE-BSD FRML MDRD: >60 ML/MIN/{1.73_M2}
GLUCOSE BLD-MCNC: 81 MG/DL (ref 70–99)
HCT VFR BLD CALC: 41.7 % (ref 40.5–52.5)
HEMOGLOBIN: 13.6 G/DL (ref 13.5–17.5)
LYMPHOCYTES ABSOLUTE: 0.9 K/UL (ref 1–5.1)
LYMPHOCYTES RELATIVE PERCENT: 16.4 %
MCH RBC QN AUTO: 32 PG (ref 26–34)
MCHC RBC AUTO-ENTMCNC: 32.6 G/DL (ref 31–36)
MCV RBC AUTO: 98.4 FL (ref 80–100)
MONOCYTES ABSOLUTE: 0.5 K/UL (ref 0–1.3)
MONOCYTES RELATIVE PERCENT: 9.5 %
NEUTROPHILS ABSOLUTE: 4 K/UL (ref 1.7–7.7)
NEUTROPHILS RELATIVE PERCENT: 71.3 %
PDW BLD-RTO: 13.8 % (ref 12.4–15.4)
PHOSPHORUS: 2.7 MG/DL (ref 2.5–4.9)
PLATELET # BLD: 141 K/UL (ref 135–450)
PMV BLD AUTO: 7.4 FL (ref 5–10.5)
POTASSIUM SERPL-SCNC: 3.6 MMOL/L (ref 3.5–5.1)
RBC # BLD: 4.24 M/UL (ref 4.2–5.9)
SODIUM BLD-SCNC: 141 MMOL/L (ref 136–145)
WBC # BLD: 5.6 K/UL (ref 4–11)

## 2022-11-01 PROCEDURE — 97161 PT EVAL LOW COMPLEX 20 MIN: CPT

## 2022-11-01 PROCEDURE — 6370000000 HC RX 637 (ALT 250 FOR IP): Performed by: STUDENT IN AN ORGANIZED HEALTH CARE EDUCATION/TRAINING PROGRAM

## 2022-11-01 PROCEDURE — 99232 SBSQ HOSP IP/OBS MODERATE 35: CPT | Performed by: PSYCHIATRY & NEUROLOGY

## 2022-11-01 PROCEDURE — 36415 COLL VENOUS BLD VENIPUNCTURE: CPT

## 2022-11-01 PROCEDURE — 85025 COMPLETE CBC W/AUTO DIFF WBC: CPT

## 2022-11-01 PROCEDURE — 97165 OT EVAL LOW COMPLEX 30 MIN: CPT

## 2022-11-01 PROCEDURE — 80069 RENAL FUNCTION PANEL: CPT

## 2022-11-01 RX ORDER — ALPRAZOLAM 0.5 MG/1
1 TABLET ORAL
Status: DISCONTINUED | OUTPATIENT
Start: 2022-11-01 | End: 2022-11-01 | Stop reason: HOSPADM

## 2022-11-01 RX ORDER — ALPRAZOLAM 0.5 MG/1
1 TABLET ORAL ONCE
Status: DISCONTINUED | OUTPATIENT
Start: 2022-11-01 | End: 2022-11-01

## 2022-11-01 RX ORDER — ASPIRIN 81 MG/1
81 TABLET ORAL DAILY
Qty: 30 TABLET | Refills: 5 | Status: SHIPPED | OUTPATIENT
Start: 2022-11-01

## 2022-11-01 RX ORDER — AMOXICILLIN AND CLAVULANATE POTASSIUM 500; 125 MG/1; MG/1
1 TABLET, FILM COATED ORAL 3 TIMES DAILY
Qty: 12 TABLET | Refills: 0 | Status: SHIPPED | OUTPATIENT
Start: 2022-11-01 | End: 2022-11-05

## 2022-11-01 RX ADMIN — MULTIPLE VITAMINS W/ MINERALS TAB 1 TABLET: TAB at 10:26

## 2022-11-01 RX ADMIN — ALLOPURINOL 300 MG: 300 TABLET ORAL at 10:26

## 2022-11-01 RX ADMIN — METOPROLOL TARTRATE 25 MG: 25 TABLET, FILM COATED ORAL at 10:26

## 2022-11-01 NOTE — PROGRESS NOTES
Pt. Is adamant about leaving very soon, this RN messaged the MD about a D/C order so the patient is able to follow up with an outpatient MRI, he is very anxious and wanting to leave as soon as possible.

## 2022-11-01 NOTE — PROGRESS NOTES
[facility] PROGRESS NOTE    11/1/2022 10:11 AM        Name: Yamile Edge .               Admitted: 10/30/2022  Primary Care Provider: Emmy Craven MD (Tel: [phone])      Subjective:    Denies any chest pain weakness nausea vomiting fevers or chills    Reviewed interval ancillary notes    Current Medications  ALPRAZolam (XANAX) tablet 1 mg, Once PRN  albuterol sulfate HFA (PROVENTIL;VENTOLIN;PROAIR) 108 (90 Base) MCG/ACT inhaler 2 puff, Q6H PRN   And  ipratropium (ATROVENT HFA) 17 MCG/ACT inhaler 2 puff, Q6H PRN  enoxaparin Sodium (LOVENOX) injection 30 mg, BID  allopurinol (ZYLOPRIM) tablet 300 mg, BID  atorvastatin (LIPITOR) tablet 40 mg, Nightly  mometasone-formoterol (DULERA) 200-5 MCG/ACT inhaler 2 puff, BID  metoprolol tartrate (LOPRESSOR) tablet 25 mg, BID  therapeutic multivitamin-minerals 1 tablet, Daily  cefTRIAXone (ROCEPHIN) 1,000 mg in dextrose 5 % 50 mL IVPB mini-bag, Q24H  sodium chloride flush 0.9 % injection 5-40 mL, 2 times per day  sodium chloride flush 0.9 % injection 5-40 mL, PRN  0.9 % sodium chloride infusion, PRN  ondansetron (ZOFRAN-ODT) disintegrating tablet 4 mg, Q8H PRN   Or  ondansetron (ZOFRAN) injection 4 mg, Q6H PRN  polyethylene glycol (GLYCOLAX) packet 17 g, Daily PRN  acetaminophen (TYLENOL) tablet 650 mg, Q6H PRN   Or  acetaminophen (TYLENOL) suppository 650 mg, Q6H PRN  lactated ringers infusion, Continuous  magnesium sulfate 2000 mg in 50 mL IVPB premix, PRN  potassium chloride (KLOR-CON M) extended release tablet 40 mEq, PRN   Or  potassium bicarb-citric acid (EFFER-K) effervescent tablet 40 mEq, PRN   Or  potassium chloride 10 mEq/100 mL IVPB (Peripheral Line), PRN  hydrALAZINE (APRESOLINE) injection 10 mg, Q6H PRN        Objective:  BP (!) 158/89   Pulse 81   Temp 97.9 °F (36.6 °C) (Oral)   Resp 16   Ht 6' 4\" (1.93 m)   Wt 297 lb (134.7 kg)   SpO2 97%   BMI 36.15 kg/m²   No intake or output data in the 24 hours ending 11/01/22 1011   Wt Readings from Last 3 Encounters:   11/01/22 297 lb (134.7 kg)   06/15/22 (!) 302 lb (137 kg)   03/11/22 292 lb (132.5 kg)       General appearance:  Appears comfortable. AAOx3  HEENT: atraumatic, Pupils equal, muscous membranes moist, no masses appreciated  Cardiovascular: Regular rate and rhythm no murmurs appreciated  Respiratory: CTAB no wheezing  Gastrointestinal: Abdomen soft, non-tender, BS+  EXT: no edema  Neurology: no gross focal deficts  Psychiatry: Appropriate affect. Not agitated  Skin: Warm, dry, no rashes appreciated    Labs and Tests:  CBC:   Recent Labs     10/30/22  1949 10/31/22  0531 11/01/22  0445   WBC 6.1 5.7 5.6   HGB 14.8 13.7 13.6    166 141     BMP:    Recent Labs     10/30/22  1949 10/30/22  1951 10/31/22  0531 11/01/22  0445     --  138 141   K 4.0  --  3.8 3.6     --  105 107   CO2 26  --  25 26   BUN 12  --  11 11   CREATININE 1.0  --  1.0 1.1   GLUCOSE 133* 119 93 81     Hepatic:   Recent Labs     10/30/22  1949   AST 39*   ALT 34   BILITOT 1.5*   ALKPHOS 155*     MRI BRAIN W WO CONTRAST         XR CHEST PORTABLE   Final Result   1. No acute pulmonary abnormality. 2. Stable mild enlargement of the cardiac silhouette. CTA HEAD NECK W CONTRAST   Final Result   Negative for large vessel occlusion or hemodynamic stenosis. CT HEAD WO CONTRAST   Final Result   No acute intracranial abnormality.              Recent imaging reviewed    Problem List  Principal Problem:    TIA (transient ischemic attack)  Active Problems:    Chronic atrial fibrillation (HCC)    Seizure (HCC)    (HFpEF) heart failure with preserved ejection fraction (HCC)    COPD (chronic obstructive pulmonary disease) (HCC)    S/P transmetatarsal amputation of foot, right (HCC)    Recurrent major depressive disorder, in full remission ([address]    PVD (peripheral vascular disease) ([facility] [address]    UTI (urinary tract infection)  Resolved Problems:    * No resolved hospital problems.  *       Assessment & Plan:   Possible tia  - mri   - echo  - neuro    Possible siezure neuro consult    UTI: iv atbx    Afib fu with cardilogist      Tianna Duff MD   11/1/2022 10:11 AM

## 2022-11-01 NOTE — PLAN OF CARE
Problem: Discharge Planning  Goal: Discharge to home or other facility with appropriate resources  11/1/2022 1013 by Yolanda Jackman RN  Outcome: Completed  11/1/2022 0611 by Rajani Edwards RN  Outcome: Progressing     Problem: Safety - Adult  Goal: Free from fall injury  11/1/2022 1013 by Yolanda Jackman RN  Outcome: Completed  11/1/2022 0611 by Rajani Edwards RN  Outcome: Progressing     Problem: Neurosensory - Adult  Goal: Achieves stable or improved neurological status  11/1/2022 1013 by Yolanda Jackman RN  Outcome: Completed  11/1/2022 0611 by Rajani Edwards RN  Outcome: Progressing

## 2022-11-01 NOTE — PROGRESS NOTES
No complaints over night. Anticipation discharge today after MRI. Will need a sedative prior to the test. No neurological changes.

## 2022-11-01 NOTE — TELEPHONE ENCOUNTER
Called pt. He states they did every test they could when he was in the hospital and couldn't find anything. He was told that he needed to have an MRI and he is not sure why he needed one. He was d/c today and said he will f/u in the office only if dr. Estephania Olivas feels like he needs to. He is not sure if you think he will need an MRI based on all the tests that he had completed. Petar Hoover

## 2022-11-01 NOTE — TELEPHONE ENCOUNTER
Pt is currently in the hospital and wants to be released to Dr. Gallo kessler. Per pt they won't release him. Pt is unhappy there. Please reach out to pt. He wants an appointment.

## 2022-11-01 NOTE — PROGRESS NOTES
Tabitha Aldana  Neurology Follow-up  [facility]    Date of Service: 11/1/2022    Subjective:   CC: Follow up today regarding: New onset syncope    Events noted. Chart and lab reviewed. Patient denies any symptoms today. No headache, chest pain, dysphagia or dysarthria. Unable to tolerate MRI. Wants to go home. He would like to have his MRI of the brain in outpatient setting      ROS : A 10-12 system review obtained and updated today and is unremarkable except as mentioned  in my interval history. No current facility-administered medications for this encounter. Current Outpatient Medications   Medication Sig Dispense Refill    amoxicillin-clavulanate (AUGMENTIN) 500-125 MG per tablet Take 1 tablet by mouth 3 times daily for 4 days 12 tablet 0    aspirin EC 81 MG EC tablet Take 1 tablet by mouth daily 30 tablet 5    atorvastatin (LIPITOR) 40 MG tablet TAKE 1 TABLET BY MOUTH EVERY DAY IN THE EVENING 90 tablet 1    allopurinol (ZYLOPRIM) 300 MG tablet TAKE 1 TABLET BY MOUTH 2 TIMES DAILY 180 tablet 3    diclofenac sodium (VOLTAREN) 1 % GEL APPLY 4 GRAMS TOPICALLY 4 TIMES DAILY AS NEEDED FOR PAIN 500 g 2    metoprolol tartrate (LOPRESSOR) 25 MG tablet Take 1 tablet by mouth 2 times daily 180 tablet 3    budesonide-formoterol (SYMBICORT) 160-4.5 MCG/ACT AERO Inhale 2 puffs into the lungs 2 times daily. 3 Inhaler 3    albuterol-ipratropium (COMBIVENT RESPIMAT)  MCG/ACT AERS inhaler Inhale 1 puff into the lungs every 6 hours. (Patient taking differently: Inhale 1 puff into the lungs every 6 hours as needed ) 3 Inhaler 3    OXYGEN Inhale  into the lungs. 2 liters/ nasal cannula prn      therapeutic multivitamin-minerals (THERAGRAN-M) tablet Take 1 tablet by mouth daily.        Allergies   Allergen Reactions    Bactrim      hallucinations    Clindamycin/Lincomycin Diarrhea     Past Medical History:   Diagnosis Date    Anxiety     Arthritis     Asthma     Atrial fibrillation ([address]     Blood circulation, collateral     CAD (coronary artery disease)     CHF (congestive heart failure) (HCC)     Colon cancer (HCC)     COPD (chronic obstructive pulmonary disease) (HCC)     Diabetic foot infection (HCC)     Hernia     Hyperlipidemia     Hypertension     Movement disorder     Muscle cramps     Neuromuscular disorder (HCC)     marvin feet numbness/neuropathy    Neuropathy     Pneumonia     Renal insufficiency     Sleep apnea     no cpap    Thyroid disease     Tinnitus aurium, bilateral     Unspecified cerebral artery occlusion with cerebral infarction     mini stroke    Unspecified diseases of blood and blood-forming organs          Objective:  Exam:   Constitutional:   Vitals:    10/31/22 2118 10/31/22 2323 11/01/22 0420 11/01/22 0751   BP: (!) 161/93 (!) 153/101 (!) 158/89    Pulse: 92 78 81    Resp:  16 16    Temp:  98 °F (36.7 °C) 97.9 °F (36.6 °C)    TempSrc:  Oral Oral    SpO2:  95% 97%    Weight:    297 lb (134.7 kg)   Height:         General appearance:  Normal development and appear in no acute distress. Mental Status:   Oriented to person, place, problem, and time. Memory: Good immediate recall. Intact remote memory  Normal attention span and concentration. Language: intact naming, repeating and fluency   Good fund of Knowledge. Cranial Nerves:   II:   Pupils: equal, round, reactive to light  III,IV,VI: Extra Ocular Movements are intact. No nystagmus  V: Facial sensation is intact  VII: Facial strength and movements: intact and symmetric  XII: Tongue movements are normal  Musculoskeletal: 5/5 in all 4 extremities. Reflexes: Symmetric 2+ in both arms and legs. Coordination: no pronator drift, no dysmetria with FNF. Normal REM. Sensation: normal to all modalities in both arms and legs.   Gait/Posture: steady gait        Data:  LABS:   Lab Results   Component Value Date/Time     11/01/2022 04:45 AM    K 3.6 11/01/2022 04:45 AM    K 4.0 10/30/2022 07:49 PM     11/01/2022 04:45 AM CO2 26 11/01/2022 04:45 AM    BUN 11 11/01/2022 04:45 AM    CREATININE 1.1 11/01/2022 04:45 AM    GFRAA >60 03/08/2022 07:42 AM    GFRAA >60 12/13/2012 02:09 PM    LABGLOM >60 11/01/2022 04:45 AM    GLUCOSE 81 11/01/2022 04:45 AM    PHOS 2.7 11/01/2022 04:45 AM    MG 2.10 10/31/2022 05:31 AM    CALCIUM 8.9 11/01/2022 04:45 AM     Lab Results   Component Value Date/Time    WBC 5.6 11/01/2022 04:45 AM    RBC 4.24 11/01/2022 04:45 AM    HGB 13.6 11/01/2022 04:45 AM    HCT 41.7 11/01/2022 04:45 AM    MCV 98.4 11/01/2022 04:45 AM    RDW 13.8 11/01/2022 04:45 AM     11/01/2022 04:45 AM     Lab Results   Component Value Date    INR 1.15 (H) 10/30/2022    PROTIME 14.6 (H) 10/30/2022       I reviewed blood testing and other test results and discussed results with the patient      Impression:  New onset syncope, less likely seizure  Hypertension  Hyperlipidemia  A. fib      Recommendation    Can have an MRI in outpatient setting with PCP  Discussed risk of driving, fall or injury risk of recurrence  Telemetry  Aspirin on statin  Blood pressure control and continue current medications  Follow-up with neurology outpatient if symptoms recur  No further recommendation        Socorro Davey MD   [phone]      This dictation was generated by voice recognition computer software. Although all attempts are made to edit the dictation for accuracy, there may be errors in the transcription that are not intended.

## 2022-11-01 NOTE — PLAN OF CARE
Problem: Discharge Planning  Goal: Discharge to home or other facility with appropriate resources  11/1/2022 0611 by Monika Corrigan RN  Outcome: Progressing     Problem: Safety - Adult  Goal: Free from fall injury  11/1/2022 0611 by Monika Corrigan RN  Outcome: Progressing     Problem: Neurosensory - Adult  Goal: Achieves stable or improved neurological status  11/1/2022 0611 by Monika Corrigan RN  Outcome: Progressing

## 2022-11-01 NOTE — PROGRESS NOTES
[facility] Department   Phone: [phone]    Physical Therapy    [x] Initial Evaluation            [] Daily Treatment Note         [x] Discharge Summary      Patient: Francisco Saeed   : 1955   MRN: [id number]   Date of Service:  2022  Admitting Diagnosis: TIA (transient ischemic attack)  Current Admission Summary: Francisco Saeed is a 78 y.o. male  who presents to the ED after sudden onset of left-sided weakness and left-sided facial droop. Symptoms occurred just prior to being called by EMS and therefore he believes occurred at 7 PM.  Patient states that he was just watching TV at the time he. Wife also describes seizure-like activity that lasted for less than a minute. Patient at this time has no complaints. Fingerstick by EMS was 112. They also found him to be hypotensive with a systolic in the 023M. EMS reports when they arrived they found him to have left facial droop and left-sided weakness however those symptoms have mostly resolved just prior to arriving in the ER, patient denies any history of strokes or seizures in the past  Past Medical History:  has a past medical history of Anxiety, Arthritis, Asthma, Atrial fibrillation ([address] Blood circulation, collateral, CAD (coronary artery disease), CHF (congestive heart failure) ([address] Colon cancer ([address] COPD (chronic obstructive pulmonary disease) ([address] Diabetic foot infection ([address] Hernia, Hyperlipidemia, Hypertension, Movement disorder, Muscle cramps, Neuromuscular disorder ([address] Neuropathy, Pneumonia, Renal insufficiency, Sleep apnea, Thyroid disease, Tinnitus aurium, bilateral, Unspecified cerebral artery occlusion with cerebral infarction, and Unspecified diseases of blood and blood-forming organs. Past Surgical History:  has a past surgical history that includes Toe Surgery; Ventricular ablation surgery; Colon surgery; Toe amputation (3/7/11); Appendectomy (10/9/2012);  Abdomen surgery; Colonoscopy; Endoscopy, colon, diagnostic; skin biopsy; Upper gastrointestinal endoscopy (1/28/2014); Upper gastrointestinal endoscopy (3/13/14); Colonoscopy (3/13/14); endoscopic ultrasound (upper) (6/4/14); Upper gastrointestinal endoscopy (6/4/14); Ulnar tunnel release (Left, 12-11-14); Carpal tunnel release (Left, 12/11/14); hernia repair; Foot surgery (Right, 07/13/2017); Foot surgery (Right, 08/17/2017); Upper gastrointestinal endoscopy (03/02/2018); Colonoscopy (03/02/2018); Knee arthroscopy (Right, 08/23/2018); Cystoscopy (Right, 5/3/2019); and Carpal tunnel release (Right, 6/11/2019). Discharge Recommendations: Tere Chua scored a 24/24 on the -PAC short mobility form. At this time, no further PT is recommended upon discharge due to independence with functional mobility. Recommend patient returns to prior setting with prior services. DME Required For Discharge: no DME required at discharge  Precautions/Restrictions: no restrictions  Weight Bearing Restrictions: no restrictions  [] Right Upper Extremity  [] Left Upper Extremity [] Right Lower Extremity  [] Left Lower Extremity     Required Braces/Orthotics: no braces required   [] Right  [] Left  Positional Restrictions:no positional restrictions    Pre-Admission Information   Lives With: family    Type of Home: house  Home Layout: two level, laundry in basement  Home Access:  3 step to enter with handrail. Handrails are located on B side.   Bathroom Layout: tub/shower unit, walk in shower  Bathroom Equipment: grab bars in shower, shower chair, hand held shower head  Toilet Height: elevated height  Home Equipment: rolling walker, single point cane, crutches, manual wheelchair, electric scooter  Transfer Assistance: Independent without use of device  Ambulation Assistance:Independent without use of device  ADL Assistance: independent with all ADL's  IADL Assistance: independent with homemaking tasks, cleaning lady  Active :        [x] Yes  [] No  Hand Dominance: [] Left  [x] Right    Recent Falls: no recent falls    Examination   Vision:   Vision Gross Assessment: WFL  Hearing:   WFL  Observation:   General Observation:  trans met amputation RLE  Edema: Edema located in (B) LE. Edema Level: non pitting edema  Posture:   Rounded shoulders  Sensation:   reports numbness and tingling in (B) LE  Proprioception:    diminished proprioception in (B) LE  Tone:   Normotonic  Coordination Testing:   WFL    ROM:   (B) LE AROM WFL  Strength:   (B) LE strength grossly WFL  Decision Making: low complexity  Clinical Presentation: stable      Subjective  General: patient sitting EOB at start of session, pt reports he's ready to go home  Pain: 0/10  Pain Interventions: not applicable       Functional Mobility  Bed Mobility  Bed mobility not completed on this date. Comments:  Transfers  Sit to stand transfer: Independent  Stand to sit transfer: Independent  Comments:  Ambulation  Surface:level surface  Assistive Device: no device  Assistance: Independent  Distance: 30' in room  Gait Mechanics: wide ESTER with ML sway but no LOB  Comments:    Stair Mobility  Stair mobility not completed on this date. Comments:  Wheelchair Mobility:  No w/c mobility completed on this date.   Comments:  Balance  Static Sitting Balance: good: independent with functional balance in unsupported position  Dynamic Sitting Balance: good: independent with functional balance in unsupported position  Static Standing Balance: fair (+): maintains balance at SBA/supervision without use of UE support  Dynamic Standing Balance: fair (+): maintains balance at SBA/supervision without use of UE support  Comments:    Other Therapeutic Interventions    Functional Outcomes  AM-PAC Inpatient Mobility Raw Score : 24              Cognition    Orientation:    alert and oriented x 4  Command Following:   [facility]    Education  Barriers To Learning: none  Patient Education: patient educated on PT role and benefits  Learning Assessment:  patient verbalizes and demonstrates understanding    Assessment  Activity Tolerance: no SOB with all functional mobility  Impairments Requiring Therapeutic Intervention: none - eval with same day discharge  Prognosis: good  Clinical Assessment: Patient demonstrated independence with functional mobility, no skilled therapy needs at this time. Safety Interventions: patient left in bed, call light within reach, and nurse notified    Plan  Frequency: Eval with same day discharge. No follow up required. Current Treatment Recommendations: not applicable, evaluation completed with same day discharge.       Therapy Session Time      Individual Group Co-treatment   Time In     6690   Time Out     0934   Minutes     16     Timed Code Treatment Minutes:  0 Minutes  Total Treatment Minutes:  16       Electronically Signed By: Mateus Stanton PT, DPT [id number]

## 2022-11-01 NOTE — DISCHARGE SUMMARY
Hospital Medicine Discharge Summary    Patient: Alexi Crum     Gender: male  : 1955   Age: 78 y.o. MRN: [id number]    Admitting Physician: Michael Zavala DO  Discharge Physician: Jason Tate MD     Code Status: Full Code     Admit Date: 10/30/2022   Discharge Date:   2022    Disposition:  Home  Time spent arranging discharge: 35 minutes    Discharge Diagnoses: Active Hospital Problems    Diagnosis Date Noted    Chronic atrial fibrillation ([facility] [I48.20] 2010     Priority: High    Seizure ([facility] [R56.9] 10/30/2022     Priority: Medium    TIA (transient ischemic attack) [G45.9] 10/30/2022     Priority: Medium    (HFpEF) heart failure with preserved ejection fraction ([facility] [I50.30] 10/30/2022     Priority: Medium    UTI (urinary tract infection) [N39.0] 2022    PVD (peripheral vascular disease) ([facility] [I73.9] 2021    Recurrent major depressive disorder, in full remission ([facility] [F33.42] 2020    S/P transmetatarsal amputation of foot, right ([facility] [Z89.431]     COPD (chronic obstructive pulmonary disease) ([facility] [J44.9] 2010         Condition at Discharge:  Stable    Hospital Course: To hospital with possible TIA CT head CTA head and neck were negative echo did not show significant pathology. Patient refused to have MRI even with sedation states PCP will schedule him for outpatient  open MRI follow-up with PCP. Of seizure activity per neuro less likely to have seizure. Patient did have UTI discharged on Augmentin to complete therapy. Patient has a history of A. fib we will follow-up with primary cardiologist to discuss need for anticoagulation. Patient was discharged on aspirin and statin    Discharge Exam:    BP (!) 158/89   Pulse 81   Temp 97.9 °F (36.6 °C) (Oral)   Resp 16   Ht 6' 4\" (1.93 m)   Wt 297 lb (134.7 kg)   SpO2 97%   BMI 36.15 kg/m²   General appearance:  Appears comfortable.  AAOx3  HEENT: atraumatic, Pupils equal, muscous BRAIN/VENTRICLES: There is no acute intracranial hemorrhage, mass effect or midline shift. No abnormal extra-axial fluid collection. The gray-white differentiation is maintained without evidence of an acute infarct. There is prominence of the ventricles and sulci due to global parenchymal volume loss. There are nonspecific areas of hypoattenuation within the periventricular and subcortical white matter, which likely represent chronic microvascular ischemic change. There vascular calcifications ORBITS: The visualized portion of the orbits demonstrate no acute abnormality. SINUSES: The visualized paranasal sinuses and mastoid air cells demonstrate no acute abnormality. SOFT TISSUES/SKULL: No acute abnormality of the visualized skull or soft tissues. No acute intracranial abnormality. XR CHEST PORTABLE    Result Date: 10/30/2022  EXAMINATION: ONE XRAY VIEW OF THE CHEST 10/30/2022 7:42 pm COMPARISON: January 14, 2022 HISTORY: ORDERING SYSTEM PROVIDED HISTORY: AMS TECHNOLOGIST PROVIDED HISTORY: Reason for exam:->AMS Reason for Exam: Altered Mental Status (Per EMS, about an hour ago pt was sitting on the couch when the girlfriend noted seizure like activity. Pt was confused at that time. EMS noted left sided weakness and facial droop.) FINDINGS: Stable mild enlargement of the cardiac silhouette. Lungs are clear. No focal consolidation, pleural effusion, or pneumothorax. No evidence of pulmonary edema. 1. No acute pulmonary abnormality. 2. Stable mild enlargement of the cardiac silhouette. CTA HEAD NECK W CONTRAST    Result Date: 10/30/2022  EXAMINATION: CTA OF THE HEAD AND NECK WITH CONTRAST 10/30/2022 7:22 pm: TECHNIQUE: CTA of the head and neck was performed with the administration of intravenous contrast. Multiplanar reformatted images are provided for review. MIP images are provided for review. Stenosis of the internal carotid arteries measured using NASCET criteria.  Automated exposure control, on this non-dedicated study. BRAIN: No mass effect or midline shift. No extra-axial fluid collection. The gray-white differentiation is maintained. Negative for large vessel occlusion or hemodynamic stenosis.          Signed:    Alee Leroy MD   11/1/2022

## 2022-11-01 NOTE — CARE COORDINATION
Chart reviewed for d/c planning. Pt is refusing MRI at this time per nursing notes. Therapy evaluations are still pending. Will monitor for d/c needs.       Elisa Hansen RN, BSN  [phone]

## 2022-11-01 NOTE — TELEPHONE ENCOUNTER
[facility] Trans[facility] Initial Follow Up Call    Outreach made within 2 business days of discharge: Yes    Patient: Isaias Jackman Patient : 1955   MRN: [id number]  Reason for Admission: There are no discharge diagnoses documented for the most recent discharge. Discharge Date: 22       Spoke with: Patient     Discharge department/facility: [facility]     TCM Interactive Patient Contact:  Was patient able to fill all prescriptions: No   Was patient instructed to bring all medications to the follow-up visit: No: not needed  Is patient taking all medications as directed in the discharge summary?  Yes  Does patient understand their discharge instructions: Yes  Does patient have questions or concerns that need addressed prior to 7-14 day follow up office visit: yes - No     Scheduled appointment with PCP within 7-14 days    Follow Up  Future Appointments   Date Time Provider Natasha Yap   11/10/2022  7:20 AM Kinza Arias, [address] [facility] Pfizer dose 1 and 2

## 2022-11-01 NOTE — PROGRESS NOTES
This nurse spoke to the MRI department regarding getting the MRI done today. They were informed of the plan for giving a sedative prior to the test. Message sent to hospitalist requesting something for anxiety. MRI will need to be called when order is obtained.

## 2022-11-01 NOTE — PROGRESS NOTES
Data- discharge order received, pt verbalized agreement to discharge, disposition to previous residence, no needs for HHC/DME. Action- discharge instructions prepared and given to patient, pt verbalized understanding. Medication information packet given r/t NEW and/or CHANGED prescriptions emphasizing name/purpose/side effects, pt verbalized understanding. Discharge instruction summary: Diet- regular, Activity- as tolerated, Primary Care Physician as follows: Skylar Cassidy -[phone] f/u appointment to be scheduled by patient, immunizations reviewed and up to date, prescription medications filled and sent to patient's preferred pharmacy. Inpatient surgical procedure precautions reviewed. 1. WEIGHT: Admit Weight: (!) 301 lb 11.2 oz (136.9 kg) (10/31/22 0058)        Today  Weight: 297 lb (134.7 kg) (11/01/22 0751)       2. O2 SAT.: SpO2: 97 % (11/01/22 0420)    Response- Pt belongings gathered, IV removed. Disposition is home (no HHC/DME needs), transported with belongings, taken to lobby via w/c w/ belongings, no complications.

## 2022-11-01 NOTE — PROGRESS NOTES
[facility] [address]   Phone: [phone]    Occupational Therapy    [x] Initial Evaluation            [] Daily Treatment Note         [x] Discharge Summary      Patient: Niels Snyder   : 1955   MRN: [id number]   Date of Service:  2022    Admitting Diagnosis:  TIA (transient ischemic attack)  Current Admission Summary: 78 y.o. male with PMHx of CAD, COPD, hyperlipidemia, hypertension who presented to [facility] with complaints of altered mental status, possible seizure, weakness and facial droop. Patient states he was watching a movie at home when he passed out. The next thing the patient remembers is he was waking up in an ambulance on the way to the hospital.  Patient states that his girlfriend noticed that he was shaking at home with seizure-like activity. On his way to the hospital, patient did have some facial asymmetry, but that resolved prior to coming to the ED. This is never happened to the patient before. Patient does not have any history of seizures or strokes. He denies any chest pain, shortness of breath, nausea, vomiting, GI/ symptoms. Patient does have chronic lower extremity lymphedema. Past Medical History:  has a past medical history of Anxiety, Arthritis, Asthma, Atrial fibrillation ([address] Blood circulation, collateral, CAD (coronary artery disease), CHF (congestive heart failure) ([address] Colon cancer ([address] COPD (chronic obstructive pulmonary disease) ([address] Diabetic foot infection ([address] Hernia, Hyperlipidemia, Hypertension, Movement disorder, Muscle cramps, Neuromuscular disorder ([address] Neuropathy, Pneumonia, Renal insufficiency, Sleep apnea, Thyroid disease, Tinnitus aurium, bilateral, Unspecified cerebral artery occlusion with cerebral infarction, and Unspecified diseases of blood and blood-forming organs.   Past Surgical History:  has a past surgical history that includes Toe Surgery; Ventricular ablation surgery; Colon surgery; Toe amputation (3/7/11); Appendectomy (10/9/2012); Abdomen surgery; Colonoscopy; Endoscopy, colon, diagnostic; skin biopsy; Upper gastrointestinal endoscopy (1/28/2014); Upper gastrointestinal endoscopy (3/13/14); Colonoscopy (3/13/14); endoscopic ultrasound (upper) (6/4/14); Upper gastrointestinal endoscopy (6/4/14); Ulnar tunnel release (Left, 12-11-14); Carpal tunnel release (Left, 12/11/14); hernia repair; Foot surgery (Right, 07/13/2017); Foot surgery (Right, 08/17/2017); Upper gastrointestinal endoscopy (03/02/2018); Colonoscopy (03/02/2018); Knee arthroscopy (Right, 08/23/2018); Cystoscopy (Right, 5/3/2019); and Carpal tunnel release (Right, 6/11/2019). Discharge Recommendations: Alexi Skyla scored a 24/24 on the AM-PAC ADL Inpatient form. At this time, no further OT is recommended upon discharge due to patient at independent level. Recommend patient returns to prior setting with prior services. DME Required For Discharge: No DME required    Precautions/Restrictions: medium fall risk    Pre-Admission Information   Lives With: family                    Type of Home: house  Home Layout: two level, laundry in basement  Home Access:  3 step to enter with handrail. Handrails are located on B side.   Bathroom Layout: tub/shower unit, walk in shower  Bathroom Equipment: grab bars in shower, shower chair, hand held shower head  Toilet Height: elevated height  Home Equipment: rolling walker, single point cane, crutches, manual wheelchair, electric scooter  Transfer Assistance: Independent without use of device  Ambulation Assistance:Independent without use of device  ADL Assistance: independent with all ADL's  IADL Assistance: independent with homemaking tasks, cleaning lady  Active :        [x] Yes                 [] No  Hand Dominance: [] Left                 [x] Right  Recent Falls: no recent falls    Examination   Vision:   Vision Gross Assessment: WFL  Hearing:   WFL  Posture: Forward head  Sensation:   reports numbness and tingling in (B) LE, hx neuropathy  ROM:   (B) UE ROM WFL  Strength:   (B) UE gross strength WFL    Decision Making: low complexity  Clinical Presentation: stable      Subjective  General: Pt seated EOB upon arrival; agreeable to OT/PT eval. Reports he would like to d/c as soon as possible and follow up with PCP. Pleasant and cooperative throughout. Pain: 0/10  Pain Interventions: not applicable        Activities of Daily Living  Basic Activities of Daily Living  General Comments: declined ADL participation due to reports of prior completion independently this AM  Instrumental Activities of Daily Living  No IADL completed on this date. Functional Mobility  Bed Mobility  Scooting: Independent  Comments: seated EOB><long sit in bed and scooting toward EOB independently  Transfers  Sit to stand transfer:Independent  Stand to sit transfer: Independent  Comments: to/from EOB  Functional Mobility:  Sitting Balance: Independent. Standing Balance: Independent. Functional Mobility: . Independent  Functional Mobility Activity: ~30 ft in room  Functional Mobility Device Use: no device  Functional Mobility Comment: no LOB, altered gait due to R transmetatarsal amputation (this is pt's baseline)    Other Therapeutic Interventions    Functional Outcomes  AM-PAC Inpatient Daily Activity Raw Score: 24    Cognition  WFL  Orientation:    A&O x 4  Command Following:   [facility]     Education  Barriers To Learning: none  Patient Education: Patient educated on discharge recommendations  Learning Assessment:  Patient verbalized and demonstrates understanding    Assessment  Impairments Requiring Therapeutic Intervention: none - eval with same day discharge  Prognosis: good without need for therapy intervention  Clinical Assessment: Patient presenting at independent level for completion of required self care tasks for return to home. Eval with d/c at this time.   No therapy services indicated. Safety Interventions: call light within reach, nurse notified, and pt seated EOB    Plan  Frequency: Eval with same day discharge. No follow up required. Current Treatment Recommendations: Not applicable, evaluation completed with same day discharge. Goals  Patient eval with same day discharge. No goals set as patient is at baseline self care status.       Therapy Session Time     Individual Group Co-treatment   Time In    3754   Time Out    0934   Minutes    16        Timed Code Treatment Minutes:  Timed Code Treatment Minutes: 0 Minutes  Total Treatment Minutes:  16       Electronically Signed By: JR Abernathy/KAMRYN, C/NDT ([id number])

## 2022-11-01 NOTE — TELEPHONE ENCOUNTER
I recommend a HFU visit in order to be able to appropriately answer this question and address any other needs.

## 2022-11-02 ENCOUNTER — CARE COORDINATION (OUTPATIENT)
Dept: CASE MANAGEMENT | Age: 67
End: 2022-11-02

## 2022-11-02 DIAGNOSIS — G45.9 TIA (TRANSIENT ISCHEMIC ATTACK): Primary | ICD-10-CM

## 2022-11-02 LAB
INFLUENZA A BY PCR: NOT DETECTED
INFLUENZA B BY PCR: NOT DETECTED
RSV BY PCR: NOT DETECTED
RSV SOURCE: NORMAL

## 2022-11-02 PROCEDURE — 1111F DSCHRG MED/CURRENT MED MERGE: CPT | Performed by: INTERNAL MEDICINE

## 2022-11-02 NOTE — CARE COORDINATION
[facility] Care Transitions Initial Follow Up Call    Call within 2 business days of discharge: Yes    Care Transition Nurse contacted the patient by telephone to perform post hospital discharge assessment. Verified name and  with patient as identifiers. Provided introduction to self, and explanation of the Care Transition Nurse role. Patient: Dee York Patient : 1955   MRN: [id number]  Reason for Admission:   Discharge Date: 22 RARS: Readmission Risk Score: 11      Last Discharge [address]       Date Complaint Diagnosis Description Type Department Provider    10/30/22 Altered Mental Status TIA (transient ischemic attack) . .. ED to Hosp-Admission (Discharged) (ADMITTED) Hailey Lamb MD; Avelina Jones... Was this an external facility discharge? No Discharge Facility:     Challenges to be reviewed by the provider   Additional needs identified to be addressed with provider: No  none               Method of communication with provider: none. Pt states doing well, no issues or concerns. Denies any numbness, tingling blurred vision or urinary issues. F/U appts listed below. Agreed to more CTC f/u calls. Care Transition Nurse reviewed discharge instructions with patient who verbalized understanding. The patient was given an opportunity to ask questions and does not have any further questions or concerns at this time. Were discharge instructions available to patient? Yes. Reviewed appropriate site of care based on symptoms and resources available to patient including: When to call 911. The patient agrees to contact the PCP office for questions related to their healthcare. Advance Care Planning:   Does patient have an Advance Directive: reviewed and current. Medication reconciliation was performed with patient, who verbalizes understanding of administration of home medications.  Medications reviewed, 1111F entered: yes    Was patient discharged with a pulse oximeter? no    Non-face-to-face services provided:  Obtained and reviewed discharge summary and/or continuity of care documents    Offered patient enrollment in the Remote Patient Monitoring (RPM) program for in-home monitoring: Patient is not eligible for RPM program.    Care Transitions 24 Hour Call    Do you have a copy of your discharge instructions?: Yes  Do you have all of your prescriptions and are they filled?: Yes  Have you been contacted by a [id number] Pharmacist?: No  Have you scheduled your follow up appointment?: No  Post Acute Services: Home Health ([facility]  Patient DME: Wheelchair, Straight cane, Crutches, Shower chair, Walker  Do you have support at home?: Partner/Spouse/SO  Do you feel like you have everything you need to keep you well at home?: Yes  Are you an active caregiver in your home?: No  Care Transitions Interventions  No Identified Needs         Follow Up  Future Appointments   Date Time Provider Natasha Yap   11/10/2022  7:20 AM MANDY Gonzalez - CNP [id number] Transition Nurse provided contact information. Plan for follow-up call in 5-7 days based on severity of symptoms and risk factors.   Plan for next call: self management-TIA; UTI, f/u appts    Berny Daugherty RN

## 2022-11-09 ENCOUNTER — CARE COORDINATION (OUTPATIENT)
Dept: CASE MANAGEMENT | Age: 67
End: 2022-11-09

## 2022-11-09 NOTE — CARE COORDINATION
[facility] Care Transitions Follow Up Call    Care Transition Nurse contacted the patient by telephone to follow up after admission. Verified name and  with patient as identifiers. Patient: Candido Mallory  Patient : 1955   MRN: [id number]  Reason for Admission:   Discharge Date: 22 RARS: Readmission Risk Score: 11      Needs to be reviewed by the provider   Additional needs identified to be addressed with provider: No  none             Method of communication with provider: none. Pt states doing well, no issues or concerns. Denies any urinary issues. F/U appt listed below. Agreed to more CTC f/u calls. Addressed changes since last contact:  none  Discussed follow-up appointments. If no appointment was previously scheduled, appointment scheduling offered: No.   Is follow up appointment scheduled within 7 days of discharge? Yes. Follow Up  Future Appointments   Date Time Provider Natasha Yap   11/10/2022  7:20 AM Zula Cogan, APRN - CNP [facility]     [facility] follow up appointment(s):     Care Transition Nurse reviewed discharge instructions and medical action plan with patient and discussed any barriers to care and/or understanding of plan of care after discharge. Discussed appropriate site of care based on symptoms and resources available to patient including: When to call 911. The patient agrees to contact the PCP office for questions related to their healthcare. Advance Care Planning:   reviewed and current.      Patients top risk factors for readmission: medical condition-TIA, UTI  Interventions to address risk factors: Assessment and support for treatment adherence and medication management-TIA, UTI    Offered patient enrollment in the Remote Patient Monitoring (RPM) program for in-home monitoring: Patient is not eligible for RPM program.     Care Transitions Subsequent and Final Call    Subsequent and Final Calls  Do you have any ongoing symptoms?: No  Have your medications changed?: No  Do you have any questions related to your medications?: No  Do you currently have any active services?: No  Are you currently active with any services?: Home Health  Do you have any needs or concerns that I can assist you with?: No  Identified Barriers: None  Care Transitions Interventions  No Identified Needs  Other Interventions:             Care Transition Nurse provided contact information for future needs. Plan for follow-up call in 5-7 days based on severity of symptoms and risk factors.   Plan for next call: self management-TIa, UTI, f/u appt    Rosita Porter RN

## 2022-11-10 ENCOUNTER — OFFICE VISIT (OUTPATIENT)
Dept: INTERNAL MEDICINE CLINIC | Age: 67
End: 2022-11-10

## 2022-11-10 VITALS
WEIGHT: 298 LBS | DIASTOLIC BLOOD PRESSURE: 88 MMHG | SYSTOLIC BLOOD PRESSURE: 142 MMHG | HEART RATE: 97 BPM | OXYGEN SATURATION: 96 % | BODY MASS INDEX: 36.27 KG/M2

## 2022-11-10 DIAGNOSIS — I50.32 CHRONIC HEART FAILURE WITH PRESERVED EJECTION FRACTION (HCC): ICD-10-CM

## 2022-11-10 DIAGNOSIS — G47.33 OBSTRUCTIVE SLEEP APNEA SYNDROME: ICD-10-CM

## 2022-11-10 DIAGNOSIS — I10 ESSENTIAL HYPERTENSION: ICD-10-CM

## 2022-11-10 DIAGNOSIS — E78.2 MIXED HYPERLIPIDEMIA: ICD-10-CM

## 2022-11-10 DIAGNOSIS — E66.01 SEVERE OBESITY (BMI 35.0-39.9) WITH COMORBIDITY (HCC): ICD-10-CM

## 2022-11-10 DIAGNOSIS — R56.9 SEIZURE (HCC): ICD-10-CM

## 2022-11-10 DIAGNOSIS — I73.9 PVD (PERIPHERAL VASCULAR DISEASE) (HCC): ICD-10-CM

## 2022-11-10 DIAGNOSIS — G45.9 TIA (TRANSIENT ISCHEMIC ATTACK): ICD-10-CM

## 2022-11-10 DIAGNOSIS — Z09 HOSPITAL DISCHARGE FOLLOW-UP: Primary | ICD-10-CM

## 2022-11-10 DIAGNOSIS — I48.20 CHRONIC ATRIAL FIBRILLATION (HCC): ICD-10-CM

## 2022-11-10 LAB
CHOLESTEROL, TOTAL: 140 MG/DL (ref 0–199)
HDLC SERPL-MCNC: 58 MG/DL (ref 40–60)
LDL CHOLESTEROL CALCULATED: 67 MG/DL
TRIGL SERPL-MCNC: 77 MG/DL (ref 0–150)
VLDLC SERPL CALC-MCNC: 15 MG/DL

## 2022-11-10 SDOH — ECONOMIC STABILITY: FOOD INSECURITY: WITHIN THE PAST 12 MONTHS, YOU WORRIED THAT YOUR FOOD WOULD RUN OUT BEFORE YOU GOT MONEY TO BUY MORE.: NEVER TRUE

## 2022-11-10 SDOH — ECONOMIC STABILITY: FOOD INSECURITY: WITHIN THE PAST 12 MONTHS, THE FOOD YOU BOUGHT JUST DIDN'T LAST AND YOU DIDN'T HAVE MONEY TO GET MORE.: NEVER TRUE

## 2022-11-10 ASSESSMENT — SOCIAL DETERMINANTS OF HEALTH (SDOH): HOW HARD IS IT FOR YOU TO PAY FOR THE VERY BASICS LIKE FOOD, HOUSING, MEDICAL CARE, AND HEATING?: NOT HARD AT ALL

## 2022-11-10 NOTE — PROGRESS NOTES
Post-Discharge Transitional Care  Follow Up      Lindsey Crowell   YOB: 1955    Date of Office Visit:  11/10/2022  Date of Hospital Admission: 10/30/22  Date of Hospital Discharge: 11/1/22  Risk of hospital readmission (high >=14%. Medium >=10%) :Readmission Risk Score: 11    Care management risk score Rising risk (score 2-5) and Complex Care (Scores >=6): No Risk Score On File     Non face to face  following discharge, date last encounter closed (first attempt may have been earlier): 11/02/2022    Call initiated 2 business days of discharge: Yes    ASSESSMENT/PLAN:   Hospital discharge follow-up  -     NM DISCHARGE MEDS RECONCILED W/ CURRENT OUTPATIENT MED LIST  - reviewed admission notes, imaging, labs and work up with pt in detail   - complete abx for UTI as prescribed   - he is still on statin and asa, has been off AC for years. Has not seen cardiology recently as recommended. Referral provided to follow up with cardiology.    - MRI declined during admission, recommending outpt MRI - pt is not sure he will complete. TIA (transient ischemic attack)  -     Jean Macias MD, Cardiology, [facility]  -     MRI BRAIN W WO CONTRAST; Future  Seizure [facility]) - neurology feels this is like likely   Severe obesity (BMI 35.0-39. 9) with comorbidity [facility])  -     Jean Macias MD, Cardiology, [facility]  Chronic atrial fibrillation [facility])  -     Jean Macias MD, Cardiology, [facility]  Mixed hyperlipidemia  -     Lipid Panel; Future  Essential hypertension- uncontrolled today but improving at recheck   Chronic heart failure with preserved ejection fraction (HCC)  PVD (peripheral vascular disease) [address]  Obstructive sleep apnea syndrome - using oxygen at night     Medical Decision Making: moderate complexity  Return in 3 months (on 2/10/2023).          Subjective:   HPI:  Follow up of Hospital problems/diagnosis(es):   AMS - (Per EMS, about an hour ago pt was sitting on the couch when the girlfriend noted seizure like activity. Pt was confused at that time. EMS noted left sided weakness and facial droop.)    Hospital Course: To hospital with possible TIA CT head CTA head and neck were negative echo did not show significant pathology. Patient refused to have MRI even with sedation states PCP will schedule him for outpatient  open MRI follow-up with PCP. Of seizure activity per neuro less likely to have seizure. Patient did have UTI discharged on Augmentin to complete therapy. Patient has a history of A. fib we will follow-up with primary cardiologist to discuss need for anticoagulation. Patient was discharged on aspirin and statin    Completed augmentin for UTI. Declined MRI, still not sure he will complete MRI. Used to see Dr. Clarence Baez but has not seen in a few years. AC was stopped years ago due to traumatic injury in 2009 and GIB in 2018. Reports he feels better. Inpatient course: Discharge summary reviewed- see chart. Interval history/Current status: stable.      Patient Active Problem List   Diagnosis    Chronic atrial fibrillation (HCC)    COPD (chronic obstructive pulmonary disease) (HCC)    Sleep apnea    Essential hypertension    Hyperlipidemia    Chronic idiopathic gout of multiple sites    Hand arthritis    Chronic diastolic congestive heart failure (HCC)    BRIAN (acute kidney injury) ([address]    Obesity    S/P transmetatarsal amputation of foot, right (HCC)    Recurrent major depressive disorder, in full remission ([address]    Mixed erectile dysfunction    PVD (peripheral vascular disease) ([address]    Stage 3b chronic kidney disease ([address]    Localized edema    UTI (urinary tract infection)    Open wound of right lower extremity    Cellulitis of leg, right    Noncompliance    Antibiotic-associated diarrhea    Seizure (HCC)    TIA (transient ischemic attack)    (HFpEF) heart failure with preserved ejection fraction (HCC)       Medications listed as ordered at the time of discharge from hospital     Medication List            Accurate as of November 10, 2022  8:02 AM. If you have any questions, ask your nurse or doctor. CHANGE how you take these medications      albuterol-ipratropium  MCG/ACT Aers inhaler  Commonly known as: COMBIVENT RESPIMAT  Inhale 1 puff into the lungs every 6 hours. What changed:   when to take this  reasons to take this            CONTINUE taking these medications      allopurinol 300 MG tablet  Commonly known as: ZYLOPRIM  TAKE 1 TABLET BY MOUTH 2 TIMES DAILY     aspirin EC 81 MG EC tablet  Take 1 tablet by mouth daily     atorvastatin 40 MG tablet  Commonly known as: LIPITOR  TAKE 1 TABLET BY MOUTH EVERY DAY IN THE EVENING     budesonide-formoterol 160-4.5 MCG/ACT Aero  Commonly known as: Symbicort  Inhale 2 puffs into the lungs 2 times daily. diclofenac sodium 1 % Gel  Commonly known as: VOLTAREN  APPLY 4 GRAMS TOPICALLY 4 TIMES DAILY AS NEEDED FOR PAIN     metoprolol tartrate 25 MG tablet  Commonly known as: LOPRESSOR  Take 1 tablet by mouth 2 times daily     OXYGEN     therapeutic multivitamin-minerals tablet                Medications marked \"taking\" at this time  Outpatient Medications Marked as Taking for the 11/10/22 encounter (Office Visit) with MANDY Carvajal CNP   Medication Sig Dispense Refill    aspirin EC 81 MG EC tablet Take 1 tablet by mouth daily 30 tablet 5    atorvastatin (LIPITOR) 40 MG tablet TAKE 1 TABLET BY MOUTH EVERY DAY IN THE EVENING 90 tablet 1    allopurinol (ZYLOPRIM) 300 MG tablet TAKE 1 TABLET BY MOUTH 2 TIMES DAILY 180 tablet 3    diclofenac sodium (VOLTAREN) 1 % GEL APPLY 4 GRAMS TOPICALLY 4 TIMES DAILY AS NEEDED FOR PAIN 500 g 2    metoprolol tartrate (LOPRESSOR) 25 MG tablet Take 1 tablet by mouth 2 times daily 180 tablet 3    budesonide-formoterol (SYMBICORT) 160-4.5 MCG/ACT AERO Inhale 2 puffs into the lungs 2 times daily.  3 Inhaler 3    albuterol-ipratropium (COMBIVENT RESPIMAT)  MCG/ACT AERS inhaler Inhale 1 puff into the lungs every 6 hours. (Patient taking differently: Inhale 1 puff into the lungs every 6 hours as needed) 3 Inhaler 3    OXYGEN Inhale  into the lungs. 2 liters/ nasal cannula prn      therapeutic multivitamin-minerals (THERAGRAN-M) tablet Take 1 tablet by mouth daily. Medications patient taking as of now reconciled against medications ordered at time of hospital discharge: Yes        Objective:    BP (!) 142/88   Pulse 97   Wt 298 lb (135.2 kg)   SpO2 96%   BMI 36.27 kg/m²     Physical Exam  Constitutional:       General: He is not in acute distress. Appearance: Normal appearance. He is not ill-appearing. HENT:      Head: Normocephalic and atraumatic. Cardiovascular:      Rate and Rhythm: Normal rate and regular rhythm. Pulmonary:      Effort: Pulmonary effort is normal. No respiratory distress. Breath sounds: Normal breath sounds. Neurological:      Mental Status: He is alert and oriented to person, place, and time. Mental status is at baseline. Motor: No weakness. Psychiatric:         Mood and Affect: Mood normal.         Behavior: Behavior normal.     An electronic signature was used to authenticate this note.   --MANDY Das - CNP

## 2022-11-11 ENCOUNTER — OFFICE VISIT (OUTPATIENT)
Dept: CARDIOLOGY CLINIC | Age: 67
End: 2022-11-11
Payer: MEDICARE

## 2022-11-11 VITALS
HEIGHT: 76 IN | BODY MASS INDEX: 36.29 KG/M2 | HEART RATE: 61 BPM | WEIGHT: 298 LBS | SYSTOLIC BLOOD PRESSURE: 110 MMHG | OXYGEN SATURATION: 98 % | DIASTOLIC BLOOD PRESSURE: 68 MMHG

## 2022-11-11 DIAGNOSIS — I50.32 CHRONIC DIASTOLIC CONGESTIVE HEART FAILURE (HCC): ICD-10-CM

## 2022-11-11 DIAGNOSIS — I48.20 CHRONIC ATRIAL FIBRILLATION (HCC): ICD-10-CM

## 2022-11-11 DIAGNOSIS — E78.2 MIXED HYPERLIPIDEMIA: ICD-10-CM

## 2022-11-11 DIAGNOSIS — I10 ESSENTIAL HYPERTENSION: ICD-10-CM

## 2022-11-11 DIAGNOSIS — I71.21 ANEURYSM OF ASCENDING AORTA WITHOUT RUPTURE: ICD-10-CM

## 2022-11-11 DIAGNOSIS — G45.9 TIA (TRANSIENT ISCHEMIC ATTACK): Primary | ICD-10-CM

## 2022-11-11 PROBLEM — I71.23 ANEURYSM OF DESCENDING THORACIC AORTA WITHOUT RUPTURE (HCC): Status: ACTIVE | Noted: 2022-11-11

## 2022-11-11 PROCEDURE — 93000 ELECTROCARDIOGRAM COMPLETE: CPT | Performed by: INTERNAL MEDICINE

## 2022-11-11 PROCEDURE — 3078F DIAST BP <80 MM HG: CPT | Performed by: INTERNAL MEDICINE

## 2022-11-11 PROCEDURE — 1124F ACP DISCUSS-NO DSCNMKR DOCD: CPT | Performed by: INTERNAL MEDICINE

## 2022-11-11 PROCEDURE — 99204 OFFICE O/P NEW MOD 45 MIN: CPT | Performed by: INTERNAL MEDICINE

## 2022-11-11 PROCEDURE — 3074F SYST BP LT 130 MM HG: CPT | Performed by: INTERNAL MEDICINE

## 2022-11-11 RX ORDER — DILTIAZEM HYDROCHLORIDE 180 MG/1
180 CAPSULE, EXTENDED RELEASE ORAL 2 TIMES DAILY
COMMUNITY

## 2022-11-11 NOTE — PROGRESS NOTES
[address]  11/11/22  Referring:  Garland GALVAN- CNP    REASON FOR CONSULT/CHIEF COMPLAINT/HPI     Reason for visit/ Chief complaint   TIA, Chronic Atrial Fibrillation    HPI Carlos Escobar is a 78 y.o.male referred by CNP for TIA with chronic atrial fibrillation. He presented to the [facility] ED on 10/30/22 for altered mental status, noted seizure activity and left sided weakness and facial droop. Symptoms occurred just prior to being called by EMS and therefore he believes occurred at 7 PM.  He stated that he was just watching TV at the time. His wife also described seizure-like activity that lasted for less than a minute. Fingerstick by EMS was 112. They also found him to be hypertensive with a systolic in the 614P. EMS reported when they arrived they found him to have left facial droop and left-sided weakness however those symptoms had mostly resolved just prior to arriving to the ER. He denied any history of strokes or seizures in the past.    Today he states he feels back to normal. He denies a bleeding issue at this time. He denies dizziness or palpitaitons. He has a remote history of GI bleeding for which he was taken off his coumadin several years ago. He can't remember anything about this. Reviewing his chart it appears he had an EGD in 2014 and had bled down to a Hb of 9 from a baseline of 14. I can't find the EGD report. He hasn't recently been seen by GI.     Patient is adherent with medications and is tolerating them well without side effects     HISTORY/ALLERGIES/ROS     MedHx:  has a past medical history of Anxiety, Arthritis, Asthma, Atrial fibrillation ([address] Blood circulation, collateral, CAD (coronary artery disease), CHF (congestive heart failure) ([address] Colon cancer ([address] COPD (chronic obstructive pulmonary disease) ([address] Diabetic foot infection ([address] Hernia, Hyperlipidemia, Hypertension, Movement disorder, Muscle cramps, Neuromuscular disorder ([address] Neuropathy, Pneumonia, Renal insufficiency, Sleep apnea, Thyroid disease, Tinnitus aurium, bilateral, Unspecified cerebral artery occlusion with cerebral infarction, and Unspecified diseases of blood and blood-forming organs. SurgHx:  has a past surgical history that includes Toe Surgery; Ventricular ablation surgery; Colon surgery; Toe amputation (3/7/11); Appendectomy (10/9/2012); Abdomen surgery; Colonoscopy; Endoscopy, colon, diagnostic; skin biopsy; Upper gastrointestinal endoscopy (1/28/2014); Upper gastrointestinal endoscopy (3/13/14); Colonoscopy (3/13/14); endoscopic ultrasound (upper) (6/4/14); Upper gastrointestinal endoscopy (6/4/14); Ulnar tunnel release (Left, 12-11-14); Carpal tunnel release (Left, 12/11/14); hernia repair; Foot surgery (Right, 07/13/2017); Foot surgery (Right, 08/17/2017); Upper gastrointestinal endoscopy (03/02/2018); Colonoscopy (03/02/2018); Knee arthroscopy (Right, 08/23/2018); Cystoscopy (Right, 5/3/2019); and Carpal tunnel release (Right, 6/11/2019). SocHx:  reports that he has never smoked. He has never used smokeless tobacco. He reports current alcohol use. He reports that he does not use drugs. FamHx: No family history of premature coronary artery disease, sudden death, or aneurysm  Allergies: Bactrim and Clindamycin/lincomycin     MEDICATIONS      Prior to Admission medications    Medication Sig Start Date End Date Taking?  Authorizing Provider   dilTIAZem (DILACOR XR) 180 MG extended release capsule Take 180 mg by mouth in the morning and at bedtime 1 capsule bid   Yes Historical Provider, MD   apixaban (ELIQUIS) 5 MG TABS tablet Take 1 tablet by mouth 2 times daily 11/11/22  Yes Stella Frazier MD   aspirin EC 81 MG EC tablet Take 1 tablet by mouth daily 11/1/22  Yes Jt Person MD   atorvastatin (LIPITOR) 40 MG tablet TAKE 1 TABLET BY MOUTH EVERY DAY IN THE EVENING 7/18/22  Yes Celestina Henderson MD   allopurinol (ZYLOPRIM) 300 MG tablet TAKE 1 TABLET BY MOUTH 2 TIMES DAILY  Patient taking differently: Take 300 mg by mouth as needed 4/19/22  Yes Kevan Hsu MD   diclofenac sodium (VOLTAREN) 1 % GEL APPLY 4 GRAMS TOPICALLY 4 TIMES DAILY AS NEEDED FOR PAIN 1/17/22  Yes Lisa Cabrales MD   metoprolol tartrate (LOPRESSOR) 25 MG tablet Take 1 tablet by mouth 2 times daily 1/15/22  Yes Karle Chávez MD   albuterol-ipratropium (COMBIVENT RESPIMAT)  MCG/ACT AERS inhaler Inhale 1 puff into the lungs every 6 hours. Patient taking differently: Inhale 1 puff into the lungs every 6 hours as needed 4/20/15  Yes Kevan Rojo MD   OXYGEN Inhale  into the lungs. 2 liters/ nasal cannula prn   Yes Historical Provider, MD   therapeutic multivitamin-minerals (THERAGRAN-M) tablet Take 1 tablet by mouth daily. Yes Historical Provider, MD   budesonide-formoterol (SYMBICORT) 160-4.5 MCG/ACT AERO Inhale 2 puffs into the lungs 2 times daily. Patient not taking: Reported on 11/11/2022 4/20/15   Kevan Rojo MD       PHYSICAL EXAM        Vitals:    11/11/22 1019   BP: 110/68   Pulse: 61   SpO2: 98%    Weight: 298 lb (135.2 kg)     Gen Alert, cooperative, no distress  Body mass index is 36.27 kg/m².    Heart  Regular rate and rhythm, no murmur   Head Normocephalic, atraumatic, no abnormalities Abd  Soft, NT, +BS, no mass, no OM   Eyes PERRLA, conj/corn clear Ext  Ext nl, AT, no C/C, no edema   Nose Nares normal, no drain age, Non-tender Pulse 2+ and symmetric   Throat Lips, mucosa, tongue normal Skin Color/text/turg nl, no rash/lesions   Neck S/S, TM, NT, no bruit Psych Nl mood and affect   Lung CTA-B, unlabored, no DTP     Ch wall NT, no deform       LABS and Imaging     Relevant and available CV data reviewed    EKG today personally reviewed - AF controlled rate    EKG personally interpreted: 10/30/22 Atrial fibrillation  with occasional Premature ventricular complexes and Fusion complexes  Anterior infarct , age undetermined  Nonspecific ST & T wave changes      Echo 10/30/22    Summary  Left ventricular systolic function is normal with ejection fraction estimated at 55-60 %. No regional wall motion abnormalities are noted. There is mild concentric left ventricular hypertrophy. Grade II diastolic dysfunction with elevated filling pressure. The ascending aorta is dilated at 4.5 cm. Bi-atrial enlargement. Mild mitral & pulmonic regurgitation. Mild-to-moderate tricuspid regurgitation. IVC size is dilated (>2.1 cm) but collapses > 50% with respiration consistent with elevated RA pressure (8 mmHg). [facility] 9/30/2004 Dr Alec Peter   Mixed Dominant   [facility] Normal  Left anterior Normal  Cx Normal  RCA Normal  LVEF 55% LV Wall Motion Normal   Normal Coronary Study          ModerateRisk  ModerateComplexity/Medical Decision Making    Outside/Care everywhere records Reviewed  Labs Reviewed  Prior Imaging, ekg, cath, echo reviewed when available  Medications reviewed  Old Notes reviewed  ASSESSMENT AND PLAN     1. TIA  10/30/22 CT Head No Acute Intracranial Abnormality  10/30/22 CTA Head Neck Negative for large vessel occlusion or hemodynamic stenosis. Plan:  Brain MRI to be done still  Neuro symptoms resolved  H/o TIA increases his CHADS-VASC score by 1 point    2. Chronic Atrial Fibrillation  5/31/2000 Cardioversion Dr Alec Peter  ASA, Metoprolol  Plan: start Eliquis; get 7 day event monitor to evaluate rate control and burden of AF. Refer to EP. 3.Chronic Diastolic Heart Failure  Echo 10/30/22 Left ventricular systolic function is normal with ejection fraction estimated at 55-60 %. 1/14/22 Pro BNP 1057  Plan: appears euvolemic on exam today, no symptoms    4. Essential HTN  BP Today well controlled  Continue current medications. 5. Hyperlipidemia   11/10/22 Lipid Panel  TG 77 HDL 58 LDL 67  Continue statin at current dose    7. TAA   Echo 10/30/22 The ascending aorta is dilated at 4.5 cm.   I reviewed old records - CT from 2016 AsAo only 4.3 cm  He is quite tall (well over 6' tall) and unlikely to suffer aortic dissection at this size, annalee given slow rate of growth  Will need repeat echo every 2 years to monitor progression    Follow Up: Return in about 1 year (around 11/11/2023). Patient counseled on lifestyle modification, diet, and exercise. Willard Hayden MD  Cardiologist [facility]    Scribe's Attestation: This note was scribed in the presence of Dr. Tim Pinto MD by Derek Negro RN. 11/11/22     Physician Attestation  The scribe wrote this note in the presence of me Willard Hayden MD). The scribe may have prepopulated components of this note with my historical  intellectual property under my direct supervision. Any additions to this intellectual property were performed in my presence and at my direction. Furthermore, the content and accuracy of this note have been reviewed by me with edits by me as needed.   Willard Hayden MD 11/11/2022 10:56 AM

## 2022-11-11 NOTE — PATIENT INSTRUCTIONS
Eliquis 5 mg twice daily- watch for bleeding  Office visit with Electrophysiology for the Atrial Fibrillation  Follow up with Dr Viraj Escalante in 1 year  7 Day Holter monitor - we will call you with results

## 2022-11-16 ENCOUNTER — CARE COORDINATION (OUTPATIENT)
Dept: CASE MANAGEMENT | Age: 67
End: 2022-11-16

## 2022-11-16 NOTE — CARE COORDINATION
[facility] Transitions Follow Up Call    2022    Patient: German Meza  Patient : 1955   MRN: [id number]  Reason for Admission: TIA  Discharge Date: 22 RARS: Readmission Risk Score: 11         Spoke with: German Meza who reported that he is doing really good. Patient reported that he is not having any problem. Patient denied cp, sob, cough, dizziness, headache, n/v, diarrhea, abdominal pains, fever, or chills. Patient report that appetite and fluid intake is good and denied any problems with bowel or bladder. Patient reported that he is taking all medications as ordered. Patient reported that he was wearing a heart monitor and stated that it will need to be turned in on Friday. Patient stated that he is not sure how to return the monitor and stated that he will call the cardiologist's office to get instructions. Patient denied any other needs at this time. Patient instructed to continue to monitor s/s, reporting any that may present to MD immediately for early intervention. Patient is agreeable to f/u calls     Care Transitions Follow Up Call    Needs to be reviewed by the provider   Additional needs identified to be addressed with provider: No  none             Method of communication with provider : none      LPN Care Coordinator contacted the patient by telephone to follow up after admission on 10/30/2022. Verified name and  with patient as identifiers. Addressed changes since last contact: none  Discussed follow-up appointments. If no appointment was previously scheduled, appointment scheduling offered: Yes. Is follow up appointment scheduled within 7 days of discharge? Yes. LPN CC reviewed discharge instructions, medical action plan and red flags with patient and discussed any barriers to care and/or understanding of plan of care after discharge.  Discussed appropriate site of care based on symptoms and resources available to patient including:

## 2022-11-23 ENCOUNTER — CARE COORDINATION (OUTPATIENT)
Dept: CASE MANAGEMENT | Age: 67
End: 2022-11-23

## 2022-11-23 NOTE — CARE COORDINATION
[facility] Care Transitions Follow Up Call    Care Transition Nurse contacted the patient by telephone to follow up after admission . Verified name and  with patient as identifiers. Patient: Paco Jolly  Patient : 1955   MRN: [id number]  Reason for Admission:   Discharge Date: 22 RARS: Readmission Risk Score: 11      Needs to be reviewed by the provider   Additional needs identified to be addressed with provider: No  none             Method of communication with provider: none. Pt states doing well, no issues or concerns. Waiting on holter monitor results. Agreed to more CTC f/u calls. Addressed changes since last contact:  none  Discussed follow-up appointments. If no appointment was previously scheduled, appointment scheduling offered: No.   Is follow up appointment scheduled within 7 days of discharge? Yes. Follow Up  Future Appointments   Date Time Provider Natasha Yap   2023  7:30 AM Yonis Quinteros MD [facility]     [facility] follow up appointment(s):     Care Transition Nurse reviewed medical action plan with patient and discussed any barriers to care and/or understanding of plan of care after discharge. Discussed appropriate site of care based on symptoms and resources available to patient including: When to call 911. The patient agrees to contact the PCP office for questions related to their healthcare. Advance Care Planning:   reviewed and current. Patients top risk factors for readmission: medical condition-  Possible TIA; UTI;   Interventions to address risk factors: Assessment and support for treatment adherence and medication management-  Possible TIA; UTI;    Offered patient enrollment in the Remote Patient Monitoring (RPM) program for in-home monitoring: Patient is not eligible for RPM program.     Care Transitions Subsequent and Final Call    Subsequent and Final Calls  Do you have any ongoing symptoms?: No  Do you have any questions related to your medications?: No  Do you currently have any active services?: No  Are you currently active with any services?: Home Health  Do you have any needs or concerns that I can assist you with?: No  Identified Barriers: None  Care Transitions Interventions  No Identified Needs  Other Interventions:             Care Transition Nurse provided contact information for future needs. Plan for follow-up call in 5-7 days based on severity of symptoms and risk factors.   Plan for next call: self management-  Possible TIA; UTI, monitor results    Len Glass RN

## 2022-11-28 NOTE — TELEPHONE ENCOUNTER
This should be evaluated in the office so that it can be addressed appropriately. This was followed by his cardiologist in [place].

## 2022-11-29 ENCOUNTER — TELEPHONE (OUTPATIENT)
Dept: OTHER | Facility: CLINIC | Age: 67
End: 2022-11-29

## 2022-11-29 ENCOUNTER — APPOINTMENT (OUTPATIENT)
Dept: GENERAL RADIOLOGY | Age: 67
End: 2022-11-29
Payer: MEDICARE

## 2022-11-29 ENCOUNTER — HOSPITAL ENCOUNTER (INPATIENT)
Age: 67
LOS: 2 days | Discharge: HOME | End: 2022-12-01
Attending: INTERNAL MEDICINE | Admitting: INTERNAL MEDICINE
Payer: MEDICARE

## 2022-11-29 ENCOUNTER — APPOINTMENT (OUTPATIENT)
Dept: CT IMAGING | Age: 67
End: 2022-11-29
Payer: MEDICARE

## 2022-11-29 DIAGNOSIS — R56.9 SEIZURE (HCC): Primary | ICD-10-CM

## 2022-11-29 PROBLEM — N39.0 UTI (URINARY TRACT INFECTION): Status: RESOLVED | Noted: 2022-01-14 | Resolved: 2022-11-29

## 2022-11-29 LAB
A/G RATIO: 1.3 (ref 1.1–2.2)
ALBUMIN SERPL-MCNC: 4 G/DL (ref 3.4–5)
ALP BLD-CCNC: 172 U/L (ref 40–129)
ALT SERPL-CCNC: 35 U/L (ref 10–40)
ANION GAP SERPL CALCULATED.3IONS-SCNC: 24 MMOL/L (ref 3–16)
AST SERPL-CCNC: 43 U/L (ref 15–37)
BACTERIA: NORMAL /HPF
BASOPHILS ABSOLUTE: 0.1 K/UL (ref 0–0.2)
BASOPHILS RELATIVE PERCENT: 0.7 %
BILIRUB SERPL-MCNC: 1.1 MG/DL (ref 0–1)
BILIRUBIN URINE: NEGATIVE
BLOOD, URINE: ABNORMAL
BUN BLDV-MCNC: 13 MG/DL (ref 7–20)
CALCIUM SERPL-MCNC: 9.4 MG/DL (ref 8.3–10.6)
CHLORIDE BLD-SCNC: 96 MMOL/L (ref 99–110)
CLARITY: CLEAR
CO2: 16 MMOL/L (ref 21–32)
COLOR: YELLOW
CREAT SERPL-MCNC: 1.1 MG/DL (ref 0.8–1.3)
EOSINOPHILS ABSOLUTE: 0 K/UL (ref 0–0.6)
EOSINOPHILS RELATIVE PERCENT: 0.5 %
EPITHELIAL CELLS, UA: 0 /HPF (ref 0–5)
GFR SERPL CREATININE-BSD FRML MDRD: >60 ML/MIN/{1.73_M2}
GLUCOSE BLD-MCNC: 128 MG/DL (ref 70–99)
GLUCOSE BLD-MCNC: 88 MG/DL (ref 70–99)
GLUCOSE URINE: NEGATIVE MG/DL
HCT VFR BLD CALC: 46.4 % (ref 40.5–52.5)
HEMOGLOBIN: 14.6 G/DL (ref 13.5–17.5)
HYALINE CASTS: 1 /LPF (ref 0–8)
KETONES, URINE: NEGATIVE MG/DL
LEUKOCYTE ESTERASE, URINE: ABNORMAL
LYMPHOCYTES ABSOLUTE: 0.8 K/UL (ref 1–5.1)
LYMPHOCYTES RELATIVE PERCENT: 8.4 %
MCH RBC QN AUTO: 31.6 PG (ref 26–34)
MCHC RBC AUTO-ENTMCNC: 31.5 G/DL (ref 31–36)
MCV RBC AUTO: 100.4 FL (ref 80–100)
MICROSCOPIC EXAMINATION: YES
MONOCYTES ABSOLUTE: 0.7 K/UL (ref 0–1.3)
MONOCYTES RELATIVE PERCENT: 7.7 %
NEUTROPHILS ABSOLUTE: 7.7 K/UL (ref 1.7–7.7)
NEUTROPHILS RELATIVE PERCENT: 82.7 %
NITRITE, URINE: NEGATIVE
PDW BLD-RTO: 14.5 % (ref 12.4–15.4)
PERFORMED ON: NORMAL
PH UA: 6.5 (ref 5–8)
PLATELET # BLD: 195 K/UL (ref 135–450)
PMV BLD AUTO: 7.9 FL (ref 5–10.5)
POTASSIUM SERPL-SCNC: 4.1 MMOL/L (ref 3.5–5.1)
PROTEIN UA: 300 MG/DL
RBC # BLD: 4.62 M/UL (ref 4.2–5.9)
RBC UA: 4 /HPF (ref 0–4)
SODIUM BLD-SCNC: 136 MMOL/L (ref 136–145)
SPECIFIC GRAVITY UA: 1.01 (ref 1–1.03)
TOTAL PROTEIN: 7.2 G/DL (ref 6.4–8.2)
TROPONIN: <0.01 NG/ML
URINE REFLEX TO CULTURE: ABNORMAL
URINE TYPE: ABNORMAL
UROBILINOGEN, URINE: 1 E.U./DL
WBC # BLD: 9.3 K/UL (ref 4–11)
WBC UA: 1 /HPF (ref 0–5)

## 2022-11-29 PROCEDURE — 96374 THER/PROPH/DIAG INJ IV PUSH: CPT

## 2022-11-29 PROCEDURE — 71045 X-RAY EXAM CHEST 1 VIEW: CPT

## 2022-11-29 PROCEDURE — 85025 COMPLETE CBC W/AUTO DIFF WBC: CPT

## 2022-11-29 PROCEDURE — 99285 EMERGENCY DEPT VISIT HI MDM: CPT

## 2022-11-29 PROCEDURE — 1200000000 HC SEMI PRIVATE

## 2022-11-29 PROCEDURE — 6360000002 HC RX W HCPCS: Performed by: PHYSICIAN ASSISTANT

## 2022-11-29 PROCEDURE — 80053 COMPREHEN METABOLIC PANEL: CPT

## 2022-11-29 PROCEDURE — 81001 URINALYSIS AUTO W/SCOPE: CPT

## 2022-11-29 PROCEDURE — 93005 ELECTROCARDIOGRAM TRACING: CPT | Performed by: PHYSICIAN ASSISTANT

## 2022-11-29 PROCEDURE — 36415 COLL VENOUS BLD VENIPUNCTURE: CPT

## 2022-11-29 PROCEDURE — 6370000000 HC RX 637 (ALT 250 FOR IP): Performed by: INTERNAL MEDICINE

## 2022-11-29 PROCEDURE — 84484 ASSAY OF TROPONIN QUANT: CPT

## 2022-11-29 PROCEDURE — 2580000003 HC RX 258: Performed by: INTERNAL MEDICINE

## 2022-11-29 PROCEDURE — 70450 CT HEAD/BRAIN W/O DYE: CPT

## 2022-11-29 RX ORDER — LORAZEPAM 2 MG/ML
2 INJECTION INTRAMUSCULAR ONCE
Status: COMPLETED | OUTPATIENT
Start: 2022-11-29 | End: 2022-11-29

## 2022-11-29 RX ORDER — LEVETIRACETAM 5 MG/ML
500 INJECTION INTRAVASCULAR EVERY 12 HOURS
Status: DISCONTINUED | OUTPATIENT
Start: 2022-11-30 | End: 2022-12-01 | Stop reason: HOSPADM

## 2022-11-29 RX ORDER — LEVETIRACETAM 10 MG/ML
1000 INJECTION INTRAVASCULAR ONCE
Status: COMPLETED | OUTPATIENT
Start: 2022-11-29 | End: 2022-11-29

## 2022-11-29 RX ORDER — SODIUM CHLORIDE 9 MG/ML
INJECTION, SOLUTION INTRAVENOUS PRN
Status: DISCONTINUED | OUTPATIENT
Start: 2022-11-29 | End: 2022-12-01 | Stop reason: HOSPADM

## 2022-11-29 RX ORDER — ONDANSETRON 4 MG/1
4 TABLET, ORALLY DISINTEGRATING ORAL EVERY 8 HOURS PRN
Status: DISCONTINUED | OUTPATIENT
Start: 2022-11-29 | End: 2022-12-01 | Stop reason: HOSPADM

## 2022-11-29 RX ORDER — DILTIAZEM HYDROCHLORIDE 180 MG/1
180 CAPSULE, COATED, EXTENDED RELEASE ORAL 2 TIMES DAILY
Status: DISCONTINUED | OUTPATIENT
Start: 2022-11-29 | End: 2022-12-01 | Stop reason: HOSPADM

## 2022-11-29 RX ORDER — ATORVASTATIN CALCIUM 40 MG/1
40 TABLET, FILM COATED ORAL NIGHTLY
Status: DISCONTINUED | OUTPATIENT
Start: 2022-11-29 | End: 2022-12-01 | Stop reason: HOSPADM

## 2022-11-29 RX ORDER — ACETAMINOPHEN 325 MG/1
650 TABLET ORAL EVERY 6 HOURS PRN
Status: DISCONTINUED | OUTPATIENT
Start: 2022-11-29 | End: 2022-12-01 | Stop reason: HOSPADM

## 2022-11-29 RX ORDER — SODIUM CHLORIDE 0.9 % (FLUSH) 0.9 %
5-40 SYRINGE (ML) INJECTION EVERY 12 HOURS SCHEDULED
Status: DISCONTINUED | OUTPATIENT
Start: 2022-11-29 | End: 2022-12-01 | Stop reason: HOSPADM

## 2022-11-29 RX ORDER — LORAZEPAM 2 MG/ML
1 INJECTION INTRAMUSCULAR EVERY 5 MIN PRN
Status: DISCONTINUED | OUTPATIENT
Start: 2022-11-29 | End: 2022-12-01 | Stop reason: HOSPADM

## 2022-11-29 RX ORDER — ASPIRIN 81 MG/1
81 TABLET ORAL DAILY
Status: DISCONTINUED | OUTPATIENT
Start: 2022-11-29 | End: 2022-11-29 | Stop reason: ALTCHOICE

## 2022-11-29 RX ORDER — ONDANSETRON 2 MG/ML
4 INJECTION INTRAMUSCULAR; INTRAVENOUS EVERY 6 HOURS PRN
Status: DISCONTINUED | OUTPATIENT
Start: 2022-11-29 | End: 2022-12-01 | Stop reason: HOSPADM

## 2022-11-29 RX ORDER — ACETAMINOPHEN 650 MG/1
650 SUPPOSITORY RECTAL EVERY 6 HOURS PRN
Status: DISCONTINUED | OUTPATIENT
Start: 2022-11-29 | End: 2022-12-01 | Stop reason: HOSPADM

## 2022-11-29 RX ORDER — POLYETHYLENE GLYCOL 3350 17 G/17G
17 POWDER, FOR SOLUTION ORAL DAILY PRN
Status: DISCONTINUED | OUTPATIENT
Start: 2022-11-29 | End: 2022-12-01 | Stop reason: HOSPADM

## 2022-11-29 RX ORDER — SODIUM CHLORIDE 0.9 % (FLUSH) 0.9 %
5-40 SYRINGE (ML) INJECTION PRN
Status: DISCONTINUED | OUTPATIENT
Start: 2022-11-29 | End: 2022-12-01 | Stop reason: HOSPADM

## 2022-11-29 RX ADMIN — Medication 10 ML: at 22:14

## 2022-11-29 RX ADMIN — LORAZEPAM 2 MG: 2 INJECTION INTRAMUSCULAR; INTRAVENOUS at 12:28

## 2022-11-29 RX ADMIN — APIXABAN 5 MG: 5 TABLET, FILM COATED ORAL at 21:09

## 2022-11-29 RX ADMIN — METOPROLOL TARTRATE 25 MG: 25 TABLET, FILM COATED ORAL at 21:09

## 2022-11-29 RX ADMIN — DILTIAZEM HYDROCHLORIDE 180 MG: 180 CAPSULE, COATED, EXTENDED RELEASE ORAL at 22:14

## 2022-11-29 RX ADMIN — ATORVASTATIN CALCIUM 40 MG: 40 TABLET, FILM COATED ORAL at 21:09

## 2022-11-29 RX ADMIN — LEVETIRACETAM 1000 MG: 10 INJECTION INTRAVENOUS at 16:49

## 2022-11-29 ASSESSMENT — PAIN SCALES - GENERAL: PAINLEVEL_OUTOF10: 0

## 2022-11-29 ASSESSMENT — ENCOUNTER SYMPTOMS
NAUSEA: 0
VOMITING: 0
SHORTNESS OF BREATH: 0
WHEEZING: 0
DIARRHEA: 0
RHINORRHEA: 0
ABDOMINAL PAIN: 0
COUGH: 0

## 2022-11-29 ASSESSMENT — PAIN - FUNCTIONAL ASSESSMENT: PAIN_FUNCTIONAL_ASSESSMENT: NONE - DENIES PAIN

## 2022-11-29 ASSESSMENT — LIFESTYLE VARIABLES
HOW OFTEN DO YOU HAVE A DRINK CONTAINING ALCOHOL: 2-3 TIMES A WEEK
HOW MANY STANDARD DRINKS CONTAINING ALCOHOL DO YOU HAVE ON A TYPICAL DAY: 1 OR 2

## 2022-11-29 NOTE — TELEPHONE ENCOUNTER
Writer contacted ED provider to inform of 30 day readmission risk. No Decision on disposition at this time.     Call Back: If you need to call back to inform of disposition you can contact me at [phone]

## 2022-11-29 NOTE — PROGRESS NOTES
Physician Progress Note      PATIENT:               Darci Heredia  CSN #:                  [id number]  :                       1955  ADMIT DATE:       10/30/2022 7:15 PM  [facility] DATE:        2022 11:08 AM  RESPONDING  PROVIDER #:        Aaron Babinski MD          QUERY TEXT:    Pt admitted with TIA. Pt noted to have atrial fibrillation, started on   Aspirin. If possible, please document in progress notes and discharge summary   if you are evaluating and /or treating any of the following: The medical record reflects the following:    Risk Factors: Atrial fibrillation not on anticoagulation    Clinical Indicators: per DCS \"to hospital with possible TIA. Sonia Hurt patient has a   history of A fib we will follow-up with primary cardiologist to discuss need   for anticoagulation. Patient was discharged on aspirin and statin. \"    Treatment:  given in ER, daily 81 mg ASA added. continue home statin.    imaging, labs, neuro consult, supportive care and monitoring    Thank you,  [email or web address]  Options provided:  -- Embolic TIA suspected due to cerebral embolism related to Afib  -- Other - I will add my own diagnosis  -- Disagree - Not applicable / Not valid  -- Disagree - Clinically unable to determine / Unknown  -- Refer to Clinical Documentation Reviewer    PROVIDER RESPONSE TEXT:    Possible embolic tia    Query created by:  Pradip Dumont on 2022 2:39 PM      Electronically signed by:  Aaron Babinski MD 2022 3:03 PM

## 2022-11-29 NOTE — ED PROVIDER NOTES
I did not perform a face-to-face evaluation of this patient. I did interpret the patient's EKG as noted below: The Ekg interpreted by me shows  atrial fibrillation with a rate of 58  Axis is   Normal  QTc is  within an acceptable range  Intervals and Durations are unremarkable.       ST Segments: nonspecific changes  No significant change from prior EKG dated 11/11/22         Antony Salinas MD  11/29/22 [id number]

## 2022-11-29 NOTE — PROGRESS NOTES
Patient seen in ED, room 14. Admission completed except for: 4 Eyes Assessment, Rights and Responsibilities, orientation to room, Plan of Care, education, white board, height and weight, pain assessment and head to toe assessment. Patient is alert and oriented X 4. Patient lives in a ranch style home with a finished basement and is being admitted for seizure. Home Medication Reconciliation has been verbally reviewed with patient and updated as appropriate. It continues to be marked as \"In process\" as the verification with pharmacy has not been completed. All questions answered.

## 2022-11-29 NOTE — ED NOTES
This nurse witnessed active seizure, NRB applied, IV ativan given. Post ictal at this time. Seizure pads on.      Sujatha Parker RN  11/29/22 1995

## 2022-11-29 NOTE — H&P
HOSPITALISTS HISTORY AND PHYSICAL    11/29/2022 4:07 PM    Patient Information:  Jena Pritchett is a 78 y.o. male [id number]  PCP:  Domo Baugh MD (Tel: [phone] )    Chief complaint:    Chief Complaint   Patient presents with    Seizures     Came by [facility] from home with seizure- pt states had seizure in the past- on eliquis for afib- A&O upon arrival       History of Present Illness:  Vincent Tucker is a 78 y.o. male admitted to hospital roughly 1 month ago with TIA patient refused MRI states would get outpatient MRI that was open however has not had that MRI. The patient had seizure-like activity witnessed by friend at home and another episode in the ED there is no documentation of exactly what the episode was attempted to discuss with ED nurse or witness event but was unable to get a hold of the nurse. Denies any weakness numbness chest pain shortness of breath fevers or chills cough or headaches. Patient does not smoke but does drink 2-3 drinks of beer a day never had  seizures before    =    REVIEW OF SYSTEMS:   Constitutional: Negative for fever,chills or night sweats  ENT: Negative for rhinorrhea, epistaxis, hoarseness, sore throat. Respiratory: Negative for shortness of breath,wheezing  Cardiovascular: Negative for chest pain, palpitations   Gastrointestinal: Negative for nausea, vomiting, diarrhea  Genitourinary: Negative for polyuria, dysuria   Hematologic/Lymphatic: Negative for bleeding tendency, easy bruising  Musculoskeletal: Negative for myalgias and arthralgias  Neurologic: see above  Skin: Negative for itching,rash  Psychiatric: Negative for depression,anxiety, agitation. Endocrine: Negative for polydipsia,polyuria,heat /cold intolerance.     Past Medical History:   has a past medical history of Anxiety, Arthritis, Asthma, Atrial fibrillation ([facility] [address] Blood circulation, collateral, CAD (coronary artery disease), CHF (congestive heart failure) ([facility] [address] Colon cancer ([facility] [address] COPD (chronic obstructive pulmonary disease) ([facility] [address] Diabetic foot infection ([facility] [address] Hernia, Hyperlipidemia, Hypertension, Movement disorder, Muscle cramps, Neuromuscular disorder ([facility] [address] Neuropathy, Pneumonia, Renal insufficiency, Sleep apnea, Thyroid disease, Tinnitus aurium, bilateral, Unspecified cerebral artery occlusion with cerebral infarction, and Unspecified diseases of blood and blood-forming organs. Past Surgical History:   has a past surgical history that includes Toe Surgery; Ventricular ablation surgery; Colon surgery; Toe amputation (3/7/11); Appendectomy (10/9/2012); Abdomen surgery; Colonoscopy; Endoscopy, colon, diagnostic; skin biopsy; Upper gastrointestinal endoscopy (1/28/2014); Upper gastrointestinal endoscopy (3/13/14); Colonoscopy (3/13/14); endoscopic ultrasound (upper) (6/4/14); Upper gastrointestinal endoscopy (6/4/14); Ulnar tunnel release (Left, 12-11-14); Carpal tunnel release (Left, 12/11/14); hernia repair; Foot surgery (Right, 07/13/2017); Foot surgery (Right, 08/17/2017); Upper gastrointestinal endoscopy (03/02/2018); Colonoscopy (03/02/2018); Knee arthroscopy (Right, 08/23/2018); Cystoscopy (Right, 5/3/2019); and Carpal tunnel release (Right, 6/11/2019). Medications:  No current facility-administered medications on file prior to encounter.      Current Outpatient Medications on File Prior to Encounter   Medication Sig Dispense Refill    dilTIAZem (DILACOR XR) 180 MG extended release capsule Take 180 mg by mouth in the morning and at bedtime 1 capsule bid      apixaban (ELIQUIS) 5 MG TABS tablet Take 1 tablet by mouth 2 times daily 180 tablet 3    aspirin EC 81 MG EC tablet Take 1 tablet by mouth daily 30 tablet 5    atorvastatin (LIPITOR) 40 MG tablet TAKE 1 TABLET BY MOUTH EVERY DAY IN THE EVENING 90 tablet 1    allopurinol (ZYLOPRIM) 300 MG tablet TAKE 1 TABLET BY MOUTH 2 TIMES DAILY (Patient taking differently: Take 300 mg by mouth as needed) 180 tablet 3    diclofenac sodium (VOLTAREN) 1 % GEL APPLY 4 GRAMS TOPICALLY 4 TIMES DAILY AS NEEDED FOR PAIN 500 g 2    metoprolol tartrate (LOPRESSOR) 25 MG tablet Take 1 tablet by mouth 2 times daily 180 tablet 3    budesonide-formoterol (SYMBICORT) 160-4.5 MCG/ACT AERO Inhale 2 puffs into the lungs 2 times daily. (Patient not taking: Reported on 11/11/2022) 3 Inhaler 3    albuterol-ipratropium (COMBIVENT RESPIMAT)  MCG/ACT AERS inhaler Inhale 1 puff into the lungs every 6 hours. (Patient taking differently: Inhale 1 puff into the lungs every 6 hours as needed) 3 Inhaler 3    OXYGEN Inhale  into the lungs. 2 liters/ nasal cannula prn      therapeutic multivitamin-minerals (THERAGRAN-M) tablet Take 1 tablet by mouth daily. Allergies: Allergies   Allergen Reactions    Bactrim      hallucinations    Clindamycin/Lincomycin Diarrhea        Social History:  Patient Lives at home   reports that he has never smoked. He has never used smokeless tobacco. He reports current alcohol use. He reports that he does not use drugs. Family History:  family history includes Cancer in his father; Heart Disease in his father and mother; High Blood Pressure in his father and mother. ,     Physical Exam:  BP (!) 161/88   Pulse 75   Temp 97.8 °F (36.6 °C) (Oral)   Resp 15   Ht 6' 4\" (1.93 m)   Wt 289 lb (131.1 kg)   SpO2 96%   BMI 35.18 kg/m²     General appearance:  Appears comfortable. AAOx3  HEENT: atraumatic, Pupils equal, muscous membranes moist, no masses appreciated  Cardiovascular: irregular irregular no murmurs appreciated  Respiratory: CTAB no wheezing  Gastrointestinal: Abdomen soft, non-tender, BS+  EXT: no edema  Neurology: no gross focal deficts  Psychiatry: Appropriate affect.  Not agitated  Skin: Warm, dry, no rashes appreciated    Labs:  CBC:   Lab Results   Component Value Date/Time    WBC 9.3 11/29/2022 12:20 PM RBC 4.62 11/29/2022 12:20 PM    HGB 14.6 11/29/2022 12:20 PM    HCT 46.4 11/29/2022 12:20 PM    .4 11/29/2022 12:20 PM    MCH 31.6 11/29/2022 12:20 PM    MCHC 31.5 11/29/2022 12:20 PM    RDW 14.5 11/29/2022 12:20 PM     11/29/2022 12:20 PM    MPV 7.9 11/29/2022 12:20 PM     BMP:    Lab Results   Component Value Date/Time     11/29/2022 12:20 PM    K 4.1 11/29/2022 12:20 PM    K 4.0 10/30/2022 07:49 PM    CL 96 11/29/2022 12:20 PM    CO2 16 11/29/2022 12:20 PM    BUN 13 11/29/2022 12:20 PM    CREATININE 1.1 11/29/2022 12:20 PM    CALCIUM 9.4 11/29/2022 12:20 PM    GFRAA >60 03/08/2022 07:42 AM    GFRAA >60 12/13/2012 02:09 PM    LABGLOM >60 11/29/2022 12:20 PM    GLUCOSE 128 11/29/2022 12:20 PM     CT HEAD WO CONTRAST   Preliminary Result   No acute intracranial abnormality. XR CHEST PORTABLE   Final Result   Suspected right sub pulmonic effusion. Stable cardiomegaly. MRI BRAIN W CONTRAST    (Results Pending)       Recent imaging reviewed    Problem List  Principal Problem:    Seizure [facility])  Resolved Problems:    * No resolved hospital problems. *        Assessment/Plan:   New onset seziure  - keppra  - seizure precautions  - neuro consult  - mri brain    Chronic afib home meds eliquis    Chronic copd: home meds    Etoh abuse: monitor for withdrawal  DVT prophylaxis eliquis  Code status full code        Admit as inpatient I anticipate hospitalization spanning more than two midnights for investigation and treatment of the above medically necessary diagnoses. Please note that some part of this chart was generated using Dragon dictation software. Although every effort was made to ensure the accuracy of this automated transcription, some errors in transcription may have occurred inadvertently. If you may need any clarification, please do not hesitate to contact me through [facility].        Tianna Duff MD    11/29/2022 4:07 PM

## 2022-11-29 NOTE — ED PROVIDER NOTES
[facility]        Pt Name: Mary Jane Hassan  MRN: [id number]  [place] 1955  Date of evaluation: 11/29/2022  Provider: Jaye Mon PA-C  PCP: Delroy Phillips MD  Note Started: 12:19 PM EST 11/29/22          MAUREEN. I have evaluated this patient. My supervising physician was available for consultation. CHIEF COMPLAINT       Chief Complaint   Patient presents with    Seizures     Came by [facility] from home with seizure- pt states had seizure in the past- on eliquis for afib- A&O upon arrival       HISTORY OF PRESENT ILLNESS   (Location, Timing/Onset, Context/Setting, Quality, Duration, Modifying Factors, Severity, Associated Signs and Symptoms)  Note limiting factors. Chief Complaint: Seizure     Mary Jane Hassan is a 78 y.o. male who presents for evaluation of witnessed seizure-like activity. Patient states that he had his first seizure about a month ago. He was not admitted at that time but was evaluated in the ER. Patient states that his friend told him that he was \"growling like a bear and flailing his arms. \"  Unsure duration of seizure. Patient alert and oriented upon arrival to the ER. He states that he is not on any antiseizure medication. He is on Eliquis due to history of atrial fibrillation. He denies any recent illness. He has no other complaints or concerns at this time. Nursing Notes were all reviewed and agreed with or any disagreements were addressed in the HPI. REVIEW OF SYSTEMS    (2-9 systems for level 4, 10 or more for level 5)     Review of Systems   Constitutional:  Negative for appetite change, chills and fever. HENT:  Negative for congestion and rhinorrhea. Respiratory:  Negative for cough, shortness of breath and wheezing. Cardiovascular:  Negative for chest pain. Gastrointestinal:  Negative for abdominal pain, diarrhea, nausea and vomiting.    Genitourinary:  Negative for difficulty urinating, dysuria and hematuria. Musculoskeletal:  Negative for neck pain and neck stiffness. Skin:  Negative for rash. Neurological:  Positive for seizures. Negative for headaches. Positives and Pertinent negatives as per HPI. Except as noted above in the ROS, all other systems were reviewed and negative.        PAST MEDICAL HISTORY     Past Medical History:   Diagnosis Date    Anxiety     Arthritis     Asthma     Atrial fibrillation ([facility] [address]     Blood circulation, collateral     CAD (coronary artery disease)     CHF (congestive heart failure) (HCC)     Colon cancer (HCC)     COPD (chronic obstructive pulmonary disease) ([facility] [address]     Diabetic foot infection ([facility] [address]     Hernia     Hyperlipidemia     Hypertension     Movement disorder     Muscle cramps     Neuromuscular disorder (HCC)     marvin feet numbness/neuropathy    Neuropathy     Pneumonia     Renal insufficiency     Sleep apnea     no cpap    Thyroid disease     Tinnitus aurium, bilateral     Unspecified cerebral artery occlusion with cerebral infarction     mini stroke    Unspecified diseases of blood and blood-forming organs          SURGICAL HISTORY     Past Surgical History:   Procedure Laterality Date    ABDOMEN SURGERY      APPENDECTOMY  10/9/2012    CARPAL TUNNEL RELEASE Left 12/11/14    CARPAL TUNNEL RELEASE Right 6/11/2019    RIGHT CARPAL TUNNEL RELEASE AND RIGHT MIDDLE FINGER TRIGGER FINGER RELEASE performed by Anayeli Ruggiero MD at [address] [place] [address]      for Colon Ca 10 inches removed from colon    COLONOSCOPY      COLONOSCOPY  3/13/14    COLONOSCOPY  03/02/2018    CYSTOSCOPY Right 5/3/2019    CYSTOSCOPY, RIGHT URETEROSCOPY, STONE EXTRACTION WITH STONE BASKET performed by Manuela Ramos MD at [address] [place] [address] ([facility])  6/4/14    ENDOSCOPY, COLON, DIAGNOSTIC      FOOT SURGERY Right 07/13/2017    Amputation toes 2,3,5, right foot Flap closure, Exostectomy 1st metatarsal right foot    FOOT SURGERY Right 08/17/2017 INCISION AND DRAINAGE OF RIGHT FOOT WITH FLAP CLOSURE    HERNIA REPAIR      umbilical, hiatal hernia repairs    KNEE ARTHROSCOPY Right 08/23/2018    partial medial/ lateral menisectomy    SKIN BIOPSY      TOE AMPUTATION  3/7/11    RIGHT FOURTH TOE    TOE SURGERY      ULNAR TUNNEL RELEASE Left 12-11-14    Left ulnar nerve decompression and left CTR    UPPER GASTROINTESTINAL ENDOSCOPY  1/28/2014    with biopsies    UPPER GASTROINTESTINAL ENDOSCOPY  3/13/14    UPPER GASTROINTESTINAL ENDOSCOPY  6/4/14    with biopsy    UPPER GASTROINTESTINAL ENDOSCOPY  03/02/2018    VENTRICULAR ABLATION SURGERY           CURRENTMEDICATIONS       Previous Medications    ALBUTEROL-IPRATROPIUM (COMBIVENT RESPIMAT)  MCG/ACT AERS INHALER    Inhale 1 puff into the lungs every 6 hours. ALLOPURINOL (ZYLOPRIM) 300 MG TABLET    TAKE 1 TABLET BY MOUTH 2 TIMES DAILY    APIXABAN (ELIQUIS) 5 MG TABS TABLET    Take 1 tablet by mouth 2 times daily    ASPIRIN EC 81 MG EC TABLET    Take 1 tablet by mouth daily    ATORVASTATIN (LIPITOR) 40 MG TABLET    TAKE 1 TABLET BY MOUTH EVERY DAY IN THE EVENING    BUDESONIDE-FORMOTEROL (SYMBICORT) 160-4.5 MCG/ACT AERO    Inhale 2 puffs into the lungs 2 times daily. DICLOFENAC SODIUM (VOLTAREN) 1 % GEL    APPLY 4 GRAMS TOPICALLY 4 TIMES DAILY AS NEEDED FOR PAIN    DILTIAZEM (DILACOR XR) 180 MG EXTENDED RELEASE CAPSULE    Take 180 mg by mouth in the morning and at bedtime 1 capsule bid    METOPROLOL TARTRATE (LOPRESSOR) 25 MG TABLET    Take 1 tablet by mouth 2 times daily    OXYGEN    Inhale  into the lungs. 2 liters/ nasal cannula prn    THERAPEUTIC MULTIVITAMIN-MINERALS (THERAGRAN-M) TABLET    Take 1 tablet by mouth daily.          ALLERGIES     Bactrim and Clindamycin/lincomycin    FAMILYHISTORY       Family History   Problem Relation Age of Onset    Cancer Father         colon    High Blood Pressure Father     Heart Disease Father     High Blood Pressure Mother     Heart Disease Mother SOCIAL HISTORY       Social History     Tobacco Use    Smoking status: Never    Smokeless tobacco: Never   Vaping Use    Vaping Use: Never used   Substance Use Topics    Alcohol use: Yes     Comment: \"occasionally\"    Drug use: No       SCREENINGS    [place] Coma Scale  Eye Opening: Spontaneous  Best Verbal Response: Oriented  Best Motor Response: Obeys commands  [place] Coma Scale Score: 15        PHYSICAL EXAM    (up to 7 for level 4, 8 or more for level 5)     ED Triage Vitals [11/29/22 1146]   BP Temp Temp Source Heart Rate Resp SpO2 Height Weight   (!) 158/85 97.8 °F (36.6 °C) Oral 68 20 100 % 6' 4\" (1.93 m) 289 lb (131.1 kg)       Physical Exam  Vitals and nursing note reviewed. Constitutional:       General: He is not in acute distress. Appearance: He is well-developed. He is not ill-appearing, toxic-appearing or diaphoretic. HENT:      Head: Normocephalic and atraumatic. Right Ear: External ear normal.      Left Ear: External ear normal.      Nose: Nose normal.   Eyes:      General:         Right eye: No discharge. Left eye: No discharge. Extraocular Movements: Extraocular movements intact. Conjunctiva/sclera: Conjunctivae normal.      Pupils: Pupils are equal, round, and reactive to light. Cardiovascular:      Rate and Rhythm: Normal rate and regular rhythm. Heart sounds: Normal heart sounds. Pulmonary:      Effort: Pulmonary effort is normal. No respiratory distress. Breath sounds: Normal breath sounds. Chest:      Chest wall: No tenderness. Abdominal:      General: There is no distension. Palpations: Abdomen is soft. Tenderness: There is no abdominal tenderness. There is no guarding or rebound. Musculoskeletal:         General: Normal range of motion. Cervical back: Normal range of motion and neck supple. Skin:     General: Skin is warm and dry. Neurological:      Mental Status: He is alert and oriented to person, place, and time. Mental status is at baseline. GCS: GCS eye subscore is 4. GCS verbal subscore is 5. GCS motor subscore is 6. Cranial Nerves: Cranial nerves 2-12 are intact. Sensory: Sensation is intact. Motor: Motor function is intact. No seizure activity. Coordination: Coordination is intact. Psychiatric:         Behavior: Behavior normal.       DIAGNOSTIC RESULTS   LABS:    Labs Reviewed   CBC WITH AUTO DIFFERENTIAL - Abnormal; Notable for the following components:       Result Value    .4 (*)     Lymphocytes Absolute 0.8 (*)     All other components within normal limits   COMPREHENSIVE METABOLIC PANEL - Abnormal; Notable for the following components:    Chloride 96 (*)     CO2 16 (*)     Anion Gap 24 (*)     Glucose 128 (*)     Total Bilirubin 1.1 (*)     Alkaline Phosphatase 172 (*)     AST 43 (*)     All other components within normal limits   URINALYSIS WITH REFLEX TO CULTURE - Abnormal; Notable for the following components:    Blood, Urine SMALL (*)     Leukocyte Esterase, Urine TRACE (*)     All other components within normal limits   TROPONIN   MICROSCOPIC URINALYSIS       When ordered only abnormal lab results are displayed. All other labs were within normal range or not returned as of this dictation. EKG: When ordered, EKG's are interpreted by the Emergency Department Physician in the absence of a cardiologist.  Please see their note for interpretation of EKG. RADIOLOGY:   Non-plain film images such as CT, Ultrasound and MRI are read by the radiologist. Plain radiographic images are visualized and preliminarily interpreted by the ED Provider with the below findings:        Interpretation per the Radiologist below, if available at the time of this note:    CT HEAD WO CONTRAST   Preliminary Result   No acute intracranial abnormality. XR CHEST PORTABLE   Final Result   Suspected right sub pulmonic effusion. Stable cardiomegaly.          MRI BRAIN W CONTRAST    (Results Pending)     No results found. PROCEDURES   Unless otherwise noted below, none     Procedures    CRITICAL CARE TIME       CONSULTS:  IP CONSULT TO NEUROLOGY      EMERGENCY DEPARTMENT COURSE and DIFFERENTIAL DIAGNOSIS/MDM:   Vitals:    Vitals:    11/29/22 1146 11/29/22 1432 11/29/22 1447 11/29/22 1500   BP: (!) 158/85 (!) 158/86 (!) 150/97 (!) 161/88   Pulse: 68 69 72 75   Resp: 20 17 15 15   Temp: 97.8 °F (36.6 °C)      TempSrc: Oral      SpO2: 100% 96% 96%    Weight: 289 lb (131.1 kg)      Height: 6' 4\" (1.93 m)          Patient was given the following medications:  Medications   levETIRAcetam (KEPPRA) 1000 mg/100 mL IVPB (has no administration in time range)   LORazepam (ATIVAN) injection 2 mg (2 mg IntraVENous Given 11/29/22 1228)         Is this patient to be included in the SEP-1 Core Measure due to severe sepsis or septic shock? No   Exclusion criteria - the patient is NOT to be included for SEP-1 Core Measure due to: Infection is not suspected    Patient presents for evaluation after seizure. On exam, resting comfortably in bed no acute distress nontoxic. Slightly hypertensive but vitals are otherwise stable and he is afebrile. He is alert and oriented x3. GCS 15. Cranial nerves II through XII are intact. He is moving all extremities without difficulty or focal neurologic deficit. No witnessed seizure-like activity. Please see attending note for EKG interpretation. I was called back into the room by nursing staff with patient with active seizure-like activity. He became hypoxic, desatted down to the 60s and was placed on nonrebreather. Given 2 mg of IV Ativan. CBC and CMP are unremarkable. Troponin is negative. Urinalysis is negative. Chest x-ray shows no acute process. CT of the head shows no acute intracranial abnormality. I spoke with on-call neurology, Dr. Marquise Tran, who is recommending loading dose of Keppra, admission and MRI of the brain with contrast.  This was ordered. Hospitalist resume care the patient at this time. Patient informed and agreeable. He is stable for admission. FINAL IMPRESSION      1. Seizure [facility])          DISPOSITION/PLAN   DISPOSITION Decision To Admit 11/29/2022 03:24:06 PM      PATIENT REFERRED TO:  No follow-up provider specified.     DISCHARGE MEDICATIONS:  New Prescriptions    No medications on file       DISCONTINUED MEDICATIONS:  Discontinued Medications    No medications on file              (Please note that portions of this note were completed with a voice recognition program.  Efforts were made to edit the dictations but occasionally words are mis-transcribed.)    Laura Galdamez PA-C (electronically signed)            Chandu Disla PA-C  11/29/22 4823

## 2022-11-29 NOTE — ACP (ADVANCE CARE PLANNING)
Advanced Care Planning Note. Purpose of Encounter: Advanced care planning in light of hospitalization  Parties In Attendance: Patient,    Decisional Capacity: Yes  Subjective: Patient  understand that this conversation is to address long term care goal  Objective: patient admitted to our hospital with new onset seizure  Goals of Care Determination: Patient would pursue CPR and Intubation if required. Would pursue tracheostomy or long term ventilation if required.      Code Status: full code  Time spent on Advanced care Plannin minutes  Advanced Care Planning Documents: documented patient's wishes, would like Brother Lona Beltran to make medical decisions if unable to make decisions    Madan Cardoso MD  2022 4:10 PM

## 2022-11-30 LAB
ANION GAP SERPL CALCULATED.3IONS-SCNC: 9 MMOL/L (ref 3–16)
BASOPHILS ABSOLUTE: 0 K/UL (ref 0–0.2)
BASOPHILS RELATIVE PERCENT: 0.4 %
BUN BLDV-MCNC: 13 MG/DL (ref 7–20)
CALCIUM SERPL-MCNC: 8.8 MG/DL (ref 8.3–10.6)
CHLORIDE BLD-SCNC: 103 MMOL/L (ref 99–110)
CO2: 25 MMOL/L (ref 21–32)
CREAT SERPL-MCNC: 1 MG/DL (ref 0.8–1.3)
EKG ATRIAL RATE: 357 BPM
EKG DIAGNOSIS: NORMAL
EKG Q-T INTERVAL: 468 MS
EKG QRS DURATION: 112 MS
EKG QTC CALCULATION (BAZETT): 459 MS
EKG R AXIS: 0 DEGREES
EKG T AXIS: 41 DEGREES
EKG VENTRICULAR RATE: 58 BPM
EOSINOPHILS ABSOLUTE: 0.1 K/UL (ref 0–0.6)
EOSINOPHILS RELATIVE PERCENT: 1.9 %
GFR SERPL CREATININE-BSD FRML MDRD: >60 ML/MIN/{1.73_M2}
GLUCOSE BLD-MCNC: 109 MG/DL (ref 70–99)
GLUCOSE BLD-MCNC: 84 MG/DL (ref 70–99)
HCT VFR BLD CALC: 37.5 % (ref 40.5–52.5)
HEMOGLOBIN: 12.5 G/DL (ref 13.5–17.5)
LYMPHOCYTES ABSOLUTE: 0.7 K/UL (ref 1–5.1)
LYMPHOCYTES RELATIVE PERCENT: 12.4 %
MCH RBC QN AUTO: 32.7 PG (ref 26–34)
MCHC RBC AUTO-ENTMCNC: 33.5 G/DL (ref 31–36)
MCV RBC AUTO: 97.8 FL (ref 80–100)
MONOCYTES ABSOLUTE: 0.6 K/UL (ref 0–1.3)
MONOCYTES RELATIVE PERCENT: 10.9 %
NEUTROPHILS ABSOLUTE: 4 K/UL (ref 1.7–7.7)
NEUTROPHILS RELATIVE PERCENT: 74.4 %
PDW BLD-RTO: 14.2 % (ref 12.4–15.4)
PERFORMED ON: ABNORMAL
PLATELET # BLD: 139 K/UL (ref 135–450)
PMV BLD AUTO: 8.1 FL (ref 5–10.5)
POTASSIUM REFLEX MAGNESIUM: 3.8 MMOL/L (ref 3.5–5.1)
RBC # BLD: 3.83 M/UL (ref 4.2–5.9)
SODIUM BLD-SCNC: 137 MMOL/L (ref 136–145)
WBC # BLD: 5.4 K/UL (ref 4–11)

## 2022-11-30 PROCEDURE — 95816 EEG AWAKE AND DROWSY: CPT | Performed by: PSYCHIATRY & NEUROLOGY

## 2022-11-30 PROCEDURE — 6360000002 HC RX W HCPCS: Performed by: INTERNAL MEDICINE

## 2022-11-30 PROCEDURE — 80048 BASIC METABOLIC PNL TOTAL CA: CPT

## 2022-11-30 PROCEDURE — 95819 EEG AWAKE AND ASLEEP: CPT

## 2022-11-30 PROCEDURE — 99222 1ST HOSP IP/OBS MODERATE 55: CPT | Performed by: PSYCHIATRY & NEUROLOGY

## 2022-11-30 PROCEDURE — 2580000003 HC RX 258: Performed by: INTERNAL MEDICINE

## 2022-11-30 PROCEDURE — 97530 THERAPEUTIC ACTIVITIES: CPT

## 2022-11-30 PROCEDURE — 85025 COMPLETE CBC W/AUTO DIFF WBC: CPT

## 2022-11-30 PROCEDURE — 6370000000 HC RX 637 (ALT 250 FOR IP): Performed by: INTERNAL MEDICINE

## 2022-11-30 PROCEDURE — 97116 GAIT TRAINING THERAPY: CPT

## 2022-11-30 PROCEDURE — 97165 OT EVAL LOW COMPLEX 30 MIN: CPT

## 2022-11-30 PROCEDURE — 36415 COLL VENOUS BLD VENIPUNCTURE: CPT

## 2022-11-30 PROCEDURE — 93010 ELECTROCARDIOGRAM REPORT: CPT | Performed by: INTERNAL MEDICINE

## 2022-11-30 PROCEDURE — 1200000000 HC SEMI PRIVATE

## 2022-11-30 PROCEDURE — 97161 PT EVAL LOW COMPLEX 20 MIN: CPT

## 2022-11-30 RX ADMIN — DILTIAZEM HYDROCHLORIDE 180 MG: 180 CAPSULE, COATED, EXTENDED RELEASE ORAL at 08:11

## 2022-11-30 RX ADMIN — Medication 10 ML: at 08:12

## 2022-11-30 RX ADMIN — LEVETIRACETAM 500 MG: 5 INJECTION INTRAVENOUS at 16:45

## 2022-11-30 RX ADMIN — METOPROLOL TARTRATE 25 MG: 25 TABLET, FILM COATED ORAL at 19:59

## 2022-11-30 RX ADMIN — METOPROLOL TARTRATE 25 MG: 25 TABLET, FILM COATED ORAL at 08:09

## 2022-11-30 RX ADMIN — APIXABAN 5 MG: 5 TABLET, FILM COATED ORAL at 19:59

## 2022-11-30 RX ADMIN — LEVETIRACETAM 500 MG: 5 INJECTION INTRAVENOUS at 04:20

## 2022-11-30 RX ADMIN — APIXABAN 5 MG: 5 TABLET, FILM COATED ORAL at 08:10

## 2022-11-30 RX ADMIN — DILTIAZEM HYDROCHLORIDE 180 MG: 180 CAPSULE, COATED, EXTENDED RELEASE ORAL at 19:59

## 2022-11-30 RX ADMIN — Medication 10 ML: at 20:00

## 2022-11-30 RX ADMIN — ATORVASTATIN CALCIUM 40 MG: 40 TABLET, FILM COATED ORAL at 19:59

## 2022-11-30 ASSESSMENT — PAIN SCALES - GENERAL
PAINLEVEL_OUTOF10: 0
PAINLEVEL_OUTOF10: 0

## 2022-11-30 NOTE — PROGRESS NOTES
Patient assessed this AM. Vitals stable. Medication given an tolerated. No c/o pain. Pt. Sitting up in bed eating. Voiced no other concerns at this time. Call light in reach.

## 2022-11-30 NOTE — CARE COORDINATION
11/30/22 1306   Readmission Assessment   Number of Days since last admission? 8-30 days   Previous Disposition Other (comment)  (Home with significant other)   Who is being Interviewed Patient   What was the patient's/caregiver's perception as to why they think they needed to return back to the hospital? Other (Comment)  (Patient's friend witnessed him having a seizure, also had witnessed seizure activity in the ED)   Did you visit your Primary Care Physician after you left the hospital, before you returned this time? No   Did you see a specialist, such as Cardiac, Pulmonary, Orthopedic Physician, etc. after you left the hospital? Yes  (11/11 Appointment with Dr. Shadia Little)   Who advised the patient to return to the hospital? Self-referral   Does the patient report anything that got in the way of taking their medications? No   In our efforts to provide the best possible care to you and others like you, can you think of anything that we could have done to help you after you left the hospital the first time, so that you might not have needed to return so soon?  Other (Comment)  (Nothing reported)

## 2022-11-30 NOTE — PROGRESS NOTES
Physical Therapy    [facility] [address]   Phone: [phone]    Physical Therapy    [x] Initial Evaluation            [] Daily Treatment Note         [] Discharge Summary      Patient: Nemo Carlos   : 1955   MRN: [id number]   Date of Service:  2022  Admitting Diagnosis: Seizure [facility])  Current Admission Summary: Nemo Carlos is a 78 y.o. male who presents for evaluation of witnessed seizure-like activity. Patient states that he had his first seizure about a month ago. He was not admitted at that time but was evaluated in the ER. Patient states that his friend told him that he was \"growling like a bear and flailing his arms. \"  Unsure duration of seizure. Patient alert and oriented upon arrival to the ER. He states that he is not on any antiseizure medication. He is on Eliquis due to history of atrial fibrillation. He denies any recent illness. He has no other complaints or concerns at this time  Past Medical History:  has a past medical history of Anxiety, Arthritis, Asthma, Atrial fibrillation ([address] Blood circulation, collateral, CAD (coronary artery disease), CHF (congestive heart failure) ([address] Colon cancer ([address] COPD (chronic obstructive pulmonary disease) ([address] Diabetic foot infection ([address] Hernia, Hyperlipidemia, Hypertension, Movement disorder, Muscle cramps, Neuromuscular disorder ([address] Neuropathy, Pneumonia, Renal insufficiency, Sleep apnea, Thyroid disease, Tinnitus aurium, bilateral, Unspecified cerebral artery occlusion with cerebral infarction, and Unspecified diseases of blood and blood-forming organs. Past Surgical History:  has a past surgical history that includes Toe Surgery; Ventricular ablation surgery; Colon surgery; Toe amputation (3/7/11); Appendectomy (10/9/2012); Abdomen surgery; Colonoscopy; Endoscopy, colon, diagnostic; skin biopsy; Upper gastrointestinal endoscopy (2014);  Upper gastrointestinal endoscopy (3/13/14); Colonoscopy (3/13/14); endoscopic ultrasound (upper) (6/4/14); Upper gastrointestinal endoscopy (6/4/14); Ulnar tunnel release (Left, 12-11-14); Carpal tunnel release (Left, 12/11/14); hernia repair; Foot surgery (Right, 07/13/2017); Foot surgery (Right, 08/17/2017); Upper gastrointestinal endoscopy (03/02/2018); Colonoscopy (03/02/2018); Knee arthroscopy (Right, 08/23/2018); Cystoscopy (Right, 5/3/2019); and Carpal tunnel release (Right, 6/11/2019). Discharge Recommendations: Vincent Tucker scored a 19/24 on the AM-PAC short mobility form. Current research shows that an AM-PAC score of 18 or greater is typically associated with a discharge to the patient's home setting. Based on the patient's AM-PAC score and their current functional mobility deficits, it is recommended that the patient have 2-3 sessions per week of Physical Therapy at d/c to increase the patient's independence. At this time, this patient demonstrates the endurance and safety to discharge home with HHPT and a follow up treatment frequency of 2-3x/wk. Please see assessment section for further patient specific details. If patient discharges prior to next session this note will serve as a discharge summary. Please see below for the latest assessment towards goals. DME Required For Discharge: patient has all required DME for discharge  Precautions/Restrictions: high fall risk, contact precautions, seizure (CDIFF rule out)   Weight Bearing Restrictions: no restrictions  [] Right Upper Extremity  [] Left Upper Extremity [] Right Lower Extremity  [] Left Lower Extremity     Required Braces/Orthotics: no braces required   [] Right  [] Left  Positional Restrictions:no positional restrictions    Pre-Admission Information   Lives With: significant other, \"Otilia\", SO for 30+ years                    Type of Home: house  Home Layout: one level, laundry in basement, able to live on main level  Home Access:  3 step to enter with handrail. Handrails are located on both side. Bathroom Layout: walker accessible, wheelchair accessible, tub/shower unit, walk in shower, handicap height toilet  Bathroom Equipment: grab bars in shower, grab bars around toilet, shower chair  Toilet Height: elevated height  Home Equipment: rolling walker, rollator - 4 wheeled walker, single point cane, crutches, electric scooter  Transfer Assistance: Independent without use of device  Ambulation Assistance:Independent without use of device  ADL Assistance: independent with all ADL's  IADL Assistance: independent with homemaking tasks, requires assistance with yard work  Active :        [x] Yes                 [] No  Hand Dominance: [] Left                 [x] Right  Current Employment: retired. Occupation: retired but cares for [employer] with brother  Hobbies: \"not much any more\"  Recent Falls: denied    Examination   Vision:   Vision Gross Assessment: WFL  Hearing:   hard of hearing  Observation:   General Observation:  R transmetatarsal foot amputation   Posture:    Forward head, rounded shoulders  Sensation:   Per chart review, pt has neuropathy however pt denies numbness and tingling   ROM:   (B) LE AROM WFL  Strength:   Functional strength assessed via completion of STS with SBA   Therapist Clinical Decision Making (Complexity): low complexity  Clinical Presentation: stable      Subjective  General: Pt supine in bed upon arrival. Pt agreeable to PT/OT eval. Seizure pads in place   Pain: 0/10  Pain Interventions: not applicable       Functional Mobility  Bed Mobility  Supine to Sit: stand by assistance  Sit to Supine: stand by assistance  Comments: HOB elevated, grab bars   Transfers  Sit to stand transfer: stand by assistance  Stand to sit transfer: stand by assistance  Comments: impulsive  Ambulation  Surface:level surface  Assistive Device: rolling walker  Assistance: contact guard assistance  Distance: 150ft  Gait Mechanics: forward flexed posture, B LE ER, cues to keep RW close, impulsive with picking up walker, decreased spatial awareness of (B) LE  Comments:  fatigued post ambulation. Pt encouraged to use RW at this time   Stair Mobility  Stair mobility not completed on this date. Comments:  Wheelchair Mobility:  No w/c mobility completed on this date. Comments:  Balance  Static Sitting Balance: fair (+): maintains balance at SBA/supervision without use of UE support  Dynamic Sitting Balance: fair (+): maintains balance at SBA/supervision without use of UE support  Static Standing Balance: fair (-): maintains balance at CGA with use of UE support  Dynamic Standing Balance: poor (+): requires min (A) to maintain balance  Comments: Minor LOB when standing to don pants with min A to correct     Other Therapeutic Interventions    Functional Outcomes  AM-PAC Inpatient Mobility Raw Score : 19              Cognition  Overall Cognitive Status: WFL  Following Commands: follows one step commands with repetition  Attention Span: attends with cues to redirect  Comments: impulsive, decreased safety awareness  Orientation:    alert and oriented x 4  Command Following:   accurately follows one step commands impulsive     Education  Barriers To Learning: hearing  Patient Education: patient educated on goals, PT role and benefits, plan of care, general safety, discharge recommendations  Learning Assessment:  patient verbalizes and demonstrates understanding    Assessment  Activity Tolerance: Pt fatigued/SOB post ambulation however vitals WFL   Impairments Requiring Therapeutic Intervention: decreased functional mobility, decreased strength, decreased safety awareness, decreased cognition, decreased endurance, decreased sensation, decreased balance, decreased posture  Prognosis: good  Clinical Assessment: Pt presents to hospital s/p seizure. Pt reports prior level of independence. At this time, pt complete functional transfers with SBA and ambulates with RW CGA. Min A required for some balance activities.  Pt would continue to benefit from skilled PT to address above deficits and safely return to PLOF   Safety Interventions: patient left in bed, bed alarm in place, call light within reach, gait belt, patient at risk for falls, nurse notified, and seizure mats in use    Plan  Frequency: 3-5 x/per week  Current Treatment Recommendations: strengthening, balance training, functional mobility training, transfer training, gait training, stair training, endurance training, home exercise program, and safety education    Goals  Patient Goals: none stated    Short Term Goals:  Time Frame: upon discharge   Patient will complete bed mobility at [facility]   Patient will complete transfers at [address]   Patient will ambulate 150 ft with use of LRAD at [facility]  Patient will ascend/descend 3 stairs with (B) handrail at modified independent    Therapy Session Time      Individual Group Co-treatment   Time In     1143   Time Out     1207   Minutes     24     Timed Code Treatment Minutes:   9  Total Treatment Minutes:  24       Electronically Signed By: Preeti Darnell [address], [address]

## 2022-11-30 NOTE — FLOWSHEET NOTE
Pt assess completed per flow sheet. Pt is requesting something to eat and drink. Took pwww with no issues. Messaged hospitalist to ask for a diet order.

## 2022-11-30 NOTE — PLAN OF CARE
Problem: ABCDS Injury Assessment  Goal: Absence of physical injury  Outcome: Progressing  Flowsheets (Taken 11/30/2022 0327)  Absence of Physical Injury: Implement safety measures based on patient assessment     Problem: Neurosensory - Adult  Goal: Absence of seizures  Outcome: Progressing  Flowsheets (Taken 11/30/2022 0327)  Absence of seizures: Administer anticonvulsants as ordered     Problem: Cardiovascular - Adult  Goal: Absence of cardiac dysrhythmias or at baseline  Outcome: Progressing  Flowsheets (Taken 11/30/2022 0327)  Absence of cardiac dysrhythmias or at baseline: Monitor cardiac rate and rhythm     Problem: Musculoskeletal - Adult  Goal: Return mobility to safest level of function  Outcome: Progressing  Flowsheets (Taken 11/30/2022 0327)  Return Mobility to Safest Level of Function: Assist with transfers and ambulation using safe patient handling equipment as needed

## 2022-11-30 NOTE — PROGRESS NOTES
[facility] [address]   Phone: [phone]    Occupational Therapy    [x] Initial Evaluation            [] Daily Treatment Note         [] Discharge Summary      Patient: Sherman Winston   : 1955   MRN: [id number]   Date of Service:  2022    Admitting Diagnosis:  Seizure [facility])  Current Admission Summary: Sherman Winston is a 78 y.o. male admitted to hospital roughly 1 month ago with TIA patient refused MRI states would get outpatient MRI that was open however has not had that MRI. The patient had seizure-like activity witnessed by friend at home and another episode in the ED there is no documentation of exactly what the episode was attempted to discuss with ED nurse or witness event but was unable to get a hold of the nurse. Denies any weakness numbness chest pain shortness of breath fevers or chills cough or headaches. Patient does not smoke but does drink 2-3 drinks of beer a day never had  seizures before  Past Medical History:  has a past medical history of Anxiety, Arthritis, Asthma, Atrial fibrillation ([address] Blood circulation, collateral, CAD (coronary artery disease), CHF (congestive heart failure) ([address] Colon cancer ([address] COPD (chronic obstructive pulmonary disease) ([address] Diabetic foot infection ([address] Hernia, Hyperlipidemia, Hypertension, Movement disorder, Muscle cramps, Neuromuscular disorder ([address] Neuropathy, Pneumonia, Renal insufficiency, Sleep apnea, Thyroid disease, Tinnitus aurium, bilateral, Unspecified cerebral artery occlusion with cerebral infarction, and Unspecified diseases of blood and blood-forming organs. Past Surgical History:  has a past surgical history that includes Toe Surgery; Ventricular ablation surgery; Colon surgery; Toe amputation (3/7/11); Appendectomy (10/9/2012); Abdomen surgery; Colonoscopy; Endoscopy, colon, diagnostic; skin biopsy; Upper gastrointestinal endoscopy (2014);  Upper gastrointestinal endoscopy (3/13/14); Colonoscopy (3/13/14); endoscopic ultrasound (upper) (6/4/14); Upper gastrointestinal endoscopy (6/4/14); Ulnar tunnel release (Left, 12-11-14); Carpal tunnel release (Left, 12/11/14); hernia repair; Foot surgery (Right, 07/13/2017); Foot surgery (Right, 08/17/2017); Upper gastrointestinal endoscopy (03/02/2018); Colonoscopy (03/02/2018); Knee arthroscopy (Right, 08/23/2018); Cystoscopy (Right, 5/3/2019); and Carpal tunnel release (Right, 6/11/2019). Discharge Recommendations: Damari Mccann scored a 21/24 on the AM-PAC ADL Inpatient form. Current research shows that an AM-PAC score of 18 or greater is typically associated with a discharge to the patient's home setting. Based on the patient's AM-PAC score, and their current ADL deficits, it is recommended that the patient have 2-3 sessions per week of Occupational Therapy at d/c to increase the patient's independence. At this time, this patient demonstrates the endurance and safety to discharge home with home health (home vs OP services) and a follow up treatment frequency of 2-3x/wk. Please see assessment section for further patient specific details. If patient discharges prior to next session this note will serve as a discharge summary. Please see below for the latest assessment towards goals.       HOME HEALTH CARE: LEVEL 1 STANDARD    - Initial home health evaluation to occur within 24-48 hours, in patient home   - Therapy to evaluate with goal of regaining prior level of functioning   - Therapy to evaluate if patient has [address] needs for personal care      DME Required For Discharge: patient has all required DME for discharge    Precautions/Restrictions: high fall risk, up as tolerated , contact precautions for c-diff rule out    Pre-Admission Information   Lives With: significant other, \"Otilia\", SO for 30+ years, per chart review pt is the primary caregiver of Angle Engle has an AK[employer]     Type of Home: house  Home Layout: one level, laundry in basement, able to live on main level  Home Access:  3 step to enter with handrail. Handrails are located on both side. Bathroom Layout: walker accessible, wheelchair accessible, tub/shower unit, walk in shower, handicap height toilet  Bathroom Equipment: grab bars in shower, grab bars around toilet, shower chair  Toilet Height: elevated height  Home Equipment: rolling walker, rollator - 4 wheeled walker, single point cane, crutches, electric scooter  Transfer Assistance: Independent without use of device  Ambulation Assistance:Independent without use of device  ADL Assistance: independent with all ADL's  IADL Assistance: independent with homemaking tasks, requires assistance with yard work  Active :        [x] Yes  [] No  Hand Dominance: [] Left  [x] Right  Current Employment: retired. Occupation: retired but cares for [employer] with brother  Hobbies: \"not much any more\"  Recent Falls: denied    Examination   Vision:   Vision Gross Assessment: WFL  Hearing:   hard of hearing  Observation:   General Observation:  Redness and dry skin on BLE, R foot toes ambutated, bleeding/seeping in BLE  Edema: Edema located in (B) LE. Edema Level: 4+: severe with skin rebound > 30 seconds  Posture:   Fair  Sensation:   denies numbness and tingling  Per chart review, pt has neuropathy in BLE  ROM:   (B) UE AROM WFL  Strength:   (B) UE strength grossly WFL  Therapist Clinical Decision Making (Complexity): low complexity  Clinical Presentation: stable    Subjective  General: Pt supine in bed, agreeable to OT/PT cotx and eval. Pt on RA with seizure precautions.    Pain: Patient does not rate upon questioning  Pain Interventions: not applicable        Activities of Daily Living  Basic Activities of Daily Living  Lower Extremity Dressing: minimal assistance  Dressing Comments: minor LOB when standing   Instrumental Activities of Daily Living  No IADL completed on this date.  Functional Mobility  Bed Mobility  Supine to Sit: stand by assistance  Sit to Supine: stand by assistance  Scooting: stand by assistance  Comments: impulsive  Transfers  Sit to stand transfer:stand by assistance  Stand to sit transfer: stand by assistance  Comments:impulsive  Functional Mobility:  Sitting Balance: stand by assistance. Standing Balance: minimal assistance. Standing Balance Comment:  minor LOB with no RW during LB dressing, SBA at RW  Functional Mobility: .  contact guard assistance  Functional Mobility Activity: see PT note  Functional Mobility Device Use: rolling walker  Functional Mobility Comment: very SOB, SPO2 95%  Functional Outcomes  AM-PAC Inpatient Daily Activity Raw Score: 21    Cognition  Overall Cognitive Status: WFL  Following Commands: follows one step commands with repetition  Attention Span: attends with cues to redirect  Comments: impulsive, decreased safety awareness  Orientation:    alert and oriented x 4  Command Following:   accurately follows one step commands  Education  Barriers To Learning: cognition  Patient Education: patient educated on plan of care, precautions, ADL adaptive strategies, proper use of assistive device/equipment, transfer training, discharge recommendations  Learning Assessment:  patient verbalizes understanding, would benefit from continued reinforcement  Assessment  Activity Tolerance: Fair  Impairments Requiring Therapeutic Intervention: decreased functional mobility, decreased ADL status, decreased safety awareness, decreased cognition, decreased endurance, decreased sensation, decreased balance, decreased IADL, decreased fine motor control, increased pain, decreased posture  Prognosis: fair  Clinical Assessment: Pt presents slightly below baseline with above deficits secondary to generalized weakness, fatigue, and SOB. Skilled OT warranted to improve safety and reduce the risk of falls with occupational pursuits.    Safety Interventions: patient left in bed, bed alarm in place, call light within reach, patient at risk for falls, nurse notified, and seizure mats in use    Plan  Frequency: 3-5 x/per week  Current Treatment Recommendations: strengthening, ROM, balance training, functional mobility training, transfer training, endurance training, neuromuscular re-education, patient/caregiver education, ADL/self-care training, IADL training, home management training, pain management, home exercise program, safety education, equipment evaluation/education, and positioning  Goals  Patient Goals: to go home ASAP   Short Term Goals:  Time Frame: upon d/c  Patient will complete LB dressing with SBA. Patient will complete toileting with SBA. Patient will complete functional mobility using LRAD with SUP during ADL.     Therapy Session Time   Individual Group Co-treatment   Time In    2247   Time Out    1208   Minutes    25        Timed Code Treatment Minutes:  Timed Code Treatment Minutes: 10 Minutes    Total Treatment Minutes:  25       Electronically Signed By: ROSE Elam OTR/KAMRYN, [id number]

## 2022-11-30 NOTE — FLOWSHEET NOTE
4 Eyes Skin Assessment     NAME:  Razia North OF BIRTH:  1955  MEDICAL RECORD NUMBER:  [id number]    The patient is being assessed for  Admission    I agree that One RN have performed a thorough Head to Toe Skin Assessment on the patient. ALL assessment sites listed below have been assessed. Areas assessed by both nurses:    Head, Face, Ears, Shoulders, Back, Chest, Arms, Elbows, Hands, Sacrum. Buttock, Coccyx, Ischium, and Legs. Feet and Heels        Does the Patient have a Wound? Yes wound(s) were present on assessment.  LDA wound assessment was Initiated and completed by RN/ stage 1 to coccyx scattered abrasions to ble       Alexi Prevention initiated by RN: NA   Wound Care Orders initiated by RN: NA    Pressure Injury (Stage 3,4, Unstageable, DTI, NWPT, and Complex wounds) if present place referral order by RN under : NA    New and Established Ostomies, if present place, referral order under : NA      Nurse 1 eSignature: Electronically signed by Mindy Gan RN on 11/30/22 at 1:58 AM EST    **SHARE this note so that the co-signing nurse is able to place an eSignature**    Nurse 2 eSignature: Electronically signed by Yoselin Driver RN on 11/30/22 at 2:10 AM EST

## 2022-11-30 NOTE — CONSULTS
In patient Neurology consult        [facility] Neurology      MD Allan Richard  1955    Date of Service: 11/30/2022    Referring Physician: Vidal Solitario MD      Reason for the consult and CC: New onset seizure     HPI:   The patient is a 78y.o.  years old male with multiple medical problems who was admitted from the ER yesterday with a witnessed seizure. He was seen and admitted about a month ago for unexplained LOC. This time, he had a witnessed seizure at home per description generalized tonic-clonic seizure for few seconds. He came to the ED for evaluation where he had a second seizure witnessed by ED staff. Description generalized tonic-clonic seizure, degree was severe and duration was minutes. No tongue biting or bladder incontinence but he was confused for few minutes. This was somewhat different than last month presentation. He was loaded with benzodiazepine followed by Keppra. Initial imaging was unremarkable. He was admitted for medical management. Today he is awake and alert. He wants to go home. He is refusing MRI at this point which was exactly the same from his last admission. He would like to have this MRI and an open MRI as an outpatient. He denies any headache, dysphagia or dysarthria, weakness or numbness or tingling. He does drink beer daily. Other review of system was unremarkable.       Family History   Problem Relation Age of Onset    Cancer Father         colon    High Blood Pressure Father     Heart Disease Father     High Blood Pressure Mother     Heart Disease Mother      Past Surgical History:   Procedure Laterality Date    ABDOMEN SURGERY      APPENDECTOMY  10/9/2012    CARPAL TUNNEL RELEASE Left 12/11/14    CARPAL TUNNEL RELEASE Right 6/11/2019    RIGHT CARPAL TUNNEL RELEASE AND RIGHT MIDDLE FINGER TRIGGER FINGER RELEASE performed by Samantha Ahuja MD at [facility]      for Colon Ca 10 inches removed from colon COLONOSCOPY      COLONOSCOPY  3/13/14    COLONOSCOPY  03/02/2018    CYSTOSCOPY Right 5/3/2019    CYSTOSCOPY, RIGHT URETEROSCOPY, STONE EXTRACTION WITH STONE BASKET performed by Shawnee Edgar MD at [address] [place] [address] ([facility])  6/4/14    ENDOSCOPY, COLON, DIAGNOSTIC      FOOT SURGERY Right 07/13/2017    Amputation toes 2,3,5, right foot Flap closure, Exostectomy 1st metatarsal right foot    FOOT SURGERY Right 08/17/2017    INCISION AND DRAINAGE OF RIGHT FOOT WITH FLAP CLOSURE    HERNIA REPAIR      umbilical, hiatal hernia repairs    KNEE ARTHROSCOPY Right 08/23/2018    partial medial/ lateral menisectomy    SKIN BIOPSY      TOE AMPUTATION  3/7/11    RIGHT FOURTH TOE    TOE SURGERY      ULNAR TUNNEL RELEASE Left 12-11-14    Left ulnar nerve decompression and left CTR    UPPER GASTROINTESTINAL ENDOSCOPY  1/28/2014    with biopsies    UPPER GASTROINTESTINAL ENDOSCOPY  3/13/14    UPPER GASTROINTESTINAL ENDOSCOPY  6/4/14    with biopsy    UPPER GASTROINTESTINAL ENDOSCOPY  03/02/2018    VENTRICULAR ABLATION SURGERY          Past Medical History:   Diagnosis Date    Anxiety     Arthritis     Asthma     Atrial fibrillation ([address]     Blood circulation, collateral     CAD (coronary artery disease)     CHF (congestive heart failure) (HCC)     Colon cancer ([address]     COPD (chronic obstructive pulmonary disease) ([address]     Diabetic foot infection ([address]     pt denies diabetes    Hernia     Hyperlipidemia     Hypertension     Movement disorder     Muscle cramps     Neuromuscular disorder (HCC)     marvin feet numbness/neuropathy    Neuropathy     Pneumonia     Renal insufficiency     Sleep apnea     no cpap    Thyroid disease     Tinnitus aurium, bilateral     Unspecified cerebral artery occlusion with cerebral infarction     mini stroke    Unspecified diseases of blood and blood-forming organs      Social History     Tobacco Use    Smoking status: Never    Smokeless tobacco: Never   Vaping Use    Vaping Use: Never used   Substance Use Topics    Alcohol use: Yes     Comment: \"occasionally\"    Drug use: No     Allergies   Allergen Reactions    Bactrim      hallucinations    Clindamycin/Lincomycin Diarrhea     Current Facility-Administered Medications   Medication Dose Route Frequency Provider Last Rate Last Admin    apixaban (ELIQUIS) tablet 5 mg  5 mg Oral BID David Licea MD   5 mg at 11/30/22 0810    atorvastatin (LIPITOR) tablet 40 mg  40 mg Oral Nightly David Licea MD   40 mg at 11/29/22 2109    dilTIAZem (CARDIZEM CD) extended release capsule 180 mg  180 mg Oral BID David Licea MD   180 mg at 11/30/22 0811    metoprolol tartrate (LOPRESSOR) tablet 25 mg  25 mg Oral BID David Licea MD   25 mg at 11/30/22 0809    levETIRAcetam (KEPPRA) 500 mg/100 mL IVPB  500 mg IntraVENous Q12H David Licea MD   Stopped at 11/30/22 0502    sodium chloride flush 0.9 % injection 5-40 mL  5-40 mL IntraVENous 2 times per day David Licea MD   10 mL at 11/30/22 4944    sodium chloride flush 0.9 % injection 5-40 mL  5-40 mL IntraVENous PRN David Licea MD        0.9 % sodium chloride infusion   IntraVENous PRN David Licea MD        ondansetron (ZOFRAN-ODT) disintegrating tablet 4 mg  4 mg Oral Q8H PRN David Licea MD        Or    ondansetron (ZOFRAN) injection 4 mg  4 mg IntraVENous Q6H PRN David Licea MD        polyethylene glycol (GLYCOLAX) packet 17 g  17 g Oral Daily PRN David Licea MD        acetaminophen (TYLENOL) tablet 650 mg  650 mg Oral Q6H PRN David Licea MD        Or    acetaminophen (TYLENOL) suppository 650 mg  650 mg Rectal Q6H PRN Syed Grady MD        LORazepam (ATIVAN) injection 1 mg  1 mg IntraVENous Q5 Min PRN Nano Zabala PA-C           ROS : A 10-14 system review of constitutional, cardiovascular, respiratory, eyes, musculoskeletal, endocrine, GI, ENT, skin, hematological, genitourinary, psychiatric and neurologic systems was obtained and updated today and is unremarkable except as mentioned in my HPI      Exam:     Constitutional:   Vitals:    11/30/22 0416 11/30/22 0600 11/30/22 0800 11/30/22 1103   BP: (!) 155/91  (!) 156/82    Pulse: 69  68 57   Resp: 18  18    Temp: 98 °F (36.7 °C)  97.8 °F (36.6 °C)    TempSrc: Oral  Oral    SpO2: 96%  96%    Weight:  (!) 311 lb (141.1 kg)     Height:           General appearance and observation: Normal development and appear in no acute distress. Eye:  Fundus: No blurring of optic disc. Neck: supple  Cardiovascular: No lower leg edema with good pulsation. Mental Status:   Oriented to person, place, problem, and time. Memory: Good immediate recall. Intact remote memory  Normal attention span and concentration. Language: intact naming, repeating and fluency   Good fund of Knowledge. Aware of current events and vocabulary   Cranial Nerves:   II: Visual fields: Full. Pupils: equal, round, reactive to light  III,IV,VI: Extra Ocular Movements are intact. No nystagmus  V: Facial sensation is intact  VII: Facial strength and movements: intact and symmetric  VIII: Hearing: Intact  IX: Palate elevation is symmetric  XI: Shoulder shrug is intact  XII: Tongue movements are normal  Musculoskeletal: 5/5 in all 4 extremities. Tone: Normal tone. Reflexes: Symmetric 2+ in the arms and 1 in the legs   Planters: flexor bilaterally. Coordination: no pronator drift, no dysmetria with FNF in upper extremities. Normal REM. Sensation: normal to all modalities in both arms and legs.   Gait/Posture: steady gait   Data:  LABS:   Lab Results   Component Value Date/Time     11/30/2022 05:54 AM    K 3.8 11/30/2022 05:54 AM     11/30/2022 05:54 AM    CO2 25 11/30/2022 05:54 AM    BUN 13 11/30/2022 05:54 AM    CREATININE 1.0 11/30/2022 05:54 AM    GFRAA >60 03/08/2022 07:42 AM    GFRAA >60 12/13/2012 02:09 PM    LABGLOM >60 11/30/2022 05:54 AM    GLUCOSE 84 11/30/2022 05:54 AM    PHOS 2.7 11/01/2022 04:45 AM    MG 2.10 10/31/2022 05:31 AM    CALCIUM 8.8 11/30/2022 05:54 AM     Lab Results   Component Value Date/Time    WBC 5.4 11/30/2022 05:54 AM    RBC 3.83 11/30/2022 05:54 AM    HGB 12.5 11/30/2022 05:54 AM    HCT 37.5 11/30/2022 05:54 AM    MCV 97.8 11/30/2022 05:54 AM    RDW 14.2 11/30/2022 05:54 AM     11/30/2022 05:54 AM     Lab Results   Component Value Date    INR 1.15 (H) 10/30/2022    PROTIME 14.6 (H) 10/30/2022       Neuroimaging was independently reviewed by myself and discussed results with the patient and/or family  Reviewed notes from different physicians  Reviewed lab and blood testing    Impression:  New onset seizure. So far unknown cause. Patient is refusing MRI of the brain at this point despite the risk. I explained possibility of cryptogenic seizure, stroke or even neoplasm. He understands such risk and he would like to have it in an outpatient setting despite such discussion. We will get EEG before discharge. Continue with Keppra 500 mg twice daily. Discussed side effect of Keppra with the patient. Risk of suicidal ideation or thoughts. We also discussed driving restriction. He is not allowed to drive for 3-month provided he remains seizure-free and cleared from his PCP. He agreed with such statement and to comply with no driving. Discussed risk of falling on blood thinner    Hypertension: Continue current blood pressure medication  Monitor blood pressure at home    A. fib: Continue Eliquis    PT and OT  DT precaution with thiamine and glucose supplementation  Can be discharged from neurology if medically stable and EEG showed no epileptiform discharge  Discussed with primary team  No further recommendation             Thank you for referring such patient. If you have any questions regarding my consult note, please don't hesitate to call me. Osmel Pete MD  [phone]    This dictation was generated by voice recognition computer software.  Although all attempts are made to edit the dictation for accuracy, there may be errors in the  transcription that are not intended

## 2022-11-30 NOTE — PROGRESS NOTES
[facility] PROGRESS NOTE    11/30/2022 8:56 AM        Name: Niels Snyder . Admitted: 11/29/2022  Primary Care Provider: Odalis Glover MD (Tel: [phone])        Subjective:      Bed denies any chest pain nausea vomiting fevers or chills no further seizures overnight  Reviewed interval ancillary notes    Current Medications  apixaban (ELIQUIS) tablet 5 mg, BID  atorvastatin (LIPITOR) tablet 40 mg, Nightly  dilTIAZem (CARDIZEM CD) extended release capsule 180 mg, BID  metoprolol tartrate (LOPRESSOR) tablet 25 mg, BID  levETIRAcetam (KEPPRA) 500 mg/100 mL IVPB, Q12H  sodium chloride flush 0.9 % injection 5-40 mL, 2 times per day  sodium chloride flush 0.9 % injection 5-40 mL, PRN  0.9 % sodium chloride infusion, PRN  ondansetron (ZOFRAN-ODT) disintegrating tablet 4 mg, Q8H PRN   Or  ondansetron (ZOFRAN) injection 4 mg, Q6H PRN  polyethylene glycol (GLYCOLAX) packet 17 g, Daily PRN  acetaminophen (TYLENOL) tablet 650 mg, Q6H PRN   Or  acetaminophen (TYLENOL) suppository 650 mg, Q6H PRN  LORazepam (ATIVAN) injection 1 mg, Q5 Min PRN        Objective:  BP (!) 156/82   Pulse 68   Temp 97.8 °F (36.6 °C) (Oral)   Resp 18   Ht 6' 4\" (1.93 m)   Wt (!) 311 lb (141.1 kg)   SpO2 96%   BMI 37.86 kg/m²     Intake/Output Summary (Last 24 hours) at 11/30/2022 0856  Last data filed at 11/30/2022 0816  Gross per 24 hour   Intake 480 ml   Output 1250 ml   Net -770 ml      Wt Readings from Last 3 Encounters:   11/30/22 (!) 311 lb (141.1 kg)   11/11/22 298 lb (135.2 kg)   11/10/22 298 lb (135.2 kg)   General appearance:  Appears comfortable.  AAOx3  HEENT: atraumatic, Pupils equal, muscous membranes moist, no masses appreciated  Cardiovascular: irregular irregular no murmurs appreciated  Respiratory: CTAB no wheezing  Gastrointestinal: Abdomen soft, non-tender, BS+  EXT: no edema  Neurology: no gross focal deficts  Psychiatry: Appropriate affect. Not agitated  Skin: Warm, dry, no rashes appreciated    Labs and Tests:  CBC:   Recent Labs     11/29/22  1220 11/30/22  0554   WBC 9.3 5.4   HGB 14.6 12.5*    139     BMP:    Recent Labs     11/29/22  1220 11/30/22  0554    137   K 4.1 3.8   CL 96* 103   CO2 16* 25   BUN 13 13   CREATININE 1.1 1.0   GLUCOSE 128* 84     Hepatic:   Recent Labs     11/29/22  1220   AST 43*   ALT 35   BILITOT 1.1*   ALKPHOS 172*     CT HEAD WO CONTRAST   Final Result   No acute intracranial abnormality. XR CHEST PORTABLE   Final Result   Suspected right sub pulmonic effusion. Stable cardiomegaly. MRI BRAIN WO CONTRAST    (Results Pending)       Recent imaging reviewed    Problem List  Principal Problem:    Seizure [facility])  Resolved Problems:    * No resolved hospital problems.  *     Assessment/Plan:   New onset seziure  - keppra  - seizure precautions  - neuro consult  - mri brain if patient will allow     Chronic afib home meds eliquis     Chronic copd: home meds     Etoh abuse: monitor for withdrawal  DVT prophylaxis eliquis  Code status full code      David Licea MD   11/30/2022 8:56 AM

## 2022-12-01 VITALS
SYSTOLIC BLOOD PRESSURE: 185 MMHG | HEART RATE: 67 BPM | BODY MASS INDEX: 37.77 KG/M2 | RESPIRATION RATE: 18 BRPM | WEIGHT: 310.19 LBS | TEMPERATURE: 97.8 F | HEIGHT: 76 IN | OXYGEN SATURATION: 95 % | DIASTOLIC BLOOD PRESSURE: 77 MMHG

## 2022-12-01 PROCEDURE — 2580000003 HC RX 258: Performed by: INTERNAL MEDICINE

## 2022-12-01 PROCEDURE — 6360000002 HC RX W HCPCS: Performed by: INTERNAL MEDICINE

## 2022-12-01 PROCEDURE — 6370000000 HC RX 637 (ALT 250 FOR IP): Performed by: INTERNAL MEDICINE

## 2022-12-01 RX ORDER — LEVETIRACETAM 500 MG/1
500 TABLET ORAL 2 TIMES DAILY
Qty: 60 TABLET | Refills: 3 | Status: SHIPPED | OUTPATIENT
Start: 2022-12-01

## 2022-12-01 RX ADMIN — DILTIAZEM HYDROCHLORIDE 180 MG: 180 CAPSULE, COATED, EXTENDED RELEASE ORAL at 10:17

## 2022-12-01 RX ADMIN — Medication 10 ML: at 10:17

## 2022-12-01 RX ADMIN — METOPROLOL TARTRATE 25 MG: 25 TABLET, FILM COATED ORAL at 10:17

## 2022-12-01 RX ADMIN — LEVETIRACETAM 500 MG: 5 INJECTION INTRAVENOUS at 05:01

## 2022-12-01 RX ADMIN — APIXABAN 5 MG: 5 TABLET, FILM COATED ORAL at 10:17

## 2022-12-01 NOTE — DISCHARGE SUMMARY
Hospital Medicine Discharge Summary    Patient: Karina Lugo     Gender: male  : 1955   Age: 78 y.o. MRN: [id number]    Admitting Physician: David Licea MD  Discharge Physician: David Licea MD     Code Status: Full Code     Admit Date: 2022   Discharge Date:   2022    Disposition:  Home  Time spent arranging discharge: 35 minutes    Discharge Diagnoses: Active Hospital Problems    Diagnosis Date Noted    New onset seizure ([address] [R56.9] 10/30/2022     Priority: Medium           Condition at Discharge:  [facility] Course: To hospital with new onset seizure started on IV Keppra had no further seizures seen by neurology EEG was negative discharged on Keppra , for 3 months. MRI was recommended to patient discussed with myself and neurology patient understands risk and refuses MRI ;.  States will follow with PCP and arrange outpatient mri with pcp    Discharge Exam:    BP (!) 157/87   Pulse 79   Temp (!) 96.1 °F (35.6 °C) (Oral)   Resp 18   Ht 6' 4\" (1.93 m)   Wt (!) 310 lb 3 oz (140.7 kg)   SpO2 95%   BMI 37.76 kg/m²   General appearance:  Appears comfortable. AAOx3  HEENT: atraumatic, Pupils equal, muscous membranes moist, no masses appreciated  Cardiovascular: Regular rate and rhythm no murmurs appreciated  Respiratory: CTAB no wheezing  Gastrointestinal: Abdomen soft, non-tender, BS+  EXT: no edema  Neurology: no gross focal deficts  Psychiatry: Appropriate affect.  Not agitated  Skin: Warm, dry, no rashes appreciated    Discharge Medications:   Current Discharge Medication List        START taking these medications    Details   levETIRAcetam (KEPPRA) 500 MG tablet Take 1 tablet by mouth 2 times daily  Qty: 60 tablet, Refills: 3           Current Discharge Medication List        Current Discharge Medication List        CONTINUE these medications which have NOT CHANGED    Details   dilTIAZem (DILACOR XR) 180 MG extended release capsule Take 180 mg by mouth in the morning and at bedtime 1 capsule bid      apixaban (ELIQUIS) 5 MG TABS tablet Take 1 tablet by mouth 2 times daily  Qty: 180 tablet, Refills: 3      aspirin EC 81 MG EC tablet Take 1 tablet by mouth daily  Qty: 30 tablet, Refills: 5      atorvastatin (LIPITOR) 40 MG tablet TAKE 1 TABLET BY MOUTH EVERY DAY IN THE EVENING  Qty: 90 tablet, Refills: 1      allopurinol (ZYLOPRIM) 300 MG tablet TAKE 1 TABLET BY MOUTH 2 TIMES DAILY  Qty: 180 tablet, Refills: 3      diclofenac sodium (VOLTAREN) 1 % GEL APPLY 4 GRAMS TOPICALLY 4 TIMES DAILY AS NEEDED FOR PAIN  Qty: 500 g, Refills: 2      metoprolol tartrate (LOPRESSOR) 25 MG tablet Take 1 tablet by mouth 2 times daily  Qty: 180 tablet, Refills: 3    Associated Diagnoses: Chronic diastolic congestive heart failure ([facility] Essential hypertension      budesonide-formoterol (SYMBICORT) 160-4.5 MCG/ACT AERO Inhale 2 puffs into the lungs 2 times daily. Qty: 3 Inhaler, Refills: 3      albuterol-ipratropium (COMBIVENT RESPIMAT)  MCG/ACT AERS inhaler Inhale 1 puff into the lungs every 6 hours. Qty: 3 Inhaler, Refills: 3      OXYGEN Inhale  into the lungs. 2 liters/ nasal cannula prn      therapeutic multivitamin-minerals (THERAGRAN-M) tablet Take 1 tablet by mouth daily. Current Discharge Medication List          Labs:  For convenience and continuity at follow-up the following most recent labs are provided:    Lab Results   Component Value Date/Time    WBC 5.4 11/30/2022 05:54 AM    HGB 12.5 11/30/2022 05:54 AM    HCT 37.5 11/30/2022 05:54 AM    MCV 97.8 11/30/2022 05:54 AM     11/30/2022 05:54 AM     11/30/2022 05:54 AM    K 3.8 11/30/2022 05:54 AM     11/30/2022 05:54 AM    CO2 25 11/30/2022 05:54 AM    BUN 13 11/30/2022 05:54 AM    CREATININE 1.0 11/30/2022 05:54 AM    CALCIUM 8.8 11/30/2022 05:54 AM    PHOS 2.7 11/01/2022 04:45 AM     01/23/2014 06:36 PM    ALKPHOS 172 11/29/2022 12:20 PM    ALT 35 11/29/2022 12:20 PM    AST 43 11/29/2022 12:20 PM    BILITOT 1.1 11/29/2022 12:20 PM    BILIDIR <0.2 08/29/2017 11:45 AM    LABALBU 4.0 11/29/2022 12:20 PM    LDLCALC 67 11/10/2022 08:26 AM    TRIG 77 11/10/2022 08:26 AM     Lab Results   Component Value Date    INR 1.15 (H) 10/30/2022    INR 0.99 01/14/2022    INR 1.08 05/03/2019       Radiology:  CT HEAD WO CONTRAST    Result Date: 11/29/2022  EXAMINATION: CT OF THE HEAD WITHOUT CONTRAST  11/29/2022 1:50 pm TECHNIQUE: CT of the head was performed without the administration of intravenous contrast. Automated exposure control, iterative reconstruction, and/or weight based adjustment of the mA/kV was utilized to reduce the radiation dose to as low as reasonably achievable. COMPARISON: 10/30/2022 HISTORY: ORDERING SYSTEM PROVIDED HISTORY: ams, seizure TECHNOLOGIST PROVIDED HISTORY: Reason for exam:->ams, seizure Has a \"code stroke\" or \"stroke alert\" been called? ->No Decision Support Exception - unselect if not a suspected or confirmed emergency medical condition->Emergency Medical Condition (MA) Reason for Exam: Seizures ([facility] from home with seizure- pt states had seizure in the past- on eliquis for afib- A&O upon arrival) FINDINGS: BRAIN/VENTRICLES: There is no acute intracranial hemorrhage, mass effect or midline shift. No abnormal extra-axial fluid collection. The gray-white differentiation is maintained without evidence of an acute infarct. There is no evidence of hydrocephalus. There is stable age-appropriate atrophy. There is stable chronic small vessel ischemic white matter disease. No focus of acute abnormal brain attenuation is identified. ORBITS: The visualized portion of the orbits demonstrate no acute abnormality. SINUSES: The visualized paranasal sinuses and mastoid air cells demonstrate no acute abnormality. SOFT TISSUES/SKULL:  No acute abnormality of the visualized skull or soft tissues. No acute intracranial abnormality.      XR CHEST PORTABLE    Result Date: 11/29/2022  EXAMINATION: ONE XRAY VIEW OF THE CHEST 11/29/2022 12:46 pm COMPARISON: 10/30/2022 HISTORY: ORDERING SYSTEM PROVIDED HISTORY: SOB TECHNOLOGIST PROVIDED HISTORY: Reason for exam:->SOB Reason for Exam: sob FINDINGS: Cardiac silhouette is enlarged. There is no blunting of the right costophrenic angle. There is hazy opacity in the right base. No pneumothorax or consolidation is identified. Suspected right sub pulmonic effusion. Stable cardiomegaly.          Signed:    Naima Nolen MD   12/1/2022

## 2022-12-01 NOTE — PROGRESS NOTES
This patient has had a discharge order placed. They are returning home and being picked up in the lobby by their son. Discharge paperwork has been printed, highlighted, and gone over with the patient by this RN. Patient understands teaching and has no further questions at this time. IV has been removed with no complications. Telemetry has been removed. Pt has all belongings present. No further needs expressed.

## 2022-12-01 NOTE — CARE COORDINATION
Discharge note:      CM/SW has been notified of discharge. Patient noted to have the following needs at discharge. CM/SW has coordinated the following services: The patient already has services through [facility]. The SW called and informed [employer] and informed the company the patient will be discharging on 12/1/2022    [address] [place] [address]. [address]  Phone [phone]  Fax [phone]       Discharge Destination: Home  Transportation: Family         All CM/SW needs met, will sign off.        Electronically signed by LAURITA Gamble on 12/1/2022 at 11:21 AM

## 2022-12-01 NOTE — PLAN OF CARE
Problem: Safety - Adult  Goal: Free from fall injury  12/1/2022 1141 by Toby Chopra RN  Outcome: Completed  12/1/2022 0037 by Mary Esteves RN  Outcome: Progressing     Problem: ABCDS Injury Assessment  Goal: Absence of physical injury  12/1/2022 1141 by Toby Chopra RN  Outcome: Completed  12/1/2022 0037 by Mary Esteves RN  Outcome: Progressing     Problem: Neurosensory - Adult  Goal: Absence of seizures  12/1/2022 1141 by Toby Chopra RN  Outcome: Completed  12/1/2022 0037 by Mary Esteves RN  Outcome: Progressing     Problem: Cardiovascular - Adult  Goal: Absence of cardiac dysrhythmias or at baseline  12/1/2022 1141 by Toby Chopra RN  Outcome: Completed  12/1/2022 0037 by Mary Esteves RN  Outcome: Progressing     Problem: Skin/Tissue Integrity - Adult  Goal: Skin integrity remains intact  12/1/2022 1141 by Toby Chopra RN  Outcome: Completed  12/1/2022 0037 by Mary Esteves RN  Outcome: Progressing     Problem: Musculoskeletal - Adult  Goal: Return mobility to safest level of function  12/1/2022 1141 by Toby Chopra RN  Outcome: Completed  12/1/2022 0037 by Mary Esteves RN  Outcome: Progressing     Problem: Chronic Conditions and Co-morbidities  Goal: Patient's chronic conditions and co-morbidity symptoms are monitored and maintained or improved  Outcome: Completed

## 2022-12-01 NOTE — PLAN OF CARE
Problem: Safety - Adult  Goal: Free from fall injury  Outcome: Progressing     Problem: ABCDS Injury Assessment  Goal: Absence of physical injury  Outcome: Progressing     Problem: Neurosensory - Adult  Goal: Absence of seizures  Outcome: Progressing     Problem: Cardiovascular - Adult  Goal: Absence of cardiac dysrhythmias or at baseline  Outcome: Progressing     Problem: Skin/Tissue Integrity - Adult  Goal: Skin integrity remains intact  Outcome: Progressing     Problem: Musculoskeletal - Adult  Goal: Return mobility to safest level of function  Outcome: Progressing

## 2022-12-01 NOTE — PROGRESS NOTES
Shift assessment completed. Routine vitals taken. Scheduled medications given. Patient is awake, alert and oriented. Respirations are easy and unlabored. Patient does not appear to be in distress, resting comfortably at this time. Call light within reach. No further needs expressed.

## 2022-12-01 NOTE — DISCHARGE INSTR - COC
Continuity of Care Form    Patient Name: Sarah Crowell   :  1955  MRN:  [id number]    Admit date:  2022  Discharge date:  22    Code Status Order: Full Code   Advance Directives:     Admitting Physician:  Demetra Ibrahim MD  PCP: Lavonne Wright MD    Discharging Nurse: Brie Perez [facility] Unit/Room#: 3FR-6128/1945-69  Discharging Unit Phone Number: [phone]    Emergency Contact:   Extended Emergency Contact Information  Primary Emergency Contact: [facility]  Mobile Phone: [phone]  Relation: Child  Secondary Emergency Contact:  [address] Phone: .[phone]  Relation: Brother/Sister    Past Surgical History:  Past Surgical History:   Procedure Laterality Date    ABDOMEN SURGERY      APPENDECTOMY  10/9/2012    CARPAL TUNNEL RELEASE Left 14    CARPAL TUNNEL RELEASE Right 2019    RIGHT CARPAL TUNNEL RELEASE AND RIGHT MIDDLE FINGER TRIGGER FINGER RELEASE performed by Maribel Nuñez MD at [address] [place] [address]      for Colon Ca 10 inches removed from colon    COLONOSCOPY      COLONOSCOPY  3/13/14    COLONOSCOPY  2018    CYSTOSCOPY Right 5/3/2019    CYSTOSCOPY, RIGHT URETEROSCOPY, STONE EXTRACTION WITH STONE BASKET performed by Idris Vines MD at [facility]. [address] (UPPER)  14    ENDOSCOPY, COLON, DIAGNOSTIC      FOOT SURGERY Right 2017    Amputation toes 2,3,5, right foot Flap closure, Exostectomy 1st metatarsal right foot    FOOT SURGERY Right 2017    INCISION AND DRAINAGE OF RIGHT FOOT WITH FLAP CLOSURE    HERNIA REPAIR      umbilical, hiatal hernia repairs    KNEE ARTHROSCOPY Right 2018    partial medial/ lateral menisectomy    SKIN BIOPSY      TOE AMPUTATION  3/7/11    RIGHT FOURTH TOE    TOE SURGERY      ULNAR TUNNEL RELEASE Left 14    Left ulnar nerve decompression and left CTR    UPPER GASTROINTESTINAL ENDOSCOPY  2014    with biopsies    UPPER GASTROINTESTINAL ENDOSCOPY  3/13/14    UPPER GASTROINTESTINAL ENDOSCOPY  6/4/14    with biopsy    UPPER GASTROINTESTINAL ENDOSCOPY  03/02/2018    VENTRICULAR ABLATION SURGERY         Immunization History:   Immunization History   Administered Date(s) Administered    DTaP 07/29/2009    Tdap (Boostrix, Adacel) 05/07/2017       Active Problems:  Patient Active Problem List   Diagnosis Code    Chronic atrial fibrillation (HCC) I48.20    COPD (chronic obstructive pulmonary disease) ([facility] J44.9    Sleep apnea G47.30    Essential hypertension I10    Hyperlipidemia E78.5    Chronic idiopathic gout of multiple sites M1A. 09X0    Hand arthritis M19.049    Chronic diastolic congestive heart failure ([facility]) I50.32    BRIAN (acute kidney injury) ([facility] N17.9    Obesity E66.9    S/P transmetatarsal amputation of foot, right ([facility]) Z89.431    Recurrent major depressive disorder, in full remission ([facility] F33.42    Mixed erectile dysfunction N52.8    PVD (peripheral vascular disease) ([facility]) I73.9    Stage 3b chronic kidney disease ([facility]) N18.32    Localized edema R60.0    Open wound of right lower extremity S81.801A    Cellulitis of leg, right L03.115    Noncompliance Z91.199    Antibiotic-associated diarrhea K52.1, T36.95XA    New onset seizure ([facility]) R56.9    TIA (transient ischemic attack) G45.9    (HFpEF) heart failure with preserved ejection fraction ([facility]) I50.30    Aneurysm of descending thoracic aorta without rupture I71.23       Isolation/Infection:   Isolation            Contact          Patient Infection Status       Infection Onset Added Last Indicated Last Indicated By Review Planned Expiration Resolved Resolved By    MRSA 03/05/21 03/08/21 06/15/22 Culture, Wound        Resolved    C-diff Rule Out 11/29/22 11/29/22 11/29/22 Clostridium difficile toxin/antigen (Ordered)   12/01/22 Rule-Out Test Canceled            Nurse Assessment:  Last Vital Signs: BP (!) 157/87   Pulse 79   Temp (!) 96.1 °F (35.6 °C) (Oral)   Resp 18   Ht 6' 4\" (1.93 m)   Wt (!) 310 lb 3 oz (140.7 kg)   SpO2 95%   BMI 37.76 kg/m²     Last documented pain score (0-10 scale): Pain Level: 0  Last Weight:   Wt Readings from Last 1 Encounters:   12/01/22 (!) 310 lb 3 oz (140.7 kg)     Mental Status:  oriented and alert    IV Access:  - None    Nursing Mobility/ADLs:  Walking   Independent  Transfer  Independent  Bathing  Independent  Dressing  Independent  Toileting  Independent  Feeding  Independent  Med Admin  Independent  Med Delivery   whole    Wound Care Documentation and Therapy:  Wound 11/30/22 Sacrum (Active)   Wound Etiology Pressure Stage 1 11/30/22 2003   Dressing Status New dressing applied;Clean;Dry; Intact 11/30/22 2003   Wound Assessment Pink/red;Dry 11/30/22 2003   Drainage Amount None 11/30/22 2003   Odor None 11/30/22 2003   Samantha-wound Assessment Blanchable erythema 11/30/22 2003   Number of days: 1        Elimination:  Continence: Bowel: Yes  Bladder: Yes  Urinary Catheter: None   Colostomy/Ileostomy/Ileal Conduit: No       Date of Last BM:     Intake/Output Summary (Last 24 hours) at 12/1/2022 0910  Last data filed at 12/1/2022 6607  Gross per 24 hour   Intake --   Output 550 ml   Net -550 ml     I/O last 3 completed shifts: In: 480 [P.O.:480]  Out: 1800 [Urine:1800]    Safety Concerns: At Risk for Falls and History of Seizures    Impairments/Disabilities:      None    Nutrition Therapy:  Current Nutrition Therapy:   - Oral Diet:  General    Routes of Feeding: Oral  Liquids: No Restrictions  Daily Fluid Restriction: no  Last Modified Barium Swallow with Video (Video Swallowing Test): not done    Treatments at the Time of Hospital Discharge:   Respiratory Treatments: none  Oxygen Therapy:  is not on home oxygen therapy.   Ventilator:    - No ventilator support    Rehab Therapies: Physical Therapy and Occupational Therapy  Weight Bearing Status/Restrictions: No weight bearing restrictions  Other Medical Equipment (for information only, NOT a DME order): Other Treatments:     Patient's personal belongings (please select all that are sent with patient):  None    RN SIGNATURE:  Electronically signed by Maurice Zamudio RN on 12/1/22 at 11:31 AM EST    CASE MANAGEMENT/SOCIAL WORK SECTION    Inpatient Status Date: 11/29/2022    Readmission Risk Assessment Score:  Readmission Risk              Risk of Unplanned Readmission:  14           [facility]  Phone [phone]  Fax [phone]     / signature: Electronically signed by LAURITA Somers on 12/1/22 at 11:19 AM EST    PHYSICIAN SECTION    Prognosis: Fair    Condition at Discharge: Stable    Rehab Potential (if transferring to Rehab): Fair    Recommended Labs or Other Treatments After Discharge:     Physician Certification: I certify the above information and transfer of Karen Ritchie  is necessary for the continuing treatment of the diagnosis listed and that he requires Home Care for greater 30 days.      Update Admission H&P: No change in H&P    PHYSICIAN SIGNATURE:  Electronically signed by Tonie Johnson MD on 12/1/22 at 9:10 AM EST

## 2022-12-02 ENCOUNTER — CARE COORDINATION (OUTPATIENT)
Dept: CASE MANAGEMENT | Age: 67
End: 2022-12-02

## 2022-12-02 DIAGNOSIS — I48.20 CHRONIC ATRIAL FIBRILLATION (HCC): Primary | ICD-10-CM

## 2022-12-02 PROCEDURE — 1111F DSCHRG MED/CURRENT MED MERGE: CPT | Performed by: INTERNAL MEDICINE

## 2022-12-02 NOTE — CARE COORDINATION
[facility] Care Transitions Initial Follow Up Call    Call within 2 business days of discharge: Yes    LPN Care Coordinator contacted the patient by telephone to perform post hospital discharge assessment. Verified name and  with patient as identifiers. Provided introduction to self, and explanation of the LPN Care Coordinator role. Patient: Nemo Carlos Patient : 1955   MRN: [id number]  Reason for Admission: seizure  Discharge Date: 22 RARS: Readmission Risk Score: 15.5      Last Discharge  [address]       Date Complaint Diagnosis Description Type Department Provider    22 Seizures Seizure [facility]) ED to Hosp-Admission (Discharged) (Heather Pantoja) MAUREENFZ 5T Charan Blood, MD            Was this an external facility discharge? No Discharge Facility: NA    Challenges to be reviewed by the provider   Additional needs identified to be addressed with provider: No  none               Method of communication with provider: none. LPN CC spoke with patient. States he is doing well, better than ever, in fact. Denies HA, dizziness, LOC, seizure-like activity. Appetite good. Denies problems with bowels or bladder. Full medication reconciliation and 1111f completed. HC active. LPN CC assisted patient to schedule PCP f/u . Denies needs. Adelaide Sanchez  [facility]  Care Transitions  [phone]    LP Care Coordinator reviewed medical action plan and red flags with patient who verbalized understanding. The patient was given an opportunity to ask questions and does not have any further questions or concerns at this time. Were discharge instructions available to patient? Yes. Reviewed appropriate site of care based on symptoms and resources available to patient including: PCP  Specialist  Urgent care clinics  [facility]  When to call 485. The patient agrees to contact the PCP office for questions related to their healthcare.      Advance Care Planning:   Does patient have an Advance Directive: reviewed and current. Medication reconciliation was performed with patient, who verbalizes understanding of administration of home medications. Medications reviewed, 1111F entered: yes    Was patient discharged with a pulse oximeter? no    Non-face-to-face services provided:  Scheduled appointment with PCP-12/9  Obtained and reviewed discharge summary and/or continuity of care documents  Education of patient/family/caregiver/guardian to support self-management-discussed BP and SPO2 monitoring  Assessment and support for treatment adherence and medication management-full medication reconciliation completed    Offered patient enrollment in the Remote Patient Monitoring (RPM) program for in-home monitoring:  did not offer this encounter . Care Transitions 24 Hour Call    Do you have a copy of your discharge instructions?: Yes  Do you have all of your prescriptions and are they filled?: Yes  Have you been contacted by a [employer] [address]?: No  Have you scheduled your follow up appointment?: Yes  How are you going to get to your appointment?: Car - family or friend to transport  [address]: [address] (Comment: [facility])  Patient DME: Wheelchair, Straight cane, Crutches, Shower chair, Walker  Do you have support at home?: Partner/Spouse/SO  Do you feel like you have everything you need to keep you well at home?: Yes  Are you an active caregiver in your home?: No  Care Transitions Interventions         Follow Up  Future Appointments   Date Time Provider Natasha Yap   12/9/2022  7:40 AM MANDY Cotter - CNP [facility]   11/13/2023  7:30 AM Ana M Alfred MD [facility] Cardio [facility]       LPN Care Coordinator provided contact information. Plan for follow-up call in 5-7 days based on severity of symptoms and risk factors.   Plan for next call: symptom management-HA, dizziness, LOC, seizure-like activity  follow-up appointment-12/9 PCP  medication management-new or changed  community resources-[facility]  RPM?    Ruth Naylor LPN

## 2022-12-06 ENCOUNTER — TELEPHONE (OUTPATIENT)
Dept: CARDIOLOGY CLINIC | Age: 67
End: 2022-12-06

## 2022-12-06 DIAGNOSIS — I48.20 CHRONIC ATRIAL FIBRILLATION (HCC): Primary | ICD-10-CM

## 2022-12-06 NOTE — TELEPHONE ENCOUNTER
----- Message from Tereza Blue MD sent at 12/6/2022  3:50 PM EST -----  Rate control looks good  When does he see EP?

## 2022-12-07 NOTE — TELEPHONE ENCOUNTER
Called and spoke to the pt, pt declined the appt recommended for MXA 1/12/23 at 11:15, pt stated they did not like middle of the appts wanted a 7:30 a. Appt, pt is scheduled with MXA 1/19 7:45 am, is this date and time ok or does the patient need to get scheduled sooner?     Pls advise thank you

## 2022-12-07 NOTE — TELEPHONE ENCOUNTER
Patient can see MXA 1/12/23 at 11:15. Please schedule patient and call him. Dr Yvon Nicole aware of timing and is okay with it.

## 2022-12-09 ENCOUNTER — OFFICE VISIT (OUTPATIENT)
Dept: INTERNAL MEDICINE CLINIC | Age: 67
End: 2022-12-09

## 2022-12-09 ENCOUNTER — CARE COORDINATION (OUTPATIENT)
Dept: CASE MANAGEMENT | Age: 67
End: 2022-12-09

## 2022-12-09 VITALS
BODY MASS INDEX: 38.1 KG/M2 | HEART RATE: 54 BPM | WEIGHT: 313 LBS | SYSTOLIC BLOOD PRESSURE: 124 MMHG | OXYGEN SATURATION: 98 % | DIASTOLIC BLOOD PRESSURE: 78 MMHG

## 2022-12-09 DIAGNOSIS — I73.9 PVD (PERIPHERAL VASCULAR DISEASE) (HCC): ICD-10-CM

## 2022-12-09 DIAGNOSIS — R56.9 NEW ONSET SEIZURE (HCC): ICD-10-CM

## 2022-12-09 DIAGNOSIS — I10 ESSENTIAL HYPERTENSION: ICD-10-CM

## 2022-12-09 DIAGNOSIS — G45.9 TIA (TRANSIENT ISCHEMIC ATTACK): ICD-10-CM

## 2022-12-09 DIAGNOSIS — F41.9 ANXIETY: ICD-10-CM

## 2022-12-09 DIAGNOSIS — E78.2 MIXED HYPERLIPIDEMIA: ICD-10-CM

## 2022-12-09 DIAGNOSIS — N18.32 STAGE 3B CHRONIC KIDNEY DISEASE (HCC): ICD-10-CM

## 2022-12-09 DIAGNOSIS — I48.20 CHRONIC ATRIAL FIBRILLATION (HCC): ICD-10-CM

## 2022-12-09 DIAGNOSIS — Z09 HOSPITAL DISCHARGE FOLLOW-UP: Primary | ICD-10-CM

## 2022-12-09 RX ORDER — DIAZEPAM 5 MG/1
5 TABLET ORAL ONCE
Qty: 2 TABLET | Refills: 0 | Status: SHIPPED | OUTPATIENT
Start: 2022-12-09 | End: 2022-12-09

## 2022-12-09 NOTE — CARE COORDINATION
[facility] Care Transitions Follow Up Call    [facility] Care Coordinator contacted the patient by telephone to follow up after admission on -  Verified name and  with patient as identifiers. Patient: Niels Snyder  Patient : 1955   MRN: [id number]  Reason for Admission: seizures  Discharge Date: 22 RARS: Readmission Risk Score: 15.5      Needs to be reviewed by the provider   Additional needs identified to be addressed with provider: No  none             Method of communication with provider: none. N CC spoke with patient. States he is pretty good. Had HFU with PCP who ordered MRI. Patient to f/u on this next week. Denies HA, dizziness, LOC, seizure activity. Appetite good. Denies problems with bowels or bladder. HC active. Denies medication changes. Denies needs. Matt Choi, CHRIS [address] [facility]  Care Transitions  [phone]    Addressed changes since last contact:  none  Discussed follow-up appointments. If no appointment was previously scheduled, appointment scheduling offered: Yes. Is follow up appointment scheduled within 7 days of discharge? Yes. Follow Up  Future Appointments   Date Time Provider Natasha Yap   2023  7:45 AM Willy Schmidt MD [facility] Cardio [facility]   2023  7:30 AM Tereza Blue MD FF Cardio [facility]     Non-[facility] follow up appointment(s): DEBORAH    [facility] Care Coordinator reviewed medical action plan and red flags with patient and discussed any barriers to care and/or understanding of plan of care after discharge. Discussed appropriate site of care based on symptoms and resources available to patient including: PCP  Specialist  Urgent care clinics  [facility]  When to call 911. The patient agrees to contact the PCP office for questions related to their healthcare. Advance Care Planning:   reviewed and current.      Patients top risk factors for readmission: medical condition-seizures  Interventions to address risk factors: Assessment and support for treatment adherence and medication management-denied new or changed meds    Offered patient enrollment in the Remote Patient Monitoring (RPM) program for in-home monitoring:  did not offer this encounter . Care Transitions Subsequent and Final Call    Subsequent and Final Calls  Do you have any ongoing symptoms?: No  Have your medications changed?: No  Do you have any questions related to your medications?: No  Do you currently have any active services?: No  Are you currently active with any services?: Home Health  Do you have any needs or concerns that I can assist you with?: No  Identified Barriers: None  Care Transitions Interventions  Other Interventions:             LPN Care Coordinator provided contact information for future needs. Plan for follow-up call in 7-10 days based on severity of symptoms and risk factors.   Plan for next call: symptom management-HA, dizziness, LOC, seizure activity  self management-ADLs  follow-up appointment-MRI  medication management-new or changed  community resources-[facility]    [employer], LPN

## 2022-12-09 NOTE — PROGRESS NOTES
Post-Discharge Transitional Care  Follow Up      German Meza   YOB: 1955    Date of Office Visit:  12/9/2022  Date of Hospital Admission: 11/29/22  Date of Hospital Discharge: 12/1/22  Risk of hospital readmission (high >=14%. Medium >=10%) :Readmission Risk Score: 15.5    Care management risk score Rising risk (score 2-5) and Complex Care (Scores >=6): No Risk Score On File     Non face to face  following discharge, date last encounter closed (first attempt may have been earlier): 12/02/2022    Call initiated 2 business days of discharge: Yes    ASSESSMENT/PLAN:   Hospital discharge follow-up  -     [place] DISCHARGE MEDS RECONCILED W/ CURRENT OUTPATIENT MED LIST  - reviewed admission notes, imaging, labs and work up in detail with pt  - new onset seizures   - strongly encouraged MRI brain - open MRI scheduled at [facility] by MA on 12/21 at [address] [place] [address], pt is aware and agreeable. - diazepam Rx for anxiety prior to MRI and brother will drive him to appt   - reviewed driving restrictions and pt verbalized understanding   -Discussed risk of blood thinners and falling with seizures - verbalized understanding   Anxiety/ New onset seizure ([facility]  -     diazePAM (VALIUM) 5 MG tablet; Take 1 tablet by mouth once for 1 dose. Take one 30 minutes prior to MRI and take the other at the time of the MRI if needed. , Disp-2 tablet, R-0Normal  Essential hypertension  Stage 3b chronic kidney disease (HCC)  Mixed hyperlipidemia  Chronic atrial fibrillation (HCC) - continue eliquis. 1/19 - keep appt with Dr. Nasrin Babcock with EP. PVD (peripheral vascular disease) ([facility]  TIA (transient ischemic attack)    Medical Decision Making: moderate complexity  Return in about 3 months (around 3/9/2023), or if symptoms worsen or fail to improve. Subjective:   HPI:  Follow up of Hospital problems/diagnosis(es):     Admitted 11/29-12/1 for new onset seizures. EEG was negative. Discharged on [address] for 3 months.   Recommended MRI brain during admission but pt refused and is requesting an open MRI. Unknown cause. Will go to [facility] for open MRI. Last month he was admitted for a AMS thought to be secondary to TIA - during HFU appt a MRI brain was ordered bc he declined one during that admission as well. Recently re-established with cardiology, Dr. Deepa Kumar. Completed holter monitor as recommended. Has appt with EP. Pt is back on AC, eliquis. Inpatient course: Discharge summary reviewed- see chart. Interval history/Current status:     Patient Active Problem List   Diagnosis    Chronic atrial fibrillation (HCC)    COPD (chronic obstructive pulmonary disease) ([address]    Sleep apnea    Essential hypertension    Hyperlipidemia    Chronic idiopathic gout of multiple sites    Hand arthritis    Chronic diastolic congestive heart failure ([address]    BRIAN (acute kidney injury) ([address]    Obesity    S/P transmetatarsal amputation of foot, right (HCC)    Recurrent major depressive disorder, in full remission ([address]    Mixed erectile dysfunction    PVD (peripheral vascular disease) ([address]    Stage 3b chronic kidney disease ([address]    Localized edema    Open wound of right lower extremity    Cellulitis of leg, right    Noncompliance    Antibiotic-associated diarrhea    New onset seizure ([address]    TIA (transient ischemic attack)    (HFpEF) heart failure with preserved ejection fraction ([address]    Aneurysm of descending thoracic aorta without rupture       Medications listed as ordered at the time of discharge from hospital     Medication List            Accurate as of December 9, 2022 10:36 AM. If you have any questions, ask your nurse or doctor. CONTINUE taking these medications      albuterol-ipratropium  MCG/ACT Aers inhaler  Commonly known as: COMBIVENT RESPIMAT  Inhale 1 puff into the lungs every 6 hours.      allopurinol 300 MG tablet  Commonly known as: ZYLOPRIM  TAKE 1 TABLET BY MOUTH 2 TIMES DAILY     apixaban 5 MG Tabs tablet  Commonly known as: Eliquis  Take 1 tablet by mouth 2 times daily     aspirin EC 81 MG EC tablet  Take 1 tablet by mouth daily     atorvastatin 40 MG tablet  Commonly known as: LIPITOR  TAKE 1 TABLET BY MOUTH EVERY DAY IN THE EVENING     budesonide-formoterol 160-4.5 MCG/ACT Aero  Commonly known as: Symbicort  Inhale 2 puffs into the lungs 2 times daily. diazePAM 5 MG tablet  Commonly known as: Valium  Take 1 tablet by mouth once for 1 dose. Take one 30 minutes prior to MRI and take the other at the time of the MRI if needed. diclofenac sodium 1 % Gel  Commonly known as: VOLTAREN  APPLY 4 GRAMS TOPICALLY 4 TIMES DAILY AS NEEDED FOR PAIN     dilTIAZem 180 MG extended release capsule  Commonly known as: DILACOR XR     levETIRAcetam 500 MG tablet  Commonly known as: Keppra  Take 1 tablet by mouth 2 times daily     metoprolol tartrate 25 MG tablet  Commonly known as: LOPRESSOR  Take 1 tablet by mouth 2 times daily     therapeutic multivitamin-minerals tablet               Where to Get Your Medications        These medications were sent to [id number], [address] [place] [address] [address], [place] [address]      Phone: [phone]   diazePAM 5 MG tablet           Medications marked \"taking\" at this time  Outpatient Medications Marked as Taking for the 12/9/22 encounter (Office Visit) with MANDY Alfonso CNP   Medication Sig Dispense Refill    diazePAM (VALIUM) 5 MG tablet Take 1 tablet by mouth once for 1 dose. Take one 30 minutes prior to MRI and take the other at the time of the MRI if needed.  2 tablet 0    levETIRAcetam (KEPPRA) 500 MG tablet Take 1 tablet by mouth 2 times daily 60 tablet 3    dilTIAZem (DILACOR XR) 180 MG extended release capsule Take 180 mg by mouth in the morning and at bedtime 1 capsule bid      apixaban (ELIQUIS) 5 MG TABS tablet Take 1 tablet by mouth 2 times daily 180 tablet 3    atorvastatin (LIPITOR) 40 MG tablet TAKE 1 TABLET BY MOUTH EVERY DAY IN THE EVENING 90 tablet 1    diclofenac sodium (VOLTAREN) 1 % GEL APPLY 4 GRAMS TOPICALLY 4 TIMES DAILY AS NEEDED FOR PAIN 500 g 2    metoprolol tartrate (LOPRESSOR) 25 MG tablet Take 1 tablet by mouth 2 times daily 180 tablet 3    budesonide-formoterol (SYMBICORT) 160-4.5 MCG/ACT AERO Inhale 2 puffs into the lungs 2 times daily. 3 Inhaler 3    therapeutic multivitamin-minerals (THERAGRAN-M) tablet Take 1 tablet by mouth daily. Medications patient taking as of now reconciled against medications ordered at time of hospital discharge: Yes        Objective:    /78   Pulse 54   Wt (!) 313 lb (142 kg)   SpO2 98%   BMI 38.10 kg/m²     Physical Exam  Constitutional:       General: He is not in acute distress. Appearance: Normal appearance. He is obese. HENT:      Head: Normocephalic and atraumatic. Cardiovascular:      Rate and Rhythm: Normal rate. Rhythm irregular. Pulmonary:      Effort: Pulmonary effort is normal. No respiratory distress. Breath sounds: Normal breath sounds. Neurological:      Mental Status: He is alert and oriented to person, place, and time. Mental status is at baseline. Psychiatric:         Mood and Affect: Mood normal.         Behavior: Behavior normal.     An electronic signature was used to authenticate this note.   --MANDY Dupree - CNP

## 2022-12-16 ENCOUNTER — CARE COORDINATION (OUTPATIENT)
Dept: CASE MANAGEMENT | Age: 67
End: 2022-12-16

## 2022-12-16 NOTE — CARE COORDINATION
[facility] Care Transitions Follow Up Call    Care Transition Nurse contacted the patient by telephone to follow up after admission. Verified name and  with patient as identifiers. Patient: Edwin Limb  Patient : 1955   MRN: [id number]  Reason for Admission:   Discharge Date: 22 RARS: Readmission Risk Score: 15.5      Needs to be reviewed by the provider   Additional needs identified to be addressed with provider: No  none             Method of communication with provider: none. Pt states doing well, no issues or concerns. Denies any further seizures. HC continues to come out. Agreed to more CTC f/u calls. Addressed changes since last contact:  none  Discussed follow-up appointments. If no appointment was previously scheduled, appointment scheduling offered: No.   Is follow up appointment scheduled within 7 days of discharge? Yes. Follow Up  Future Appointments   Date Time Provider Natasha Yap   2023  7:45 AM Samantha Will MD  Cardio [facility]   2023  7:30 AM Chester Cervantes MD  Cardio [facility]     Non-[facility] follow up appointment(s):     Care Transition Nurse reviewed medical action plan with patient and discussed any barriers to care and/or understanding of plan of care after discharge. Discussed appropriate site of care based on symptoms and resources available to patient including: When to call 911. The patient agrees to contact the PCP office for questions related to their healthcare. Advance Care Planning:   reviewed and current. Patients top risk factors for readmission: medical condition-seizures  Interventions to address risk factors: Assessment and support for treatment adherence and medication management-seizures    Offered patient enrollment in the Remote Patient Monitoring (RPM) program for in-home monitoring:  next call .      Care Transitions Subsequent and Final Call    Subsequent and Final Calls  Do you have any ongoing symptoms?: No  Have your medications changed?: No  Do you have any questions related to your medications?: No  Do you currently have any active services?: Yes  Are you currently active with any services?: Home Health  Do you have any needs or concerns that I can assist you with?: No  Identified Barriers: None  Care Transitions Interventions  No Identified Needs  Other Interventions:             Care Transition Nurse provided contact information for future needs. Plan for follow-up call in 5-7 days based on severity of symptoms and risk factors.   Plan for next call: self management-seizures, HC, f/u appts, discuss RPM    Deloris Lowry, RN

## 2022-12-20 ENCOUNTER — TELEPHONE (OUTPATIENT)
Dept: INTERNAL MEDICINE CLINIC | Age: 67
End: 2022-12-20

## 2022-12-20 NOTE — TELEPHONE ENCOUNTER
Daryl De Oliveira from [place] called in regards to an appointment for a scan that is scheduled for tomorrow for this pt. States that his insurance needs Dr. Noah Crane order to be able to complete the scan. [phone]. Case # [id number]. Daryl De Oliveira asked to be called back in regards to this since the appt is tomorrow and would like to be kept in the loop. Please call her with the outcome. [phone]. Ext. [phone].

## 2022-12-20 NOTE — TELEPHONE ENCOUNTER
Called and spoke with insurance company. With the updated info from the hospital the test was approved from 12/20-03/19/23. Order number [id number].  Called and lm on Haylee's vmail Low BP and Fever at home. Recently dx with PNA. Pt is bed bound and alert to self at baseline. Hx of dementia. Unable to obtain temperature. Pt is agitated.

## 2022-12-23 ENCOUNTER — CARE COORDINATION (OUTPATIENT)
Dept: CASE MANAGEMENT | Age: 67
End: 2022-12-23

## 2022-12-23 NOTE — CARE COORDINATION
[facility] Transitions Follow Up Call    2022    Patient: Hunter Hester  Patient : 1955   MRN: [id number]  Reason for Admission: seizures  Discharge Date: 22 RARS: Readmission Risk Score: 15.5         Spoke with: David Flowers  Patient reports he is doing really good. Appetite and fluid intake is good. No urinary or bowel elimination problems. He denies sob, fever, chills, swelling, cough,lightheadedness, dizziness or seizure activity. Home care continued. No questions or needs. Will continue to follow. Care Transitions Follow Up Call    Needs to be reviewed by the provider   Additional needs identified to be addressed with provider: No  none             Method of communication with provider : none      Care Transition Nurse (CTN) contacted the patient by telephone to follow up after admission on 2022. Verified name and  with patient as identifiers. Addressed changes since last contact: none  Discussed follow-up appointments. If no appointment was previously scheduled, appointment scheduling offered: na.   Is follow up appointment scheduled within 7 days of discharge? No.    Advance Care Planning:   Does patient have an Advance Directive: not on file. CTN reviewed discharge instructions, medical action plan and red flags with patient and discussed any barriers to care and/or understanding of plan of care after discharge. Discussed appropriate site of care based on symptoms and resources available to patient including: PCP  Specialist  After hours contact number-   Urgent care clinics  [facility]. The patient agrees to contact the PCP office for questions related to their healthcare.      Patients top risk factors for readmission: medical condition-seizures,copd, chf,afib  Interventions to address risk factors: Education of patient/family/caregiver/guardian to support self-management-       Non-[facility] follow up appointment(s):     CTN provided contact information for future needs. Plan for follow-up call in 5-7 days based on severity of symptoms and risk factors. Plan for next call: symptom management-ha, dizzy,   self management-           Care Transitions Subsequent and Final Call    Subsequent and Final Calls  Do you have any ongoing symptoms?: No  Have your medications changed?: No  Do you have any questions related to your medications?: No  Do you currently have any active services?: Yes  Are you currently active with any services?: Home Health  Do you have any needs or concerns that I can assist you with?: No  Identified Barriers: None  Care Transitions Interventions  Other Interventions:              Follow Up  Future Appointments   Date Time Provider Natasha Yap   1/19/2023  7:45 AM Tin Alcala MD FF Cardio [facility]   11/13/2023  7:30 AM Estee Ames MD FF Cardio [facility]       Tonie Lackey LPN

## 2022-12-30 ENCOUNTER — CARE COORDINATION (OUTPATIENT)
Dept: CASE MANAGEMENT | Age: 67
End: 2022-12-30

## 2022-12-30 NOTE — CARE COORDINATION
[facility] Care Transitions Follow Up Call    Care Transition Nurse contacted the patient by telephone to follow up after admission. Verified name and  with patient as identifiers. Patient: Damari Mccann  Patient : 1955   MRN: [id number]  Reason for Admission:   Discharge Date: 22 RARS: Readmission Risk Score: 15.5      Needs to be reviewed by the provider   Additional needs identified to be addressed with provider: No  none             Method of communication with provider: none. Pt states doing well, no issues or concerns. Denies any more seizure activity. HC continues to come out to [facility]. No need for further f/u CTC calls. Addressed changes since last contact:  none  Discussed follow-up appointments. If no appointment was previously scheduled, appointment scheduling offered: No.   Is follow up appointment scheduled within 7 days of discharge? Yes. Follow Up  Future Appointments   Date Time Provider Natasha Yap   2023  7:45 AM Betsy Cantrell MD FF Cardio [facility]   2023  7:30 AM Italia Martinez MD  Cardio [facility]     Non-[facility] follow up appointment(s):     Care Transition Nurse reviewed medical action plan with patient and discussed any barriers to care and/or understanding of plan of care after discharge. Discussed appropriate site of care based on symptoms and resources available to patient including: When to call 911. The patient agrees to contact the PCP office for questions related to their healthcare. Advance Care Planning:   reviewed and current. Patients top risk factors for readmission: medical condition-seizures  Interventions to address risk factors: Assessment and support for treatment adherence and medication management-seizures    Offered patient enrollment in the Remote Patient Monitoring (RPM) program for in-home monitoring: Patient declined.      Care Transitions Subsequent and Final Call    Subsequent and Final Calls  Do you have any ongoing symptoms?: No  Have your medications changed?: No  Do you have any questions related to your medications?: No  Do you currently have any active services?: Yes  Are you currently active with any services?: Home Health  Do you have any needs or concerns that I can assist you with?: No  Identified Barriers: None  Care Transitions Interventions  No Identified Needs  Other Interventions:             Care Transition Nurse provided contact information for future needs. No further follow-up call indicated based on severity of symptoms and risk factors.   Plan for next call:  n/a    Deloris Lowry RN

## 2023-01-04 DIAGNOSIS — R93.0 ABNORMAL MRI OF HEAD: Primary | ICD-10-CM

## 2023-01-04 DIAGNOSIS — I10 ESSENTIAL HYPERTENSION: ICD-10-CM

## 2023-01-04 DIAGNOSIS — I50.32 CHRONIC HEART FAILURE WITH PRESERVED EJECTION FRACTION (HCC): ICD-10-CM

## 2023-01-04 DIAGNOSIS — I48.20 CHRONIC ATRIAL FIBRILLATION (HCC): ICD-10-CM

## 2023-01-05 ENCOUNTER — CARE COORDINATION (OUTPATIENT)
Dept: CARE COORDINATION | Age: 68
End: 2023-01-05

## 2023-01-06 ENCOUNTER — TELEPHONE (OUTPATIENT)
Dept: INTERNAL MEDICINE CLINIC | Age: 68
End: 2023-01-06

## 2023-01-06 NOTE — TELEPHONE ENCOUNTER
----- Message from Kalyan Machado sent at 1/6/2023  3:57 PM EST -----  Subject: Medication Problem    Medication: Other - antibiotics  Dosage: n/a  Ordering Provider: luly    Question/Problem: Patient's home health nurse says he has cellulitis, its   red and angry. Requesting antibiotics before it turns worse.        [id number], [address] [place] [address] [phone]    ---------------------------------------------------------------------------  --------------  [facility]  [phone]; OK to leave message on voicemail  ---------------------------------------------------------------------------  --------------    SCRIPT ANSWERS  Relationship to Patient: Self

## 2023-01-06 NOTE — TELEPHONE ENCOUNTER
----- Message from Maninder Beltráns sent at 1/6/2023  3:57 PM EST -----  Subject: Medication Problem    Medication: Other - antibiotics  Dosage: n/a  Ordering Provider: luly    Question/Problem: Patient's home health nurse says he has cellulitis, its   red and angry. Requesting antibiotics before it turns worse.        Pharmacy: [id number], [address] [phone]    ---------------------------------------------------------------------------  --------------  Siri ALCAZAR  [phone]; OK to leave message on voicemail  ---------------------------------------------------------------------------  --------------    SCRIPT ANSWERS  Relationship to Patient: Self

## 2023-01-06 NOTE — TELEPHONE ENCOUNTER
Pt calling asking to talk to [facility] about MRI tumor results---please call the pt. Thanks.

## 2023-01-09 NOTE — TELEPHONE ENCOUNTER
Please see call requesting medication for cellulitis. Not sure if you want the pt. Evaluated or not.

## 2023-01-09 NOTE — TELEPHONE ENCOUNTER
Pt calling needing an antibiotic for cellulitis---said you have called this in before for him---please call the pt. Thanks.

## 2023-01-09 NOTE — TELEPHONE ENCOUNTER
I would recommend he be evaluated. It looks like he had a question about a test Kenn Harris ordered as well.

## 2023-01-11 ENCOUNTER — OFFICE VISIT (OUTPATIENT)
Dept: INTERNAL MEDICINE CLINIC | Age: 68
End: 2023-01-11
Payer: MEDICARE

## 2023-01-11 ENCOUNTER — TELEPHONE (OUTPATIENT)
Dept: INTERNAL MEDICINE CLINIC | Age: 68
End: 2023-01-11

## 2023-01-11 ENCOUNTER — CARE COORDINATION (OUTPATIENT)
Dept: CARE COORDINATION | Age: 68
End: 2023-01-11

## 2023-01-11 VITALS — DIASTOLIC BLOOD PRESSURE: 80 MMHG | SYSTOLIC BLOOD PRESSURE: 136 MMHG | HEART RATE: 78 BPM | OXYGEN SATURATION: 97 %

## 2023-01-11 DIAGNOSIS — I71.23 ANEURYSM OF DESCENDING THORACIC AORTA WITHOUT RUPTURE: ICD-10-CM

## 2023-01-11 DIAGNOSIS — Z89.431 S/P TRANSMETATARSAL AMPUTATION OF FOOT, RIGHT (HCC): ICD-10-CM

## 2023-01-11 DIAGNOSIS — L03.115 CELLULITIS OF RIGHT LOWER EXTREMITY: ICD-10-CM

## 2023-01-11 DIAGNOSIS — N18.32 STAGE 3B CHRONIC KIDNEY DISEASE (HCC): ICD-10-CM

## 2023-01-11 DIAGNOSIS — L03.116 CELLULITIS OF LEFT LOWER EXTREMITY: Primary | ICD-10-CM

## 2023-01-11 DIAGNOSIS — J44.9 CHRONIC OBSTRUCTIVE PULMONARY DISEASE, UNSPECIFIED COPD TYPE (HCC): ICD-10-CM

## 2023-01-11 DIAGNOSIS — I48.20 CHRONIC ATRIAL FIBRILLATION (HCC): ICD-10-CM

## 2023-01-11 DIAGNOSIS — R93.0 ABNORMAL MRI OF HEAD: ICD-10-CM

## 2023-01-11 DIAGNOSIS — I73.9 PVD (PERIPHERAL VASCULAR DISEASE) (HCC): ICD-10-CM

## 2023-01-11 DIAGNOSIS — G45.9 TIA (TRANSIENT ISCHEMIC ATTACK): ICD-10-CM

## 2023-01-11 DIAGNOSIS — R56.9 NEW ONSET SEIZURE (HCC): ICD-10-CM

## 2023-01-11 DIAGNOSIS — E66.01 SEVERE OBESITY (BMI 35.0-39.9) WITH COMORBIDITY (HCC): ICD-10-CM

## 2023-01-11 DIAGNOSIS — I50.32 CHRONIC DIASTOLIC CONGESTIVE HEART FAILURE (HCC): ICD-10-CM

## 2023-01-11 DIAGNOSIS — F33.42 RECURRENT MAJOR DEPRESSIVE DISORDER, IN FULL REMISSION (HCC): ICD-10-CM

## 2023-01-11 PROCEDURE — 3079F DIAST BP 80-89 MM HG: CPT | Performed by: NURSE PRACTITIONER

## 2023-01-11 PROCEDURE — 1123F ACP DISCUSS/DSCN MKR DOCD: CPT | Performed by: NURSE PRACTITIONER

## 2023-01-11 PROCEDURE — 99214 OFFICE O/P EST MOD 30 MIN: CPT | Performed by: NURSE PRACTITIONER

## 2023-01-11 PROCEDURE — 3075F SYST BP GE 130 - 139MM HG: CPT | Performed by: NURSE PRACTITIONER

## 2023-01-11 RX ORDER — DOXYCYCLINE HYCLATE 100 MG
100 TABLET ORAL 2 TIMES DAILY
Qty: 14 TABLET | Refills: 0 | Status: SHIPPED | OUTPATIENT
Start: 2023-01-11 | End: 2023-01-18

## 2023-01-11 SDOH — ECONOMIC STABILITY: INCOME INSECURITY: IN THE LAST 12 MONTHS, WAS THERE A TIME WHEN YOU WERE NOT ABLE TO PAY THE MORTGAGE OR RENT ON TIME?: NO

## 2023-01-11 SDOH — ECONOMIC STABILITY: HOUSING INSECURITY: IN THE LAST 12 MONTHS, HOW MANY PLACES HAVE YOU LIVED?: 1

## 2023-01-11 SDOH — ECONOMIC STABILITY: HOUSING INSECURITY
IN THE LAST 12 MONTHS, WAS THERE A TIME WHEN YOU DID NOT HAVE A STEADY PLACE TO SLEEP OR SLEPT IN A SHELTER (INCLUDING NOW)?: NO

## 2023-01-11 SDOH — ECONOMIC STABILITY: TRANSPORTATION INSECURITY
IN THE PAST 12 MONTHS, HAS THE LACK OF TRANSPORTATION KEPT YOU FROM MEDICAL APPOINTMENTS OR FROM GETTING MEDICATIONS?: NO

## 2023-01-11 SDOH — ECONOMIC STABILITY: TRANSPORTATION INSECURITY
IN THE PAST 12 MONTHS, HAS LACK OF TRANSPORTATION KEPT YOU FROM MEETINGS, WORK, OR FROM GETTING THINGS NEEDED FOR DAILY LIVING?: NO

## 2023-01-11 SDOH — HEALTH STABILITY: PHYSICAL HEALTH: ON AVERAGE, HOW MANY MINUTES DO YOU ENGAGE IN EXERCISE AT THIS LEVEL?: 0 MIN

## 2023-01-11 SDOH — HEALTH STABILITY: PHYSICAL HEALTH: ON AVERAGE, HOW MANY DAYS PER WEEK DO YOU ENGAGE IN MODERATE TO STRENUOUS EXERCISE (LIKE A BRISK WALK)?: 0 DAYS

## 2023-01-11 ASSESSMENT — ENCOUNTER SYMPTOMS
RESPIRATORY NEGATIVE: 1
COLOR CHANGE: 1
GASTROINTESTINAL NEGATIVE: 1
EYES NEGATIVE: 1

## 2023-01-11 ASSESSMENT — PATIENT HEALTH QUESTIONNAIRE - PHQ9
10. IF YOU CHECKED OFF ANY PROBLEMS, HOW DIFFICULT HAVE THESE PROBLEMS MADE IT FOR YOU TO DO YOUR WORK, TAKE CARE OF THINGS AT HOME, OR GET ALONG WITH OTHER PEOPLE: 1
SUM OF ALL RESPONSES TO PHQ QUESTIONS 1-9: 4
7. TROUBLE CONCENTRATING ON THINGS, SUCH AS READING THE NEWSPAPER OR WATCHING TELEVISION: 0
5. POOR APPETITE OR OVEREATING: 1
4. FEELING TIRED OR HAVING LITTLE ENERGY: 1
9. THOUGHTS THAT YOU WOULD BE BETTER OFF DEAD, OR OF HURTING YOURSELF: 0
SUM OF ALL RESPONSES TO PHQ QUESTIONS 1-9: 4
6. FEELING BAD ABOUT YOURSELF - OR THAT YOU ARE A FAILURE OR HAVE LET YOURSELF OR YOUR FAMILY DOWN: 0
SUM OF ALL RESPONSES TO PHQ QUESTIONS 1-9: 4
8. MOVING OR SPEAKING SO SLOWLY THAT OTHER PEOPLE COULD HAVE NOTICED. OR THE OPPOSITE, BEING SO FIGETY OR RESTLESS THAT YOU HAVE BEEN MOVING AROUND A LOT MORE THAN USUAL: 0
SUM OF ALL RESPONSES TO PHQ QUESTIONS 1-9: 4
3. TROUBLE FALLING OR STAYING ASLEEP: 1
1. LITTLE INTEREST OR PLEASURE IN DOING THINGS: 1

## 2023-01-11 ASSESSMENT — SOCIAL DETERMINANTS OF HEALTH (SDOH)
DO YOU BELONG TO ANY CLUBS OR ORGANIZATIONS SUCH AS CHURCH GROUPS UNIONS, FRATERNAL OR ATHLETIC GROUPS, OR SCHOOL GROUPS?: NO
ARE YOU MARRIED, WIDOWED, DIVORCED, SEPARATED, NEVER MARRIED, OR LIVING WITH A PARTNER?: LIVING WITH PARTNER
HOW OFTEN DO YOU ATTEND CHURCH OR RELIGIOUS SERVICES?: NEVER
IN A TYPICAL WEEK, HOW MANY TIMES DO YOU TALK ON THE PHONE WITH FAMILY, FRIENDS, OR NEIGHBORS?: TWICE A WEEK
HOW OFTEN DO YOU ATTENT MEETINGS OF THE CLUB OR ORGANIZATION YOU BELONG TO?: NEVER
HOW OFTEN DO YOU GET TOGETHER WITH FRIENDS OR RELATIVES?: TWICE A WEEK

## 2023-01-11 NOTE — TELEPHONE ENCOUNTER
Pt called stating that the referral has still not been sent to the spine doctor. Also requesting MRI disk to be sent to them as well. They stated if they receive this that he wouldn't have to get another. Please advise. [phone].

## 2023-01-11 NOTE — TELEPHONE ENCOUNTER
I agree with the Care Coordinator's Plan of Care. He needs to continue with the plan to see neurosurgery, I also recommend he call to schedule an appt with audiology.  Thanks, Lucretia Dang

## 2023-01-11 NOTE — TELEPHONE ENCOUNTER
Referral was placed 1/4/23 and re-faxed to [facility]. Niño will need to request imaging from [facility] but I did just fax the report to them also.

## 2023-01-11 NOTE — CARE COORDINATION
Ambulatory Care Coordination Note  1/11/2023    ACC: Kimberli Abdul RN  PCP referral to care management and RPM. RN-CC outreached patient; introduced role of RN-CC. Patient agreeable to enrollment. Patient is a 78 yr old male with pmhx of COPD, CHF, atrial fib, htn, TIA, ckd stage 3, obesity, tinnitus. He lives with his significant other in a single family home. He was seen by his pcp today for cellulitis to bilateral extremities. He was started on doxycycline BID x7 days. I did confirm he picked up the antibiotic. I advised he take as directed until complete. RN-CC recommended cleansing daily with antibacterial soap, rinsing, and pat dry. Advised on keeping skin clean, dry and moisturized with Aquaphor. He is active with [facility]. Patient reports bilateral edema x 1 year; redness developed approximately 2 months ago. He states he has a bottle of Torsemide 40 mg and takes prn when his legs swell. I reviewed his MAR and do not see an active script for Torsemide. Per patient, the Torsemide he has in home is from 2021. He is currently taking 40 mg BID but will discontinue when swelling improves. RN-CC advised against taking medications without consulting his doctors first. Patient was educated on causes as well as s/sx of cellulitis. Informed patient that skin due to excessive fluid retention is vulnerable to infection. Patient does have compression stockings and ace bandages - he states he will occasionally apply ace wraps with significant improvement in swelling. He is active with [facility] - SN comes twice weekly to monitor blood pressure and s/sx of acute exacerbation. CHF - he does not follow a low sodium diet or fluid restriction. He uses a salt substitute but does not read food labels. He eats out often. He does not own a scale or blood pressure monitor. As stated above, his [address] [place] [address] monitors his blood pressure twice weekly and he reports his readings are always good.  He is unable to provide me with specific readings. He denies chest pain or sob. He has chronic bilateral lower edema x 1 year that is improved with compression and elevation. He takes Torsemide 40 mg prn for swelling, although this isn't listed on his MAR. He is unable to identify s/sx of CHF; increase sob, chest pain, rapid weight gain/3lbs in 1 day or 5lbs in 1 week, orthopnea, cough, bloating, dizziness - education provided, reinforcement needed. Educated on low na diet and fluid restriction - 64 fl oz, 2 g daily - additional teaching needed. RN-CC recommended referral to dietician, Shi Velazquez, but patient declined. He is followed by [facility]. He was seen by Dr. Sue Ivan on 11/11/22 and has an upcoming appointment with Dr. Celina Tay on 1/19/23. COPD - he reports he breathing is good, denies sob. He does not use Combivent or Symbicort. Has a rescue inhaler but states he rarely needs it. He has an oxygen concentrator but states he does not use it. He states his brother is currently borrowing his oxygen concentrator. RN-CC advised against sharing respiratory equipment. Patient is unable to identify s/sx of COPD as well as triggers to avoid. Additional education is needed. Mobility scooter - has a power mobility scooter that his over 11years old. He states his battery will not hold a charge and is in need of a new scooter. RN-CC recommended a referral to [address] [place] [address], pt agreeable. Referral sent; patient advised that [facility] with [address] [place] [address] will be contacting him. Discussed a referral to [employer] for support; patient declined. Stephen - I am showing his account is active but he has not logged in since 2014. He states he has the cheri on his phone but forgot his password and would like help logging back in. RN-CC provided him with his [address] and talked him through how to reset his password, pt melchor.     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  He was told they have not received the referral. I am showing the referral was faxed on 1/4/23 and again today as well as a copy of the MRI report. Orbital MRI ordered and strongly recommended but patient is reluctant to complete d/t claustrophobia. Needs open MRI at [facility]. He prefers to get the opinion of neurosurgery before scheduling additional testing. He is waiting on a call return from [place] to schedule the appointment. RPM- RN-CC discussed the benefits of RMP and encouraged patient to enroll. Patient declined stating he has a hard time complying with medical treatment. He acknowledges he would do it for a week and then stop. Tinnitus - ringing in ears x 15 years. Worse after completing MRI in 12/2022. Patient requesting recommendations for treatment. He has never been seen by ENT. RN-CC recommended using a white noise machine. Medication review completed -  takes Torsemide 40 mg prn - not listed on MAR. His [address] fills his weekly mediset. Patient requested I call back at later time, [place] [address] [address] nurse arrived to his home for a visit during our call. RN-CC provided patient with my contact information and encouraged him to reach out with questions or concerns. PLAN:    F/u MRI, appt with neurosurgery  F/u on cellulitis  Outreach SS - send resources on COPD, CHF  F/u on tinnitis  F/u on needs & concerns      Offered patient enrollment in the Remote Patient Monitoring (RPM) program for in-home monitoring: Patient declined. Ambulatory Care Coordination Assessment    Care Coordination Protocol  Referral from Primary Care Provider: Yes  Week 1 - Initial Assessment     Do you have all of your prescriptions and are they filled?: Yes  Barriers to medication adherence: Does not understand need for medication  Are you able to afford your medications?: Yes  How often do you have trouble taking your medications the way you have been told to take them?: I always take them as prescribed.      Do you have Home O2 Therapy?: Yes   Oxygen Regimen: At night/Sleep    Method of Delivery: Concentrater, Nasal Cannula      Ability to seek help/take action for Emergent Urgent situations i.e. fire, crime, inclement weather or health crisis. : Independent  Ability to ambulate to restroom: Independent  Ability handle personal hygeine needs (bathing/dressing/grooming): Independent  Ability to manage Medications: Independent  Ability to prepare Food Preparation: Independent  Ability to maintain home (clean home, laundry): Needs Assistance  Ability to drive and/or has transportation: Independent  Ability to do shopping: Independent  Ability to manage finances:  Independent  Is patient able to live independently?: Yes     Current Housing: Private Residence              Are you experiencing loss of meaning?: No  Are you experiencing loss of hope and peace?: No     Thinking about your patient's physical health needs, are there any symptoms or problems (risk indicators) you are unsure about that require further investigation?: Mild vague physical symptoms or problems; but do not impact on daily life or are not of concern to patient   Are the patients physical health problems impacting on their mental well-being?: Mild impact on mental well-being e.g. \"\"feeling fed-up\"\", \"\"reduced enjoyment\"\"   Are there any problems with your patients lifestyle behaviors (alcohol, drugs, diet, exercise) that are impacting on physical or mental well-being?: Some mild concern of potential negative impact on well-being   How would you rate their home environment in terms of safety and stability (including domestic violence, insecure housing, neighbor harassment)?: Safe, stable, but with some inconsistency   How do daily activities impact on the patient's well-being? (include current or anticipated unemployment, work, caregiving, access to transportation or other): No identified problems or perceived positive benefits   How would you rate their social network (family, work, friends)?: Adequate participation with social networks   How would you rate their financial resources (including ability to afford all required medical care)?: Financially secure, some resource challenges   How wells does the patient now understand their health and well-being (symptoms, signs or risk factors) and what they need to do to manage their health?: Little understanding which impacts on their ability to undertake better management   How well do you think your patient can engage in healthcare discussions? (Barriers include language, deafness, aphasia, alcohol or drug problems, learning difficulties, concentration): Clear and open communication, no identified barriers   Do other services need to be involved to help this patient?: Other care/services in place but not sufficient   Are current services involved with this patient well-coordinated? (Include coordination with other services you are now recommendation): Required care/services in place with some coordination barriers   Suggested Interventions and Community Resources                  Prior to Admission medications    Medication Sig Start Date End Date Taking?  Authorizing Provider   doxycycline hyclate (VIBRA-TABS) 100 MG tablet Take 1 tablet by mouth 2 times daily for 7 days 1/11/23 1/18/23 Yes MANDY Bradley CNP   levETIRAcetam (KEPPRA) 500 MG tablet Take 1 tablet by mouth 2 times daily 12/1/22  Yes Tonie Johnson MD   dilTIAZem (DILACOR XR) 180 MG extended release capsule Take 180 mg by mouth in the morning and at bedtime 1 capsule bid   Yes Historical Provider, MD   apixaban (ELIQUIS) 5 MG TABS tablet Take 1 tablet by mouth 2 times daily 11/11/22  Yes Janay Quiroz MD   aspirin EC 81 MG EC tablet Take 1 tablet by mouth daily 11/1/22  Yes Tonie Johnson MD   atorvastatin (LIPITOR) 40 MG tablet TAKE 1 TABLET BY MOUTH EVERY DAY IN THE EVENING 7/18/22  Yes Deyanira Camejo MD   allopurinol (ZYLOPRIM) 300 MG tablet TAKE 1 TABLET BY MOUTH 2 TIMES DAILY 4/19/22  Yes Laura Telles MD   metoprolol tartrate (LOPRESSOR) 25 MG tablet Take 1 tablet by mouth 2 times daily 1/15/22  Yes Juanito Green MD   therapeutic multivitamin-minerals [facility]) tablet Take 1 tablet by mouth daily. Yes Historical Provider, MD   diclofenac sodium (VOLTAREN) 1 % GEL APPLY 4 GRAMS TOPICALLY 4 TIMES DAILY AS NEEDED FOR PAIN 1/17/22   Deisi Perez MD   budesonide-formoterol [facility]) 160-4.5 MCG/ACT AERO Inhale 2 puffs into the lungs 2 times daily. Patient not taking: Reported on 1/11/2023 4/20/15   Dandy Pond MD   albuterol-ipratropium (COMBIVENT RESPIMAT)  MCG/ACT AERS inhaler Inhale 1 puff into the lungs every 6 hours.   Patient not taking: No sig reported 4/20/15   Dandy Pond MD       Future Appointments   Date Time Provider Natasha Yap   1/19/2023  7:45 AM Jennyfer Gruber MD FF Cardio [facility]   11/13/2023  7:30 AM Kitty Teran MD FF Cardio [facility]     ,   Congestive Heart Failure Assessment    Are you currently restricting fluids?: 1800cc  Do you understand a low sodium diet?: No  Do you understand how to read food labels?: No  How many restaurant meals do you eat per week?: 5 or more  Do you salt your food before tasting it?: No     Swelling (worse than baseline) in hands, feet/legs or around abdomen      Symptoms:          , and   COPD Assessment    Does the patient understand envrionmental exposure?: Yes  Is the patient able to verbalize Rescue vs. Long Acting medications?: Yes  Does the patient have a nebulizer?: No  Does the patient use a space with inhaled medications?: No            Symptoms:

## 2023-01-11 NOTE — PROGRESS NOTES
1/11/23     Chief Complaint   Patient presents with    Cellulitis     [facility]ses [facility]. 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 HPI    Here today to discuss issue with possible recurring cellulitis of the right leg and new onset wound of the left leg. Also wants to discuss MRI results and necessity of other image. Allergies   Allergen Reactions    Bactrim      hallucinations    Clindamycin/Lincomycin Diarrhea       Current Outpatient Medications   Medication Sig Dispense Refill    doxycycline hyclate (VIBRA-TABS) 100 MG tablet Take 1 tablet by mouth 2 times daily for 7 days 14 tablet 0    levETIRAcetam (KEPPRA) 500 MG tablet Take 1 tablet by mouth 2 times daily 60 tablet 3    dilTIAZem (DILACOR XR) 180 MG extended release capsule Take 180 mg by mouth in the morning and at bedtime 1 capsule bid      apixaban (ELIQUIS) 5 MG TABS tablet Take 1 tablet by mouth 2 times daily 180 tablet 3    atorvastatin (LIPITOR) 40 MG tablet TAKE 1 TABLET BY MOUTH EVERY DAY IN THE EVENING 90 tablet 1    diclofenac sodium (VOLTAREN) 1 % GEL APPLY 4 GRAMS TOPICALLY 4 TIMES DAILY AS NEEDED FOR PAIN 500 g 2    metoprolol tartrate (LOPRESSOR) 25 MG tablet Take 1 tablet by mouth 2 times daily 180 tablet 3    budesonide-formoterol (SYMBICORT) 160-4.5 MCG/ACT AERO Inhale 2 puffs into the lungs 2 times daily. 3 Inhaler 3    therapeutic multivitamin-minerals (THERAGRAN-M) tablet Take 1 tablet by mouth daily. aspirin EC 81 MG EC tablet Take 1 tablet by mouth daily (Patient not taking: No sig reported) 30 tablet 5    allopurinol (ZYLOPRIM) 300 MG tablet TAKE 1 TABLET BY MOUTH 2 TIMES DAILY (Patient not taking: No sig reported) 180 tablet 3    albuterol-ipratropium (COMBIVENT RESPIMAT)  MCG/ACT AERS inhaler Inhale 1 puff into the lungs every 6 hours.  (Patient not taking: No sig reported) 3 Inhaler 3     No current facility-administered medications for this visit. Review of Systems   Constitutional: Negative. HENT:  Positive for tinnitus. Eyes: Negative. Respiratory: Negative. Cardiovascular: Negative. Gastrointestinal: Negative. Endocrine: Negative. Genitourinary: Negative. Musculoskeletal: Negative. Skin:  Positive for color change, rash and wound. Keratinization bilaterally    Neurological: Negative. Psychiatric/Behavioral: Negative. Vitals:    01/11/23 0920   BP: 136/80   Pulse: 78   SpO2: 97%      Physical Exam  Constitutional:       General: He is not in acute distress. Appearance: Normal appearance. He is not ill-appearing. HENT:      Head: Normocephalic and atraumatic. Cardiovascular:      Rate and Rhythm: Normal rate and regular rhythm. Pulses: Normal pulses. Heart sounds: Normal heart sounds. Pulmonary:      Effort: Pulmonary effort is normal. No respiratory distress. Abdominal:      General: Bowel sounds are normal.   Skin:     General: Skin is warm and dry. Findings: Abscess, erythema and wound present. Comments: Weeping wound on R leg with seropurulent drainage  Serous drainage on L leg no open wounds    Neurological:      General: No focal deficit present. Mental Status: He is alert and oriented to person, place, and time. Mental status is at baseline. Psychiatric:         Mood and Affect: Mood normal.         Behavior: Behavior normal.     Assessment/Plan:  1. Cellulitis of left lower extremity  Continue cleaning wound per home care regimen     2. Cellulitis of right lower extremity  7 day course of Doxycyline    Continue cleaning wound per home care regimen     3. MRI/ Follow up with Neurosurgery   Discussed importance of making appointment with [address] [place] [address] Neurosurgery. Will do MRI sinus if deemed necessary.        Cellulitis of left lower extremity/ Cellulitis of right lower extremity/ PVD/ S/P transmetatarsal amputation of foot, right (HCC)  Chronic and intermittent. Acute exacerbation - covering with doxy. Reviewed wound care and dressing changes.  Pt declined going to wound clinic.    -     doxycycline hyclate (VIBRA-TABS) 100 MG tablet; Take 1 tablet by mouth 2 times daily for 7 days  -     Ambulatory Referral to Care Management with Device (Remote Patient Monitoring)    Severe obesity (BMI 35.0-39.9) with comorbidity (HCC)  Chronic, ucnontrolled. Work on diet/ exercise and healthy lifestyle changes.      Chronic obstructive pulmonary disease, unspecified COPD type (HCC)    Chronic diastolic congestive heart failure (HCC)/ Chronic atrial fibrillation (HCC)/ Stage 3b chronic kidney disease (HCC)/ aneurysm of descending aorta/ CAD  Chronic, continue seeing cardiology as recommended.     Recurrent major depressive disorder, in full remission (HCC)  Chronic, controlled and denies intervention today.     New onset seizure (HCC)/ TIA / abnormal MRI brain   Reviewed recent MRI in detail with pt.   Reviewed recommendation of orbital MRI - pt has the order but does not want to complete.  Referral for [facility] printed for pt and strongly encouraged pt to schedule appt.     Discussed medications with patient, who voiced understanding of their use and indications. All questions answered.    Follow up in 3 months or sooner if leg wound worsens after antibiotic course.     Electronically signed by MANDY Olsen CNP on 1/11/2023 at 12:55 PM    Seymour Lujan was evaluated and could benefit from a new power chair to complete daily living tasks of bathing, toileting, personal cares, ambulating, grooming, hygiene, and meal preparation.  He could benefit from a new power chair due to patient's impaired ambulation and mobility restrictions and would be unable to resolve these daily living tasks using a cane or walker.  The patient is capable of using a power chair safely in their home and can maneuver within their home with adequate  access. There is a caregiver available to provide necessary assistance. The need for this equipment was discussed with the patient and he understands, is in agreement, and has not expressed an unwillingness to use the chair.

## 2023-01-12 ENCOUNTER — CARE COORDINATION (OUTPATIENT)
Dept: CARE COORDINATION | Age: 68
End: 2023-01-12

## 2023-01-16 ENCOUNTER — TELEPHONE (OUTPATIENT)
Dept: CARDIOLOGY CLINIC | Age: 68
End: 2023-01-16

## 2023-01-16 NOTE — TELEPHONE ENCOUNTER
Pt has appt with [facility] on 1/19/23 and is asking if he has to come in for monitor results. Can they be give results over the phone.  Please call to advise

## 2023-01-17 NOTE — PROGRESS NOTES
[facility]   Electrophysiology Consultation   Date: 1/19/2023  Reason for Consultation: Afib    Consult Requesting Physician: [facility]   Chief Complaint   Patient presents with    Atrial Fibrillation     No complaints        CC: Afib   HPI: Julisa Lyles is a 78 y.o. male with a PMH of HTN, chronic diastolic HF, atrial fibrillation, HLD and OLEG. [address] showed normal Cors    10/30/2022  presented to [facility] with altered mental status. Also had left sided weakness and facial drooping. Diagnosed with TIA. Holter 11/11/2022 (7 day) showed 100% afib    11/29/2022 admitted for seizure like activity. EEG was performed and was normal.    Interval History:  Patient has had persistent A. fib. He is able to perform his daily activity and has no symptoms related to his atrial fibrillation. He has history of GI bleed but has been able to take Eliquis for now. Assessment and plan:   Persistent Atrial Fibrillation   -ECG today shows declined ECG today    -Patient has a [id number] Score of at least 4(TIA2, age, HTN)   -Holter 11/11/2022 showed 100% AF burden   -continue lopressor 25 mg BID    -s/p DCCV 5/31/2000  He has history of GI bleed. We discussed watchman in case EP develops any issues in the future. TIA   -10/30/22 CT Head No Acute Intracranial Abnormality  -10/30/22 CTA Head Neck Negative for large vessel occlusion or hemodynamic stenosis. HTN  -Controlled:   -BP goal <130/80  -Home BP monitoring encouraged, printed information provided on how to accurately measure BP at home.  -Counseled to follow a low salt diet to assure blood pressure remains controlled for cardiovascular risk factor modification.   -The patient is counseled to get regular exercise 3-5 times per week and maintain a healthy weight reduce cardiovascular risk factors. Obesity  Body mass index is 36.83 kg/m². -Excessive weight is complicating assessment and treatment.  It is placing patient at risk for multiple co-morbidities as well as early death and contributing to the patient's presentation.  -Discussed weight management with diet and exercise      OLEG  -Stable: Uses CPAP/Bipap/APAP  -Encourage to use machine to prevent long term effects of untreated OLEG    Plan:   Follow-up in 1 year  Patient Active Problem List    Diagnosis Date Noted    Essential hypertension 04/09/2013    Chronic atrial fibrillation ([address] 07/12/2010    Abnormal MRI of head 01/11/2023    Aneurysm of descending thoracic aorta without rupture 11/11/2022    New onset seizure ([address] 10/30/2022    TIA (transient ischemic attack) 10/30/2022    (HFpEF) heart failure with preserved ejection fraction ([address] 10/30/2022    Cellulitis of leg, right 02/25/2022    Noncompliance 02/25/2022    Antibiotic-associated diarrhea 02/25/2022    Open wound of right lower extremity 02/17/2022    Localized edema 04/28/2021    PVD (peripheral vascular disease) ([address] 03/05/2021    Stage 3b chronic kidney disease ([address] 03/05/2021    Mixed erectile dysfunction 04/22/2020    Recurrent major depressive disorder, in full remission ([address] 02/04/2020    BRIAN (acute kidney injury) ([address]     Obesity     S/P transmetatarsal amputation of foot, right (HCC)     Chronic diastolic congestive heart failure ([address] 02/13/2018    Hand arthritis 02/23/2016    Chronic idiopathic gout of multiple sites 02/18/2016    Hyperlipidemia 08/09/2013    COPD (chronic obstructive pulmonary disease) ([address] 07/12/2010    Sleep apnea 07/12/2010     Diagnostic studies:   ECG 1/19/23       MRI head 12/22/2022  CONCLUSION:   1. No evidence of acute infarct or hemorrhage. 2. No focal temporal lobe lesions are seen. 3. Moderate microangiopathic white matter disease. 4. No intra-axial mass or abnormal intra-axial enhancement. No intra-axial metastatic lesions    are seen.    5. A fusiform lesion is present in the mid and posterior aspect of the left medial orbit,    medial to the medial rectus muscle, and exerting mass effect on the medial rectus muscle. 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 Consider dedicated orbital CT and MRI to characterize the abnormality    further if possible. Echo 10/31/2022   Left ventricular systolic function is normal with ejection fraction   estimated at 55-60 %. No regional wall motion abnormalities are noted. There is mild concentric left ventricular hypertrophy. Grade II diastolic dysfunction with elevated filling pressure. The ascending aorta is dilated at 4.5 cm. Bi-atrial enlargement. Mild mitral & pulmonic regurgitation. Mild-to-moderate tricuspid regurgitation. IVC size is dilated (>2.1 cm) but collapses > 50% with respiration   consistent with elevated RA pressure (8 mmHg). Consider cross-sectional imaging of the aorta if clinically indicated. Echo 5/7/2017   -Left ventricular size is mildly increased . -Moderate concentric left ventricular hypertrophy is present.   -Left ventricular function is normal with ejection fraction estimated at 60   %.   -No regional wall motion abnormalities are noted. E/e'= 11.95 .   -Mitral annular calcification is present.   -There is mild mitral and tricuspid regurgitation with RVSP estimated at 35   mmHg.   -Dilated left atrium with a volume of 101 ml.   -Right heart is moderately dilated . I independently reviewed the cardiac diagnostic studies, ECG and relevant imaging studies. Lab Results   Component Value Date    LVEF 58 10/31/2022    LVEFMODE Echo 05/08/2017     Lab Results   Component Value Date    TSH 1.66 05/07/2017       Physical Examination:  Vitals:    01/19/23 0741   BP: 138/76   Pulse: 73      Wt Readings from Last 3 Encounters:   01/19/23 (!) 302 lb 9.6 oz (137.3 kg)   12/09/22 (!) 313 lb (142 kg)   12/01/22 (!) 310 lb 3 oz (140.7 kg)       Constitutional: Oriented. No distress. Head: Normocephalic and atraumatic. Mouth/Throat: Oropharynx is clear and moist.   Eyes: Conjunctivae normal. EOM are normal.   Neck: Neck supple. No rigidity. No JVD present. Cardiovascular: Normal rate, irregular rhythm, S1&S2. Pulmonary/Chest: Bilateral respiratory sounds. No wheezes, No rhonchi. Abdominal: Soft. Bowel sounds present. No distension, No tenderness. Musculoskeletal: No tenderness. No edema    Lymphadenopathy: Has no cervical adenopathy. Neurological: Alert and oriented. Cranial nerve appears intact, No Gross deficit   Skin: Skin is warm and dry. No rash noted. Psychiatric: Has a normal behavior       Review of System:  [x] Full ROS obtained and negative except as mentioned in HPI    Prior to Admission medications    Medication Sig Start Date End Date Taking? Authorizing Provider   levETIRAcetam (KEPPRA) 500 MG tablet Take 1 tablet by mouth 2 times daily 12/1/22  Yes Ingrid Grossman MD   dilTIAZem (DILACOR XR) 180 MG extended release capsule Take 180 mg by mouth in the morning and at bedtime 1 capsule bid   Yes Historical Provider, MD   apixaban (ELIQUIS) 5 MG TABS tablet Take 1 tablet by mouth 2 times daily 11/11/22  Yes Gabriele Driver MD   aspirin EC 81 MG EC tablet Take 1 tablet by mouth daily 11/1/22  Yes Ingrid Grossman MD   atorvastatin (LIPITOR) 40 MG tablet TAKE 1 TABLET BY MOUTH EVERY DAY IN THE EVENING 7/18/22  Yes Niles Mejia MD   allopurinol (ZYLOPRIM) 300 MG tablet TAKE 1 TABLET BY MOUTH 2 TIMES DAILY 4/19/22  Yes Jenn Cabezas MD   diclofenac sodium (VOLTAREN) 1 % GEL APPLY 4 GRAMS TOPICALLY 4 TIMES DAILY AS NEEDED FOR PAIN 1/17/22  Yes Niles Mejia MD   metoprolol tartrate (LOPRESSOR) 25 MG tablet Take 1 tablet by mouth 2 times daily 1/15/22  Yes Melodie Anne MD   budesonide-formoterol (SYMBICORT) 160-4.5 MCG/ACT AERO Inhale 2 puffs into the lungs 2 times daily.  4/20/15  Yes Eddie Doyle MD   therapeutic multivitamin-minerals [facility]) tablet Take 1 tablet by mouth daily.    Yes Historical Provider, MD       Past Medical History:   Diagnosis Date    Anxiety     Arthritis     Asthma     Atrial fibrillation [address]     Blood circulation, collateral     CAD (coronary artery disease)     CHF (congestive heart failure) (HCC)     Colon cancer (HCC)     COPD (chronic obstructive pulmonary disease) ([facility]     Diabetic foot infection ([facility]     pt denies diabetes    Hernia     Hyperlipidemia     Hypertension     Movement disorder     Muscle cramps     Neuromuscular disorder (HCC)     marvin feet numbness/neuropathy    Neuropathy     Pneumonia     Renal insufficiency     Sleep apnea     no cpap    Thyroid disease     Tinnitus aurium, bilateral     Unspecified cerebral artery occlusion with cerebral infarction     mini stroke    Unspecified diseases of blood and blood-forming organs         Past Surgical History:   Procedure Laterality Date    ABDOMEN SURGERY      APPENDECTOMY  10/9/2012    CARPAL TUNNEL RELEASE Left 12/11/14    CARPAL TUNNEL RELEASE Right 6/11/2019    RIGHT CARPAL TUNNEL RELEASE AND RIGHT MIDDLE FINGER TRIGGER FINGER RELEASE performed by Salomon Huang MD at [address] [place] [address]      for Colon Ca 10 inches removed from colon    COLONOSCOPY      COLONOSCOPY  3/13/14    COLONOSCOPY  03/02/2018    CYSTOSCOPY Right 5/3/2019    CYSTOSCOPY, RIGHT URETEROSCOPY, STONE EXTRACTION WITH STONE BASKET performed by Josette Zheng MD at [address] [place] [address] ([facility])  6/4/14    ENDOSCOPY, COLON, DIAGNOSTIC      FOOT SURGERY Right 07/13/2017    Amputation toes 2,3,5, right foot Flap closure, Exostectomy 1st metatarsal right foot    FOOT SURGERY Right 08/17/2017    INCISION AND DRAINAGE OF RIGHT FOOT WITH FLAP CLOSURE    HERNIA REPAIR      umbilical, hiatal hernia repairs    KNEE ARTHROSCOPY Right 08/23/2018    partial medial/ lateral menisectomy    SKIN BIOPSY      TOE AMPUTATION  3/7/11    RIGHT FOURTH TOE    TOE SURGERY      ULNAR TUNNEL RELEASE Left 12-11-14 Left ulnar nerve decompression and left CTR    UPPER GASTROINTESTINAL ENDOSCOPY  1/28/2014    with biopsies    UPPER GASTROINTESTINAL ENDOSCOPY  3/13/14    UPPER GASTROINTESTINAL ENDOSCOPY  6/4/14    with biopsy    UPPER GASTROINTESTINAL ENDOSCOPY  03/02/2018    VENTRICULAR ABLATION SURGERY         Allergies   Allergen Reactions    Bactrim      hallucinations    Clindamycin/Lincomycin Diarrhea       Social History:  Reviewed. reports that he has never smoked. He has never used smokeless tobacco. He reports current alcohol use. He reports that he does not use drugs. Family History:  Reviewed. Reviewed. No family history of SCD. Relevant and available labs, and cardiovascular diagnostics reviewed. Reviewed. I independently reviewed all cardiac tracing. I independently reviewed relevant and available cardiac diagnostic tests ECG, CXR, Echo, Stress test, Device interrogation, Holter, CT scan. Outside medical records via Care everywhere reviewed and summarized in H&P above. Complex medical condition with multiple medical problems affecting prognosis and outcome of EP interventions       - The patient is counseled to follow a low salt diet to assure blood pressure remains controlled for cardiovascular risk factor modification.   - The patient is counseled to avoid excess caffeine, and energy drinks as this may exacerbated ectopy and arrhythmia. - The patient is counseled to get regular exercise 3-5 times per week to control cardiovascular risk factors. All questions and concerns were addressed to the patient/family. Alternatives to my treatment were discussed. I have discussed the above stated plan and the patient verbalized understanding and agreed with the plan. Scribe attestation:  This note was scribed in the presence of Theresa Smith MD by Hamzah Aragon RN    I, Dr. Theresa Smith personally performed the services described in this documentation as scribed by RN in my presence, and it is both accurate and complete. NOTE: This report was transcribed using voice recognition software. Every effort was made to ensure accuracy, however, inadvertent computerized transcription errors may be present.      Terri Loja MD, [facility], [facility]   Office: [phone]  Fax: [phone]

## 2023-01-18 ENCOUNTER — CARE COORDINATION (OUTPATIENT)
Dept: CARE COORDINATION | Age: 68
End: 2023-01-18

## 2023-01-18 DIAGNOSIS — H91.93 DECREASED HEARING OF BOTH EARS: Primary | ICD-10-CM

## 2023-01-18 NOTE — CARE COORDINATION
I agree with the Care Coordinator's Plan of Care. Audiology referral placed. [facility] [place]  [facility]  [address].  [address], [place], [address]

## 2023-01-18 NOTE — CARE COORDINATION
Ambulatory Care Coordination Note  1/18/2023    ACC: Vonnie Engel, RN    RN-CC outreached patient for cc follow up; CHF, audiology & neurosurgery referral, debbyhart, needs & concerns. Patient's [employer] account is now active. RN-CC will send CHF & COPD resources through [address] CHF & COPD resources also sent through mail per patient's request.    Patient educated on s/sx of CHF exacerbation, reinforcement needed. Patient does not have a home scale and is not interested in purchasing one. Patient states he tends to shed 20 lb in the warmer months. RN-CC educated on daily weights - informed patient that weighing daily is to monitor water weight. Patient still no interested in obtaining a scale. Patient loosely follows a light sodium diet. He states he is a very picky eater, likes corn and lima beans. He does not use added salt. Educated on reading food labels and staying under 2 grams of sodium per day, choosing fresh/frozen foods over canned. Patient is not interested in a dietician referral and is not overly receptive to teaching in general.     Tinnitus - recommended patient follow up with audiologist. Referral requested. RN-CC will route message to pcp. Mobility scooter - referred to [facility] with [facility]. Support staff obtained measurements and are in the process of ordering a new scooter. Cellulitis - redness, warmth & drainage resolved, per patient. He is scheduled for a nurse visit w [facility] tomorrow. RN-CC educated patient on compression, elevation, and low na diet to minimize swelling and decrease risk for infection. Patient also encouraged to keep skin moisturized to avoid cracking - recommended applying Aquaphor to bilateral extremities daily. He has not received a call from neurosurgery. Patient states he spoke with them last week and was told it could take 7-10 days to receive the report from [facility].  RN-CC outreached [facility] confirmed receipt of referral. RNBeCC was informed someone has been assigned his referral and will outreach him to schedule to the appointment. PLAN:    Route message to pcp - audiology referral requested by patient  F/u on resources sent through [employer]. patient enrollment in the Remote Patient Monitoring (RPM) program for in-home monitoring: Patient declined. Lab Results       None            Care Coordination Interventions    Referral from Primary Care Provider: Yes  Suggested Interventions and [address]: In Process (Comment: SPIRIT 1/11/22 JUAN)  Meals on Wheels: Declined (Comment: 1/11/23 JUAN)  Pharmacist: In Process (Comment: 1/11/23 JUAN)  Registered Dietician: Declined (Comment: 1/11/23 JUAN)  Senior Services: Declined (Comment: 1/11/23 JUAN)  Zone Management Tools: In Process (Comment: COPD, CHF 1/11/22 JUAN)          Goals Addressed    None         Prior to Admission medications    Medication Sig Start Date End Date Taking?  Authorizing Provider   doxycycline hyclate (VIBRA-TABS) 100 MG tablet Take 1 tablet by mouth 2 times daily for 7 days 1/11/23 1/18/23  MANDY Oconnell - CNP   levETIRAcetam (KEPPRA) 500 MG tablet Take 1 tablet by mouth 2 times daily 12/1/22   Gwynda Baumgarten, MD   dilTIAZem (DILACOR XR) 180 MG extended release capsule Take 180 mg by mouth in the morning and at bedtime 1 capsule bid    Historical Provider, MD   apixaban (ELIQUIS) 5 MG TABS tablet Take 1 tablet by mouth 2 times daily 11/11/22   Stephanie Carl MD   aspirin EC 81 MG EC tablet Take 1 tablet by mouth daily 11/1/22   Gwynda Baumgarten, MD   atorvastatin (LIPITOR) 40 MG tablet TAKE 1 TABLET BY MOUTH EVERY DAY IN THE EVENING 7/18/22   Nichelle Lara MD   allopurinol (ZYLOPRIM) 300 MG tablet TAKE 1 TABLET BY MOUTH 2 TIMES DAILY 4/19/22   Saba Hernandez MD   diclofenac sodium (VOLTAREN) 1 % GEL APPLY 4 GRAMS TOPICALLY 4 TIMES DAILY AS NEEDED FOR PAIN 1/17/22   Nichelle Lara MD   metoprolol tartrate (LOPRESSOR) 25 MG tablet Take 1 tablet by mouth 2 times daily 1/15/22   Radha Dennison MD   budesonide-formoterol [facility]) 160-4.5 MCG/ACT AERO Inhale 2 puffs into the lungs 2 times daily. Patient not taking: Reported on 1/11/2023 4/20/15   Fatoumata Chong MD   albuterol-ipratropium (COMBIVENT RESPIMAT)  MCG/ACT AERS inhaler Inhale 1 puff into the lungs every 6 hours. Patient not taking: No sig reported 4/20/15   Fatoumata Chong MD   therapeutic multivitamin-minerals [facility]) tablet Take 1 tablet by mouth daily.     Historical Provider, MD       Future Appointments   Date Time Provider Natasha Yap   1/19/2023  7:45 AM Raman Curtis MD [facility]   11/13/2023  7:30 AM Marleni Spain MD [facility]   ,   Congestive Heart Failure Assessment    Are you currently restricting fluids?: 1800cc  Do you understand a low sodium diet?: No  Do you understand how to read food labels?: No  How many restaurant meals do you eat per week?: 5 or more  Do you salt your food before tasting it?: No         Symptoms:          ,   COPD Assessment    Does the patient understand envrionmental exposure?: Yes  Is the patient able to verbalize Rescue vs. Long Acting medications?: Yes  Does the patient have a nebulizer?: No  Does the patient use a space with inhaled medications?: No            Symptoms:          , and   General Assessment

## 2023-01-19 ENCOUNTER — OFFICE VISIT (OUTPATIENT)
Dept: CARDIOLOGY CLINIC | Age: 68
End: 2023-01-19
Payer: MEDICARE

## 2023-01-19 ENCOUNTER — CARE COORDINATION (OUTPATIENT)
Dept: CARE COORDINATION | Age: 68
End: 2023-01-19

## 2023-01-19 VITALS
WEIGHT: 302.6 LBS | SYSTOLIC BLOOD PRESSURE: 138 MMHG | DIASTOLIC BLOOD PRESSURE: 76 MMHG | HEART RATE: 73 BPM | BODY MASS INDEX: 36.85 KG/M2 | HEIGHT: 76 IN

## 2023-01-19 DIAGNOSIS — I10 ESSENTIAL HYPERTENSION: ICD-10-CM

## 2023-01-19 DIAGNOSIS — I48.20 CHRONIC ATRIAL FIBRILLATION (HCC): Primary | ICD-10-CM

## 2023-01-19 DIAGNOSIS — G47.33 OBSTRUCTIVE SLEEP APNEA SYNDROME: ICD-10-CM

## 2023-01-19 DIAGNOSIS — E66.9 OBESITY (BMI 30-39.9): ICD-10-CM

## 2023-01-19 DIAGNOSIS — I50.32 CHRONIC DIASTOLIC CONGESTIVE HEART FAILURE (HCC): ICD-10-CM

## 2023-01-19 PROCEDURE — 3075F SYST BP GE 130 - 139MM HG: CPT | Performed by: INTERNAL MEDICINE

## 2023-01-19 PROCEDURE — 3078F DIAST BP <80 MM HG: CPT | Performed by: INTERNAL MEDICINE

## 2023-01-19 PROCEDURE — 1123F ACP DISCUSS/DSCN MKR DOCD: CPT | Performed by: INTERNAL MEDICINE

## 2023-01-19 PROCEDURE — 99204 OFFICE O/P NEW MOD 45 MIN: CPT | Performed by: INTERNAL MEDICINE

## 2023-01-19 NOTE — CARE COORDINATION
RN-CC attempted to outreach patient regarding Audiology referral. No answer; HIPPA compliant message left through  requesting call return. Information sent through [address]

## 2023-01-27 ENCOUNTER — CARE COORDINATION (OUTPATIENT)
Dept: CARE COORDINATION | Age: 68
End: 2023-01-27

## 2023-01-27 NOTE — CARE COORDINATION
Ambulatory Care Coordination Note  1/27/2023    ACC: Vonnie Engel, RN  RN-CC outreached patient for cc follow up; mobility scooter, referral, chf, copd, audiology, appt with [address] [place] [address]. He states he received a letter regarding the audiology referral stating he needs to schedule a hearing exam. He states his hearing is just fine and refuses to schedule testing. Mobility scooter - RN-CC received follow up [place] [facility] Evaluation and paperwork has been completed and submitted to insurance. Neurosurgery - he states he has not received a call from [address] [place] [address] to schedule an appointment. Patient states he is not calling them to follow up. He states he is tired of making phone calls. COPD - sx at baseline. Resources sent through [employer]. Educated deferred to later date. Chf - patient is not weighing daily. He does not have a scale and is not interested in obtaining one. He is not interested in RPM. He reports worsening bilateral wheeping edema. He states he has chronic redness but denies worsening redness, warmth, blistering or fever. He states his [facility] nurse applied ace wraps a few days ago and instructed him not to remove them until she returns this Sunday. RN-CC advised patient against leaving ace wraps on 24/7, especially if his legs are wheeping, as this could lead to maceration and further complications. Patient states he did remove the compression wraps and the skin to his bilateral extremities is \"white\" from excessive moisture. Bilateral extremities are currently shaan. RN-CC advised patient to cleanse skin daily with warm soap and water, pat dry. Leave shaan UNLESS he is able to change out ace wraps AT LEAST daily and prn soiled. He states he is unable to wrap his legs by himself and doesn't have anyone who can help. He denies increase sob, chest pain, orthopnea or cough.  I STRONGLY encouraged he try and schedule a same day appointment with his pcp and offered to assist with scheduling but he declined. He does not feel the need to contact his pcp or cardiologist, he states he trusts his home healthcare nurse and will talk to her on Sunday. I discussed contacting [facility] to do a same day appointment but he declined. I did provide him with the phone number to [facility] and strongly encouraged him to contact them if needed. He was resistant to taking [facility] contact information but did eventually agree. Red flag sx reviewed as well as when to seek treatment in the ER. Patient states bilateral edema is not new and has been ongoing for months, although wheeping is worse over the last few days. Patient then states he is currently taking between 3-6 40mg Torsemide tablets per day. RN-CC advised patient taking too much diuretic as this can lead to dehydration, electrolyte imbalance, low blood pressure. RN-CC informed patient that will send a message to his pcp to notify of worsening bilateral edema. PLAN:    Route high priority message to pcp   F/u on call to [address] patient enrollment in the Remote Patient Monitoring (RPM) program for in-home monitoring: Patient declined. Lab Results       None            Care Coordination Interventions    Referral from Primary Care Provider: Yes  Suggested Interventions and [address] [place] [address] Hwy: In Process (Comment: SPIRIT 1/11/22 JUAN)  Meals on Wheels: Declined (Comment: 1/11/23 JUNA)  Pharmacist: Declined (Comment: 1/11/23 JUAN)  Registered Dietician: Declined (Comment: 1/11/23 JUAN)  Senior Services: Declined (Comment: 1/11/23 JUAN)  Zone Management Tools: In Process (Comment: COPD, CHF 1/11/22 JUAN)          Goals Addressed    None         Prior to Admission medications    Medication Sig Start Date End Date Taking?  Authorizing Provider   levETIRAcetam (KEPPRA) 500 MG tablet Take 1 tablet by mouth 2 times daily 12/1/22   Pebbles Whitley MD   dilTIAZem (DILACOR XR) 180 MG extended release capsule Take 180 mg by mouth in the morning and at bedtime 1 capsule bid    Historical Provider, MD   apixaban (ELIQUIS) 5 MG TABS tablet Take 1 tablet by mouth 2 times daily 11/11/22   Clinton Jorge MD   aspirin EC 81 MG EC tablet Take 1 tablet by mouth daily 11/1/22   Meenakshi Leblanc MD   atorvastatin (LIPITOR) 40 MG tablet TAKE 1 TABLET BY MOUTH EVERY DAY IN THE EVENING 7/18/22   Blu Galvez MD   allopurinol (ZYLOPRIM) 300 MG tablet TAKE 1 TABLET BY MOUTH 2 TIMES DAILY 4/19/22   Chester Baca MD   diclofenac sodium (VOLTAREN) 1 % GEL APPLY 4 GRAMS TOPICALLY 4 TIMES DAILY AS NEEDED FOR PAIN 1/17/22   Blu Galvez MD   metoprolol tartrate (LOPRESSOR) 25 MG tablet Take 1 tablet by mouth 2 times daily 1/15/22   Glynn Petty MD   budesonide-formoterol (SYMBICORT) 160-4.5 MCG/ACT AERO Inhale 2 puffs into the lungs 2 times daily. 4/20/15   Natalie Maldonado MD   therapeutic multivitamin-minerals [facility]) tablet Take 1 tablet by mouth daily.     Historical Provider, MD       Future Appointments   Date Time Provider Natasha Yap   11/13/2023  7:30 AM Clinton Jorge MD [facility]   ,   Congestive Heart Failure Assessment    Are you currently restricting fluids?: 1800cc (Comment: 1/18/23 JUAN)  Do you understand a low sodium diet?: No  Do you understand how to read food labels?: No  How many restaurant meals do you eat per week?: 5 or more  Do you salt your food before tasting it?: No         Symptoms:          ,   COPD Assessment    Does the patient understand envrionmental exposure?: Yes  Is the patient able to verbalize Rescue vs. Long Acting medications?: Yes  Does the patient have a nebulizer?: No  Does the patient use a space with inhaled medications?: No            Symptoms:          , and   General Assessment

## 2023-01-27 NOTE — CARE COORDINATION
RN-KAYLA Davis; spoke with Jed. Confirmed receipt of referral and imaging received from [facility]. Jed states she will look into this referral and call me back shortly.

## 2023-01-30 ENCOUNTER — CARE COORDINATION (OUTPATIENT)
Dept: CARE COORDINATION | Age: 68
End: 2023-01-30

## 2023-01-30 NOTE — CARE COORDINATION
ACM f/u with patient today, per request from his primary ACM Vonnie, regarding his recent increase in bilateral leg swelling and weeping edema. Pt confirmed his Dayan Gonzalez nurse from [facility] came to see him on Sunday, 1/29/23. Per pt, he reports his legs \"are doing fine,\" and he informed ACM the Dayan Gonzalez nurse took pictures of his legs prior to re-wrapping his legs with a new dressing, planning to send them into his insurance company. He said his nurse informed him that his legs have improved and the wounds, which are the size of 50 cent pieces per patient description, are healing well and better than they were 1.5 weeks ago. Pt stated he plans to call his insurance company today as he would like to have the Dayan Gonzalez nurse come out and wrap his legs more than once per week. He said he was approved for PT and an audiology hearing test, but he said he doesn't think he needs a hearing test, or the PT because he said \"I can walk up and down steps. \" ACM encouraged him to see if his insurance company is able to offer any additional skilled nursing hours and his PCP might be able to request additional [facility] hours. Pt agreed with this plan. AC informed him I will pass this information on to his primary ACM as well. Pt confirmed he has the contact information for [facility] if needed, however he stated he doesn't need them, specifically at this time. ACM encouraged him to keep the number for future needs/concerns. ACM encouraged him to call with any additional questions or concerns and he stated he would do so.      PLAN:    Update Primary ACM

## 2023-01-31 ENCOUNTER — CARE COORDINATION (OUTPATIENT)
Dept: CARE COORDINATION | Age: 68
End: 2023-01-31

## 2023-01-31 NOTE — CARE COORDINATION
Ambulatory Care Coordination Note  1/31/2023    ACC: Vonnie Engel, RN  RN-CC received an incoming call from patient. He is requesting an increase in [facility]. He was receiving visits 2x weekly but insurance will now only approve weekly nurse visits. Patient reports his bilateral swelling is improved and he is concerned they will get worse with the decrease in nursing visits. Patient reports his [address] took pictures of his legs and has submitted them to his insurance company to review. RN-CC informed patient that I am unable to request an increase in visits - typically approval is based on documentation submitted by the [facility] clinician. I advised he outreach his [facility] to discuss, pt melchor. RN-CC reinforced the importance of following a low sodium diet, elevation and compression to control edema and prevent infection or skin breakdown. CHF and dietary resources sent through [employer] but patient has not reviewed. Appointment scheduled with Dr. Marisabel Razo on 2/14 @ 0800. Patient encouraged to contact RN-CC with questions or concerns, pt melchor. Offered patient enrollment in the Remote Patient Monitoring (RPM) program for in-home monitoring: Patient declined. Lab Results       None            Care Coordination Interventions    Referral from Primary Care Provider: Yes  Suggested Interventions and [address] [place] [address] Hwy: In Process (Comment: SPIRIT 1/11/22 JUAN)  Meals on Wheels: Declined (Comment: 1/11/23 JUAN)  Pharmacist: Declined (Comment: 1/11/23 JUAN)  Registered Dietician: Declined (Comment: 1/11/23 JUAN)  Senior Services: Declined (Comment: 1/11/23 JUAN)  Zone Management Tools: In Process (Comment: COPD, CHF 1/11/22 JUAN)          Goals Addressed    None         Prior to Admission medications    Medication Sig Start Date End Date Taking?  Authorizing Provider   levETIRAcetam (KEPPRA) 500 MG tablet Take 1 tablet by mouth 2 times daily 12/1/22   Misha Wilkes MD   dilTIAZem (DILACOR XR) 180 MG extended release capsule Take 180 mg by mouth in the morning and at bedtime 1 capsule bid    Historical Provider, MD   apixaban (ELIQUIS) 5 MG TABS tablet Take 1 tablet by mouth 2 times daily 11/11/22   Tala King MD   aspirin EC 81 MG EC tablet Take 1 tablet by mouth daily 11/1/22   María Elena Pearl MD   atorvastatin (LIPITOR) 40 MG tablet TAKE 1 TABLET BY MOUTH EVERY DAY IN THE EVENING 7/18/22   Lisa Cabrales MD   allopurinol (ZYLOPRIM) 300 MG tablet TAKE 1 TABLET BY MOUTH 2 TIMES DAILY 4/19/22   Kevan Hsu MD   diclofenac sodium (VOLTAREN) 1 % GEL APPLY 4 GRAMS TOPICALLY 4 TIMES DAILY AS NEEDED FOR PAIN 1/17/22   Lisa Cabrales MD   metoprolol tartrate (LOPRESSOR) 25 MG tablet Take 1 tablet by mouth 2 times daily 1/15/22   Karel Chávez MD   budesonide-formoterol (SYMBICORT) 160-4.5 MCG/ACT AERO Inhale 2 puffs into the lungs 2 times daily. 4/20/15   Kevan Rooj MD   therapeutic multivitamin-minerals [facility]) tablet Take 1 tablet by mouth daily.     Historical Provider, MD       Future Appointments   Date Time Provider Natasha Yap   11/13/2023  7:30 AM Tala King MD [facility]   ,   Congestive Heart Failure Assessment    Are you currently restricting fluids?: 1800cc (Comment: 1/31/23 JUAN)  Do you understand a low sodium diet?: No  Do you understand how to read food labels?: No  How many restaurant meals do you eat per week?: 5 or more  Do you salt your food before tasting it?: No         Symptoms:          ,   COPD Assessment    Does the patient understand envrionmental exposure?: Yes  Is the patient able to verbalize Rescue vs. Long Acting medications?: Yes  Does the patient have a nebulizer?: No  Does the patient use a space with inhaled medications?: No     No patient-reported symptoms         Symptoms:          , and   General Assessment

## 2023-02-08 ENCOUNTER — OFFICE VISIT (OUTPATIENT)
Dept: INTERNAL MEDICINE CLINIC | Age: 68
End: 2023-02-08
Payer: MEDICARE

## 2023-02-08 VITALS
HEIGHT: 76 IN | DIASTOLIC BLOOD PRESSURE: 72 MMHG | WEIGHT: 310.8 LBS | BODY MASS INDEX: 37.85 KG/M2 | SYSTOLIC BLOOD PRESSURE: 134 MMHG | OXYGEN SATURATION: 94 % | HEART RATE: 78 BPM

## 2023-02-08 DIAGNOSIS — E66.01 SEVERE OBESITY (BMI 35.0-39.9) WITH COMORBIDITY (HCC): ICD-10-CM

## 2023-02-08 DIAGNOSIS — L03.116 CELLULITIS OF LEFT LOWER EXTREMITY: ICD-10-CM

## 2023-02-08 DIAGNOSIS — I48.20 CHRONIC ATRIAL FIBRILLATION (HCC): ICD-10-CM

## 2023-02-08 DIAGNOSIS — R93.0 ABNORMAL MRI OF HEAD: ICD-10-CM

## 2023-02-08 DIAGNOSIS — I73.9 PVD (PERIPHERAL VASCULAR DISEASE) (HCC): Primary | ICD-10-CM

## 2023-02-08 DIAGNOSIS — I50.32 CHRONIC HEART FAILURE WITH PRESERVED EJECTION FRACTION (HCC): ICD-10-CM

## 2023-02-08 DIAGNOSIS — R60.0 LOCALIZED EDEMA: ICD-10-CM

## 2023-02-08 DIAGNOSIS — R56.9 NEW ONSET SEIZURE (HCC): ICD-10-CM

## 2023-02-08 DIAGNOSIS — N18.32 STAGE 3B CHRONIC KIDNEY DISEASE (HCC): ICD-10-CM

## 2023-02-08 DIAGNOSIS — L03.115 CELLULITIS OF RIGHT LOWER EXTREMITY: ICD-10-CM

## 2023-02-08 DIAGNOSIS — G45.9 TIA (TRANSIENT ISCHEMIC ATTACK): ICD-10-CM

## 2023-02-08 PROCEDURE — 3078F DIAST BP <80 MM HG: CPT | Performed by: NURSE PRACTITIONER

## 2023-02-08 PROCEDURE — 1123F ACP DISCUSS/DSCN MKR DOCD: CPT | Performed by: NURSE PRACTITIONER

## 2023-02-08 PROCEDURE — 99214 OFFICE O/P EST MOD 30 MIN: CPT | Performed by: NURSE PRACTITIONER

## 2023-02-08 PROCEDURE — 3075F SYST BP GE 130 - 139MM HG: CPT | Performed by: NURSE PRACTITIONER

## 2023-02-08 NOTE — PROGRESS NOTES
2/8/23     Chief Complaint   Patient presents with    Cellulitis     HPI    Here for cellulitis follow up. He has [facility] and the nurse is only coming once a week. Doing well with home dressings and compression. He was treated with doxycycline on 1/11. He reports legs are improving a lot. Quit drinking etoh and is not using any salt to help with PVD. States he is back on his \"pee pills\" - he wasn't taking it for awhile     He has not complete the follow up MRI or scheduled with neurosurgery as recommended. Allergies   Allergen Reactions    Bactrim      hallucinations    Clindamycin/Lincomycin Diarrhea     Current Outpatient Medications   Medication Sig Dispense Refill    levETIRAcetam (KEPPRA) 500 MG tablet Take 1 tablet by mouth 2 times daily 60 tablet 3    dilTIAZem (DILACOR XR) 180 MG extended release capsule Take 180 mg by mouth in the morning and at bedtime 1 capsule bid      apixaban (ELIQUIS) 5 MG TABS tablet Take 1 tablet by mouth 2 times daily 180 tablet 3    aspirin EC 81 MG EC tablet Take 1 tablet by mouth daily 30 tablet 5    atorvastatin (LIPITOR) 40 MG tablet TAKE 1 TABLET BY MOUTH EVERY DAY IN THE EVENING 90 tablet 1    allopurinol (ZYLOPRIM) 300 MG tablet TAKE 1 TABLET BY MOUTH 2 TIMES DAILY 180 tablet 3    diclofenac sodium (VOLTAREN) 1 % GEL APPLY 4 GRAMS TOPICALLY 4 TIMES DAILY AS NEEDED FOR PAIN 500 g 2    metoprolol tartrate (LOPRESSOR) 25 MG tablet Take 1 tablet by mouth 2 times daily 180 tablet 3    budesonide-formoterol (SYMBICORT) 160-4.5 MCG/ACT AERO Inhale 2 puffs into the lungs 2 times daily. 3 Inhaler 3    therapeutic multivitamin-minerals (THERAGRAN-M) tablet Take 1 tablet by mouth daily. No current facility-administered medications for this visit.      Review of Systems  Negative other than HPI     Vitals:    02/08/23 0822   BP: 134/72   Pulse: 78   SpO2: 94%   Weight: (!) 310 lb 12.8 oz (141 kg)   Height: 6' 4\" (1.93 m)      Physical Exam  Constitutional:       General: He is not in acute distress. Appearance: Normal appearance. He is obese. HENT:      Head: Normocephalic and atraumatic. Pulmonary:      Effort: Pulmonary effort is normal. No respiratory distress. Skin:     General: Skin is warm and dry. Comments: BLE with erythema, small amount of clear drainage noted on dressings - consistent with chronic vascular changes. No warmth. Edema trace to 1+. Neurological:      Mental Status: He is alert and oriented to person, place, and time. Mental status is at baseline. Psychiatric:         Mood and Affect: Mood normal.         Behavior: Behavior normal.     Assessment/Plan:  PVD (peripheral vascular disease) (HCC)/ Localized edema/ Cellulitis of left lower extremity/ Cellulitis of right lower extremity  Chronic, controlled. Exam consistent with PVD. No concerns for acute infection today. Continue with [facility] with [address] care. Reviewed supportive care. Continue taking lasix, decrease dose to 40 mg BID from 80 mg BID. Checking renal function. Reviewed strict criteria for follow up. Discussed following with wound clinic, declined today which is reasonable with improvement. Severe obesity (BMI 35.0-39. 9) with comorbidity (HCC)  Chronic, uncontrolled. Continue to work on healthy diet/ exercise. Continue to limit sodium intake. Continue to avoid etoh. Stage 3b chronic kidney disease (HCC)  Chronic, most recent labs controlled. BMP ordered. Chronic atrial fibrillation (HCC)/ Chronic heart failure with preserved ejection fraction (HCC)  Chronic, continue seeing cardiology as scheduled. New onset seizure (HCC)/ Abnormal MRI of head/ TIA (transient ischemic attack)  Chronic, uncontrolled. Schedule additional imaging. Schedule with neurosurgery. Discussed medications with patient, who voiced understanding of their use and indications. All questions answered.     FU in 3 months and prn     Electronically signed by MANDY Watkins CNP on 2/8/2023 at 9:36 AM

## 2023-02-14 ENCOUNTER — TELEPHONE (OUTPATIENT)
Dept: INTERNAL MEDICINE CLINIC | Age: 68
End: 2023-02-14

## 2023-02-14 NOTE — TELEPHONE ENCOUNTER
Pt calling about apixaban (ELIQUIS) 5 MG TABS tablet. States he went to  medication and ti was $126 after insurance. Would like to speak with Zaira about it and discuss if there is a way to get it cheaper. Please call.

## 2023-02-14 NOTE — TELEPHONE ENCOUNTER
Patient states due to cost he is not able to afford the Eliquis. He says he only gets social security and it just doesn't afford him enough to pay this much out of pocket. He states he wants to know if he can be on something much cheaper. Advised will send to dr. Danyell Perez and will reach back out to patient. He states he used to be on warfarin and was okay with going back on this if possible.

## 2023-02-20 ENCOUNTER — CARE COORDINATION (OUTPATIENT)
Dept: CARE COORDINATION | Age: 68
End: 2023-02-20

## 2023-02-20 NOTE — CARE COORDINATION
Ambulatory Care Coordination Note  2023    Patient Current Location: [address] [place] [address] Dr CANALES contacted the patient by telephone. Verified name and  with patient as identifiers. Provided introduction to self, and explanation of the ACM role. Challenges to be reviewed by the provider   Additional needs identified to be addressed with provider: Yes  refused-assistance with scheduling neurosurgery appointment               Method of communication with provider: chart routing. ACM: Vonnie Engel RN    RN-CC outreached patient for cc follow up; cellulitis, power chair, neurosurgery appointment, lasix. Patient reports his [facility] nurse with [facility] increased visits to 3 times weekly with improvement in bilateral wounds. He reports his legs are almost completely healed and swelling resolved. He states his legs or ALEX. He is taking lasix 40mg BID as directed by his pcp. RN-CC advised against taking more than prescribed due to risk of dehydration and BRIAN. Reinforced elevation, compression and following a low sodium diet. He is resistance to information related to a heart healthy diet or chf. Power chair - the claim for a new power chair was denies. [place] is requiring proof that his current chair is >11years old. He has provided [facility] [employer] proof of purchase. Yuyuto was submitted receipts and appeal to [employer] and is awaiting a decision. The patient is asking that Yuyuto return his receipts to him. RN-CC [facility]ached [facility] who states he will call the patient and schedule a time next week to drop his paperwork back off to him. He was scheduled with Dr. Donnie Freedman on  @ 0800. The patient states he never received the new patient paperwork, therefore, he didn't know where to go and cancelled his appointment. He states he is waiting on them to mail him paperwork, so he can reschedule an appointment at the [place] office.  He was informed that Dr. Donnie Freedman doesn't go to the [place] office, but he can schedule with another provider. RN-CC outreached [address] [place] [address] and was told that they do not mail new patient paperwork - this has to be completed either online prior to the appointment or in office on a kiosk the day of the appointment. Patient refused to provide [facility] with an e-mail. [facility] does mail confirmation of appointment date and time, which does include the address and directions AFTER an appointment has been scheduled. RN-CC was told all information was mailed the the patient prior to his 2/14 appt. SAMIA was informed by [facility], that if he chooses to schedule at the [facility] office, he will need to call the office and schedule the appointment, [place] will not be outreach him to schedule. RNEMILY contacted patient and informed him of the information above. RN-CC offered to call [facility] to reschedule his appointment but he declined my assistance. RN-CC provided him with the phone number and address to [place]  and advised he call today to schedule and appointment. Melba Ibanez Dr [address] [place] [address]. [place] [address]  [phone]    Offered patient enrollment in the Remote Patient Monitoring (RPM) program for in-home monitoring: Patient declined. Lab Results       None            Care Coordination Interventions    Referral from Primary Care Provider: Yes  Suggested Interventions and [address] [place] [address] Hwy: In Process (Comment: [facility] 1/11/22 JUAN)  Meals on Wheels: Declined (Comment: 1/11/23 JUAN)  Pharmacist: Declined (Comment: 1/11/23 JUAN)  Registered Dietician: Declined (Comment: 1/11/23 JUAN)  Senior Services: Declined (Comment: 1/11/23 JUAN)  Zone Management Tools: In Process (Comment: COPD, CHF 1/11/22 JUAN)          Goals Addressed                      This Visit's Progress      Patient Stated (pt-stated)         Support patient as needed with scheduling neurosurgery and audiology appointments. Barriers: overwhelmed by complexity of regimen  Plan for overcoming my barriers: Assist patient as needed. Confidence: 8/10  Anticipated Goal Completion Date: 4/1/23 1/18/23 - message routed to pcp - audiology referral requested for tinnitus. RN-CC outreached [facility]; confirmed receipt of referral. Someone from [facility] has been assigned the referral and will outreach patient to schedule appointment. 2/20/23 - audiology referral placed; patient does not want to schedule an appointment at this time. He was scheduled to see Dr. John Mendiola on 2/14 @ 0800 but he cancelled his appointment. He states the office was supposed to send him new patient paperwork but he didn't receive it so he cancelled the appointment. He has not rescheduled and declines my assistance. Patient Stated (pt-stated)         Obtain new power chair    Barriers: overwhelmed by complexity of regimen  Plan for overcoming my barriers: RN-CC placed referral to [facility] with [address] [place] [address] for assistance with obtaining a new power chair. Confidence: 8/10  Anticipated Goal Completion Date: 5/20/23 2/20/23 - RN-CC outreached [facility] with [facility]. The original claim submitted to [employer] was denied; they're requesting proof that the chair is >11years old. The patient has provided paperwork and original receipts to [address] [place] [address] Road has submitted an appeal to [employer] and is waiting on a decision. The patient is requesting all of his paperwork and receipts back from [facility]. [facility] states he will call the patient to schedule a time to drop off the paperwork to his home next week.                Future Appointments   Date Time Provider Natasha Yap   11/13/2023  7:30 AM Rosanne Lacy MD [facility]   ,   Congestive Heart Failure Assessment    Are you currently restricting fluids?: 1800cc (Comment: 1/31/23 JUAN)  Do you understand a low sodium diet?: No  Do you understand how to read food labels?: No  How many restaurant meals do you eat per week?: 5 or more  Do you salt your food before tasting it?: No         Symptoms:          ,   COPD Assessment    Does the patient understand envrionmental exposure?: Yes  Is the patient able to verbalize Rescue vs. Long Acting medications?: Yes  Does the patient have a nebulizer?: No  Does the patient use a space with inhaled medications?: No            Symptoms:          , and   General Assessment

## 2023-03-09 ENCOUNTER — CARE COORDINATION (OUTPATIENT)
Dept: CARE COORDINATION | Age: 68
End: 2023-03-09

## 2023-03-09 NOTE — CARE COORDINATION
RN-CC outreached [facility] - VO given for BMP at next [place] [address] visit. Requested specimen taken to [facility] lab. Next [place] [address] visit scheduled for next week.

## 2023-03-12 ENCOUNTER — HOSPITAL ENCOUNTER (OUTPATIENT)
Age: 68
Setting detail: SPECIMEN
Discharge: HOME | End: 2023-03-12
Payer: MEDICARE

## 2023-03-12 LAB
ANION GAP SERPL CALCULATED.3IONS-SCNC: 8 MMOL/L (ref 3–16)
BUN BLDV-MCNC: 16 MG/DL (ref 7–20)
CALCIUM SERPL-MCNC: 9.7 MG/DL (ref 8.3–10.6)
CHLORIDE BLD-SCNC: 104 MMOL/L (ref 99–110)
CO2: 31 MMOL/L (ref 21–32)
CREAT SERPL-MCNC: 1.1 MG/DL (ref 0.8–1.3)
GFR SERPL CREATININE-BSD FRML MDRD: >60 ML/MIN/{1.73_M2}
GLUCOSE BLD-MCNC: 107 MG/DL (ref 70–99)
POTASSIUM SERPL-SCNC: 4.6 MMOL/L (ref 3.5–5.1)
SODIUM BLD-SCNC: 143 MMOL/L (ref 136–145)

## 2023-03-12 PROCEDURE — 36415 COLL VENOUS BLD VENIPUNCTURE: CPT

## 2023-03-12 PROCEDURE — 80048 BASIC METABOLIC PNL TOTAL CA: CPT

## 2023-03-23 ENCOUNTER — CARE COORDINATION (OUTPATIENT)
Dept: CARE COORDINATION | Age: 68
End: 2023-03-23

## 2023-03-23 NOTE — CARE COORDINATION
appointment. He states the office was supposed to send him new patient paperwork but he didn't receive it so he cancelled the appointment. He has not rescheduled and declines my assistance. 3/23/23 - Patient received new mobility scooter from [address] [place] [address]. He was strongly advised to schedule follow up with his pcp and neurosurgery. He declines assistance from RN-CC. He is not interested in scheduling with Audiology at this time.                Future Appointments   Date Time Provider Natasha Yap   11/13/2023  7:30 AM Esdras Hernandez MD [facility]   ,   Congestive Heart Failure Assessment    Are you currently restricting fluids?: 1800cc (Comment: 3/23/23 JUAN)  Do you understand a low sodium diet?: Yes  Do you understand how to read food labels?: No  How many restaurant meals do you eat per week?: 5 or more  Do you salt your food before tasting it?: No     No patient-reported symptoms      Symptoms:  None: Yes           ,   COPD Assessment    Does the patient understand envrionmental exposure?: Yes  Is the patient able to verbalize Rescue vs. Long Acting medications?: Yes  Does the patient have a nebulizer?: No  Does the patient use a space with inhaled medications?: No            Symptoms:          , and   General Assessment    Do you have any symptoms that are causing concern?: No

## 2023-03-27 ENCOUNTER — TELEPHONE (OUTPATIENT)
Dept: INTERNAL MEDICINE CLINIC | Age: 68
End: 2023-03-27

## 2023-04-06 ENCOUNTER — CARE COORDINATION (OUTPATIENT)
Dept: CARE COORDINATION | Age: 68
End: 2023-04-06

## 2023-04-06 NOTE — CARE COORDINATION
Ambulatory Care Coordination Note  2023    Patient Current Location: [facility]     ACM contacted the patient by telephone. Verified name and  with patient as identifiers. Provided introduction to self, and explanation of the ACM role. Challenges to be reviewed by the provider   Additional needs identified to be addressed with provider: No  none               Method of communication with provider: chart routing. ACM: Vonnie Engel RN    RN-CC outreached patient for cc follow up. Patient reports he is doing good. RN-CC read message dated 3/31 to patient regarding ammonium lactate, pt vu. He will purchase otc at his local pharmacy. He remains active with [facility]. RN-CC referred patient to [address] [place] [address] for evaluation of a new mobility scooter. Patient has since received a new scooter. No questions or concerns voiced. Chf - he denies new or worsening sx. He states he does not currently have bilateral swelling. He has not purchased new batteries for her scale to date. Daily weights have been discussed and importance of monitoring weight reinforced. He states he follows a low salt diet and has declined education or resources. He does not monitor his blood pressure at home. 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 He has not scheduled a follow up with Andi Freed, nor has he scheduled an appointment with neurosurgery. RN-CC scheduled an appointment for the patient with neurosurgery but patient cancelled. I have offered my assistance with rescheduling numerous times but patient continues to decline my help. RNeBCC did confirm he has the contact information for [address] [place] [address] and strongly encouraged he to call to schedule - patient states he plans on calling soon. RN-CC reminded patient that he is due for a follow up with Andi Freed in May, pt states he is busy with baseball but will call to schedule a follow up appointment soon.     He reset his cashcloud password and is

## 2023-05-01 RX ORDER — LEVETIRACETAM 500 MG/1
500 TABLET ORAL 2 TIMES DAILY
Qty: 60 TABLET | Refills: 0 | Status: SHIPPED | OUTPATIENT
Start: 2023-05-01

## 2023-05-01 RX ORDER — ATORVASTATIN CALCIUM 40 MG/1
TABLET, FILM COATED ORAL
Qty: 90 TABLET | Refills: 0 | Status: SHIPPED | OUTPATIENT
Start: 2023-05-01

## 2023-05-01 NOTE — TELEPHONE ENCOUNTER
This medication was initially prescribed by the hospital and per chart review intended for a 3 month course which should have been completed by the end of February. He needs follow up to determine if this medication should be continued. Please inform patient that he is in need of follow up to address this medication. Please contact the pharmacy and cancel the renewal of this medication. Addendum:  Per additional conversation, it appears that he has been taking the [address] continuously since he was discharged on 12/1. He has a follow up with Tony Schwarz on 5/10. I am not sure if he has seen neurology outside of our system, but I do not see any neurology notes in his chart since he was discharged. At this point, if he has been taking Keppra continuously since December, I recommend that he continue taking this until his appointment on 5/10.  At the appointment, he should discuss this medication with Tony Schwarz, and he may need a neurology referral if he has not seen a neurologist since he was discharged from the hospital.      Zhane Dela Cruz MD   05/02/23 8:35 AM

## 2023-05-01 NOTE — TELEPHONE ENCOUNTER
Last OV: 2/8/2023  Next OV: 5/10/2023    Next appointment due:FU in 3 months and     Last fill:7/18/22  Refills:1 #90

## 2023-05-03 NOTE — TELEPHONE ENCOUNTER
Thank you - he should be seeing neurology and neurosurgery but continues to refuse scheduling appts. I will discuss again with him next week.  Shaquille Mccoy

## 2023-05-10 ENCOUNTER — OFFICE VISIT (OUTPATIENT)
Dept: INTERNAL MEDICINE CLINIC | Age: 68
End: 2023-05-10
Payer: MEDICARE

## 2023-05-10 VITALS
BODY MASS INDEX: 37 KG/M2 | DIASTOLIC BLOOD PRESSURE: 76 MMHG | HEART RATE: 80 BPM | WEIGHT: 304 LBS | OXYGEN SATURATION: 96 % | SYSTOLIC BLOOD PRESSURE: 126 MMHG

## 2023-05-10 DIAGNOSIS — G47.33 OBSTRUCTIVE SLEEP APNEA SYNDROME: ICD-10-CM

## 2023-05-10 DIAGNOSIS — R56.9 NEW ONSET SEIZURE (HCC): ICD-10-CM

## 2023-05-10 DIAGNOSIS — E66.9 OBESITY WITH SERIOUS COMORBIDITY, UNSPECIFIED CLASSIFICATION, UNSPECIFIED OBESITY TYPE: ICD-10-CM

## 2023-05-10 DIAGNOSIS — D68.69 SECONDARY HYPERCOAGULABLE STATE (HCC): ICD-10-CM

## 2023-05-10 DIAGNOSIS — E78.2 MIXED HYPERLIPIDEMIA: ICD-10-CM

## 2023-05-10 DIAGNOSIS — F33.42 RECURRENT MAJOR DEPRESSIVE DISORDER, IN FULL REMISSION (HCC): ICD-10-CM

## 2023-05-10 DIAGNOSIS — R93.0 ABNORMAL MRI OF HEAD: ICD-10-CM

## 2023-05-10 DIAGNOSIS — I10 ESSENTIAL HYPERTENSION: ICD-10-CM

## 2023-05-10 DIAGNOSIS — I50.32 CHRONIC HEART FAILURE WITH PRESERVED EJECTION FRACTION (HCC): ICD-10-CM

## 2023-05-10 DIAGNOSIS — G45.9 TIA (TRANSIENT ISCHEMIC ATTACK): ICD-10-CM

## 2023-05-10 DIAGNOSIS — F41.9 ANXIETY: ICD-10-CM

## 2023-05-10 DIAGNOSIS — I48.20 CHRONIC ATRIAL FIBRILLATION (HCC): ICD-10-CM

## 2023-05-10 DIAGNOSIS — I50.32 CHRONIC DIASTOLIC CONGESTIVE HEART FAILURE (HCC): Primary | ICD-10-CM

## 2023-05-10 PROCEDURE — 3078F DIAST BP <80 MM HG: CPT | Performed by: NURSE PRACTITIONER

## 2023-05-10 PROCEDURE — 3074F SYST BP LT 130 MM HG: CPT | Performed by: NURSE PRACTITIONER

## 2023-05-10 PROCEDURE — 1123F ACP DISCUSS/DSCN MKR DOCD: CPT | Performed by: NURSE PRACTITIONER

## 2023-05-10 PROCEDURE — 99215 OFFICE O/P EST HI 40 MIN: CPT | Performed by: NURSE PRACTITIONER

## 2023-05-10 RX ORDER — LEVETIRACETAM 500 MG/1
500 TABLET ORAL 2 TIMES DAILY
Qty: 180 TABLET | Refills: 0 | Status: SHIPPED | OUTPATIENT
Start: 2023-05-10

## 2023-05-10 RX ORDER — ATORVASTATIN CALCIUM 40 MG/1
40 TABLET, FILM COATED ORAL EVERY EVENING
Qty: 90 TABLET | Refills: 1 | Status: SHIPPED | OUTPATIENT
Start: 2023-05-10

## 2023-05-10 RX ORDER — DILTIAZEM HYDROCHLORIDE 180 MG/1
180 CAPSULE, EXTENDED RELEASE ORAL 2 TIMES DAILY
Qty: 180 CAPSULE | Refills: 1 | Status: SHIPPED | OUTPATIENT
Start: 2023-05-10

## 2023-05-10 RX ORDER — COLCHICINE 0.6 MG/1
0.6 CAPSULE ORAL
COMMUNITY
Start: 2023-04-24 | End: 2023-05-10

## 2023-05-10 NOTE — PROGRESS NOTES
5/10/23     Chief Complaint   Patient presents with    Anxiety     HPI    Here to transfer care from Dr. Brandy Leyva 3 month follow up  He report he is out of medications and needs refills today. States [facility]     He used to be on sertraline years ago, he decided to restart this on his own. He is feeling more anxious recently. Needs a new Rx. CKD - recently had stable labs. Has not seen nephrology in a few years. Seeing Dr. Veronica Wilde with cardiology and Dr. Ever Gates with EP for historty of HTN, Chronic diastolic HF, persistent afib, HLD. Xarelto was more expensive than eliquis. He has enough eliquis for a few months. He is asking to go back to coumadin. History of GI bleed on coumadin. Reports tolerating eliquis without concerns. OLEG - not complaint with cpap/bipap/apap. Not sure if he ever had a sleep study. He has not complete the follow up MRI or scheduled with neurosurgery as recommended. he denies any headaches, vision changes, dizziness or other concerns today. Admission 11/30 for new onset seizure activity. EEG was negative. He was discharged keppra for 3 months. He is not taking Keppra at this time. He has not had a follow up with neurology since admission.       Allergies   Allergen Reactions    Bactrim      hallucinations    Clindamycin/Lincomycin Diarrhea       Current Outpatient Medications   Medication Sig Dispense Refill    dilTIAZem (DILACOR XR) 180 MG extended release capsule Take 1 capsule by mouth in the morning and at bedtime 1 capsule bid 180 capsule 1    diclofenac sodium (VOLTAREN) 1 % GEL APPLY 4 GRAMS TOPICALLY 4 TIMES DAILY AS NEEDED FOR PAIN 500 g 2    sertraline (ZOLOFT) 50 MG tablet TAKE 1 TABLET BY MOUTH EVERY DAY 90 tablet 1    metoprolol tartrate (LOPRESSOR) 25 MG tablet Take 1 tablet by mouth 2 times daily 180 tablet 1    atorvastatin (LIPITOR) 40 MG tablet Take 1 tablet by mouth every evening 90 tablet 1    levETIRAcetam (KEPPRA)

## 2023-05-19 ENCOUNTER — TELEPHONE (OUTPATIENT)
Dept: INTERNAL MEDICINE CLINIC | Age: 68
End: 2023-05-19

## 2023-05-30 ENCOUNTER — TELEPHONE (OUTPATIENT)
Dept: INTERNAL MEDICINE CLINIC | Age: 68
End: 2023-05-30

## 2023-05-30 DIAGNOSIS — I48.20 CHRONIC ATRIAL FIBRILLATION (HCC): Primary | ICD-10-CM

## 2023-05-30 DIAGNOSIS — D68.69 SECONDARY HYPERCOAGULABLE STATE (HCC): ICD-10-CM

## 2023-05-30 DIAGNOSIS — G45.9 TIA (TRANSIENT ISCHEMIC ATTACK): ICD-10-CM

## 2023-05-30 NOTE — TELEPHONE ENCOUNTER
----- Message from Sasha Quinones sent at 5/30/2023  3:08 PM EDT -----  Subject: Message to Provider    QUESTIONS  Information for Provider? Patient states he needs to speak with staff   about medications. Patient states he is unable to get inside of his   mychart .  Patient would like a call back today if possible.  ---------------------------------------------------------------------------  --------------  [id number]  [phone]; OK to leave message on voicemail  ---------------------------------------------------------------------------  --------------  SCRIPT ANSWERS  undefined

## 2023-05-31 RX ORDER — WARFARIN SODIUM 2 MG/1
2 TABLET ORAL DAILY
Qty: 30 TABLET | Refills: 0 | Status: SHIPPED | OUTPATIENT
Start: 2023-05-31

## 2023-05-31 NOTE — TELEPHONE ENCOUNTER
I have sent a 30 day supply of warfarin to the pharmacy and placed a referral for the [facility] clinic. He needs to schedule an appt this week for INR management. He will need to compliant with [facility] clinic follow up.

## 2023-05-31 NOTE — TELEPHONE ENCOUNTER
Pt needs to speak to someone today--he needs leelee called in today --can not afford the Eliquis---can not get in to his mychart so needs to speak to someone --please call his cell phone. Thanks.

## 2023-06-01 ENCOUNTER — TELEPHONE (OUTPATIENT)
Dept: PHARMACY | Age: 68
End: 2023-06-01

## 2023-06-01 NOTE — TELEPHONE ENCOUNTER
Received electronic referral for pt to begin managing INR with clinic. Pt was on Eliquis and Xarelto but too expensive so transitioned to warfarin. Called and spoke to patient. States that he p/u warfarin script today and plans to start taking tonight. He has been on warfarin in the past. Was on for 10 years then taken off for 4-5 years and recently resumed anticoagulation. Pt frustrated with resuming anticoagulation as states he was taken off multiple years ago and recently resumed. Pt doesn't believe he needs to be on blood thinners. Reviewed dx for warfarin treatment with patient. Reviewed how often INR is monitored in clinic. Pt has Meet Jasmine visiting currently and would like them to check INR moving forward. Explained after initial appt in clinic can manage INR in [facility] for up to 90 days then must RTC for management. Scheduled initial appt in clinic 6/8. Provided with directions and asked pt to bring ID, insurance and med list. Pt verified understanding.

## 2023-06-02 NOTE — TELEPHONE ENCOUNTER
Patient was called and advised on recommendations, stated he will be cancelling 6/8/23 appt w/ [facility] and made 9/5/23 w/ cardiology, he wishes not to take warfin so that's why he is cancelling his appointment and wants to be on Eliquis. Made aware of 90 days refill, patient had no further questions.

## 2023-06-02 NOTE — TELEPHONE ENCOUNTER
Please clarify, he has appt with [facility] clinic on 6/8 for coumadin management. If he is not going to be compliant with INR/ [facility] management as recommended, then I well send a Rx for 90 days to get him to his cardiology appt (with a note to cancel coumadin Rx)  He needs to be compliant with medications, follow ups and healthcare. He still needs to schedule the previously ordered MRI and see neurology.

## 2023-06-02 NOTE — TELEPHONE ENCOUNTER
----- Message from Pura Disla sent at 6/2/2023  8:22 AM EDT -----  Subject: Message to Provider    QUESTIONS  Information for Provider? Pt states he will not take the warfarin. Would   like to go back to Elliquis ASAP. Wants it today if possible. [id number] Jackie Lewis, [facility] [address] [phone]  ---------------------------------------------------------------------------  --------------  Rolene Cuff INGE  [phone]; OK to leave message on voicemail  ---------------------------------------------------------------------------  --------------  SCRIPT ANSWERS  Relationship to Patient?  Self

## 2023-06-22 ENCOUNTER — TELEPHONE (OUTPATIENT)
Dept: INTERNAL MEDICINE CLINIC | Age: 68
End: 2023-06-22

## 2023-06-22 DIAGNOSIS — R93.0 ABNORMAL MRI OF HEAD: Primary | ICD-10-CM

## 2023-06-23 RX ORDER — WARFARIN SODIUM 2 MG/1
TABLET ORAL
Qty: 30 TABLET | Refills: 0 | OUTPATIENT
Start: 2023-06-23

## 2023-06-29 ENCOUNTER — TELEPHONE (OUTPATIENT)
Dept: INTERNAL MEDICINE CLINIC | Age: 68
End: 2023-06-29

## 2023-07-05 ENCOUNTER — TELEPHONE (OUTPATIENT)
Dept: ORTHOPEDIC SURGERY | Age: 68
End: 2023-07-05

## 2023-07-05 NOTE — TELEPHONE ENCOUNTER
Called patient about his call. Patient was wanting to know if he will get injections same day. I told patient we will get New x-rays more than likely as for injections Dr. Nelson Monsivais will make that call. Patient was agitated by that response.

## 2023-07-05 NOTE — TELEPHONE ENCOUNTER
Other PT HAS A QUESTION AS TO IF HE NEEDS A RIDE FOR HIS APPT ON 7/10. PLEASE CALL HIM [phone] TO ADVISE.

## 2023-07-10 ENCOUNTER — OFFICE VISIT (OUTPATIENT)
Dept: ORTHOPEDIC SURGERY | Age: 68
End: 2023-07-10
Payer: MEDICARE

## 2023-07-10 VITALS — HEIGHT: 76 IN | WEIGHT: 310 LBS | BODY MASS INDEX: 37.75 KG/M2

## 2023-07-10 DIAGNOSIS — M25.561 PAIN IN BOTH KNEES, UNSPECIFIED CHRONICITY: Primary | ICD-10-CM

## 2023-07-10 DIAGNOSIS — M25.562 PAIN IN BOTH KNEES, UNSPECIFIED CHRONICITY: Primary | ICD-10-CM

## 2023-07-10 PROCEDURE — 20610 DRAIN/INJ JOINT/BURSA W/O US: CPT | Performed by: ORTHOPAEDIC SURGERY

## 2023-07-10 PROCEDURE — 99213 OFFICE O/P EST LOW 20 MIN: CPT | Performed by: ORTHOPAEDIC SURGERY

## 2023-07-10 PROCEDURE — 1123F ACP DISCUSS/DSCN MKR DOCD: CPT | Performed by: ORTHOPAEDIC SURGERY

## 2023-07-10 RX ORDER — TRIAMCINOLONE ACETONIDE 40 MG/ML
40 INJECTION, SUSPENSION INTRA-ARTICULAR; INTRAMUSCULAR ONCE
Status: COMPLETED | OUTPATIENT
Start: 2023-07-10 | End: 2023-07-10

## 2023-07-10 RX ORDER — LIDOCAINE HYDROCHLORIDE 10 MG/ML
4 INJECTION, SOLUTION INFILTRATION; PERINEURAL ONCE
Status: COMPLETED | OUTPATIENT
Start: 2023-07-10 | End: 2023-07-10

## 2023-07-10 RX ADMIN — TRIAMCINOLONE ACETONIDE 40 MG: 40 INJECTION, SUSPENSION INTRA-ARTICULAR; INTRAMUSCULAR at 09:41

## 2023-07-10 RX ADMIN — LIDOCAINE HYDROCHLORIDE 4 ML: 10 INJECTION, SOLUTION INFILTRATION; PERINEURAL at 09:40

## 2023-07-10 RX ADMIN — LIDOCAINE HYDROCHLORIDE 4 ML: 10 INJECTION, SOLUTION INFILTRATION; PERINEURAL at 09:39

## 2023-07-10 NOTE — PROGRESS NOTES
Patient: Stacey Mcpherson  : 1955    MRN: [id number]    Date of Visit: 7/10/23    Attending Physician: Corry Ladd    History of Present Illness  Mr. Nelly Royal is a very pleasant 80 y.o. patient with a several year history of progressive BILATERAL knee pain. There is no precipitating event or trauma. The pain is located predominantly in the medial and anterior aspect of the knee aggravated by weight bearing. Walking even short distances can be painful. Stair climbing is progressing becoming more difficult and painful. However, it can also awaken the patient at night. Low-impact, structured therapy/exercise program  NSAID's/Tylenol Arthritis strength  Crtisone injections/viscosupplementation       He reports that he has not had previous injections however prior notes from Dr. Mare Vyas suggest he did have bilateral Synvisc injections.   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    PMH/PSH:  Past Medical History:   Diagnosis Date    Anxiety     Arthritis     Asthma     Atrial fibrillation ([address])     Blood circulation, collateral     CAD (coronary artery disease)     CHF (congestive heart failure) (HCC)     Colon cancer ([address])     COPD (chronic obstructive pulmonary disease) ([address])     Diabetic foot infection ([address])     pt denies diabetes    Hernia     Hyperlipidemia     Hypertension     Movement disorder     Muscle cramps     Neuromuscular disorder (HCC)     marvin feet numbness/neuropathy    Neuropathy     Pneumonia     Renal insufficiency     Sleep apnea     no cpap    Thyroid disease     Tinnitus aurium, bilateral     Unspecified cerebral artery occlusion with cerebral infarction     mini stroke    Unspecified diseases of blood and blood-forming organs      Patient Active Problem List   Diagnosis    Chronic atrial fibrillation (HCC)    COPD (chronic obstructive pulmonary

## 2023-07-21 ENCOUNTER — HOSPITAL ENCOUNTER (OUTPATIENT)
Dept: CT IMAGING | Age: 68
Discharge: HOME | End: 2023-07-21
Payer: MEDICARE

## 2023-07-21 DIAGNOSIS — R93.0 ABNORMAL MRI OF HEAD: ICD-10-CM

## 2023-07-21 LAB
CREAT SERPL-MCNC: 1.2 MG/DL (ref 0.8–1.3)
GFR SERPLBLD CREATININE-BSD FMLA CKD-EPI: >60 ML/MIN/{1.73_M2}

## 2023-07-21 PROCEDURE — 36415 COLL VENOUS BLD VENIPUNCTURE: CPT

## 2023-07-21 PROCEDURE — 70482 CT ORBIT/EAR/FOSSA W/O&W/DYE: CPT

## 2023-07-21 PROCEDURE — 82565 ASSAY OF CREATININE: CPT

## 2023-07-21 PROCEDURE — 6360000004 HC RX CONTRAST MEDICATION: Performed by: NURSE PRACTITIONER

## 2023-07-21 RX ADMIN — IOPAMIDOL 75 ML: 755 INJECTION, SOLUTION INTRAVENOUS at 07:25

## 2023-08-30 ENCOUNTER — TELEPHONE (OUTPATIENT)
Dept: INTERNAL MEDICINE CLINIC | Age: 68
End: 2023-08-30

## 2023-08-30 DIAGNOSIS — I10 ESSENTIAL HYPERTENSION: ICD-10-CM

## 2023-08-30 DIAGNOSIS — R56.9 NEW ONSET SEIZURE (HCC): ICD-10-CM

## 2023-08-30 DIAGNOSIS — F33.42 RECURRENT MAJOR DEPRESSIVE DISORDER, IN FULL REMISSION (HCC): ICD-10-CM

## 2023-08-30 DIAGNOSIS — F41.9 ANXIETY: ICD-10-CM

## 2023-08-30 DIAGNOSIS — I50.32 CHRONIC DIASTOLIC CONGESTIVE HEART FAILURE (HCC): ICD-10-CM

## 2023-08-30 RX ORDER — LEVETIRACETAM 500 MG/1
500 TABLET ORAL 2 TIMES DAILY
Qty: 180 TABLET | Refills: 0 | Status: SHIPPED | OUTPATIENT
Start: 2023-08-30

## 2023-08-30 RX ORDER — ALLOPURINOL 300 MG/1
TABLET ORAL
Qty: 180 TABLET | Refills: 0 | Status: SHIPPED | OUTPATIENT
Start: 2023-08-30 | End: 2023-08-30 | Stop reason: SDUPTHER

## 2023-08-30 RX ORDER — FUROSEMIDE 40 MG/1
80 TABLET ORAL 2 TIMES DAILY
Qty: 360 TABLET | Refills: 1 | Status: SHIPPED | OUTPATIENT
Start: 2023-08-30

## 2023-08-30 RX ORDER — ALLOPURINOL 300 MG/1
300 TABLET ORAL 2 TIMES DAILY
Qty: 180 TABLET | Refills: 0 | Status: SHIPPED | OUTPATIENT
Start: 2023-08-30

## 2023-08-30 NOTE — TELEPHONE ENCOUNTER
[facility] Rosmery  calling requesting refill of Eliquis (6/2/23)  Allopurinol ((4/19/23) and Furosemide ?      Last written see above  Last OV 5/10/23  Next OV none  Last recommended OV NA     Please send to [facility]

## 2023-08-30 NOTE — TELEPHONE ENCOUNTER
Medication:   Requested Prescriptions      No prescriptions requested or ordered in this encounter        Last Filled:      Patient Phone Number: [phone] (home)     Last appt: 6/14/2023   Next appt: Visit date not found    Last OARRS:   RX Monitoring 11/5/2018   Attestation The Prescription Monitoring Report for this patient was reviewed today. Periodic Controlled Substance Monitoring No signs of potential drug abuse or diversion identified. ;Possible medication side effects, risk of tolerance/dependence & alternative treatments discussed.

## 2023-09-05 ENCOUNTER — OFFICE VISIT (OUTPATIENT)
Dept: CARDIOLOGY CLINIC | Age: 68
End: 2023-09-05
Payer: MEDICARE

## 2023-09-05 VITALS
OXYGEN SATURATION: 96 % | HEART RATE: 80 BPM | DIASTOLIC BLOOD PRESSURE: 70 MMHG | SYSTOLIC BLOOD PRESSURE: 118 MMHG | HEIGHT: 76 IN | BODY MASS INDEX: 36.65 KG/M2 | WEIGHT: 301 LBS

## 2023-09-05 DIAGNOSIS — G45.9 TIA (TRANSIENT ISCHEMIC ATTACK): ICD-10-CM

## 2023-09-05 DIAGNOSIS — I71.23 ANEURYSM OF DESCENDING THORACIC AORTA WITHOUT RUPTURE (HCC): ICD-10-CM

## 2023-09-05 DIAGNOSIS — I10 ESSENTIAL HYPERTENSION: ICD-10-CM

## 2023-09-05 DIAGNOSIS — I50.32 CHRONIC DIASTOLIC CONGESTIVE HEART FAILURE (HCC): ICD-10-CM

## 2023-09-05 DIAGNOSIS — E78.2 MIXED HYPERLIPIDEMIA: ICD-10-CM

## 2023-09-05 DIAGNOSIS — I48.20 CHRONIC ATRIAL FIBRILLATION (HCC): Primary | ICD-10-CM

## 2023-09-05 PROCEDURE — 99214 OFFICE O/P EST MOD 30 MIN: CPT | Performed by: INTERNAL MEDICINE

## 2023-09-05 PROCEDURE — 93000 ELECTROCARDIOGRAM COMPLETE: CPT | Performed by: INTERNAL MEDICINE

## 2023-09-05 PROCEDURE — 3074F SYST BP LT 130 MM HG: CPT | Performed by: INTERNAL MEDICINE

## 2023-09-05 PROCEDURE — 3078F DIAST BP <80 MM HG: CPT | Performed by: INTERNAL MEDICINE

## 2023-09-05 PROCEDURE — 1123F ACP DISCUSS/DSCN MKR DOCD: CPT | Performed by: INTERNAL MEDICINE

## 2023-09-05 NOTE — PATIENT INSTRUCTIONS
No medication changes  CT of chest without contrast  Follow up in 6 months    Central scheduling [phone]

## 2023-09-08 ENCOUNTER — TELEPHONE (OUTPATIENT)
Dept: PHARMACY | Facility: CLINIC | Age: 68
End: 2023-09-08

## 2023-09-08 NOTE — TELEPHONE ENCOUNTER
POPULATION HEALTH CLINICAL PHARMACY: ADHERENCE REVIEW  Identified care gap per [place]: fills at [facility]: Statin adherence    ASSESSMENT   [address] Records claims through 23 (Prior Year  [address] = not reported;  [address] = 79%; Potential Fail Date: 23): Atorvastatin 40mg last filled on 4/15/23 for 90 day supply. Next refill due: 23    Prescribed si tablet/capsule daily    Per Insurer Portal: filled timely on 23 THEN REVERSED    Per [facility]: will get 90 day supply ready to  since past due. Lab Results   Component Value Date    CHOL 140 11/10/2022    TRIG 77 11/10/2022    HDL 58 11/10/2022    LDLCALC 67 11/10/2022     ALT   Date Value Ref Range Status   2022 35 10 - 40 U/L Final     AST   Date Value Ref Range Status   2022 43 (H) 15 - 37 U/L Final     The 10-year ASCVD risk score (Elyse SAHU, et al., 2019) is: 11.1%    Values used to calculate the score:      Age: 80 years      Sex: Male      Is Non- : No      Diabetic: No      Tobacco smoker: No      Systolic Blood Pressure: 110 mmHg      Is BP treated: Yes      HDL Cholesterol: 58 mg/dL      Total Cholesterol: 140 mg/dL       The following are interventions that have been identified:   Patient overdue refilling Atorvastatin 40mg l and active on home medication list.   Refill/s of Atorvastatin 40mg l READY to  at patient's [facility] pharmacy    Attempting to reach patient to review. Left message asking for return call. [place] message sent to patient. Last Visit: 5/10/23  Next Visit: none    Ana Cristina Rider [facility].    [facility] free: [phone]      not working    For Pharmacy Admin Tracking Only    Program: Miguel A in place:  No  Recommendation Provided To: Patient/Caregiver: 1 via Telephone  Intervention Detail: Refill(s) Provided  Intervention Accepted By: Patient/Caregiver: 1  Gap

## 2023-09-28 ENCOUNTER — HOSPITAL ENCOUNTER (OUTPATIENT)
Dept: CT IMAGING | Age: 68
Discharge: HOME | End: 2023-09-28
Attending: INTERNAL MEDICINE
Payer: MEDICARE

## 2023-09-28 DIAGNOSIS — I71.23 ANEURYSM OF DESCENDING THORACIC AORTA WITHOUT RUPTURE (HCC): ICD-10-CM

## 2023-09-28 PROCEDURE — 71250 CT THORAX DX C-: CPT

## 2023-10-02 ENCOUNTER — TELEPHONE (OUTPATIENT)
Dept: CARDIOLOGY CLINIC | Age: 68
End: 2023-10-02

## 2023-10-02 NOTE — TELEPHONE ENCOUNTER
----- Message from Mari Christina MD sent at 9/28/2023 12:41 PM EDT -----  Aortic size unchanged. F/u as planned.     Natasha Wu

## 2023-11-19 DIAGNOSIS — R56.9 NEW ONSET SEIZURE (HCC): ICD-10-CM

## 2023-11-19 DIAGNOSIS — I50.32 CHRONIC DIASTOLIC CONGESTIVE HEART FAILURE (HCC): ICD-10-CM

## 2023-11-20 RX ORDER — ALLOPURINOL 300 MG/1
300 TABLET ORAL 2 TIMES DAILY
Qty: 30 TABLET | Refills: 0 | Status: SHIPPED | OUTPATIENT
Start: 2023-11-20

## 2023-11-20 RX ORDER — APIXABAN 5 MG/1
5 TABLET, FILM COATED ORAL 2 TIMES DAILY
Qty: 60 TABLET | Refills: 0 | Status: SHIPPED | OUTPATIENT
Start: 2023-11-20

## 2023-11-20 RX ORDER — DILTIAZEM HYDROCHLORIDE 180 MG/1
180 CAPSULE, EXTENDED RELEASE ORAL 2 TIMES DAILY
Qty: 60 CAPSULE | Refills: 0 | Status: SHIPPED | OUTPATIENT
Start: 2023-11-20

## 2023-11-20 RX ORDER — LEVETIRACETAM 500 MG/1
500 TABLET ORAL 2 TIMES DAILY
Qty: 60 TABLET | Refills: 0 | Status: SHIPPED | OUTPATIENT
Start: 2023-11-20

## 2023-11-20 NOTE — TELEPHONE ENCOUNTER
Medication:   Requested Prescriptions     Pending Prescriptions Disp Refills    levETIRAcetam (KEPPRA) 500 MG tablet [Pharmacy Med Name: LEVETIRACETAM 500 MG TABLET] 180 tablet 0     Sig: TAKE 1 TABLET BY MOUTH TWICE A DAY        Last Filled:      Patient Phone Number: [phone] (home)     Last appt: 6/14/2023   Next appt: Visit date not found    Last OARRS:       11/5/2018     2:54 PM   RX Monitoring   Attestation The Prescription Monitoring Report for this patient was reviewed today. Periodic Controlled Substance Monitoring No signs of potential drug abuse or diversion identified. ;Possible medication side effects, risk of tolerance/dependence & alternative treatments discussed.

## 2023-11-20 NOTE — TELEPHONE ENCOUNTER
Last OV: 5/10/2023  Next OV: Visit date not found    Next appointment due:6/21/23    Pended for 1 month with note to make appt.

## 2023-12-06 ENCOUNTER — TELEPHONE (OUTPATIENT)
Dept: CARDIOLOGY CLINIC | Age: 68
End: 2023-12-06

## 2023-12-06 NOTE — TELEPHONE ENCOUNTER
12/06/23 lm for pt to call and schedule MXA yearly due 01/19/24 / vlb     12/06/23 lm for pt to call and schedule MMK 6 months due 03/03/24 / vlb

## 2023-12-20 DIAGNOSIS — I50.32 CHRONIC DIASTOLIC CONGESTIVE HEART FAILURE (HCC): ICD-10-CM

## 2023-12-20 RX ORDER — DILTIAZEM HYDROCHLORIDE 180 MG/1
CAPSULE, EXTENDED RELEASE ORAL
Qty: 60 CAPSULE | Refills: 0 | OUTPATIENT
Start: 2023-12-20

## 2023-12-20 NOTE — TELEPHONE ENCOUNTER
Patient over due for an appointment - needs appointment prior to refills.      Last OV: 5/10/2023  Next OV: Visit date not found    Next appointment due:6 weeks (around 6/21/2023)

## 2023-12-26 ENCOUNTER — TELEPHONE (OUTPATIENT)
Dept: INTERNAL MEDICINE CLINIC | Age: 68
End: 2023-12-26

## 2023-12-26 NOTE — TELEPHONE ENCOUNTER
----- Message from Sammi Card sent at 12/26/2023 10:53 AM EST -----  Subject: Refill Request    QUESTIONS  Name of Medication? ELIQUIS 5 MG TABS tablet  Patient-reported dosage and instructions? 5mg take one daily  How many days do you have left? 0  Preferred Pharmacy? [id number]  Pharmacy phone number (if available)? [phone]  Additional Information for Provider? Patient had to change insurance and   is unable to get coverage for his medication until January 1st. He is   requesting a weeks worth of the medication.  ---------------------------------------------------------------------------  --------------  CALL BACK INFO  What is the best way for the office to contact you? OK to leave message on   voicemail  Preferred Call Back Phone Number? [phone]  ---------------------------------------------------------------------------  --------------  SCRIPT ANSWERS  Relationship to Patient?  Self

## 2023-12-27 ENCOUNTER — TELEMEDICINE (OUTPATIENT)
Dept: INTERNAL MEDICINE CLINIC | Age: 68
End: 2023-12-27
Payer: MEDICARE

## 2023-12-27 DIAGNOSIS — I48.20 CHRONIC ATRIAL FIBRILLATION (HCC): Primary | ICD-10-CM

## 2023-12-27 DIAGNOSIS — I10 ESSENTIAL HYPERTENSION: ICD-10-CM

## 2023-12-27 DIAGNOSIS — M1A.09X0 IDIOPATHIC CHRONIC GOUT OF MULTIPLE SITES WITHOUT TOPHUS: ICD-10-CM

## 2023-12-27 DIAGNOSIS — F33.42 RECURRENT MAJOR DEPRESSIVE DISORDER, IN FULL REMISSION (HCC): ICD-10-CM

## 2023-12-27 DIAGNOSIS — E78.2 MIXED HYPERLIPIDEMIA: ICD-10-CM

## 2023-12-27 DIAGNOSIS — I50.32 CHRONIC DIASTOLIC CONGESTIVE HEART FAILURE (HCC): ICD-10-CM

## 2023-12-27 DIAGNOSIS — R73.09 ELEVATED GLUCOSE: ICD-10-CM

## 2023-12-27 DIAGNOSIS — F41.9 ANXIETY: ICD-10-CM

## 2023-12-27 DIAGNOSIS — G47.33 OBSTRUCTIVE SLEEP APNEA SYNDROME: ICD-10-CM

## 2023-12-27 DIAGNOSIS — N18.32 STAGE 3B CHRONIC KIDNEY DISEASE (HCC): ICD-10-CM

## 2023-12-27 DIAGNOSIS — R56.9 NEW ONSET SEIZURE (HCC): ICD-10-CM

## 2023-12-27 PROCEDURE — 3017F COLORECTAL CA SCREEN DOC REV: CPT | Performed by: NURSE PRACTITIONER

## 2023-12-27 PROCEDURE — 1123F ACP DISCUSS/DSCN MKR DOCD: CPT | Performed by: NURSE PRACTITIONER

## 2023-12-27 PROCEDURE — G8427 DOCREV CUR MEDS BY ELIG CLIN: HCPCS | Performed by: NURSE PRACTITIONER

## 2023-12-27 PROCEDURE — 99214 OFFICE O/P EST MOD 30 MIN: CPT | Performed by: NURSE PRACTITIONER

## 2023-12-27 RX ORDER — FUROSEMIDE 40 MG/1
80 TABLET ORAL 2 TIMES DAILY
Qty: 360 TABLET | Refills: 1 | Status: SHIPPED | OUTPATIENT
Start: 2023-12-27

## 2023-12-27 RX ORDER — ATORVASTATIN CALCIUM 40 MG/1
40 TABLET, FILM COATED ORAL EVERY EVENING
Qty: 90 TABLET | Refills: 1 | Status: SHIPPED | OUTPATIENT
Start: 2023-12-27

## 2023-12-27 RX ORDER — ALLOPURINOL 300 MG/1
300 TABLET ORAL 2 TIMES DAILY
Qty: 180 TABLET | Refills: 1 | Status: SHIPPED | OUTPATIENT
Start: 2023-12-27

## 2023-12-27 RX ORDER — DILTIAZEM HYDROCHLORIDE 180 MG/1
180 CAPSULE, EXTENDED RELEASE ORAL 2 TIMES DAILY
Qty: 180 CAPSULE | Refills: 1 | Status: SHIPPED | OUTPATIENT
Start: 2023-12-27

## 2023-12-27 NOTE — ASSESSMENT & PLAN NOTE
Chronic, continue eliquis, diltiazem, metoprolol, Lasix. Continue follow up with EP and cards. Repeat blood work ordered today.

## 2023-12-27 NOTE — ASSESSMENT & PLAN NOTE
Chronic, continue eliquis, diltiazem, metoprolol. Continue follow up with EP and cards as scheduled.

## 2023-12-27 NOTE — ASSESSMENT & PLAN NOTE
Chronic, controlled. He denies any further seizure activity since starting Keppra. Continue Keppra as prescribed. Follow-up with neurology as recommended.

## 2023-12-27 NOTE — PROGRESS NOTES
2023    TELEHEALTH EVALUATION -- Audio/Visual (During LQFYQ-82 public health emergency)    Chief Complaint   Patient presents with    Medication Request     Requesting refill on Eliquis      HPI:    Shannon Brennan (:  1955) has requested an audio/video evaluation for the following concern(s):    VV for follow up of chronic disease management. Seeing Dr. Link Cook with cardiology and Dr. Amber James with EP for historty of HTN, Chronic diastolic HF, persistent afib, HLD. Reports tolerating eliquis without concerns. reports he is asymptomatic. is on eliquis and needs a refill. Reports his heart doctor gave him samples but not enough to get him through another week. Reports mood has been okay since restarting sertraline. Denies any further seizure activity, is on keppra with neurology. CKD - has not seen nephrology in a few years. OLEG - not complaint with cpap/bipap/apap. Not sure if he ever had a sleep study. Has spirit [facility] at home. Allegra     Review of Systems    Prior to Visit Medications    Medication Sig Taking?  Authorizing Provider   apixaban (ELIQUIS) 5 MG TABS tablet Take 1 tablet by mouth 2 times daily Yes Dayan Robert APRN - CNP   atorvastatin (LIPITOR) 40 MG tablet Take 1 tablet by mouth every evening Yes Dayan Robert APRN - CNP   metoprolol tartrate (LOPRESSOR) 25 MG tablet Take 1 tablet by mouth 2 times daily Yes Dayan Robert APRN - CNP   sertraline (ZOLOFT) 50 MG tablet Take 1 tablet by mouth daily Yes Dayan Robert APRN - CNP   furosemide (LASIX) 40 MG tablet Take 2 tablets by mouth 2 times daily Yes Christine Robert APRN - CNP   dilTIAZem (DILT-XR) 180 MG extended release capsule Take 1 capsule by mouth 2 times daily Yes Christine Robert APRN - CNP   allopurinol (ZYLOPRIM) 300 MG tablet Take 1 tablet by mouth 2 times daily Yes Christine Robert APRN - CNP   diclofenac sodium (VOLTAREN) 1 % GEL APPLY 4 GRAMS TOPICALLY 4 TIMES DAILY AS NEEDED FOR

## 2023-12-28 NOTE — TELEPHONE ENCOUNTER
[facility] Transitions Initial Follow Up Call    Outreach made within 2 business days of discharge: Yes    Patient: Christina Bach Patient : 1955   MRN: [id number]  Reason for Admission: There are no discharge diagnoses documented for the most recent discharge. Discharge Date: 19       Spoke with: was told to call back int the evening. Will try the cell # tomorrow.     Discharge department/facility: [facility] 19-19    Follow Up  Future Appointments   Date Time Provider Natasha Yap   2019  8:00 AM Asim Mcclellan MD [facility] [address] [place], [place]  Patient Services Specialist  [phone] Opt out

## 2024-02-07 DIAGNOSIS — M1A.09X0 IDIOPATHIC CHRONIC GOUT OF MULTIPLE SITES WITHOUT TOPHUS: ICD-10-CM

## 2024-02-07 RX ORDER — ALLOPURINOL 300 MG/1
300 TABLET ORAL 2 TIMES DAILY
Qty: 180 TABLET | Refills: 1 | OUTPATIENT
Start: 2024-02-07

## 2024-02-19 DIAGNOSIS — M1A.09X0 IDIOPATHIC CHRONIC GOUT OF MULTIPLE SITES WITHOUT TOPHUS: ICD-10-CM

## 2024-02-19 RX ORDER — ALLOPURINOL 300 MG/1
300 TABLET ORAL 2 TIMES DAILY
Qty: 180 TABLET | Refills: 1 | OUTPATIENT
Start: 2024-02-19

## 2024-02-19 NOTE — TELEPHONE ENCOUNTER
Last OV: 12/27/2023  Next OV: 4/2/2024    Next appointment due:(around 3/27/2024),     Last fill:12/27/23  Refills:1#180

## 2024-03-02 DIAGNOSIS — I50.32 CHRONIC DIASTOLIC CONGESTIVE HEART FAILURE (HCC): ICD-10-CM

## 2024-03-04 RX ORDER — FUROSEMIDE 40 MG/1
80 TABLET ORAL 2 TIMES DAILY
Qty: 360 TABLET | Refills: 1 | Status: SHIPPED | OUTPATIENT
Start: 2024-03-04

## 2024-03-04 NOTE — TELEPHONE ENCOUNTER
Last OV: 12/27/2023  Next OV: 4/2/2024    Next appointment due:(around 3/27/2024)     Last fill:12/27/23  Refills:1#360

## 2024-03-05 DIAGNOSIS — R56.9 NEW ONSET SEIZURE (HCC): ICD-10-CM

## 2024-03-05 RX ORDER — LEVETIRACETAM 500 MG/1
500 TABLET ORAL 2 TIMES DAILY
Qty: 60 TABLET | Refills: 0 | OUTPATIENT
Start: 2024-03-05

## 2024-03-16 DIAGNOSIS — M1A.09X0 IDIOPATHIC CHRONIC GOUT OF MULTIPLE SITES WITHOUT TOPHUS: ICD-10-CM

## 2024-03-18 RX ORDER — ALLOPURINOL 300 MG/1
300 TABLET ORAL 2 TIMES DAILY
Qty: 180 TABLET | Refills: 1 | Status: SHIPPED | OUTPATIENT
Start: 2024-03-18

## 2024-03-18 NOTE — TELEPHONE ENCOUNTER
Medication:   Requested Prescriptions     Pending Prescriptions Disp Refills    allopurinol (ZYLOPRIM) 300 MG tablet [Pharmacy Med Name: ALLOPURINOL 300 MG TABLET] 30 tablet      Sig: TAKE 1 TABLET BY MOUTH TWICE A DAY        Last Filled:      Patient Phone Number: [phone] (home)     Last appt: 12/27/2023   Next appt: 4/2/2024    Last OARRS:       11/5/2018     2:54 PM   RX Monitoring   Attestation The Prescription Monitoring Report for this patient was reviewed today.   Periodic Controlled Substance Monitoring No signs of potential drug abuse or diversion identified.;Possible medication side effects, risk of tolerance/dependence & alternative treatments discussed.

## 2024-03-21 ENCOUNTER — OFFICE VISIT (OUTPATIENT)
Dept: CARDIOLOGY CLINIC | Age: 69
End: 2024-03-21
Payer: MEDICARE

## 2024-03-21 VITALS
OXYGEN SATURATION: 93 % | SYSTOLIC BLOOD PRESSURE: 90 MMHG | WEIGHT: 296 LBS | HEART RATE: 79 BPM | HEIGHT: 76 IN | BODY MASS INDEX: 36.04 KG/M2 | DIASTOLIC BLOOD PRESSURE: 62 MMHG

## 2024-03-21 DIAGNOSIS — E78.2 MIXED HYPERLIPIDEMIA: ICD-10-CM

## 2024-03-21 DIAGNOSIS — I50.32 CHRONIC DIASTOLIC CONGESTIVE HEART FAILURE (HCC): Primary | ICD-10-CM

## 2024-03-21 DIAGNOSIS — I48.20 CHRONIC ATRIAL FIBRILLATION (HCC): ICD-10-CM

## 2024-03-21 DIAGNOSIS — I71.21 ANEURYSM OF ASCENDING AORTA WITHOUT RUPTURE (HCC): ICD-10-CM

## 2024-03-21 PROCEDURE — G8427 DOCREV CUR MEDS BY ELIG CLIN: HCPCS | Performed by: NURSE PRACTITIONER

## 2024-03-21 PROCEDURE — 3074F SYST BP LT 130 MM HG: CPT | Performed by: NURSE PRACTITIONER

## 2024-03-21 PROCEDURE — 1036F TOBACCO NON-USER: CPT | Performed by: NURSE PRACTITIONER

## 2024-03-21 PROCEDURE — 99214 OFFICE O/P EST MOD 30 MIN: CPT | Performed by: NURSE PRACTITIONER

## 2024-03-21 PROCEDURE — 1123F ACP DISCUSS/DSCN MKR DOCD: CPT | Performed by: NURSE PRACTITIONER

## 2024-03-21 PROCEDURE — 3078F DIAST BP <80 MM HG: CPT | Performed by: NURSE PRACTITIONER

## 2024-03-21 PROCEDURE — 3017F COLORECTAL CA SCREEN DOC REV: CPT | Performed by: NURSE PRACTITIONER

## 2024-03-21 PROCEDURE — G8417 CALC BMI ABV UP PARAM F/U: HCPCS | Performed by: NURSE PRACTITIONER

## 2024-03-21 PROCEDURE — G8484 FLU IMMUNIZE NO ADMIN: HCPCS | Performed by: NURSE PRACTITIONER

## 2024-03-21 NOTE — PROGRESS NOTES
Vitals:    03/21/24 0818 03/21/24 0820 03/21/24 0844   BP: (!) 112/90 96/84 90/62   Site: Left Upper Arm Left Upper Arm Left Upper Arm   Position: Sitting Sitting    Cuff Size: Medium Adult Medium Adult Large Adult   Pulse: 79     SpO2: 93%     Weight: 134.3 kg (296 lb)     Height: 1.93 m (6' 4\")          VITALS:  BP 96/84 (Site: Left Upper Arm, Position: Sitting, Cuff Size: Medium Adult)   Pulse 79   Ht 1.93 m (6' 4\")   Wt 134.3 kg (296 lb)   SpO2 93%   BMI 36.03 kg/m²   CONSTITUTIONAL: Cooperative, no apparent distress, and appears well nourished / over weight  NEUROLOGIC:  Awake and orientated to person, place and time.  PSYCH: Calm affect.  SKIN: Warm and dry; LE dry/scaly with small bleeding areas on Lt shin; scabs on RLE.  HEENT: Sclera non-icteric, normocephalic, neck supple, no elevation of JVP, normal carotid pulses with no bruits and thyroid normal size.  LUNGS:  No increased work of breathing and clear to auscultation, no crackles or wheezing  CARDIOVASCULAR:  IRRegular rate 68 and rhythm with no murmurs, gallops, rubs, or abnormal heart sounds, normal PMI.The apical impulses not displaced  JVP less than 8 cm H2O  Heart tones are crisp and normal  Cervical veins are not engorged  The carotid upstroke is normal in amplitude and contour without delay or bruit  JVP is not elevated  ABDOMEN:  Normal bowel sounds, non-distended and non-tender to palpation  EXT: trace RLE  edema, no calf tenderness. Pulses are present bilaterally.    DATA:    Lab Results   Component Value Date    ALT 35 11/29/2022    AST 43 (H) 11/29/2022    GGT 98 (H) 04/20/2012    ALKPHOS 172 (H) 11/29/2022    BILITOT 1.1 (H) 11/29/2022     Lab Results   Component Value Date    CREATININE 1.2 07/21/2023    BUN 16 03/12/2023     03/12/2023    K 4.6 03/12/2023     03/12/2023    CO2 31 03/12/2023       Lab Results   Component Value Date    WBC 5.4 11/30/2022    HGB 12.5 (L) 11/30/2022    HCT 37.5 (L) 11/30/2022    MCV 97.8

## 2024-04-02 ENCOUNTER — OFFICE VISIT (OUTPATIENT)
Dept: INTERNAL MEDICINE CLINIC | Age: 69
End: 2024-04-02
Payer: MEDICARE

## 2024-04-02 VITALS
DIASTOLIC BLOOD PRESSURE: 70 MMHG | HEIGHT: 76 IN | HEART RATE: 68 BPM | SYSTOLIC BLOOD PRESSURE: 128 MMHG | BODY MASS INDEX: 36.53 KG/M2 | OXYGEN SATURATION: 97 % | WEIGHT: 300 LBS

## 2024-04-02 DIAGNOSIS — D68.69 SECONDARY HYPERCOAGULABLE STATE (HCC): ICD-10-CM

## 2024-04-02 DIAGNOSIS — Z89.431 S/P TRANSMETATARSAL AMPUTATION OF FOOT, RIGHT (HCC): ICD-10-CM

## 2024-04-02 DIAGNOSIS — I50.32 CHRONIC HEART FAILURE WITH PRESERVED EJECTION FRACTION (HCC): ICD-10-CM

## 2024-04-02 DIAGNOSIS — E66.01 SEVERE OBESITY (BMI 35.0-39.9) WITH COMORBIDITY (HCC): ICD-10-CM

## 2024-04-02 DIAGNOSIS — I71.23 ANEURYSM OF DESCENDING THORACIC AORTA WITHOUT RUPTURE (HCC): ICD-10-CM

## 2024-04-02 DIAGNOSIS — Z00.00 MEDICARE ANNUAL WELLNESS VISIT, SUBSEQUENT: Primary | ICD-10-CM

## 2024-04-02 DIAGNOSIS — I10 ESSENTIAL HYPERTENSION: ICD-10-CM

## 2024-04-02 DIAGNOSIS — F33.42 RECURRENT MAJOR DEPRESSIVE DISORDER, IN FULL REMISSION (HCC): ICD-10-CM

## 2024-04-02 DIAGNOSIS — Z12.11 COLON CANCER SCREENING: ICD-10-CM

## 2024-04-02 DIAGNOSIS — R56.9 NEW ONSET SEIZURE (HCC): ICD-10-CM

## 2024-04-02 DIAGNOSIS — N18.32 STAGE 3B CHRONIC KIDNEY DISEASE (HCC): ICD-10-CM

## 2024-04-02 DIAGNOSIS — E78.2 MIXED HYPERLIPIDEMIA: ICD-10-CM

## 2024-04-02 DIAGNOSIS — H61.23 BILATERAL IMPACTED CERUMEN: ICD-10-CM

## 2024-04-02 DIAGNOSIS — Z86.73 HISTORY OF STROKE: ICD-10-CM

## 2024-04-02 DIAGNOSIS — F41.9 ANXIETY: ICD-10-CM

## 2024-04-02 DIAGNOSIS — J44.9 CHRONIC OBSTRUCTIVE PULMONARY DISEASE, UNSPECIFIED COPD TYPE (HCC): ICD-10-CM

## 2024-04-02 DIAGNOSIS — I48.20 CHRONIC ATRIAL FIBRILLATION (HCC): ICD-10-CM

## 2024-04-02 DIAGNOSIS — R73.09 ELEVATED GLUCOSE: ICD-10-CM

## 2024-04-02 PROBLEM — G45.9 TIA (TRANSIENT ISCHEMIC ATTACK): Status: RESOLVED | Noted: 2022-10-30 | Resolved: 2024-04-02

## 2024-04-02 PROBLEM — L03.115 CELLULITIS OF LEG, RIGHT: Status: RESOLVED | Noted: 2022-02-25 | Resolved: 2024-04-02

## 2024-04-02 PROBLEM — S81.801A OPEN WOUND OF RIGHT LOWER EXTREMITY: Status: RESOLVED | Noted: 2022-02-17 | Resolved: 2024-04-02

## 2024-04-02 PROBLEM — K52.1 ANTIBIOTIC-ASSOCIATED DIARRHEA: Status: RESOLVED | Noted: 2022-02-25 | Resolved: 2024-04-02

## 2024-04-02 PROBLEM — T36.95XA ANTIBIOTIC-ASSOCIATED DIARRHEA: Status: RESOLVED | Noted: 2022-02-25 | Resolved: 2024-04-02

## 2024-04-02 LAB
ALBUMIN SERPL-MCNC: 4.2 G/DL (ref 3.4–5)
ALBUMIN/GLOB SERPL: 1.7 {RATIO} (ref 1.1–2.2)
ALP SERPL-CCNC: 173 U/L (ref 40–129)
ALT SERPL-CCNC: 20 U/L (ref 10–40)
ANION GAP SERPL CALCULATED.3IONS-SCNC: 15 MMOL/L (ref 3–16)
AST SERPL-CCNC: 24 U/L (ref 15–37)
BILIRUB SERPL-MCNC: 0.5 MG/DL (ref 0–1)
BUN SERPL-MCNC: 24 MG/DL (ref 7–20)
CALCIUM SERPL-MCNC: 9.2 MG/DL (ref 8.3–10.6)
CHLORIDE SERPL-SCNC: 102 MMOL/L (ref 99–110)
CHOLEST SERPL-MCNC: 126 MG/DL (ref 0–199)
CO2 SERPL-SCNC: 26 MMOL/L (ref 21–32)
CREAT SERPL-MCNC: 1.4 MG/DL (ref 0.8–1.3)
DEPRECATED RDW RBC AUTO: 16.7 % (ref 12.4–15.4)
GFR SERPLBLD CREATININE-BSD FMLA CKD-EPI: 55 ML/MIN/{1.73_M2}
GLUCOSE SERPL-MCNC: 94 MG/DL (ref 70–99)
HCT VFR BLD AUTO: 41.7 % (ref 40.5–52.5)
HDLC SERPL-MCNC: 39 MG/DL (ref 40–60)
HGB BLD-MCNC: 13.8 G/DL (ref 13.5–17.5)
LDLC SERPL CALC-MCNC: 63 MG/DL
MCH RBC QN AUTO: 31.1 PG (ref 26–34)
MCHC RBC AUTO-ENTMCNC: 33 G/DL (ref 31–36)
MCV RBC AUTO: 94 FL (ref 80–100)
PLATELET # BLD AUTO: 206 K/UL (ref 135–450)
PMV BLD AUTO: 9.6 FL (ref 5–10.5)
POTASSIUM SERPL-SCNC: 3.6 MMOL/L (ref 3.5–5.1)
PROT SERPL-MCNC: 6.7 G/DL (ref 6.4–8.2)
RBC # BLD AUTO: 4.43 M/UL (ref 4.2–5.9)
SODIUM SERPL-SCNC: 143 MMOL/L (ref 136–145)
TRIGL SERPL-MCNC: 120 MG/DL (ref 0–150)
VLDLC SERPL CALC-MCNC: 24 MG/DL
WBC # BLD AUTO: 8.8 K/UL (ref 4–11)

## 2024-04-02 PROCEDURE — G0439 PPPS, SUBSEQ VISIT: HCPCS | Performed by: NURSE PRACTITIONER

## 2024-04-02 PROCEDURE — 99214 OFFICE O/P EST MOD 30 MIN: CPT | Performed by: NURSE PRACTITIONER

## 2024-04-02 PROCEDURE — G8427 DOCREV CUR MEDS BY ELIG CLIN: HCPCS | Performed by: NURSE PRACTITIONER

## 2024-04-02 PROCEDURE — 3023F SPIROM DOC REV: CPT | Performed by: NURSE PRACTITIONER

## 2024-04-02 PROCEDURE — 1123F ACP DISCUSS/DSCN MKR DOCD: CPT | Performed by: NURSE PRACTITIONER

## 2024-04-02 PROCEDURE — 3074F SYST BP LT 130 MM HG: CPT | Performed by: NURSE PRACTITIONER

## 2024-04-02 PROCEDURE — 1036F TOBACCO NON-USER: CPT | Performed by: NURSE PRACTITIONER

## 2024-04-02 PROCEDURE — 3078F DIAST BP <80 MM HG: CPT | Performed by: NURSE PRACTITIONER

## 2024-04-02 PROCEDURE — G8417 CALC BMI ABV UP PARAM F/U: HCPCS | Performed by: NURSE PRACTITIONER

## 2024-04-02 PROCEDURE — 3017F COLORECTAL CA SCREEN DOC REV: CPT | Performed by: NURSE PRACTITIONER

## 2024-04-02 SDOH — ECONOMIC STABILITY: INCOME INSECURITY: HOW HARD IS IT FOR YOU TO PAY FOR THE VERY BASICS LIKE FOOD, HOUSING, MEDICAL CARE, AND HEATING?: NOT HARD AT ALL

## 2024-04-02 SDOH — ECONOMIC STABILITY: FOOD INSECURITY: WITHIN THE PAST 12 MONTHS, THE FOOD YOU BOUGHT JUST DIDN'T LAST AND YOU DIDN'T HAVE MONEY TO GET MORE.: NEVER TRUE

## 2024-04-02 SDOH — ECONOMIC STABILITY: FOOD INSECURITY: WITHIN THE PAST 12 MONTHS, YOU WORRIED THAT YOUR FOOD WOULD RUN OUT BEFORE YOU GOT MONEY TO BUY MORE.: NEVER TRUE

## 2024-04-02 ASSESSMENT — PATIENT HEALTH QUESTIONNAIRE - PHQ9
5. POOR APPETITE OR OVEREATING: NOT AT ALL
SUM OF ALL RESPONSES TO PHQ QUESTIONS 1-9: 1
8. MOVING OR SPEAKING SO SLOWLY THAT OTHER PEOPLE COULD HAVE NOTICED. OR THE OPPOSITE, BEING SO FIGETY OR RESTLESS THAT YOU HAVE BEEN MOVING AROUND A LOT MORE THAN USUAL: NOT AT ALL
1. LITTLE INTEREST OR PLEASURE IN DOING THINGS: NOT AT ALL
9. THOUGHTS THAT YOU WOULD BE BETTER OFF DEAD, OR OF HURTING YOURSELF: NOT AT ALL
SUM OF ALL RESPONSES TO PHQ QUESTIONS 1-9: 1
SUM OF ALL RESPONSES TO PHQ9 QUESTIONS 1 & 2: 0
SUM OF ALL RESPONSES TO PHQ QUESTIONS 1-9: 1
10. IF YOU CHECKED OFF ANY PROBLEMS, HOW DIFFICULT HAVE THESE PROBLEMS MADE IT FOR YOU TO DO YOUR WORK, TAKE CARE OF THINGS AT HOME, OR GET ALONG WITH OTHER PEOPLE: NOT DIFFICULT AT ALL
3. TROUBLE FALLING OR STAYING ASLEEP: NOT AT ALL
SUM OF ALL RESPONSES TO PHQ QUESTIONS 1-9: 1
4. FEELING TIRED OR HAVING LITTLE ENERGY: SEVERAL DAYS
7. TROUBLE CONCENTRATING ON THINGS, SUCH AS READING THE NEWSPAPER OR WATCHING TELEVISION: NOT AT ALL
2. FEELING DOWN, DEPRESSED OR HOPELESS: NOT AT ALL
6. FEELING BAD ABOUT YOURSELF - OR THAT YOU ARE A FAILURE OR HAVE LET YOURSELF OR YOUR FAMILY DOWN: NOT AT ALL

## 2024-04-02 ASSESSMENT — LIFESTYLE VARIABLES
HOW MANY STANDARD DRINKS CONTAINING ALCOHOL DO YOU HAVE ON A TYPICAL DAY: PATIENT DOES NOT DRINK
HOW OFTEN DO YOU HAVE A DRINK CONTAINING ALCOHOL: NEVER

## 2024-04-02 NOTE — PROGRESS NOTES
Medicare Annual Wellness Visit    Seymour Lujan is here for Medicare AWV    Assessment & Plan   Medicare annual wellness visit, subsequent  Assessment & Plan:  AWV today. Has labs ordered - printed and encouraged.   Recurrent major depressive disorder, in full remission (HCC)  Assessment & Plan:  Chronic, controlled.  Continue sertraline 50 mg daily.  Orders:  -     sertraline (ZOLOFT) 50 MG tablet; Take 1 tablet by mouth daily, Disp-90 tablet, R-3Normal  Anxiety  Assessment & Plan:  Chronic, controlled. Continue sertraline.   Orders:  -     sertraline (ZOLOFT) 50 MG tablet; Take 1 tablet by mouth daily, Disp-90 tablet, R-3Normal  Severe obesity (BMI 35.0-39.9) with comorbidity (HCC)  S/P transmetatarsal amputation of foot, right (HCC)  Assessment & Plan:  Chronic, stable.   Chronic obstructive pulmonary disease, unspecified COPD type (HCC)  Assessment & Plan:  Chronic. Not currently on inhalers.   New onset seizure (HCC)  Assessment & Plan:  Chronic, controlled.  He denies any further seizure activity since starting Keppra.  Continue Keppra as prescribed.  Follow-up with neurology as recommended.  Secondary hypercoagulable state (HCC)  Assessment & Plan:  Chronic, stable. Continue elliquis for PAF as recommended by EP.   Stage 3b chronic kidney disease (HCC)  Assessment & Plan:  Chronic, due for blood work - orders printed.   History of stroke  Assessment & Plan:   History of TIA   Chronic heart failure with preserved ejection fraction (HCC)  Assessment & Plan:  Chronic, stable. Continue furosemide 40 mg BID.   Colon cancer screening  -     Fecal DNA Colorectal cancer screening (Cologuard)  Aneurysm of descending thoracic aorta without rupture (HCC)  Assessment & Plan:  Chronic, most recent imaging stable.   Chronic atrial fibrillation (HCC)  Assessment & Plan:  Chronic, continue eliquis, diltiazem, metoprolol. Continue follow up with EP and cards as scheduled.   Essential hypertension  Assessment &

## 2024-04-02 NOTE — ASSESSMENT & PLAN NOTE
Chronic, stable. Continue furosemide 40 mg BID.    Regular rate and rhythm, Heart sounds S1 S2 present, no murmurs, rubs or gallops

## 2024-04-02 NOTE — PATIENT INSTRUCTIONS
aspirin, take the amount directed each day. Make sure you take aspirin and not another kind of pain reliever, such as acetaminophen (Tylenol).   When should you call for help?   Call 911 if you have symptoms of a heart attack. These may include:    Chest pain or pressure, or a strange feeling in the chest.     Sweating.     Shortness of breath.     Pain, pressure, or a strange feeling in the back, neck, jaw, or upper belly or in one or both shoulders or arms.     Lightheadedness or sudden weakness.     A fast or irregular heartbeat.   After you call 911, the  may tell you to chew 1 adult-strength or 2 to 4 low-dose aspirin. Wait for an ambulance. Do not try to drive yourself.  Watch closely for changes in your health, and be sure to contact your doctor if you have any problems.  Where can you learn more?  Go to https://www.[employer].net/patientEd and enter F075 to learn more about \"A Healthy Heart: Care Instructions.\"  Current as of: June 24, 2023               Content Version: 14.0  © 2695-0320 [employer].   Care instructions adapted under license by [employer]. If you have questions about a medical condition or this instruction, always ask your healthcare professional. [employer] disclaims any warranty or liability for your use of this information.      Personalized Preventive Plan for Seymour Lujan - 4/2/2024  Medicare offers a range of preventive health benefits. Some of the tests and screenings are paid in full while other may be subject to a deductible, co-insurance, and/or copay.    Some of these benefits include a comprehensive review of your medical history including lifestyle, illnesses that may run in your family, and various assessments and screenings as appropriate.    After reviewing your medical record and screening and assessments performed today your provider may have ordered immunizations, labs, imaging, and/or referrals for you.  A list of these orders

## 2024-04-03 ENCOUNTER — TELEPHONE (OUTPATIENT)
Dept: CARDIOLOGY CLINIC | Age: 69
End: 2024-04-03

## 2024-04-03 LAB
EST. AVERAGE GLUCOSE BLD GHB EST-MCNC: 116.9 MG/DL
HBA1C MFR BLD: 5.7 %

## 2024-04-03 NOTE — TELEPHONE ENCOUNTER
----- Message from MANDY Wilde - CNP sent at 4/3/2024 12:48 PM EDT -----  Ok ; recheck BMP in one month

## 2024-04-04 ENCOUNTER — TELEPHONE (OUTPATIENT)
Dept: CARDIOLOGY CLINIC | Age: 69
End: 2024-04-04

## 2024-04-23 DIAGNOSIS — R56.9 NEW ONSET SEIZURE (HCC): ICD-10-CM

## 2024-04-23 RX ORDER — LEVETIRACETAM 500 MG/1
500 TABLET ORAL 2 TIMES DAILY
Qty: 180 TABLET | Refills: 0 | Status: SHIPPED | OUTPATIENT
Start: 2024-04-23

## 2024-04-23 NOTE — TELEPHONE ENCOUNTER
Ingris Adam MD was the last prescriber of this med. Pt states he is no longer seeing that doctor and is asking for PCP to refill that if possible.

## 2024-04-23 NOTE — TELEPHONE ENCOUNTER
He is overdue for follow-up with neurology, he should have seen him in December. I recommend he schedule an appt. I will fill for 90 days. Please let pt know.

## 2024-05-02 PROBLEM — Z00.00 MEDICARE ANNUAL WELLNESS VISIT, SUBSEQUENT: Status: RESOLVED | Noted: 2024-04-02 | Resolved: 2024-05-02

## 2024-05-04 DIAGNOSIS — F41.9 ANXIETY: ICD-10-CM

## 2024-05-04 DIAGNOSIS — I10 ESSENTIAL HYPERTENSION: ICD-10-CM

## 2024-05-04 DIAGNOSIS — F33.42 RECURRENT MAJOR DEPRESSIVE DISORDER, IN FULL REMISSION (HCC): ICD-10-CM

## 2024-05-04 DIAGNOSIS — I50.32 CHRONIC DIASTOLIC CONGESTIVE HEART FAILURE (HCC): ICD-10-CM

## 2024-06-04 DIAGNOSIS — F33.42 RECURRENT MAJOR DEPRESSIVE DISORDER, IN FULL REMISSION (HCC): ICD-10-CM

## 2024-06-04 DIAGNOSIS — I10 ESSENTIAL HYPERTENSION: ICD-10-CM

## 2024-06-04 DIAGNOSIS — F41.9 ANXIETY: ICD-10-CM

## 2024-06-04 DIAGNOSIS — I50.32 CHRONIC DIASTOLIC CONGESTIVE HEART FAILURE (HCC): ICD-10-CM

## 2024-06-04 NOTE — TELEPHONE ENCOUNTER
Last OV: 4/2/2024  Next OV: Visit date not found    Next appointment due:around 3/27/2024     Last fill:5/6/24  Refills:0

## 2024-06-06 DIAGNOSIS — E78.2 MIXED HYPERLIPIDEMIA: ICD-10-CM

## 2024-06-06 RX ORDER — ATORVASTATIN CALCIUM 40 MG/1
40 TABLET, FILM COATED ORAL EVERY EVENING
Qty: 90 TABLET | Refills: 1 | Status: SHIPPED | OUTPATIENT
Start: 2024-06-06

## 2024-07-02 DIAGNOSIS — F41.9 ANXIETY: ICD-10-CM

## 2024-07-02 DIAGNOSIS — F33.42 RECURRENT MAJOR DEPRESSIVE DISORDER, IN FULL REMISSION (HCC): ICD-10-CM

## 2024-07-02 NOTE — TELEPHONE ENCOUNTER
Last OV: 4/2/2024  Next OV: Visit date not found    Next appointment due:9/21/2024     Last fill:6/4/24  Refills:0

## 2024-08-04 DIAGNOSIS — R56.9 NEW ONSET SEIZURE (HCC): ICD-10-CM

## 2024-08-05 ENCOUNTER — OFFICE VISIT (OUTPATIENT)
Dept: ORTHOPEDIC SURGERY | Age: 69
End: 2024-08-05
Payer: MEDICARE

## 2024-08-05 VITALS — WEIGHT: 300 LBS | HEIGHT: 76 IN | BODY MASS INDEX: 36.53 KG/M2

## 2024-08-05 DIAGNOSIS — M17.0 BILATERAL PRIMARY OSTEOARTHRITIS OF KNEE: ICD-10-CM

## 2024-08-05 DIAGNOSIS — R56.9 NEW ONSET SEIZURE (HCC): ICD-10-CM

## 2024-08-05 DIAGNOSIS — M25.562 PAIN IN BOTH KNEES, UNSPECIFIED CHRONICITY: Primary | ICD-10-CM

## 2024-08-05 DIAGNOSIS — M25.561 PAIN IN BOTH KNEES, UNSPECIFIED CHRONICITY: Primary | ICD-10-CM

## 2024-08-05 PROCEDURE — 20610 DRAIN/INJ JOINT/BURSA W/O US: CPT | Performed by: PHYSICIAN ASSISTANT

## 2024-08-05 PROCEDURE — 90000 NO LOS: CPT | Performed by: PHYSICIAN ASSISTANT

## 2024-08-05 RX ORDER — TRIAMCINOLONE ACETONIDE 40 MG/ML
40 INJECTION, SUSPENSION INTRA-ARTICULAR; INTRAMUSCULAR ONCE
Status: COMPLETED | OUTPATIENT
Start: 2024-08-05 | End: 2024-08-05

## 2024-08-05 RX ORDER — LEVETIRACETAM 500 MG/1
500 TABLET ORAL 2 TIMES DAILY
Qty: 180 TABLET | Refills: 0 | Status: SHIPPED | OUTPATIENT
Start: 2024-08-05

## 2024-08-05 RX ORDER — LIDOCAINE HYDROCHLORIDE 10 MG/ML
4 INJECTION, SOLUTION INFILTRATION; PERINEURAL ONCE
Status: SHIPPED | OUTPATIENT
Start: 2024-08-05

## 2024-08-05 RX ORDER — BUPIVACAINE HYDROCHLORIDE 5 MG/ML
4 INJECTION, SOLUTION EPIDURAL; INTRACAUDAL ONCE
Status: SHIPPED | OUTPATIENT
Start: 2024-08-05

## 2024-08-05 RX ORDER — BUPIVACAINE HYDROCHLORIDE 5 MG/ML
4 INJECTION, SOLUTION PERINEURAL ONCE
Status: COMPLETED | OUTPATIENT
Start: 2024-08-05 | End: 2024-08-05

## 2024-08-05 RX ADMIN — TRIAMCINOLONE ACETONIDE 40 MG: 40 INJECTION, SUSPENSION INTRA-ARTICULAR; INTRAMUSCULAR at 08:21

## 2024-08-05 RX ADMIN — BUPIVACAINE HYDROCHLORIDE 20 MG: 5 INJECTION, SOLUTION PERINEURAL at 08:20

## 2024-08-05 RX ADMIN — BUPIVACAINE HYDROCHLORIDE 20 MG: 5 INJECTION, SOLUTION PERINEURAL at 08:19

## 2024-08-05 RX ADMIN — TRIAMCINOLONE ACETONIDE 40 MG: 40 INJECTION, SUSPENSION INTRA-ARTICULAR; INTRAMUSCULAR at 08:20

## 2024-08-05 NOTE — PROGRESS NOTES
Joint Injection: risks and benefits were discussed with the patient, after preparation of the injection site with alcohol, a combination of 1cc kenelog and 4cc Marcaine totaling 5 cc was injected into the BILATERAL KNEE joint for arthritis. The procedure was tolerated well without adverse reaction. The patient was counseled on potential reactions to the injection and given information on follow up and when to seek medical attention. The patient will monitor how long relief persists.    He understands we can repeat these injections every 3 months as needed. He is not a good surgical candidate at this point.     Deny Roberts PA-C

## 2024-08-06 RX ORDER — LEVETIRACETAM 500 MG/1
500 TABLET ORAL 2 TIMES DAILY
Qty: 180 TABLET | Refills: 0 | OUTPATIENT
Start: 2024-08-06

## 2024-09-04 DIAGNOSIS — I50.32 CHRONIC DIASTOLIC CONGESTIVE HEART FAILURE (HCC): ICD-10-CM

## 2024-09-04 RX ORDER — FUROSEMIDE 40 MG
80 TABLET ORAL 2 TIMES DAILY
Qty: 360 TABLET | Refills: 1 | Status: SHIPPED | OUTPATIENT
Start: 2024-09-04

## 2024-09-04 NOTE — TELEPHONE ENCOUNTER
Last OV: 4/2/2024  Next OV: Visit date not found    Next appointment due: not stated     Last fill:3/4/24  Refills:1

## 2024-09-21 DIAGNOSIS — M1A.09X0 IDIOPATHIC CHRONIC GOUT OF MULTIPLE SITES WITHOUT TOPHUS: ICD-10-CM

## 2024-09-23 RX ORDER — ALLOPURINOL 300 MG/1
300 TABLET ORAL 2 TIMES DAILY
Qty: 180 TABLET | Refills: 1 | Status: SHIPPED | OUTPATIENT
Start: 2024-09-23

## 2024-10-04 DIAGNOSIS — F41.9 ANXIETY: ICD-10-CM

## 2024-10-04 DIAGNOSIS — F33.42 RECURRENT MAJOR DEPRESSIVE DISORDER, IN FULL REMISSION (HCC): ICD-10-CM

## 2024-10-04 NOTE — TELEPHONE ENCOUNTER
Last OV: 4/2/2024  Next OV: Visit date not found    Next appointment due:    Last fill:7/2/24  Refills:0#90

## 2024-10-09 DIAGNOSIS — R56.9 NEW ONSET SEIZURE (HCC): ICD-10-CM

## 2024-10-09 DIAGNOSIS — I48.20 CHRONIC ATRIAL FIBRILLATION (HCC): ICD-10-CM

## 2024-10-09 DIAGNOSIS — I50.32 CHRONIC DIASTOLIC CONGESTIVE HEART FAILURE (HCC): ICD-10-CM

## 2024-10-09 RX ORDER — DILTIAZEM HYDROCHLORIDE 180 MG/1
CAPSULE, EXTENDED RELEASE ORAL
Qty: 60 CAPSULE | Refills: 0 | Status: SHIPPED | OUTPATIENT
Start: 2024-10-09

## 2024-10-09 RX ORDER — LEVETIRACETAM 500 MG/1
500 TABLET ORAL 2 TIMES DAILY
Qty: 60 TABLET | OUTPATIENT
Start: 2024-10-09

## 2024-10-09 RX ORDER — APIXABAN 5 MG/1
5 TABLET, FILM COATED ORAL 2 TIMES DAILY
Qty: 180 TABLET | Refills: 0 | Status: SHIPPED | OUTPATIENT
Start: 2024-10-09

## 2024-10-09 NOTE — TELEPHONE ENCOUNTER
Last OV: 4/2/2024  Next OV: Visit date not found    Next appointment due: not stated    Last fill:12/27/23  Refills:3 for apixaban  Last refill: 12/27/23  Refills: 1 gor diltiazem

## 2024-10-25 DIAGNOSIS — R56.9 NEW ONSET SEIZURE (HCC): ICD-10-CM

## 2024-10-25 DIAGNOSIS — I50.32 CHRONIC DIASTOLIC CONGESTIVE HEART FAILURE (HCC): ICD-10-CM

## 2024-10-25 DIAGNOSIS — I48.20 CHRONIC ATRIAL FIBRILLATION (HCC): ICD-10-CM

## 2024-10-25 DIAGNOSIS — I10 ESSENTIAL HYPERTENSION: ICD-10-CM

## 2024-10-25 DIAGNOSIS — E78.2 MIXED HYPERLIPIDEMIA: ICD-10-CM

## 2024-10-25 RX ORDER — DILTIAZEM HYDROCHLORIDE 180 MG/1
CAPSULE, EXTENDED RELEASE ORAL
Qty: 60 CAPSULE | Refills: 0 | Status: SHIPPED | OUTPATIENT
Start: 2024-10-25

## 2024-10-25 RX ORDER — APIXABAN 5 MG/1
5 TABLET, FILM COATED ORAL 2 TIMES DAILY
Qty: 60 TABLET | Refills: 1 | Status: SHIPPED | OUTPATIENT
Start: 2024-10-25

## 2024-10-25 RX ORDER — ATORVASTATIN CALCIUM 40 MG/1
40 TABLET, FILM COATED ORAL EVERY EVENING
Qty: 90 TABLET | Refills: 1 | Status: SHIPPED | OUTPATIENT
Start: 2024-10-25

## 2024-10-25 RX ORDER — METOPROLOL TARTRATE 25 MG/1
25 TABLET, FILM COATED ORAL 2 TIMES DAILY
Qty: 180 TABLET | Refills: 1 | Status: SHIPPED | OUTPATIENT
Start: 2024-10-25

## 2024-10-28 RX ORDER — LEVETIRACETAM 500 MG/1
500 TABLET ORAL 2 TIMES DAILY
Qty: 60 TABLET | Refills: 1 | Status: SHIPPED | OUTPATIENT
Start: 2024-10-28

## 2024-10-28 NOTE — TELEPHONE ENCOUNTER
Medication:   Requested Prescriptions     Pending Prescriptions Disp Refills    levETIRAcetam (KEPPRA) 500 MG tablet [Pharmacy Med Name: LEVETIRACETAM 500 MG TABLET] 60 tablet      Sig: TAKE 1 TABLET BY MOUTH TWICE A DAY     Last Filled:  8/5/24    Last appt: 6/14/2023   Next appt: Visit date not found    Last OARRS:       11/5/2018     2:54 PM   RX Monitoring   Attestation The Prescription Monitoring Report for this patient was reviewed today.   Periodic Controlled Substance Monitoring No signs of potential drug abuse or diversion identified.;Possible medication side effects, risk of tolerance/dependence & alternative treatments discussed.

## 2024-10-29 ENCOUNTER — OFFICE VISIT (OUTPATIENT)
Dept: CARDIOLOGY CLINIC | Age: 69
End: 2024-10-29

## 2024-10-29 VITALS
HEIGHT: 76 IN | WEIGHT: 315 LBS | SYSTOLIC BLOOD PRESSURE: 110 MMHG | OXYGEN SATURATION: 98 % | BODY MASS INDEX: 38.36 KG/M2 | HEART RATE: 72 BPM | DIASTOLIC BLOOD PRESSURE: 62 MMHG

## 2024-10-29 DIAGNOSIS — I71.21 ANEURYSM OF ASCENDING AORTA WITHOUT RUPTURE (HCC): ICD-10-CM

## 2024-10-29 DIAGNOSIS — E78.2 MIXED HYPERLIPIDEMIA: ICD-10-CM

## 2024-10-29 DIAGNOSIS — I50.32 CHRONIC DIASTOLIC CONGESTIVE HEART FAILURE (HCC): Primary | ICD-10-CM

## 2024-10-29 DIAGNOSIS — I50.32 CHRONIC DIASTOLIC CONGESTIVE HEART FAILURE (HCC): ICD-10-CM

## 2024-10-29 DIAGNOSIS — I48.20 CHRONIC ATRIAL FIBRILLATION (HCC): ICD-10-CM

## 2024-10-29 LAB
ALBUMIN SERPL-MCNC: 3.9 G/DL (ref 3.4–5)
ALBUMIN/GLOB SERPL: 1.6 {RATIO} (ref 1.1–2.2)
ALP SERPL-CCNC: 152 U/L (ref 40–129)
ALT SERPL-CCNC: 27 U/L (ref 10–40)
ANION GAP SERPL CALCULATED.3IONS-SCNC: 12 MMOL/L (ref 3–16)
AST SERPL-CCNC: 32 U/L (ref 15–37)
BILIRUB SERPL-MCNC: 0.4 MG/DL (ref 0–1)
BUN SERPL-MCNC: 25 MG/DL (ref 7–20)
CALCIUM SERPL-MCNC: 9.4 MG/DL (ref 8.3–10.6)
CHLORIDE SERPL-SCNC: 101 MMOL/L (ref 99–110)
CO2 SERPL-SCNC: 28 MMOL/L (ref 21–32)
CREAT SERPL-MCNC: 1.6 MG/DL (ref 0.8–1.3)
GFR SERPLBLD CREATININE-BSD FMLA CKD-EPI: 46 ML/MIN/{1.73_M2}
GLUCOSE SERPL-MCNC: 85 MG/DL (ref 70–99)
NT-PROBNP SERPL-MCNC: 1709 PG/ML (ref 0–124)
POTASSIUM SERPL-SCNC: 4.3 MMOL/L (ref 3.5–5.1)
PROT SERPL-MCNC: 6.3 G/DL (ref 6.4–8.2)
SODIUM SERPL-SCNC: 141 MMOL/L (ref 136–145)

## 2024-10-29 NOTE — PROGRESS NOTES
[facility]     Outpatient Follow Up Note    Seymour Lujan is 69 y.o. male who presents today with a history of AF s/p DCCV '20,dilated AoR, HTN and CHF.    His other hx includes: TIA '22, hematuria '22    CHIEF COMPLAINT / HPI:  Follow Up secondary to CHF    Subjective:     He has no chest discomfort / pain / pressure. Walking in from the parking lot today, 1/2 way down the hallway of the [employer] level, he was breathing heavy. He does ok walking around the grocery store. 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  The patient denies orthopnea/PND. He sleeps in a chair for comfort.  His sleeping pattern is off and wakes up periodically (hx of intolerant to CPAP ; and sleeps sitting up).    He's been using Voltaren gel on his RLE to keep the swelling down which seems to have helped. Its been going on for a long time: years. He doesn't weigh himself at home but usually picks up during the winter and looses in the summer.   He's up ~ 35# since March by office scales. The patient is not experiencing palpitations or dizziness.     His BP runs \"regular\"; he gets it checked by a HHN    These symptoms show no change since the last OV.   With regard to medication therapy the patient has been compliant with prescribed regimen. They have tolerated therapy to date.       Past Medical History:   Diagnosis Date    Anxiety     Arthritis     Asthma     Atrial fibrillation (HCC)     Blood circulation, collateral     CAD (coronary artery disease)     Cellulitis of leg, right 02/25/2022    CHF (congestive heart failure) (HCC)     Colon cancer (HCC)     COPD (chronic obstructive pulmonary disease) (HCC)     Diabetic foot infection (HCC)     pt denies diabetes    Hernia     Hyperlipidemia     Hypertension     Movement disorder     Muscle cramps     Neuromuscular disorder (HCC)     marvin feet numbness/neuropathy    Neuropathy     Open wound of right lower

## 2024-10-29 NOTE — PATIENT INSTRUCTIONS
Labs : check your kidney function and fluid level    CT chest and echocardiogram    Appt in six weeks / depending on your lab results and if we make medication adjustments

## 2024-10-30 ENCOUNTER — OFFICE VISIT (OUTPATIENT)
Dept: INTERNAL MEDICINE CLINIC | Age: 69
End: 2024-10-30

## 2024-10-30 ENCOUNTER — TELEPHONE (OUTPATIENT)
Dept: CARDIOLOGY CLINIC | Age: 69
End: 2024-10-30

## 2024-10-30 VITALS
HEART RATE: 90 BPM | TEMPERATURE: 97.8 F | BODY MASS INDEX: 38.36 KG/M2 | DIASTOLIC BLOOD PRESSURE: 74 MMHG | WEIGHT: 315 LBS | OXYGEN SATURATION: 96 % | HEIGHT: 76 IN | SYSTOLIC BLOOD PRESSURE: 124 MMHG

## 2024-10-30 DIAGNOSIS — I50.32 CHRONIC HEART FAILURE WITH PRESERVED EJECTION FRACTION (HCC): ICD-10-CM

## 2024-10-30 DIAGNOSIS — E78.2 MIXED HYPERLIPIDEMIA: ICD-10-CM

## 2024-10-30 DIAGNOSIS — N18.32 STAGE 3B CHRONIC KIDNEY DISEASE (HCC): ICD-10-CM

## 2024-10-30 DIAGNOSIS — I73.9 PVD (PERIPHERAL VASCULAR DISEASE) (HCC): ICD-10-CM

## 2024-10-30 DIAGNOSIS — I48.20 CHRONIC ATRIAL FIBRILLATION (HCC): ICD-10-CM

## 2024-10-30 DIAGNOSIS — I50.32 CHRONIC DIASTOLIC CONGESTIVE HEART FAILURE (HCC): ICD-10-CM

## 2024-10-30 DIAGNOSIS — I10 ESSENTIAL HYPERTENSION: ICD-10-CM

## 2024-10-30 DIAGNOSIS — J43.8 OTHER EMPHYSEMA (HCC): ICD-10-CM

## 2024-10-30 DIAGNOSIS — G62.9 NEUROPATHY: ICD-10-CM

## 2024-10-30 DIAGNOSIS — L03.115 CELLULITIS OF RIGHT LOWER EXTREMITY: Primary | ICD-10-CM

## 2024-10-30 DIAGNOSIS — R60.0 LOCALIZED EDEMA: ICD-10-CM

## 2024-10-30 DIAGNOSIS — L03.116 CELLULITIS OF LEFT LOWER EXTREMITY: ICD-10-CM

## 2024-10-30 DIAGNOSIS — I50.32 CHRONIC DIASTOLIC CONGESTIVE HEART FAILURE (HCC): Primary | ICD-10-CM

## 2024-10-30 RX ORDER — METOLAZONE 2.5 MG/1
TABLET ORAL
Qty: 2 TABLET | Refills: 0 | Status: SHIPPED | OUTPATIENT
Start: 2024-10-30

## 2024-10-30 RX ORDER — DOXYCYCLINE HYCLATE 100 MG
100 TABLET ORAL 2 TIMES DAILY
Qty: 14 TABLET | Refills: 0 | Status: SHIPPED | OUTPATIENT
Start: 2024-10-30 | End: 2024-11-06

## 2024-10-30 RX ORDER — GABAPENTIN 100 MG/1
100 CAPSULE ORAL NIGHTLY
Qty: 90 CAPSULE | Refills: 0 | Status: SHIPPED | OUTPATIENT
Start: 2024-10-30 | End: 2025-01-28

## 2024-10-30 NOTE — ASSESSMENT & PLAN NOTE
Chronic, continue eliquis, diltiazem, metoprolol, Lasix. Continue follow up with EP and cards.

## 2024-10-30 NOTE — ASSESSMENT & PLAN NOTE
Chronic, continue eliquis, diltiazem, metoprolol and lasix. Continue follow up with EP and cards

## 2024-10-30 NOTE — ASSESSMENT & PLAN NOTE
Chronic, BW reviewed.  Stable. Avoid nsaids, work on hydration. Work on dietary compliance.

## 2024-10-30 NOTE — ASSESSMENT & PLAN NOTE
Chronic, not currently on inhalers. Declines intervention today. Asymptomatic today. Lungs CTA.

## 2024-10-30 NOTE — ASSESSMENT & PLAN NOTE
Chronic, uncontrolled. Reviewed supportive care in detail. Trial of gabapentin 100 mg nightly.  Reviewed medication use, risk and s/e

## 2024-10-30 NOTE — ASSESSMENT & PLAN NOTE
Chronic, uncontrolled. Wear compression as recommended. Covering acute wounds with doxy. Reviewed supportive care in detail.

## 2024-10-30 NOTE — PROGRESS NOTES
10/30/24     Chief Complaint   Patient presents with    Wound Check     Right lower leg       HPI    Pt called to get scheduled after receiving a message from Cecy with cardiology that his leg wounds were uncontrolled.  His weight is up, down a few lbs today compared to yesterday.  He had BW ordered / complete yesterday. He has an echo and CT scheduled in 2 weeks.     Pt reports bilateral leg wounds, restarted about 3-4 months ago   He thinks they are slowly improving. He gets blisters that he picks and pops, stating \"I know I'm not supposed to do but I pop them\".  Washes his legs with soap / water, sometimes uses saline water. He denies fever, chills. Reports some intermittent drainage.  Using intensive care lotion on his legs to help with dryness.     Reports his swelling has been getting a little better since he started wearing compression and is not going to the [employer] park everyday.  He is using voltaren gel on legs to help with swelling.     Neuropathy, numbness and tingling in legs and feet, getting worse. Keeping him up at night.      Had bilateral knee injections in Aug, which helped with knee pain.     Stephani with [facility] comes out on Sundays to see his room mate and he reports she has looked at his legs. She has done wound care for him in the past and is requesting to have her again.      Allergies   Allergen Reactions    Bactrim      hallucinations    Clindamycin/Lincomycin Diarrhea       Current Outpatient Medications   Medication Sig Dispense Refill    doxycycline hyclate (VIBRA-TABS) 100 MG tablet Take 1 tablet by mouth 2 times daily for 7 days 14 tablet 0    gabapentin (NEURONTIN) 100 MG capsule Take 1 capsule by mouth nightly for 90 days. 90 capsule 0    levETIRAcetam (KEPPRA) 500 MG tablet TAKE 1 TABLET BY MOUTH TWICE A DAY 60 tablet 1    apixaban (ELIQUIS) 5 MG TABS tablet TAKE 1 TABLET BY MOUTH TWICE A DAY 60 tablet 1    atorvastatin (LIPITOR) 40 MG tablet TAKE 1 TABLET BY MOUTH EVERY DAY IN THE

## 2024-10-30 NOTE — ASSESSMENT & PLAN NOTE
Chronic. Uncontrolled. Reviewed BW. Will continue furosemide 80 mg BID. Weight gain of 30lbs the past few months.  Keep appt with cardiology for follow up.

## 2024-11-01 LAB
BACTERIA SPEC AEROBE CULT: ABNORMAL
GRAM STN SPEC: ABNORMAL
ORGANISM: ABNORMAL

## 2024-11-11 DIAGNOSIS — R56.9 NEW ONSET SEIZURE (HCC): ICD-10-CM

## 2024-11-11 RX ORDER — LEVETIRACETAM 500 MG/1
500 TABLET ORAL 2 TIMES DAILY
Qty: 180 TABLET | Refills: 1 | OUTPATIENT
Start: 2024-11-11

## 2024-11-11 RX ORDER — LEVETIRACETAM 500 MG/1
500 TABLET ORAL 2 TIMES DAILY
Qty: 180 TABLET | OUTPATIENT
Start: 2024-11-11

## 2024-11-18 ENCOUNTER — HOSPITAL ENCOUNTER (OUTPATIENT)
Age: 69
Discharge: HOME | End: 2024-11-20
Payer: MEDICARE

## 2024-11-18 ENCOUNTER — HOSPITAL ENCOUNTER (OUTPATIENT)
Dept: CT IMAGING | Age: 69
Discharge: HOME | End: 2024-11-18
Payer: MEDICARE

## 2024-11-18 VITALS
HEIGHT: 76 IN | DIASTOLIC BLOOD PRESSURE: 81 MMHG | WEIGHT: 315 LBS | BODY MASS INDEX: 38.36 KG/M2 | SYSTOLIC BLOOD PRESSURE: 137 MMHG

## 2024-11-18 DIAGNOSIS — I71.21 ANEURYSM OF ASCENDING AORTA WITHOUT RUPTURE (HCC): ICD-10-CM

## 2024-11-18 DIAGNOSIS — I50.32 CHRONIC DIASTOLIC CONGESTIVE HEART FAILURE (HCC): ICD-10-CM

## 2024-11-18 LAB
ECHO AO ASC DIAM: 3.2 CM
ECHO AO ASCENDING AORTA INDEX: 1.17 CM/M2
ECHO AO ROOT DIAM: 3.3 CM
ECHO AO ROOT INDEX: 1.21 CM/M2
ECHO AV AREA PEAK VELOCITY: 3.7 CM2
ECHO AV AREA VTI: 3.2 CM2
ECHO AV AREA/BSA PEAK VELOCITY: 1.4 CM2/M2
ECHO AV AREA/BSA VTI: 1.2 CM2/M2
ECHO AV MEAN GRADIENT: 3 MMHG
ECHO AV MEAN VELOCITY: 0.8 M/S
ECHO AV PEAK GRADIENT: 6 MMHG
ECHO AV PEAK VELOCITY: 1.2 M/S
ECHO AV VELOCITY RATIO: 1
ECHO AV VTI: 23.7 CM
ECHO BSA: 2.82 M2
ECHO EST RA PRESSURE: 8 MMHG
ECHO LA AREA 2C: 39.1 CM2
ECHO LA AREA 4C: 38.3 CM2
ECHO LA DIAMETER INDEX: 1.72 CM/M2
ECHO LA DIAMETER: 4.7 CM
ECHO LA MAJOR AXIS: 8.5 CM
ECHO LA MINOR AXIS: 8.5 CM
ECHO LA TO AORTIC ROOT RATIO: 1.42
ECHO LA VOL BP: 142 ML (ref 18–58)
ECHO LA VOL MOD A2C: 147 ML (ref 18–58)
ECHO LA VOL MOD A4C: 139 ML (ref 18–58)
ECHO LA VOL/BSA BIPLANE: 52 ML/M2 (ref 16–34)
ECHO LA VOLUME INDEX MOD A2C: 54 ML/M2 (ref 16–34)
ECHO LA VOLUME INDEX MOD A4C: 51 ML/M2 (ref 16–34)
ECHO LV E' LATERAL VELOCITY: 8.27 CM/S
ECHO LV E' SEPTAL VELOCITY: 8.27 CM/S
ECHO LV EDV A2C: 122 ML
ECHO LV EDV A4C: 142 ML
ECHO LV EDV INDEX A4C: 52 ML/M2
ECHO LV EDV NDEX A2C: 45 ML/M2
ECHO LV EJECTION FRACTION A2C: 80 %
ECHO LV EJECTION FRACTION A4C: 67 %
ECHO LV EJECTION FRACTION BIPLANE: 55 % (ref 55–100)
ECHO LV ESV A2C: 25 ML
ECHO LV ESV A4C: 47 ML
ECHO LV ESV INDEX A2C: 9 ML/M2
ECHO LV ESV INDEX A4C: 17 ML/M2
ECHO LV FRACTIONAL SHORTENING: 37 % (ref 28–44)
ECHO LV INTERNAL DIMENSION DIASTOLE INDEX: 1.79 CM/M2
ECHO LV INTERNAL DIMENSION DIASTOLIC: 4.9 CM (ref 4.2–5.9)
ECHO LV INTERNAL DIMENSION SYSTOLIC INDEX: 1.14 CM/M2
ECHO LV INTERNAL DIMENSION SYSTOLIC: 3.1 CM
ECHO LV IVSD: 1.1 CM (ref 0.6–1)
ECHO LV MASS 2D: 200.5 G (ref 88–224)
ECHO LV MASS INDEX 2D: 73.4 G/M2 (ref 49–115)
ECHO LV POSTERIOR WALL DIASTOLIC: 1.1 CM (ref 0.6–1)
ECHO LV RELATIVE WALL THICKNESS RATIO: 0.45
ECHO LVOT AREA: 3.8 CM2
ECHO LVOT AV VTI INDEX: 0.84
ECHO LVOT DIAM: 2.2 CM
ECHO LVOT MEAN GRADIENT: 2 MMHG
ECHO LVOT PEAK GRADIENT: 5 MMHG
ECHO LVOT PEAK VELOCITY: 1.2 M/S
ECHO LVOT STROKE VOLUME INDEX: 27.6 ML/M2
ECHO LVOT SV: 75.2 ML
ECHO LVOT VTI: 19.8 CM
ECHO MV AREA VTI: 2.7 CM2
ECHO MV E VELOCITY: 1.35 M/S
ECHO MV E/E' LATERAL: 16.32
ECHO MV E/E' RATIO (AVERAGED): 16.32
ECHO MV E/E' SEPTAL: 16.32
ECHO MV LVOT VTI INDEX: 1.4
ECHO MV MAX VELOCITY: 1.4 M/S
ECHO MV MEAN GRADIENT: 2 MMHG
ECHO MV MEAN VELOCITY: 0.7 M/S
ECHO MV PEAK GRADIENT: 8 MMHG
ECHO MV VTI: 27.8 CM
ECHO PV MAX VELOCITY: 1.1 M/S
ECHO PV PEAK GRADIENT: 5 MMHG
ECHO RA AREA 4C: 35.5 CM2
ECHO RA END SYSTOLIC VOLUME APICAL 4 CHAMBER INDEX BSA: 55 ML/M2
ECHO RA VOLUME: 149 ML
ECHO RIGHT VENTRICULAR SYSTOLIC PRESSURE (RVSP): 31 MMHG
ECHO RV TAPSE: 1.5 CM (ref 1.7–?)
ECHO TV REGURGITANT MAX VELOCITY: 2.4 M/S
ECHO TV REGURGITANT PEAK GRADIENT: 23 MMHG

## 2024-11-18 PROCEDURE — 6360000004 HC RX CONTRAST MEDICATION: Performed by: NURSE PRACTITIONER

## 2024-11-18 PROCEDURE — 71250 CT THORAX DX C-: CPT

## 2024-11-18 PROCEDURE — 93306 TTE W/DOPPLER COMPLETE: CPT | Performed by: INTERNAL MEDICINE

## 2024-11-18 PROCEDURE — C8929 TTE W OR WO FOL WCON,DOPPLER: HCPCS

## 2024-11-18 RX ADMIN — SULFUR HEXAFLUORIDE 2 ML: 60.7; .19; .19 INJECTION, POWDER, LYOPHILIZED, FOR SUSPENSION INTRAVENOUS; INTRAVESICAL at 14:27

## 2024-11-19 ENCOUNTER — TELEPHONE (OUTPATIENT)
Dept: CARDIOLOGY CLINIC | Age: 69
End: 2024-11-19

## 2024-11-19 DIAGNOSIS — R56.9 NEW ONSET SEIZURE (HCC): ICD-10-CM

## 2024-11-19 RX ORDER — LEVETIRACETAM 500 MG/1
500 TABLET ORAL 2 TIMES DAILY
Qty: 180 TABLET | Refills: 0 | Status: SHIPPED | OUTPATIENT
Start: 2024-11-19

## 2024-11-19 NOTE — TELEPHONE ENCOUNTER
Patient requesting refill of diclofenac sodium (VOLTAREN) 1 % GEL sent to [facility] on [address]    Last OV: 10/30/2024  Next OV: 1/29/2025    Next appointment due: 1/30/2025    Last fill: 10/25/2024  Refills:   2 (previously sent to [facility])

## 2024-11-19 NOTE — TELEPHONE ENCOUNTER
I spoke with pt and relayed Echo results per NPTS. Pt verbalized understanding.      Patient is asking if he could get the results of the CT Chest he had done also.  Please call patient with results.

## 2024-12-02 ENCOUNTER — TELEPHONE (OUTPATIENT)
Dept: INTERNAL MEDICINE CLINIC | Age: 69
End: 2024-12-02

## 2024-12-02 RX ORDER — GABAPENTIN 100 MG/1
200 CAPSULE ORAL NIGHTLY
Qty: 180 CAPSULE | Refills: 0 | Status: SHIPPED | OUTPATIENT
Start: 2024-12-02 | End: 2025-03-02

## 2024-12-02 NOTE — TELEPHONE ENCOUNTER
Patient calling in stating he has been taking 2 of the gabapentin, b/c he got it mixed up with his other medication. He states it has been getting rid of his nerve pain. Patient is asking if Christine can write the script for 2 a day instead of 1.

## 2024-12-06 ENCOUNTER — TELEPHONE (OUTPATIENT)
Dept: CARDIOLOGY CLINIC | Age: 69
End: 2024-12-06

## 2024-12-06 NOTE — TELEPHONE ENCOUNTER
----- Message from MANDY Wilde - CNP sent at 12/6/2024  2:20 PM EST -----  Thoracic aorta now at 4.7 cm     Will need to continue BP control and eventual referral to vascular surgery for monitoring

## 2024-12-06 NOTE — TELEPHONE ENCOUNTER
Patient received a reminder today by text about his appointment for 12/12/24 and he cancelled it.  Now he would like to keep it for that day now.         I spoke with pt and relayed CT Chest results per NPTS. Pt verbalized understanding.

## 2024-12-12 ENCOUNTER — HOSPITAL ENCOUNTER (OUTPATIENT)
Age: 69
Discharge: HOME | End: 2024-12-12
Payer: MEDICARE

## 2024-12-12 ENCOUNTER — OFFICE VISIT (OUTPATIENT)
Dept: CARDIOLOGY CLINIC | Age: 69
End: 2024-12-12
Payer: MEDICARE

## 2024-12-12 ENCOUNTER — HOSPITAL ENCOUNTER (OUTPATIENT)
Dept: ONCOLOGY | Age: 69
Setting detail: INFUSION SERIES
Discharge: HOME | End: 2024-12-12
Payer: MEDICARE

## 2024-12-12 VITALS
BODY MASS INDEX: 38.36 KG/M2 | WEIGHT: 315 LBS | HEART RATE: 73 BPM | DIASTOLIC BLOOD PRESSURE: 64 MMHG | OXYGEN SATURATION: 98 % | SYSTOLIC BLOOD PRESSURE: 112 MMHG | HEIGHT: 76 IN

## 2024-12-12 VITALS
RESPIRATION RATE: 18 BRPM | DIASTOLIC BLOOD PRESSURE: 77 MMHG | TEMPERATURE: 97.2 F | SYSTOLIC BLOOD PRESSURE: 120 MMHG | OXYGEN SATURATION: 98 % | HEART RATE: 58 BPM

## 2024-12-12 DIAGNOSIS — I71.21 ANEURYSM OF ASCENDING AORTA WITHOUT RUPTURE (HCC): ICD-10-CM

## 2024-12-12 DIAGNOSIS — I48.20 CHRONIC ATRIAL FIBRILLATION (HCC): ICD-10-CM

## 2024-12-12 DIAGNOSIS — I50.32 CHRONIC DIASTOLIC CONGESTIVE HEART FAILURE (HCC): ICD-10-CM

## 2024-12-12 DIAGNOSIS — I50.32 CHRONIC DIASTOLIC CONGESTIVE HEART FAILURE (HCC): Primary | ICD-10-CM

## 2024-12-12 DIAGNOSIS — E78.2 MIXED HYPERLIPIDEMIA: ICD-10-CM

## 2024-12-12 LAB
ALBUMIN SERPL-MCNC: 4 G/DL (ref 3.4–5)
ALBUMIN/GLOB SERPL: 1.3 {RATIO} (ref 1.1–2.2)
ALP SERPL-CCNC: 163 U/L (ref 40–129)
ALT SERPL-CCNC: 13 U/L (ref 10–40)
ANION GAP SERPL CALCULATED.3IONS-SCNC: 10 MMOL/L (ref 3–16)
AST SERPL-CCNC: 24 U/L (ref 15–37)
BILIRUB SERPL-MCNC: 0.4 MG/DL (ref 0–1)
BUN SERPL-MCNC: 24 MG/DL (ref 7–20)
CALCIUM SERPL-MCNC: 9.3 MG/DL (ref 8.3–10.6)
CHLORIDE SERPL-SCNC: 101 MMOL/L (ref 99–110)
CO2 SERPL-SCNC: 33 MMOL/L (ref 21–32)
CREAT SERPL-MCNC: 1.7 MG/DL (ref 0.8–1.3)
FERRITIN SERPL IA-MCNC: 28 NG/ML (ref 30–400)
GFR SERPLBLD CREATININE-BSD FMLA CKD-EPI: 43 ML/MIN/{1.73_M2}
GLUCOSE SERPL-MCNC: 93 MG/DL (ref 70–99)
IRON SATN MFR SERPL: 9 % (ref 20–50)
IRON SERPL-MCNC: 32 UG/DL (ref 59–158)
NT-PROBNP SERPL-MCNC: 1399 PG/ML (ref 0–124)
POTASSIUM SERPL-SCNC: 3.5 MMOL/L (ref 3.5–5.1)
PROT SERPL-MCNC: 7.1 G/DL (ref 6.4–8.2)
SODIUM SERPL-SCNC: 144 MMOL/L (ref 136–145)
TIBC SERPL-MCNC: 345 UG/DL (ref 260–445)

## 2024-12-12 PROCEDURE — 99214 OFFICE O/P EST MOD 30 MIN: CPT | Performed by: NURSE PRACTITIONER

## 2024-12-12 PROCEDURE — 96374 THER/PROPH/DIAG INJ IV PUSH: CPT

## 2024-12-12 PROCEDURE — G8484 FLU IMMUNIZE NO ADMIN: HCPCS | Performed by: NURSE PRACTITIONER

## 2024-12-12 PROCEDURE — 3074F SYST BP LT 130 MM HG: CPT | Performed by: NURSE PRACTITIONER

## 2024-12-12 PROCEDURE — 3017F COLORECTAL CA SCREEN DOC REV: CPT | Performed by: NURSE PRACTITIONER

## 2024-12-12 PROCEDURE — 1036F TOBACCO NON-USER: CPT | Performed by: NURSE PRACTITIONER

## 2024-12-12 PROCEDURE — 83540 ASSAY OF IRON: CPT

## 2024-12-12 PROCEDURE — 36415 COLL VENOUS BLD VENIPUNCTURE: CPT

## 2024-12-12 PROCEDURE — G8427 DOCREV CUR MEDS BY ELIG CLIN: HCPCS | Performed by: NURSE PRACTITIONER

## 2024-12-12 PROCEDURE — 1123F ACP DISCUSS/DSCN MKR DOCD: CPT | Performed by: NURSE PRACTITIONER

## 2024-12-12 PROCEDURE — 2580000003 HC RX 258: Performed by: NURSE PRACTITIONER

## 2024-12-12 PROCEDURE — G8417 CALC BMI ABV UP PARAM F/U: HCPCS | Performed by: NURSE PRACTITIONER

## 2024-12-12 PROCEDURE — 99211 OFF/OP EST MAY X REQ PHY/QHP: CPT

## 2024-12-12 PROCEDURE — 83880 ASSAY OF NATRIURETIC PEPTIDE: CPT

## 2024-12-12 PROCEDURE — 3078F DIAST BP <80 MM HG: CPT | Performed by: NURSE PRACTITIONER

## 2024-12-12 PROCEDURE — 80053 COMPREHEN METABOLIC PANEL: CPT

## 2024-12-12 PROCEDURE — 6360000002 HC RX W HCPCS: Performed by: NURSE PRACTITIONER

## 2024-12-12 PROCEDURE — 83550 IRON BINDING TEST: CPT

## 2024-12-12 PROCEDURE — 1159F MED LIST DOCD IN RCRD: CPT | Performed by: NURSE PRACTITIONER

## 2024-12-12 PROCEDURE — 82728 ASSAY OF FERRITIN: CPT

## 2024-12-12 RX ORDER — POTASSIUM CHLORIDE 1500 MG/1
20 TABLET, EXTENDED RELEASE ORAL DAILY
Qty: 30 TABLET | Refills: 5 | Status: SHIPPED | OUTPATIENT
Start: 2024-12-12

## 2024-12-12 RX ORDER — SODIUM CHLORIDE 0.9 % (FLUSH) 0.9 %
5-40 SYRINGE (ML) INJECTION ONCE
Status: COMPLETED | OUTPATIENT
Start: 2024-12-12 | End: 2024-12-12

## 2024-12-12 RX ORDER — TORSEMIDE 100 MG/1
50 TABLET ORAL DAILY
Qty: 30 TABLET | Refills: 3 | Status: SHIPPED | OUTPATIENT
Start: 2024-12-12

## 2024-12-12 RX ORDER — FUROSEMIDE 10 MG/ML
80 INJECTION INTRAMUSCULAR; INTRAVENOUS ONCE
Status: COMPLETED | OUTPATIENT
Start: 2024-12-12 | End: 2024-12-12

## 2024-12-12 RX ADMIN — FUROSEMIDE 80 MG: 10 INJECTION, SOLUTION INTRAMUSCULAR; INTRAVENOUS at 09:28

## 2024-12-12 RX ADMIN — Medication 10 ML: at 09:27

## 2024-12-12 NOTE — PROGRESS NOTES
to CVTS to follow ascending aorta      Overall the patient is stable from CV standpoint    I have addresed the patient's cardiac risk factors and adjusted pharmacologic treatment as needed. In addition, I have reinforced the need for patient directed risk factor modification.    Further evaluation will be based upon the patient's clinical course and testing results.    All questions and concerns were addressed to the patient/son, Buddy. Alternatives to my treatment were discussed.      The patient is not currently smoking.     Patient is on a beta-blocker  Patient is not on an ace-i/ARB : followed by Dr. Sarina OLIVIER last seen '21  Patient is on a statin    anti-coagulation has been recommended / prescribed for this patient. Education conducted on adverse reactions including bleeding was discussed.    Daily weight, low sodium diet were discussed. Patient instructed to call the office with a weight gain: > 3 # over night or 5# in one week; swelling, SOB/orthopnea/PND    The patient verbalizes understanding not to stop medications without discussing with us.    Discussed exercise: 30-60 minutes 7 days/week  Discussed Low saturated fat/MARISEL diet. Uses No Salt substitute     Thank you for allowing to us to participate in the care of Seymour Lujan.    MANDY Wilde    Documentation of today's visit sent to PCP

## 2024-12-12 NOTE — PROGRESS NOTES
Patient to Department for Lasix. Here from Cardiology office. Labs drawn in Outpatient lab. Lasix 80mg given IVP at 20 mg per minute. Tolerated well. Patient aware that he will have urinary urgency and frequency. Also aware that this tx may lower his b/p and he may feel dizzy upon standing. All questions answered. To follow up with cardiology office. Discharged home per W/C with son

## 2024-12-12 NOTE — PATIENT INSTRUCTIONS
Lasix via intravenous today    Labs this morning and have your nurse recheck them on Sunday when she comes    Stop furosemide / lasix and begin torsemide / demadex tomorrow (100 mg tablet and take 1/2 tablet daily)    Begin potassium supplement 20 mEq daily     Appt in 2 weeks

## 2024-12-16 ENCOUNTER — TELEPHONE (OUTPATIENT)
Dept: CARDIOLOGY CLINIC | Age: 69
End: 2024-12-16

## 2024-12-16 DIAGNOSIS — K90.9 IRON MALABSORPTION: Primary | ICD-10-CM

## 2024-12-16 DIAGNOSIS — D50.8 IRON DEFICIENCY ANEMIA SECONDARY TO INADEQUATE DIETARY IRON INTAKE: ICD-10-CM

## 2024-12-16 DIAGNOSIS — I71.21 ANEURYSM OF ASCENDING AORTA WITHOUT RUPTURE (HCC): Primary | ICD-10-CM

## 2024-12-16 RX ORDER — SODIUM CHLORIDE 0.9 % (FLUSH) 0.9 %
5-40 SYRINGE (ML) INJECTION PRN
OUTPATIENT
Start: 2024-12-16

## 2024-12-16 RX ORDER — SODIUM CHLORIDE 9 MG/ML
5-250 INJECTION, SOLUTION INTRAVENOUS PRN
OUTPATIENT
Start: 2024-12-16

## 2024-12-16 NOTE — TELEPHONE ENCOUNTER
----- Message from MANDY Wilde CNP sent at 12/15/2024 10:40 AM EST -----  He needs to be set up for iron infusions at the infusion clinic : 1 dose weekly x 5 doses

## 2024-12-16 NOTE — TELEPHONE ENCOUNTER
I spoke with pt and relayed lab results per NPTS. Pt verbalized understanding.      Faxed order for iron infusion to the Infusion Clinic.      Faxed successfully.

## 2024-12-16 NOTE — TELEPHONE ENCOUNTER
Pt states he is going to have hard time getting to infusion center and asking if he can take a pill. Also states he has a home care nurse coming in weekly if there is something she can give him. Please call to advise

## 2024-12-17 DIAGNOSIS — I50.32 CHRONIC DIASTOLIC CONGESTIVE HEART FAILURE (HCC): ICD-10-CM

## 2024-12-17 RX ORDER — DILTIAZEM HYDROCHLORIDE 180 MG/1
180 CAPSULE, EXTENDED RELEASE ORAL 2 TIMES DAILY
Qty: 180 CAPSULE | Refills: 0 | Status: SHIPPED | OUTPATIENT
Start: 2024-12-17

## 2024-12-20 ENCOUNTER — CLINICAL DOCUMENTATION (OUTPATIENT)
Dept: ONCOLOGY | Age: 69
End: 2024-12-20

## 2024-12-20 NOTE — PROGRESS NOTES
Called patient to schedule iron infusions, patient unsure if it is affordable for him and wants to talk to doctor next week about trying oral pills first, will return call if wants to schedule.

## 2024-12-24 NOTE — PROGRESS NOTES
[facility]     Outpatient Follow Up Note    Seymour Lujan is 69 y.o. male who presents today with a history of AF s/p DCCV '20,dilated AoR, HTN and CHF.    His other hx includes: TIA '22, hematuria '22    CHIEF COMPLAINT / HPI:  Follow Up secondary to CHF receiving IV lasix 80 mg and changing furosemide to torsemide. His BNP returned at 1399.  His iron saturation and ferritin also returned low and recommended IV iron infusions. Unfortunately, he has limited transportation.      Subjective:   He hasn't gained any and unchanged by office scales (up 14# / 6 weeks by office scales [49# / 7 months]).   His clothes are of a larger size x2 months.    He can barely take a deep breath. Walking 30-40 ft he can feel it. However, he can make it through [employer] walking slow and holding on the a cart.   He sleeps in a recliner. He wakes but cannot say that he's slept through the night (hx of intolerant to CPAP ; and sleeps sitting up). He hasn't been eating much. His cough has resolved.  His knees are swollen and belly feels full. He isn't peeing anymore taking torsemide. He goes in small amts described as 2 oz    He denies chest discomfort.   The patient is not experiencing palpitations or dizziness.     These symptoms show no change since the last OV.   With regard to medication therapy the patient has been compliant with prescribed regimen. They have tolerated therapy to date.       Past Medical History:   Diagnosis Date    Anxiety     Arthritis     Asthma     Atrial fibrillation (HCC)     Blood circulation, collateral     CAD (coronary artery disease)     Cellulitis of leg, right 02/25/2022    CHF (congestive heart failure) (HCC)     Colon cancer (HCC)     COPD (chronic obstructive pulmonary disease) (HCC)     Diabetic foot infection (HCC)     pt denies diabetes    Hernia     Hyperlipidemia     Hypertension     Movement disorder     Muscle cramps     Neuromuscular disorder (HCC)     marvin feet numbness/neuropathy

## 2024-12-26 ENCOUNTER — HOSPITAL ENCOUNTER (OUTPATIENT)
Age: 69
Discharge: HOME | End: 2024-12-26
Payer: MEDICARE

## 2024-12-26 ENCOUNTER — OFFICE VISIT (OUTPATIENT)
Dept: CARDIOLOGY CLINIC | Age: 69
End: 2024-12-26
Payer: MEDICARE

## 2024-12-26 ENCOUNTER — HOSPITAL ENCOUNTER (OUTPATIENT)
Dept: GENERAL RADIOLOGY | Age: 69
Discharge: HOME | End: 2024-12-26
Payer: MEDICARE

## 2024-12-26 ENCOUNTER — TELEPHONE (OUTPATIENT)
Dept: CARDIOLOGY CLINIC | Age: 69
End: 2024-12-26

## 2024-12-26 VITALS
SYSTOLIC BLOOD PRESSURE: 110 MMHG | HEART RATE: 71 BPM | BODY MASS INDEX: 38.36 KG/M2 | DIASTOLIC BLOOD PRESSURE: 60 MMHG | WEIGHT: 315 LBS | HEIGHT: 76 IN | OXYGEN SATURATION: 97 %

## 2024-12-26 DIAGNOSIS — I48.20 CHRONIC ATRIAL FIBRILLATION (HCC): ICD-10-CM

## 2024-12-26 DIAGNOSIS — I71.21 ANEURYSM OF ASCENDING AORTA WITHOUT RUPTURE (HCC): ICD-10-CM

## 2024-12-26 DIAGNOSIS — E78.2 MIXED HYPERLIPIDEMIA: ICD-10-CM

## 2024-12-26 DIAGNOSIS — I50.32 CHRONIC DIASTOLIC CONGESTIVE HEART FAILURE (HCC): ICD-10-CM

## 2024-12-26 DIAGNOSIS — I50.32 CHRONIC DIASTOLIC CONGESTIVE HEART FAILURE (HCC): Primary | ICD-10-CM

## 2024-12-26 LAB
ALBUMIN SERPL-MCNC: 4 G/DL (ref 3.4–5)
ALBUMIN/GLOB SERPL: 1.4 {RATIO} (ref 1.1–2.2)
ALP SERPL-CCNC: 159 U/L (ref 40–129)
ALT SERPL-CCNC: 16 U/L (ref 10–40)
ANION GAP SERPL CALCULATED.3IONS-SCNC: 12 MMOL/L (ref 3–16)
AST SERPL-CCNC: 22 U/L (ref 15–37)
BILIRUB SERPL-MCNC: 0.5 MG/DL (ref 0–1)
BUN SERPL-MCNC: 17 MG/DL (ref 7–20)
CALCIUM SERPL-MCNC: 9.3 MG/DL (ref 8.3–10.6)
CHLORIDE SERPL-SCNC: 102 MMOL/L (ref 99–110)
CO2 SERPL-SCNC: 30 MMOL/L (ref 21–32)
CREAT SERPL-MCNC: 1.7 MG/DL (ref 0.8–1.3)
GFR SERPLBLD CREATININE-BSD FMLA CKD-EPI: 43 ML/MIN/{1.73_M2}
GLUCOSE SERPL-MCNC: 65 MG/DL (ref 70–99)
NT-PROBNP SERPL-MCNC: 1790 PG/ML (ref 0–124)
POTASSIUM SERPL-SCNC: 3.8 MMOL/L (ref 3.5–5.1)
PROT SERPL-MCNC: 6.9 G/DL (ref 6.4–8.2)
SODIUM SERPL-SCNC: 144 MMOL/L (ref 136–145)

## 2024-12-26 PROCEDURE — G8417 CALC BMI ABV UP PARAM F/U: HCPCS | Performed by: NURSE PRACTITIONER

## 2024-12-26 PROCEDURE — 3078F DIAST BP <80 MM HG: CPT | Performed by: NURSE PRACTITIONER

## 2024-12-26 PROCEDURE — 3074F SYST BP LT 130 MM HG: CPT | Performed by: NURSE PRACTITIONER

## 2024-12-26 PROCEDURE — G8484 FLU IMMUNIZE NO ADMIN: HCPCS | Performed by: NURSE PRACTITIONER

## 2024-12-26 PROCEDURE — 71046 X-RAY EXAM CHEST 2 VIEWS: CPT

## 2024-12-26 PROCEDURE — 99214 OFFICE O/P EST MOD 30 MIN: CPT | Performed by: NURSE PRACTITIONER

## 2024-12-26 PROCEDURE — 3017F COLORECTAL CA SCREEN DOC REV: CPT | Performed by: NURSE PRACTITIONER

## 2024-12-26 PROCEDURE — 1036F TOBACCO NON-USER: CPT | Performed by: NURSE PRACTITIONER

## 2024-12-26 PROCEDURE — G8427 DOCREV CUR MEDS BY ELIG CLIN: HCPCS | Performed by: NURSE PRACTITIONER

## 2024-12-26 PROCEDURE — 83880 ASSAY OF NATRIURETIC PEPTIDE: CPT

## 2024-12-26 PROCEDURE — 80053 COMPREHEN METABOLIC PANEL: CPT

## 2024-12-26 PROCEDURE — 36415 COLL VENOUS BLD VENIPUNCTURE: CPT

## 2024-12-26 PROCEDURE — 1123F ACP DISCUSS/DSCN MKR DOCD: CPT | Performed by: NURSE PRACTITIONER

## 2024-12-26 PROCEDURE — 1159F MED LIST DOCD IN RCRD: CPT | Performed by: NURSE PRACTITIONER

## 2024-12-26 NOTE — TELEPHONE ENCOUNTER
----- Message from MANDY Wilde CNP sent at 12/26/2024  2:30 PM EST -----  BNP trending up. Torsemide increased today    Notify pt please

## 2024-12-26 NOTE — TELEPHONE ENCOUNTER
Skyler from [facility] returned NPTS calll regarding the RX torsemide (DEMADEX) ,  he states there was a question on the strength whether it was 20mg or 100 that the patient was taking.   He wanted to clairfy that   torsemide (DEMADEX) 100 MG tablet Take 0.5 tablets by mouth daily was the RX that was filled on 12/12/24. Please call him back with any further questions at [phone]

## 2024-12-26 NOTE — PATIENT INSTRUCTIONS
Increase torsemide to 50 mg twice a day    Labs and chest x-ray today    Appt in 2-3 weeks ; call me next week and let me know how you're doing

## 2024-12-28 DIAGNOSIS — I48.20 CHRONIC ATRIAL FIBRILLATION (HCC): ICD-10-CM

## 2024-12-30 RX ORDER — APIXABAN 5 MG/1
5 TABLET, FILM COATED ORAL 2 TIMES DAILY
Qty: 180 TABLET | Refills: 3 | Status: SHIPPED | OUTPATIENT
Start: 2024-12-30

## 2024-12-31 ENCOUNTER — CLINICAL DOCUMENTATION (OUTPATIENT)
Dept: CARDIOLOGY CLINIC | Age: 69
End: 2024-12-31

## 2024-12-31 ENCOUNTER — TELEPHONE (OUTPATIENT)
Dept: CARDIOLOGY CLINIC | Age: 69
End: 2024-12-31

## 2024-12-31 NOTE — TELEPHONE ENCOUNTER
----- Message from Dr. Zeyad Eaton MD sent at 12/28/2024 10:34 AM EST -----  He is interested in watchman. Please initiate shared decision making. See Susan's note

## 2024-12-31 NOTE — PROGRESS NOTES
Seymour Lujan has been evaluated for left atrial appendage closure with WATCHMAN device for management of stroke risk resulting from non-valvular atrial fibrillation.     Patient is a poor candidate for long term anticoagulation.     Patient is high risk for stroke or systemic thromboembolism.   Calculated [id number]  is: 4   Calculated HAS-BLED score is:     Specifically regarding risk of anticoagulation patient has:     History of bleeding (eg. , GI, retro-peritoneal)       We have discussed their unique stroke and bleeding risk both on and off oral-anticoagulation, and the rationale for appendage closure.      A decision aid tool (Decision AID for AFIB Stroke Prevention, Card[employer], [facility]) was used for discussing risks, benefits and alternatives to anticoagulation and left atrial appendage closure devices.      Based on both stroke and bleeding risk, a shared decision has been made to pursue closure of the left atrial appendage as an alternative to oral anticoagulant therapy for stroke prophylaxis and to reduce their long term risk of incidence of bleeding.    The procedure has been discussed in detail with patient and family members along with the risk and benefits. Success rate and complications associated with such a procedure have been discussed. Other options including AtriClip by CT surgery were discussed.  Continuing anticoagulation is also reasonable if patient tolerates it with no bleeding.     Risks including but not limited cardiac perforation, stroke, embolization of device, need for open heart surgery, cardiac arrest, mortality and morbidity have been discussed. We discussed that Watchman procedure reduce the chance of stroke but it does not eliminate it. Education materials, including decision aid, were provided.    We have previously discussed watchman.  Will start shared decision making    Signed:    Zeyad Eaton MD, 12/31/2024, 9:05 AM

## 2024-12-31 NOTE — TELEPHONE ENCOUNTER
Spoke with pt, he is scheduled with Dr. Wilkinson on 1-20-25 at 8:30 am @ [facility]  for Shared Decision - he last seen [facility] on 9-5-23.

## 2025-01-02 ENCOUNTER — TELEPHONE (OUTPATIENT)
Dept: CARDIOLOGY CLINIC | Age: 70
End: 2025-01-02

## 2025-01-02 NOTE — TELEPHONE ENCOUNTER
Spoke to pt about messages below he verbalized understanding. Appointment canceled with NPTS       Mallory Blancas APRN - CNP  [facility] Staff1 minute ago (3:28 PM)       He can just keep his appt with [facility] on 1/20     Venita Farris MA Strassell, Tamara, APRN - CNP5 hours ago (9:52 AM)     KN  Pt has appt. 01/20/2025 with [facility]K, does pt need to keep 01/09 appt with you?

## 2025-01-02 NOTE — TELEPHONE ENCOUNTER
Patient called to see if he truly needed to keep his upcoming appt with NPTS on 1/9/25.  He states he was told if he was doing well to call in and let us know, which he is.  He also states he has an appt with [facility] on 1/20/25.  Please call him to advise [phone]

## 2025-01-03 ENCOUNTER — TELEPHONE (OUTPATIENT)
Dept: INTERNAL MEDICINE CLINIC | Age: 70
End: 2025-01-03

## 2025-01-03 NOTE — TELEPHONE ENCOUNTER
Last OV was 10/30/24.  Next OV scheduled 1/29/25.  Will see if Christine will approve based on these dates.

## 2025-01-03 NOTE — TELEPHONE ENCOUNTER
Pt calling in stating he has new insurance this year and needs new orders for skilled nursing through [facility] - he gets wound care visits. He was just approved for visits but he says the  nurse needs a new order to submit through his new insurance. He gave the number for the home health nurse Cristina [phone] for the order.    He is aware that Christine is out today.

## 2025-01-07 ENCOUNTER — TELEPHONE (OUTPATIENT)
Dept: CARDIOLOGY CLINIC | Age: 70
End: 2025-01-07

## 2025-01-14 ENCOUNTER — OFFICE VISIT (OUTPATIENT)
Age: 70
End: 2025-01-14
Payer: MEDICARE

## 2025-01-14 VITALS
HEIGHT: 75 IN | OXYGEN SATURATION: 96 % | DIASTOLIC BLOOD PRESSURE: 60 MMHG | HEART RATE: 87 BPM | TEMPERATURE: 97.1 F | WEIGHT: 315 LBS | SYSTOLIC BLOOD PRESSURE: 90 MMHG | BODY MASS INDEX: 39.17 KG/M2

## 2025-01-14 DIAGNOSIS — I71.21 ANEURYSM OF ASCENDING AORTA WITHOUT RUPTURE (HCC): Primary | ICD-10-CM

## 2025-01-14 PROCEDURE — 3078F DIAST BP <80 MM HG: CPT | Performed by: THORACIC SURGERY (CARDIOTHORACIC VASCULAR SURGERY)

## 2025-01-14 PROCEDURE — 1159F MED LIST DOCD IN RCRD: CPT | Performed by: THORACIC SURGERY (CARDIOTHORACIC VASCULAR SURGERY)

## 2025-01-14 PROCEDURE — 1123F ACP DISCUSS/DSCN MKR DOCD: CPT | Performed by: THORACIC SURGERY (CARDIOTHORACIC VASCULAR SURGERY)

## 2025-01-14 PROCEDURE — 99205 OFFICE O/P NEW HI 60 MIN: CPT | Performed by: THORACIC SURGERY (CARDIOTHORACIC VASCULAR SURGERY)

## 2025-01-14 PROCEDURE — 3074F SYST BP LT 130 MM HG: CPT | Performed by: THORACIC SURGERY (CARDIOTHORACIC VASCULAR SURGERY)

## 2025-01-14 ASSESSMENT — ENCOUNTER SYMPTOMS
EYES NEGATIVE: 1
ALLERGIC/IMMUNOLOGIC NEGATIVE: 1
SHORTNESS OF BREATH: 1
APNEA: 1
GASTROINTESTINAL NEGATIVE: 1

## 2025-01-14 NOTE — H&P
numbness/neuropathy    Neuropathy     Open wound of right lower extremity 02/17/2022    Pneumonia     Renal insufficiency     Sleep apnea     no cpap    Thyroid disease     Tinnitus aurium, bilateral     Unspecified cerebral artery occlusion with cerebral infarction     mini stroke    Unspecified diseases of blood and blood-forming organs         Past Surgical History     Past Surgical History:   Procedure Laterality Date    ABDOMEN SURGERY      APPENDECTOMY  10/9/2012    CARPAL TUNNEL RELEASE Left 12/11/14    CARPAL TUNNEL RELEASE Right 6/11/2019    RIGHT CARPAL TUNNEL RELEASE AND RIGHT MIDDLE FINGER TRIGGER FINGER RELEASE performed by Ramiro Delgado MD at [facility] OR    COLON SURGERY      for Colon Ca 10 inches removed from colon    COLONOSCOPY      COLONOSCOPY  3/13/14    COLONOSCOPY  03/02/2018    CYSTOSCOPY Right 5/3/2019    CYSTOSCOPY, RIGHT URETEROSCOPY, STONE EXTRACTION WITH STONE BASKET performed by Chente Garcia MD at [facility] OR    ENDOSCOPIC ULTRASOUND (UPPER)  6/4/14    ENDOSCOPY, COLON, DIAGNOSTIC      FOOT SURGERY Right 07/13/2017    Amputation toes 2,3,5, right foot Flap closure, Exostectomy 1st metatarsal right foot    FOOT SURGERY Right 08/17/2017    INCISION AND DRAINAGE OF RIGHT FOOT WITH FLAP CLOSURE    HERNIA REPAIR      umbilical, hiatal hernia repairs    KNEE ARTHROSCOPY Right 08/23/2018    partial medial/ lateral menisectomy    SKIN BIOPSY      TOE AMPUTATION  3/7/11    RIGHT FOURTH TOE    TOE SURGERY      ULNAR TUNNEL RELEASE Left 12-11-14    Left ulnar nerve decompression and left CTR    UPPER GASTROINTESTINAL ENDOSCOPY  1/28/2014    with biopsies    UPPER GASTROINTESTINAL ENDOSCOPY  3/13/14    UPPER GASTROINTESTINAL ENDOSCOPY  6/4/14    with biopsy    UPPER GASTROINTESTINAL ENDOSCOPY  03/02/2018    VENTRICULAR ABLATION SURGERY          Medications Prior to Admission     Prior to Admission medications    Medication Sig Start Date End Date Taking? Authorizing Provider   ELIQUIS 5 MG TABS

## 2025-01-20 ENCOUNTER — TELEPHONE (OUTPATIENT)
Dept: CARDIOLOGY CLINIC | Age: 70
End: 2025-01-20

## 2025-01-20 ENCOUNTER — HOSPITAL ENCOUNTER (OUTPATIENT)
Age: 70
Discharge: HOME | End: 2025-01-20
Payer: MEDICARE

## 2025-01-20 ENCOUNTER — OFFICE VISIT (OUTPATIENT)
Dept: CARDIOLOGY CLINIC | Age: 70
End: 2025-01-20
Payer: MEDICARE

## 2025-01-20 VITALS
HEART RATE: 93 BPM | DIASTOLIC BLOOD PRESSURE: 90 MMHG | WEIGHT: 315 LBS | SYSTOLIC BLOOD PRESSURE: 130 MMHG | HEIGHT: 75 IN | BODY MASS INDEX: 39.17 KG/M2 | OXYGEN SATURATION: 100 %

## 2025-01-20 DIAGNOSIS — R31.9 HEMATURIA, UNSPECIFIED TYPE: ICD-10-CM

## 2025-01-20 DIAGNOSIS — N28.9 RENAL INSUFFICIENCY: ICD-10-CM

## 2025-01-20 DIAGNOSIS — I10 ESSENTIAL HYPERTENSION: ICD-10-CM

## 2025-01-20 DIAGNOSIS — I71.23 ANEURYSM OF DESCENDING THORACIC AORTA WITHOUT RUPTURE (HCC): ICD-10-CM

## 2025-01-20 DIAGNOSIS — I48.20 CHRONIC ATRIAL FIBRILLATION (HCC): Primary | ICD-10-CM

## 2025-01-20 DIAGNOSIS — G47.33 OBSTRUCTIVE SLEEP APNEA SYNDROME: ICD-10-CM

## 2025-01-20 DIAGNOSIS — J43.8 OTHER EMPHYSEMA (HCC): ICD-10-CM

## 2025-01-20 LAB
ANION GAP SERPL CALCULATED.3IONS-SCNC: 14 MMOL/L (ref 3–16)
BUN SERPL-MCNC: 27 MG/DL (ref 7–20)
CALCIUM SERPL-MCNC: 9 MG/DL (ref 8.3–10.6)
CHLORIDE SERPL-SCNC: 100 MMOL/L (ref 99–110)
CO2 SERPL-SCNC: 29 MMOL/L (ref 21–32)
CREAT SERPL-MCNC: 1.8 MG/DL (ref 0.8–1.3)
GFR SERPLBLD CREATININE-BSD FMLA CKD-EPI: 40 ML/MIN/{1.73_M2}
GLUCOSE SERPL-MCNC: 82 MG/DL (ref 70–99)
POTASSIUM SERPL-SCNC: 3.4 MMOL/L (ref 3.5–5.1)
SODIUM SERPL-SCNC: 143 MMOL/L (ref 136–145)

## 2025-01-20 PROCEDURE — 99214 OFFICE O/P EST MOD 30 MIN: CPT | Performed by: INTERNAL MEDICINE

## 2025-01-20 PROCEDURE — 1159F MED LIST DOCD IN RCRD: CPT | Performed by: INTERNAL MEDICINE

## 2025-01-20 PROCEDURE — 3075F SYST BP GE 130 - 139MM HG: CPT | Performed by: INTERNAL MEDICINE

## 2025-01-20 PROCEDURE — 3080F DIAST BP >= 90 MM HG: CPT | Performed by: INTERNAL MEDICINE

## 2025-01-20 PROCEDURE — 1123F ACP DISCUSS/DSCN MKR DOCD: CPT | Performed by: INTERNAL MEDICINE

## 2025-01-20 PROCEDURE — 80048 BASIC METABOLIC PNL TOTAL CA: CPT

## 2025-01-20 PROCEDURE — 36415 COLL VENOUS BLD VENIPUNCTURE: CPT

## 2025-01-20 RX ORDER — TORSEMIDE 100 MG/1
100 TABLET ORAL 2 TIMES DAILY
Qty: 180 TABLET | Refills: 0 | Status: SHIPPED | OUTPATIENT
Start: 2025-01-20

## 2025-01-20 NOTE — PATIENT INSTRUCTIONS
Limit sodium intake in diet  Have nurse wrap legs  Labs today  Get back in with Dr Branch (kidney doctor) [phone]  Have Christine address home care issues - ask to come twice a week  Referral to sleep doctor Dr Aguirre [phone]  No med changes for now  Appt with Cecy in 6 weeks  Appt with Dr Wilkinson in 3 months

## 2025-01-20 NOTE — TELEPHONE ENCOUNTER
Spoke with patient today regarding his shared decision appointment he had with Dr. Wilkinson.  It was decided patient will continue Eliquis at this time since he has been tolerating it for sometime. I encouraged to call if anything changes.

## 2025-01-20 NOTE — TELEPHONE ENCOUNTER
Patient K+ on labs 3.4 and Cr 1.8    Per MMK- Eat some bananas or high potassium foods. Get back in with Dr Branch for kidneys. Spoke to patient. He verbalized understanding.

## 2025-01-20 NOTE — PROGRESS NOTES
[facility]       Referring Provider:  Christine Robert, APRN - CNP     Chief Complaint   Patient presents with    Hypertension     SOB. Was worse 2 weeks go.     Atrial Fibrillation     2nd opinion on Watchman    Congestive Heart Failure    Follow-up      History of Present Illness:     Mr. Lujan is here for a routine  follow up for atrial fibrillation,CHF,HTN and cardiac risk evaluation for knee surgery.  Also has a history copd,asthma (prn oxygen),sleep apnea (unable to tolerate treatment),colon cancer,RI,hernia,arthritis,CAD,CVA,HLD, and neuropathy. In 2004, an angiogram showed normal coronaries.  He is off Coumadin due to GI bleed from a fall; the others are too expensive.      It has been about a year and a half since I have seen Seymour.  He has been seeing the nurse practitioner in office however.  He was in  wound clinic for some time but now has a home nurse who wrapped his legs.  He continues to have some lower extremity edema and shortness of breath.  He has not seen pulmonary for severe COPD in several years.  She never treated his sleep apnea and says he does not remember this diagnosis.  He was sent here today for second opinion about possible watchman.  However to me he denies any type of bleeding in the past several years.  He did notice some discoloration of his urine a few months ago that he related to some type of diet that he was eating.  Whether it was that or more likely some hematuria is unclear to me.  He says he has had no recent episodes of that.  He did have a fall with internal bleeding on Coumadin many years ago after tripping.  He has had no other issues of that.    Past Medical History:   has a past medical history of Anxiety, Arthritis, Asthma, Atrial fibrillation (HCC), Blood circulation, collateral, CAD (coronary artery disease), Cellulitis of leg, right, CHF (congestive heart failure) (HCC), Colon cancer (HCC), COPD (chronic obstructive pulmonary disease) (HCC),

## 2025-01-29 ENCOUNTER — OFFICE VISIT (OUTPATIENT)
Dept: INTERNAL MEDICINE CLINIC | Age: 70
End: 2025-01-29

## 2025-01-29 VITALS
WEIGHT: 315 LBS | OXYGEN SATURATION: 96 % | SYSTOLIC BLOOD PRESSURE: 128 MMHG | HEIGHT: 75 IN | BODY MASS INDEX: 39.17 KG/M2 | DIASTOLIC BLOOD PRESSURE: 84 MMHG | HEART RATE: 96 BPM

## 2025-01-29 DIAGNOSIS — J43.9 PULMONARY EMPHYSEMA, UNSPECIFIED EMPHYSEMA TYPE (HCC): ICD-10-CM

## 2025-01-29 DIAGNOSIS — I71.23 ANEURYSM OF DESCENDING THORACIC AORTA WITHOUT RUPTURE (HCC): ICD-10-CM

## 2025-01-29 DIAGNOSIS — I50.32 CHRONIC HEART FAILURE WITH PRESERVED EJECTION FRACTION (HCC): ICD-10-CM

## 2025-01-29 DIAGNOSIS — E78.2 MIXED HYPERLIPIDEMIA: ICD-10-CM

## 2025-01-29 DIAGNOSIS — L98.9 SKIN LESION OF BACK: Primary | ICD-10-CM

## 2025-01-29 DIAGNOSIS — D68.69 SECONDARY HYPERCOAGULABLE STATE (HCC): ICD-10-CM

## 2025-01-29 DIAGNOSIS — R56.9 NEW ONSET SEIZURE (HCC): ICD-10-CM

## 2025-01-29 DIAGNOSIS — F33.42 RECURRENT MAJOR DEPRESSIVE DISORDER, IN FULL REMISSION (HCC): ICD-10-CM

## 2025-01-29 DIAGNOSIS — Z89.431 S/P TRANSMETATARSAL AMPUTATION OF FOOT, RIGHT (HCC): ICD-10-CM

## 2025-01-29 DIAGNOSIS — M1A.09X0 IDIOPATHIC CHRONIC GOUT OF MULTIPLE SITES WITHOUT TOPHUS: ICD-10-CM

## 2025-01-29 DIAGNOSIS — I10 ESSENTIAL HYPERTENSION: ICD-10-CM

## 2025-01-29 DIAGNOSIS — I48.20 CHRONIC ATRIAL FIBRILLATION (HCC): ICD-10-CM

## 2025-01-29 DIAGNOSIS — I73.9 PVD (PERIPHERAL VASCULAR DISEASE) (HCC): ICD-10-CM

## 2025-01-29 DIAGNOSIS — I50.32 CHRONIC DIASTOLIC CONGESTIVE HEART FAILURE (HCC): ICD-10-CM

## 2025-01-29 DIAGNOSIS — N18.32 STAGE 3B CHRONIC KIDNEY DISEASE (HCC): ICD-10-CM

## 2025-01-29 DIAGNOSIS — E66.01 SEVERE OBESITY (BMI 35.0-39.9) WITH COMORBIDITY: ICD-10-CM

## 2025-01-29 DIAGNOSIS — G47.33 OBSTRUCTIVE SLEEP APNEA SYNDROME: ICD-10-CM

## 2025-01-29 RX ORDER — ALBUTEROL SULFATE 90 UG/1
2 INHALANT RESPIRATORY (INHALATION) EVERY 4 HOURS PRN
Qty: 1 EACH | Refills: 5 | Status: SHIPPED | OUTPATIENT
Start: 2025-01-29

## 2025-01-29 SDOH — ECONOMIC STABILITY: FOOD INSECURITY: WITHIN THE PAST 12 MONTHS, THE FOOD YOU BOUGHT JUST DIDN'T LAST AND YOU DIDN'T HAVE MONEY TO GET MORE.: NEVER TRUE

## 2025-01-29 SDOH — ECONOMIC STABILITY: FOOD INSECURITY: WITHIN THE PAST 12 MONTHS, YOU WORRIED THAT YOUR FOOD WOULD RUN OUT BEFORE YOU GOT MONEY TO BUY MORE.: NEVER TRUE

## 2025-01-29 ASSESSMENT — PATIENT HEALTH QUESTIONNAIRE - PHQ9
SUM OF ALL RESPONSES TO PHQ QUESTIONS 1-9: 0
2. FEELING DOWN, DEPRESSED OR HOPELESS: NOT AT ALL
1. LITTLE INTEREST OR PLEASURE IN DOING THINGS: NOT AT ALL
5. POOR APPETITE OR OVEREATING: NOT AT ALL
10. IF YOU CHECKED OFF ANY PROBLEMS, HOW DIFFICULT HAVE THESE PROBLEMS MADE IT FOR YOU TO DO YOUR WORK, TAKE CARE OF THINGS AT HOME, OR GET ALONG WITH OTHER PEOPLE: NOT DIFFICULT AT ALL
7. TROUBLE CONCENTRATING ON THINGS, SUCH AS READING THE NEWSPAPER OR WATCHING TELEVISION: NOT AT ALL
SUM OF ALL RESPONSES TO PHQ QUESTIONS 1-9: 0
SUM OF ALL RESPONSES TO PHQ QUESTIONS 1-9: 0
3. TROUBLE FALLING OR STAYING ASLEEP: NOT AT ALL
SUM OF ALL RESPONSES TO PHQ QUESTIONS 1-9: 0
8. MOVING OR SPEAKING SO SLOWLY THAT OTHER PEOPLE COULD HAVE NOTICED. OR THE OPPOSITE, BEING SO FIGETY OR RESTLESS THAT YOU HAVE BEEN MOVING AROUND A LOT MORE THAN USUAL: NOT AT ALL
SUM OF ALL RESPONSES TO PHQ9 QUESTIONS 1 & 2: 0
4. FEELING TIRED OR HAVING LITTLE ENERGY: NOT AT ALL
6. FEELING BAD ABOUT YOURSELF - OR THAT YOU ARE A FAILURE OR HAVE LET YOURSELF OR YOUR FAMILY DOWN: NOT AT ALL
9. THOUGHTS THAT YOU WOULD BE BETTER OFF DEAD, OR OF HURTING YOURSELF: NOT AT ALL

## 2025-01-29 NOTE — PROGRESS NOTES
1/30/25     Chief Complaint   Patient presents with   • 3 Month Follow-Up   • Wound Check     Both legs        History of Present Illness  The patient presents for a 3-month follow-up.    Sleep Disturbances  - Experiencing sleep disturbances  - Underwent a sleep study approximately 15 years ago but did not find it beneficial  - Difficulty sleeping on his side, resorts to sleeping on his back in a chair  - Maintains that he achieves adequate sleep  - Upcoming appointment with a sleep specialist on 04/24/2025    COPD  - Diagnosed with COPD  - Previously on inhaled therapy, which he discontinued on his own   - keeps albuterol inhaler on hand, but has not required its use  - Continues to walk without difficulty  - feels like he could benefit from restarting inhaler     Skin lesion   - Long-standing lesion on the back of his neck, present for several years  - Occasionally scabs over and causes discomfort when it rubs against his clothing  - Has not consulted a general surgeon or dermatology for this issue    Leg Swelling  - Experiencing leg swelling, bilateral and chronic   - improving and doing well with dressing changes   - Has home health nurse and administers a topical treatment for the blisters  - Requesting additional home care orders    - On torsemide, prescribed by his cardiologist  - History of pericardial effusion, which was drained approximately 20 years ago  - Currently on potassium supplements, started about a month ago      Allergies   Allergen Reactions   • Bactrim      hallucinations   • Clindamycin/Lincomycin Diarrhea       Current Outpatient Medications   Medication Sig Dispense Refill   • albuterol sulfate HFA (PROVENTIL HFA) 108 (90 Base) MCG/ACT inhaler Inhale 2 puffs into the lungs every 4 hours as needed for Wheezing 1 each 5   • Budeson-Glycopyrrol-Formoterol 160-9-4.8 MCG/ACT AERO Inhale 2 Inhalers into the lungs in the morning and at bedtime 1 each 3   • torsemide (DEMADEX) 100 MG tablet Take 1

## 2025-01-29 NOTE — ASSESSMENT & PLAN NOTE
Chronic, continue eliquis, diltiazem, metoprolol, Lasix. Continue follow up with EP and cards.

## 2025-01-29 NOTE — ASSESSMENT & PLAN NOTE
Chronic, uncontrolled. Restart inhaled therapy. Follow up with pulmonology. - Prescription for a new inhaler to be used twice daily, with instructions to rinse mouth post-inhalation.    Orders:    albuterol sulfate HFA (PROVENTIL HFA) 108 (90 Base) MCG/ACT inhaler; Inhale 2 puffs into the lungs every 4 hours as needed for Wheezing    Budeson-Glycopyrrol-Formoterol 160-9-4.8 MCG/ACT AERO; Inhale 2 Inhalers into the lungs in the morning and at bedtime

## 2025-01-30 NOTE — ASSESSMENT & PLAN NOTE
Chronic, stable. continue eliquis, diltiazem, metoprolol, Lasix. Continue follow up with EP and cards.  Advised to adhere to a low-sodium diet due to swelling and its potential impact on blood pressure and fluid retention.

## 2025-01-30 NOTE — ASSESSMENT & PLAN NOTE
Chronic, improving compared to previous appt.  Wear compression as recommended.  Reviewed supportive care in detail.

## 2025-02-04 ENCOUNTER — OFFICE VISIT (OUTPATIENT)
Dept: SURGERY | Age: 70
End: 2025-02-04
Payer: MEDICARE

## 2025-02-04 VITALS — SYSTOLIC BLOOD PRESSURE: 123 MMHG | BODY MASS INDEX: 41.7 KG/M2 | DIASTOLIC BLOOD PRESSURE: 72 MMHG | WEIGHT: 315 LBS

## 2025-02-04 DIAGNOSIS — R20.9 DISTURBANCE OF SKIN SENSATION: ICD-10-CM

## 2025-02-04 DIAGNOSIS — R56.9 NEW ONSET SEIZURE (HCC): ICD-10-CM

## 2025-02-04 DIAGNOSIS — R22.9 SKIN MASS: Primary | ICD-10-CM

## 2025-02-04 PROCEDURE — 3078F DIAST BP <80 MM HG: CPT | Performed by: SURGERY

## 2025-02-04 PROCEDURE — 3074F SYST BP LT 130 MM HG: CPT | Performed by: SURGERY

## 2025-02-04 PROCEDURE — 1159F MED LIST DOCD IN RCRD: CPT | Performed by: SURGERY

## 2025-02-04 PROCEDURE — 1123F ACP DISCUSS/DSCN MKR DOCD: CPT | Performed by: SURGERY

## 2025-02-04 PROCEDURE — 1126F AMNT PAIN NOTED NONE PRSNT: CPT | Performed by: SURGERY

## 2025-02-04 PROCEDURE — 99202 OFFICE O/P NEW SF 15 MIN: CPT | Performed by: SURGERY

## 2025-02-04 ASSESSMENT — ENCOUNTER SYMPTOMS
GASTROINTESTINAL NEGATIVE: 1
RESPIRATORY NEGATIVE: 1
COLOR CHANGE: 1
EYES NEGATIVE: 1
ALLERGIC/IMMUNOLOGIC NEGATIVE: 1

## 2025-02-04 NOTE — PROGRESS NOTES
[facility] GENERAL AND LAPAROSCOPIC SURGERY                       PATIENT NAME: Seymour Lujan        TODAY'S DATE: 2/4/2025    Reason for Consult:  Mass    Requesting Physician:  JOSIAH Robert CNP    HISTORY OF PRESENT ILLNESS:              The patient is a 69 y.o. male who presents with a back lesion. Has had long time, notes ooze and increased size locally over mos. Bleeds on shirt. Tender with pressure. No other similar sites. No fever. No prior surgery in the area. Takes Eliquis.    Past Medical History:        Diagnosis Date    BRIAN (acute kidney injury) (HCC)     Anxiety     Arthritis     Asthma     Atrial fibrillation (HCC)     Blood circulation, collateral     CAD (coronary artery disease)     Cellulitis of leg, right 02/25/2022    CHF (congestive heart failure) (HCC)     Colon cancer (HCC)     COPD (chronic obstructive pulmonary disease) (HCC)     Diabetic foot infection (HCC)     pt denies diabetes    Hernia     Hyperlipidemia     Hypertension     Movement disorder     Muscle cramps     Neuromuscular disorder (HCC)     marvin feet numbness/neuropathy    Neuropathy     Open wound of right lower extremity 02/17/2022    Pneumonia     Renal insufficiency     S/P transmetatarsal amputation of foot, right (HCC)     Sleep apnea     no cpap    Thyroid disease     Tinnitus aurium, bilateral     Unspecified cerebral artery occlusion with cerebral infarction     mini stroke    Unspecified diseases of blood and blood-forming organs        Past Surgical History:        Procedure Laterality Date    ABDOMEN SURGERY      APPENDECTOMY  10/9/2012    CARPAL TUNNEL RELEASE Left 12/11/14    CARPAL TUNNEL RELEASE Right 6/11/2019    RIGHT CARPAL TUNNEL RELEASE AND RIGHT MIDDLE FINGER TRIGGER FINGER RELEASE performed by Ramiro Delgado MD at [facility] OR    COLON SURGERY      for Colon Ca 10 inches removed from colon    COLONOSCOPY      COLONOSCOPY  3/13/14    COLONOSCOPY  03/02/2018    CYSTOSCOPY Right 5/3/2019    CYSTOSCOPY,

## 2025-02-05 RX ORDER — LEVETIRACETAM 500 MG/1
500 TABLET ORAL 2 TIMES DAILY
Qty: 180 TABLET | Refills: 1 | Status: SHIPPED | OUTPATIENT
Start: 2025-02-05

## 2025-02-20 ENCOUNTER — PROCEDURE VISIT (OUTPATIENT)
Dept: SURGERY | Age: 70
End: 2025-02-20

## 2025-02-20 VITALS — BODY MASS INDEX: 41.62 KG/M2 | DIASTOLIC BLOOD PRESSURE: 70 MMHG | SYSTOLIC BLOOD PRESSURE: 133 MMHG | WEIGHT: 315 LBS

## 2025-02-20 DIAGNOSIS — R22.9 SKIN MASS: Primary | ICD-10-CM

## 2025-02-20 RX ORDER — LIDOCAINE HYDROCHLORIDE AND EPINEPHRINE 10; 10 MG/ML; UG/ML
10 INJECTION, SOLUTION INFILTRATION; PERINEURAL ONCE
Status: COMPLETED | OUTPATIENT
Start: 2025-02-20 | End: 2025-02-20

## 2025-02-20 RX ADMIN — LIDOCAINE HYDROCHLORIDE AND EPINEPHRINE 10 ML: 10; 10 INJECTION, SOLUTION INFILTRATION; PERINEURAL at 10:32

## 2025-02-20 RX ADMIN — LIDOCAINE HYDROCHLORIDE AND EPINEPHRINE 10 ML: 10; 10 INJECTION, SOLUTION INFILTRATION; PERINEURAL at 10:33

## 2025-02-20 NOTE — PROGRESS NOTES
Procedure Note:  Right upper back area mass  Site confirmed, pt consents to excision  Has had increased size, tenderness locally, drainage      Chloraprep Prep  1% Lido with epi X 20 ml local  #15 blade excision done  Full thickness skin, SQ, and entire lesion removed; 8 cm excision  Layered intermediate closure: Closed with 3-0 Vicryl in superficial fascia and 3-0 Nylon for skin, 8 cm length  Pt tolerated well  Site hemostatic  PSO / DSD placed  Dressing care instructions reviewed with pt  Pathology sent    Graham Nova

## 2025-02-25 ENCOUNTER — TELEPHONE (OUTPATIENT)
Dept: SURGERY | Age: 70
End: 2025-02-25

## 2025-02-25 NOTE — TELEPHONE ENCOUNTER
I called pt and informed him of the results.      ----- Message from Dr. Graham Nova MD sent at 2/24/2025  4:21 PM EST -----  Please call pt with path.  Basal cell ca. No further treatment needed  CJ

## 2025-03-05 ENCOUNTER — OFFICE VISIT (OUTPATIENT)
Dept: CARDIOLOGY CLINIC | Age: 70
End: 2025-03-05
Payer: MEDICARE

## 2025-03-05 VITALS
OXYGEN SATURATION: 96 % | WEIGHT: 315 LBS | HEART RATE: 100 BPM | HEIGHT: 75 IN | SYSTOLIC BLOOD PRESSURE: 120 MMHG | DIASTOLIC BLOOD PRESSURE: 62 MMHG | BODY MASS INDEX: 39.17 KG/M2

## 2025-03-05 DIAGNOSIS — I10 ESSENTIAL HYPERTENSION: ICD-10-CM

## 2025-03-05 DIAGNOSIS — E78.2 MIXED HYPERLIPIDEMIA: ICD-10-CM

## 2025-03-05 DIAGNOSIS — I50.32 CHRONIC DIASTOLIC CONGESTIVE HEART FAILURE (HCC): Primary | ICD-10-CM

## 2025-03-05 DIAGNOSIS — I71.21 ANEURYSM OF ASCENDING AORTA WITHOUT RUPTURE: ICD-10-CM

## 2025-03-05 DIAGNOSIS — I48.20 CHRONIC ATRIAL FIBRILLATION (HCC): ICD-10-CM

## 2025-03-05 DIAGNOSIS — I50.32 CHRONIC DIASTOLIC CONGESTIVE HEART FAILURE (HCC): ICD-10-CM

## 2025-03-05 PROCEDURE — 99214 OFFICE O/P EST MOD 30 MIN: CPT | Performed by: NURSE PRACTITIONER

## 2025-03-05 PROCEDURE — 1159F MED LIST DOCD IN RCRD: CPT | Performed by: NURSE PRACTITIONER

## 2025-03-05 PROCEDURE — 3074F SYST BP LT 130 MM HG: CPT | Performed by: NURSE PRACTITIONER

## 2025-03-05 PROCEDURE — 1123F ACP DISCUSS/DSCN MKR DOCD: CPT | Performed by: NURSE PRACTITIONER

## 2025-03-05 PROCEDURE — 3078F DIAST BP <80 MM HG: CPT | Performed by: NURSE PRACTITIONER

## 2025-03-05 PROCEDURE — G2211 COMPLEX E/M VISIT ADD ON: HCPCS | Performed by: NURSE PRACTITIONER

## 2025-03-05 RX ORDER — METOPROLOL TARTRATE 50 MG
50 TABLET ORAL 2 TIMES DAILY
Qty: 180 TABLET | OUTPATIENT
Start: 2025-03-05

## 2025-03-05 RX ORDER — METOPROLOL TARTRATE 50 MG
50 TABLET ORAL 2 TIMES DAILY
Qty: 60 TABLET | Refills: 3 | Status: SHIPPED | OUTPATIENT
Start: 2025-03-05

## 2025-03-05 NOTE — PATIENT INSTRUCTIONS
Hold off on taking diltiazem : can cause leg swelling    Increase metoprolol to 50 mg twice a day    Keep appt with Dr. Wilkinson in April but call me next week ; let me know if your swelling is any better    Continue to keep your legs wrapped and elevated

## 2025-03-05 NOTE — PROGRESS NOTES
diet were discussed. Patient instructed to call the office with a weight gain: > 3 # over night or 5# in one week; swelling, SOB/orthopnea/PND    The patient verbalizes understanding not to stop medications without discussing with us.    Discussed exercise: 30-60 minutes 7 days/week  Discussed Low saturated fat/MARISEL diet. Uses No Salt substitute     Thank you for allowing to us to participate in the care of Seymour Lujan.    MANDY Wilde    Documentation of today's visit sent to PCP

## 2025-03-05 NOTE — TELEPHONE ENCOUNTER
Received refill request for metoprolol tartrate (LOPRESSOR) 50 MG tablet  from [facility] pharmacy.    Last OV: 12/12/2024 NPTS    Next OV: 4/17/2025 [facility]    Last Labs: 10/29/2024 EKG    Last Filled:

## 2025-03-06 ENCOUNTER — TELEPHONE (OUTPATIENT)
Dept: CARDIOLOGY CLINIC | Age: 70
End: 2025-03-06

## 2025-03-06 RX ORDER — DILTIAZEM HYDROCHLORIDE 180 MG/1
180 CAPSULE, EXTENDED RELEASE ORAL DAILY
Qty: 90 CAPSULE | Refills: 0
Start: 2025-03-06

## 2025-03-06 NOTE — TELEPHONE ENCOUNTER
Called pt to inform him that per NPTS she told him to stop diltiazem, shedidn't see that he was taking it bid, she wants him to take 180 mg only once a day instead of stopping complete, pt verbalized understanding.

## 2025-03-10 RX ORDER — GABAPENTIN 100 MG/1
200 CAPSULE ORAL 2 TIMES DAILY
Qty: 360 CAPSULE | Refills: 0 | Status: SHIPPED | OUTPATIENT
Start: 2025-03-10 | End: 2025-06-08

## 2025-03-10 NOTE — TELEPHONE ENCOUNTER
Last OV: 1/29/2025  Next OV: 4/29/2025    Next appointment due:4/29/2025     Last fill:12/2/24  Refills:0

## 2025-03-11 ENCOUNTER — TELEPHONE (OUTPATIENT)
Dept: INTERNAL MEDICINE CLINIC | Age: 70
End: 2025-03-11

## 2025-03-11 DIAGNOSIS — I10 ESSENTIAL HYPERTENSION: ICD-10-CM

## 2025-03-11 DIAGNOSIS — R60.0 LOCALIZED EDEMA: ICD-10-CM

## 2025-03-11 DIAGNOSIS — N18.32 STAGE 3B CHRONIC KIDNEY DISEASE (HCC): Primary | ICD-10-CM

## 2025-03-11 DIAGNOSIS — D50.8 IRON DEFICIENCY ANEMIA SECONDARY TO INADEQUATE DIETARY IRON INTAKE: ICD-10-CM

## 2025-03-11 NOTE — TELEPHONE ENCOUNTER
Pt stating he needs a new referral for nephrology since it's been over 3 years that he was seen. He gave the name of Dr. Phong Barnch. He says he sees him at the [facility] location.    Please advise and let pt know when that referral is placed.

## 2025-03-17 RX ORDER — GABAPENTIN 100 MG/1
200 CAPSULE ORAL 2 TIMES DAILY
Qty: 360 CAPSULE | Refills: 0 | OUTPATIENT
Start: 2025-03-17

## 2025-03-17 NOTE — TELEPHONE ENCOUNTER
Last OV: 1/29/2025  Next OV: 4/29/2025    Next appointment due:4/29/2025     Last fill:3/10/25  Refills:0

## 2025-03-26 PROBLEM — R80.0 ISOLATED PROTEINURIA WITHOUT SPECIFIC MORPHOLOGIC LESION: Status: ACTIVE | Noted: 2025-03-26

## 2025-03-26 PROBLEM — N18.32 CKD STAGE 3B, GFR 30-44 ML/MIN (HCC): Status: ACTIVE | Noted: 2025-03-26

## 2025-04-10 ENCOUNTER — OFFICE VISIT (OUTPATIENT)
Dept: INTERNAL MEDICINE CLINIC | Age: 70
End: 2025-04-10
Payer: MEDICARE

## 2025-04-10 VITALS
OXYGEN SATURATION: 96 % | WEIGHT: 315 LBS | HEART RATE: 102 BPM | BODY MASS INDEX: 38.36 KG/M2 | SYSTOLIC BLOOD PRESSURE: 136 MMHG | DIASTOLIC BLOOD PRESSURE: 82 MMHG | HEIGHT: 76 IN

## 2025-04-10 DIAGNOSIS — I10 ESSENTIAL HYPERTENSION: ICD-10-CM

## 2025-04-10 DIAGNOSIS — G40.909 SEIZURE DISORDER (HCC): ICD-10-CM

## 2025-04-10 DIAGNOSIS — M1A.09X0 IDIOPATHIC CHRONIC GOUT OF MULTIPLE SITES WITHOUT TOPHUS: ICD-10-CM

## 2025-04-10 DIAGNOSIS — N18.32 STAGE 3B CHRONIC KIDNEY DISEASE (HCC): ICD-10-CM

## 2025-04-10 DIAGNOSIS — E66.01 SEVERE OBESITY (BMI 35.0-39.9) WITH COMORBIDITY: ICD-10-CM

## 2025-04-10 DIAGNOSIS — R73.09 ELEVATED GLUCOSE: ICD-10-CM

## 2025-04-10 DIAGNOSIS — J43.8 OTHER EMPHYSEMA (HCC): ICD-10-CM

## 2025-04-10 DIAGNOSIS — F33.42 RECURRENT MAJOR DEPRESSIVE DISORDER, IN FULL REMISSION: ICD-10-CM

## 2025-04-10 DIAGNOSIS — E78.2 MIXED HYPERLIPIDEMIA: ICD-10-CM

## 2025-04-10 DIAGNOSIS — I50.32 CHRONIC DIASTOLIC CONGESTIVE HEART FAILURE (HCC): ICD-10-CM

## 2025-04-10 DIAGNOSIS — I48.20 CHRONIC ATRIAL FIBRILLATION (HCC): ICD-10-CM

## 2025-04-10 DIAGNOSIS — F41.9 ANXIETY: ICD-10-CM

## 2025-04-10 DIAGNOSIS — I50.32 CHRONIC HEART FAILURE WITH PRESERVED EJECTION FRACTION (HCC): ICD-10-CM

## 2025-04-10 DIAGNOSIS — C44.519 BASAL CELL CARCINOMA (BCC) OF BACK: ICD-10-CM

## 2025-04-10 DIAGNOSIS — Z00.00 MEDICARE ANNUAL WELLNESS VISIT, SUBSEQUENT: Primary | ICD-10-CM

## 2025-04-10 DIAGNOSIS — G62.9 NEUROPATHY: ICD-10-CM

## 2025-04-10 PROCEDURE — 1159F MED LIST DOCD IN RCRD: CPT | Performed by: NURSE PRACTITIONER

## 2025-04-10 PROCEDURE — 3079F DIAST BP 80-89 MM HG: CPT | Performed by: NURSE PRACTITIONER

## 2025-04-10 PROCEDURE — G0439 PPPS, SUBSEQ VISIT: HCPCS | Performed by: NURSE PRACTITIONER

## 2025-04-10 PROCEDURE — 1123F ACP DISCUSS/DSCN MKR DOCD: CPT | Performed by: NURSE PRACTITIONER

## 2025-04-10 PROCEDURE — 3075F SYST BP GE 130 - 139MM HG: CPT | Performed by: NURSE PRACTITIONER

## 2025-04-10 PROCEDURE — 1160F RVW MEDS BY RX/DR IN RCRD: CPT | Performed by: NURSE PRACTITIONER

## 2025-04-10 PROCEDURE — 99214 OFFICE O/P EST MOD 30 MIN: CPT | Performed by: NURSE PRACTITIONER

## 2025-04-10 RX ORDER — ATORVASTATIN CALCIUM 40 MG/1
40 TABLET, FILM COATED ORAL EVERY EVENING
Qty: 90 TABLET | Refills: 1 | Status: SHIPPED | OUTPATIENT
Start: 2025-04-10

## 2025-04-10 RX ORDER — ALLOPURINOL 300 MG/1
300 TABLET ORAL 2 TIMES DAILY
Qty: 180 TABLET | Refills: 1 | Status: SHIPPED | OUTPATIENT
Start: 2025-04-10

## 2025-04-10 ASSESSMENT — PATIENT HEALTH QUESTIONNAIRE - PHQ9
2. FEELING DOWN, DEPRESSED OR HOPELESS: NOT AT ALL
5. POOR APPETITE OR OVEREATING: NOT AT ALL
6. FEELING BAD ABOUT YOURSELF - OR THAT YOU ARE A FAILURE OR HAVE LET YOURSELF OR YOUR FAMILY DOWN: NOT AT ALL
SUM OF ALL RESPONSES TO PHQ QUESTIONS 1-9: 0
8. MOVING OR SPEAKING SO SLOWLY THAT OTHER PEOPLE COULD HAVE NOTICED. OR THE OPPOSITE, BEING SO FIGETY OR RESTLESS THAT YOU HAVE BEEN MOVING AROUND A LOT MORE THAN USUAL: NOT AT ALL
SUM OF ALL RESPONSES TO PHQ QUESTIONS 1-9: 0
3. TROUBLE FALLING OR STAYING ASLEEP: NOT AT ALL
9. THOUGHTS THAT YOU WOULD BE BETTER OFF DEAD, OR OF HURTING YOURSELF: NOT AT ALL
1. LITTLE INTEREST OR PLEASURE IN DOING THINGS: NOT AT ALL
SUM OF ALL RESPONSES TO PHQ QUESTIONS 1-9: 0
SUM OF ALL RESPONSES TO PHQ QUESTIONS 1-9: 0
4. FEELING TIRED OR HAVING LITTLE ENERGY: NOT AT ALL
7. TROUBLE CONCENTRATING ON THINGS, SUCH AS READING THE NEWSPAPER OR WATCHING TELEVISION: NOT AT ALL
10. IF YOU CHECKED OFF ANY PROBLEMS, HOW DIFFICULT HAVE THESE PROBLEMS MADE IT FOR YOU TO DO YOUR WORK, TAKE CARE OF THINGS AT HOME, OR GET ALONG WITH OTHER PEOPLE: NOT DIFFICULT AT ALL

## 2025-04-10 NOTE — PROGRESS NOTES
Medicare Annual Wellness Visit    Seymuor Lujan is here for Medicare AWV    Assessment & Plan   Assessment & Plan  Medicare annual wellness visit, subsequent   AWV today   HM reviewed   Most recent BW reviewed   Has labs ordered with nephrology to complete prior to next appt      - Prescriptions for sertraline, allopurinol, atorvastatin, and diclofenac gel will be refilled and sent to [facility]    Recurrent major depressive disorder, in full remission       Orders:    sertraline (ZOLOFT) 50 MG tablet; Take 1 tablet by mouth daily    Anxiety       Orders:    sertraline (ZOLOFT) 50 MG tablet; Take 1 tablet by mouth daily    Mixed hyperlipidemia       Orders:    atorvastatin (LIPITOR) 40 MG tablet; Take 1 tablet by mouth every evening    Lipid Panel; Future    Idiopathic chronic gout of multiple sites without tophus       Orders:    allopurinol (ZYLOPRIM) 300 MG tablet; Take 1 tablet by mouth 2 times daily    Elevated glucose       Orders:    Hemoglobin A1C; Future    Stage 3b chronic kidney disease (HCC)   Chronic, BW reviewed. Stable.   -Avoid nsaids, work on hydration.   -Work on dietary compliance.   - Significant weight loss of 14 pounds since last appt with Dr. Branch on 03/26/2025 (from 346 pounds to 332 pounds)  - Weight loss attributed to fluid reduction  - Blood work will be ordered (lipid and A1c) and sent to [facility] for completion at the same time as nephrology labs.            Severe obesity (BMI 35.0-39.9) with comorbidity   Chronic, uncontrolled.  Continue to work on healthy lifestyle modifications with diet and exercise         Chronic diastolic congestive heart failure (HCC)   Chronic, stable.   - Significant weight loss of 14 pounds since last appt with Dr. Branch on 03/26/2025 (from 346 pounds to 332 pounds)  - Weight loss attributed to fluid reduction  - Blood work will be ordered (lipid and A1c) and sent to [facility] for completion at the same time as nephrology labs.   - Currently taking

## 2025-04-10 NOTE — ASSESSMENT & PLAN NOTE
Orders:    atorvastatin (LIPITOR) 40 MG tablet; Take 1 tablet by mouth every evening    Lipid Panel; Future

## 2025-04-10 NOTE — PATIENT INSTRUCTIONS
narrow the blood vessels and reduce blood flow. A heart attack happens when blood flow is completely blocked. A high-fat diet, smoking, and other factors increase the risk of heart disease.  Your doctor has found that you have a chance of having heart disease. A heart-healthy lifestyle can help keep your heart healthy and prevent heart disease. This lifestyle includes eating healthy, being active, staying at a weight that's healthy for you, and not smoking or using tobacco. It also includes taking medicines as directed, managing other health conditions, and trying to get a healthy amount of sleep.  Follow-up care is a key part of your treatment and safety. Be sure to make and go to all appointments, and call your doctor if you are having problems. It's also a good idea to know your test results and keep a list of the medicines you take.  How can you care for yourself at home?  Diet    Use less salt when you cook and eat. This helps lower your blood pressure. Taste food before salting. Add only a little salt when you think you need it. With time, your taste buds will adjust to less salt.     Eat fewer snack items, fast foods, canned soups, and other high-salt, high-fat, processed foods.     Read food labels and try to avoid saturated and trans fats. They increase your risk of heart disease by raising cholesterol levels.     Limit the amount of solid fat--butter, margarine, and shortening--you eat. Use olive, peanut, or canola oil when you cook. Bake, broil, and steam foods instead of frying them.     Eat a variety of fruit and vegetables every day. Dark green, deep orange, red, or yellow fruits and vegetables are especially good for you. Examples include spinach, carrots, peaches, and berries.     Foods high in fiber can reduce your cholesterol and provide important vitamins and minerals. High-fiber foods include whole-grain cereals and breads, oatmeal, beans, brown rice, citrus fruits, and apples.     Eat lean

## 2025-04-14 ENCOUNTER — TELEPHONE (OUTPATIENT)
Dept: CARDIOLOGY CLINIC | Age: 70
End: 2025-04-14

## 2025-04-14 NOTE — TELEPHONE ENCOUNTER
Pt r/s his 4/17 appt. 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 Appt r/s for 8/5. He says he is doing 10x better now and is able to walk again. Available for a phone call if [facility] wants to discuss his progress.

## 2025-04-15 PROBLEM — C44.519 BASAL CELL CARCINOMA (BCC) OF BACK: Status: ACTIVE | Noted: 2025-04-15

## 2025-04-15 PROBLEM — G40.909 SEIZURE DISORDER (HCC): Status: ACTIVE | Noted: 2022-10-30

## 2025-04-15 NOTE — ASSESSMENT & PLAN NOTE
Chronic, stable.   - Significant weight loss of 14 pounds since last appt with Dr. Branch on 03/26/2025 (from 346 pounds to 332 pounds)  - Weight loss attributed to fluid reduction  - Blood work will be ordered (lipid and A1c) and sent to [facility] for completion at the same time as nephrology labs.   - Currently taking spironolactone, potassium, torsemide, sertraline, metoprolol, Keppra, gabapentin, Eliquis, diltiazem, allopurinol, and atorvastatin  Continue follow up with EP and cards.

## 2025-04-15 NOTE — ASSESSMENT & PLAN NOTE
Chronic, continue current medications. Continue follow up with EP and cards as scheduled.

## 2025-04-15 NOTE — ASSESSMENT & PLAN NOTE
- Reports improvement in neuropathy symptoms; currently taking gabapentin 1 pill twice a day  - Will reduce gabapentin dosage to 1 pill at night for a week and then discontinue  - If symptoms such as tingling or difficulty walking return, they will notify us to resume the medication

## 2025-04-15 NOTE — ASSESSMENT & PLAN NOTE
Chronic, uncontrolled.  Continue to work on healthy lifestyle modifications with diet and exercise

## 2025-04-15 NOTE — ASSESSMENT & PLAN NOTE
- Basal cell carcinoma removed from back in 02/2025 by Dr. Nova; area is healing  - Appointment with Dr. Breen will be scheduled if not improving.

## 2025-04-15 NOTE — ASSESSMENT & PLAN NOTE
Chronic, BW reviewed. Stable.   -Avoid nsaids, work on hydration.   -Work on dietary compliance.   - Significant weight loss of 14 pounds since last appt with Dr. Branch on 03/26/2025 (from 346 pounds to 332 pounds)  - Weight loss attributed to fluid reduction  - Blood work will be ordered (lipid and A1c) and sent to [facility] for completion at the same time as nephrology labs.

## 2025-04-15 NOTE — ASSESSMENT & PLAN NOTE
Chronic, stable. continue medications and follow up with EP / cards.  Continue working on low-sodium diet.

## 2025-04-15 NOTE — TELEPHONE ENCOUNTER
Requested Prescriptions     Pending Prescriptions Disp Refills    torsemide (DEMADEX) 100 MG tablet 180 tablet 0     Sig: Take 1 tablet by mouth in the morning and 1 tablet in the evening.      Last OV:  3/5/2025 NPTS    Next OV: 8/5/2025 [facility]    Last Labs: 1/20/2025 BMP    Last Filled: 1/20/2025 [facility]

## 2025-04-16 RX ORDER — TORSEMIDE 100 MG/1
100 TABLET ORAL 2 TIMES DAILY
Qty: 180 TABLET | Refills: 1 | Status: SHIPPED | OUTPATIENT
Start: 2025-04-16

## 2025-04-22 DIAGNOSIS — I50.32 CHRONIC DIASTOLIC CONGESTIVE HEART FAILURE (HCC): ICD-10-CM

## 2025-04-22 RX ORDER — FUROSEMIDE 40 MG/1
80 TABLET ORAL 2 TIMES DAILY
Qty: 360 TABLET | Refills: 1 | OUTPATIENT
Start: 2025-04-22

## 2025-04-24 ENCOUNTER — TELEMEDICINE (OUTPATIENT)
Dept: PULMONOLOGY | Age: 70
End: 2025-04-24
Payer: MEDICARE

## 2025-04-24 DIAGNOSIS — R06.83 SNORING: ICD-10-CM

## 2025-04-24 DIAGNOSIS — G47.10 HYPERSOMNIA: Primary | ICD-10-CM

## 2025-04-24 DIAGNOSIS — I10 ESSENTIAL HYPERTENSION: ICD-10-CM

## 2025-04-24 DIAGNOSIS — E66.01 MORBID OBESITY WITH BMI OF 40.0-44.9, ADULT (HCC): ICD-10-CM

## 2025-04-24 DIAGNOSIS — I48.20 CHRONIC ATRIAL FIBRILLATION (HCC): ICD-10-CM

## 2025-04-24 PROCEDURE — 99204 OFFICE O/P NEW MOD 45 MIN: CPT | Performed by: STUDENT IN AN ORGANIZED HEALTH CARE EDUCATION/TRAINING PROGRAM

## 2025-04-24 PROCEDURE — G2211 COMPLEX E/M VISIT ADD ON: HCPCS | Performed by: STUDENT IN AN ORGANIZED HEALTH CARE EDUCATION/TRAINING PROGRAM

## 2025-04-24 ASSESSMENT — SLEEP AND FATIGUE QUESTIONNAIRES
HOW LIKELY ARE YOU TO NOD OFF OR FALL ASLEEP WHILE SITTING AND TALKING TO SOMEONE: WOULD NEVER DOZE
HOW LIKELY ARE YOU TO NOD OFF OR FALL ASLEEP WHILE SITTING AND READING: MODERATE CHANCE OF DOZING
HOW LIKELY ARE YOU TO NOD OFF OR FALL ASLEEP WHILE LYING DOWN TO REST IN THE AFTERNOON WHEN CIRCUMSTANCES PERMIT: SLIGHT CHANCE OF DOZING
HOW LIKELY ARE YOU TO NOD OFF OR FALL ASLEEP WHILE SITTING INACTIVE IN A PUBLIC PLACE: WOULD NEVER DOZE
HOW LIKELY ARE YOU TO NOD OFF OR FALL ASLEEP WHILE SITTING QUIETLY AFTER LUNCH WITHOUT ALCOHOL: WOULD NEVER DOZE
HOW LIKELY ARE YOU TO NOD OFF OR FALL ASLEEP IN A CAR, WHILE STOPPED FOR A FEW MINUTES IN TRAFFIC: WOULD NEVER DOZE
HOW LIKELY ARE YOU TO NOD OFF OR FALL ASLEEP WHEN YOU ARE A PASSENGER IN A CAR FOR AN HOUR WITHOUT A BREAK: WOULD NEVER DOZE
ESS TOTAL SCORE: 5
HOW LIKELY ARE YOU TO NOD OFF OR FALL ASLEEP WHILE WATCHING TV: MODERATE CHANCE OF DOZING

## 2025-04-24 NOTE — PROGRESS NOTES
[facility]  Sleep Medicine  [facility] [facility], [address]  [place] [address]    Chief Complaint   Patient presents with    New Patient     [place], [place]       Seymour Lujan is a 70 y.o. male who comes in for sleep evaluation.  His complaints include daytime fatigue, insomnia, disrupted sleep/frequent nocturnal awakenings, and irregular sleep schedule. Patient was referred here by Cardiology (a-fib, CHF) for evaluation and management of OLEG.    Pertinent PMHx includes: a-fib, CHF, COPD, chronic gout, severe obesity, anxiety, seizure disorder, severe obesity.    He goes to bed at variable times. He falls asleep in variable durations of time (while watching TV) .  He awakens a couple of times per night (usually to use the bathroom). He awakens at 6am. The average total amount of sleep is about 3-4  hours per night (naps a lot during the day). He does not use sleep aid/s. He does take daytime naps (throughout the day). He describes the symptoms as fatigue, snoring, witnessed apneas, non refreshed sleep , and irregular sleep schedule.  He has not dozed off while driving. He denies recent significant weight gain. Previous evaluation and treatment has included: PSG (couldn't sleep at the hospital).    Alcohol intake/day/week: none  Occupation: retired       DATA REVIEWED TODAY:  [place] & Insomnia Severity Index scores      4/24/2025     8:19 AM   Sleep Medicine   Sitting and reading 2   Watching TV 2   Sitting, inactive in a public place (e.g. a theatre or a meeting) 0   As a passenger in a car for an hour without a break 0   Lying down to rest in the afternoon when circumstances permit 1   Sitting and talking to someone 0   Sitting quietly after a lunch without alcohol 0   In a car, while stopped for a few minutes in traffic 0   [place] Sleepiness Score 5         4/24/2025     8:00 AM   Insomnia Severity Index (COV)   Difficulty falling asleep 3   Difficulty staying asleep 0   Problems waking up too

## 2025-04-26 DIAGNOSIS — J43.9 PULMONARY EMPHYSEMA, UNSPECIFIED EMPHYSEMA TYPE (HCC): ICD-10-CM

## 2025-04-28 RX ORDER — BUDESONIDE, GLYCOPYRROLATE, AND FORMOTEROL FUMARATE 160; 9; 4.8 UG/1; UG/1; UG/1
AEROSOL, METERED RESPIRATORY (INHALATION)
Qty: 10.7 G | Refills: 2 | Status: SHIPPED | OUTPATIENT
Start: 2025-04-28

## 2025-04-28 NOTE — TELEPHONE ENCOUNTER
Last OV: 4/10/2025  Next OV: 8/11/2025    Next appointment due:8/10/2025     Last fill:1/29/25  Refills:3

## 2025-04-29 LAB
ESTIMATED AVERAGE GLUCOSE: 123
HBA1C MFR BLD: 5.9 %

## 2025-05-06 DIAGNOSIS — R56.9 NEW ONSET SEIZURE (HCC): ICD-10-CM

## 2025-05-06 RX ORDER — LEVETIRACETAM 500 MG/1
500 TABLET ORAL 2 TIMES DAILY
Qty: 180 TABLET | Refills: 1 | Status: SHIPPED | OUTPATIENT
Start: 2025-05-06

## 2025-05-06 NOTE — TELEPHONE ENCOUNTER
Received refill request for POTASSIUM CL 20MEQ ER TABLETS  from [facility] pharmacy.     Last OV: 3/5/25    Next OV: 8/5/25    Last Labs: n/a    Last Filled: 12/12/24

## 2025-05-08 RX ORDER — POTASSIUM CHLORIDE 1500 MG/1
20 TABLET, EXTENDED RELEASE ORAL DAILY
Qty: 30 TABLET | Refills: 5 | OUTPATIENT
Start: 2025-05-08

## 2025-05-11 DIAGNOSIS — I48.20 CHRONIC ATRIAL FIBRILLATION (HCC): ICD-10-CM

## 2025-05-12 RX ORDER — APIXABAN 5 MG/1
5 TABLET, FILM COATED ORAL 2 TIMES DAILY
Qty: 180 TABLET | Refills: 3 | Status: SHIPPED | OUTPATIENT
Start: 2025-05-12

## 2025-05-12 NOTE — TELEPHONE ENCOUNTER
Last OV: 4/10/2025  Next OV: 8/11/2025    Next appointment due: 8/10/2025    Last fill: 12/30/2024  Refills: 3

## 2025-06-07 DIAGNOSIS — I10 ESSENTIAL HYPERTENSION: ICD-10-CM

## 2025-06-07 DIAGNOSIS — I50.32 CHRONIC DIASTOLIC CONGESTIVE HEART FAILURE (HCC): ICD-10-CM

## 2025-06-09 NOTE — TELEPHONE ENCOUNTER
Received refill request for metoprolol tartrate (LOPRESSOR) 50 MG tablet  from [facility] pharmacy.     Last OV: 3/5/25    Next OV: 8/5/25    Last Labs:     Last Filled: 3/5/25

## 2025-06-10 RX ORDER — METOPROLOL TARTRATE 50 MG
50 TABLET ORAL 2 TIMES DAILY
Qty: 60 TABLET | Refills: 3 | Status: SHIPPED | OUTPATIENT
Start: 2025-06-10

## 2025-06-11 ENCOUNTER — TELEPHONE (OUTPATIENT)
Dept: INTERNAL MEDICINE CLINIC | Age: 70
End: 2025-06-11

## 2025-06-11 NOTE — TELEPHONE ENCOUNTER
Spoke to patient and asked if he has followed up with his surgeon and he said no. I offered appointment for tomorrow but he declined. He is scheduled for 6/16.

## 2025-06-11 NOTE — TELEPHONE ENCOUNTER
Kely from [facility] is calling stating the pt had skin cancer removed a while ago in between his shoulder blades and while she was with him today she seen the spot was irritated . There is redness and tender but no fever or any other symptoms. She states it gets irritated on and off but the tenderness is new . Please advise

## 2025-06-16 ENCOUNTER — OFFICE VISIT (OUTPATIENT)
Dept: INTERNAL MEDICINE CLINIC | Age: 70
End: 2025-06-16
Payer: MEDICARE

## 2025-06-16 VITALS
SYSTOLIC BLOOD PRESSURE: 112 MMHG | OXYGEN SATURATION: 96 % | DIASTOLIC BLOOD PRESSURE: 74 MMHG | BODY MASS INDEX: 38.36 KG/M2 | HEART RATE: 79 BPM | WEIGHT: 315 LBS | HEIGHT: 76 IN

## 2025-06-16 DIAGNOSIS — E66.01 SEVERE OBESITY (BMI 35.0-39.9) WITH COMORBIDITY (HCC): ICD-10-CM

## 2025-06-16 DIAGNOSIS — L03.116 CELLULITIS OF LEFT LOWER EXTREMITY: ICD-10-CM

## 2025-06-16 DIAGNOSIS — N18.32 STAGE 3B CHRONIC KIDNEY DISEASE (HCC): ICD-10-CM

## 2025-06-16 DIAGNOSIS — G40.909 SEIZURE DISORDER (HCC): ICD-10-CM

## 2025-06-16 DIAGNOSIS — L03.115 CELLULITIS OF RIGHT LOWER EXTREMITY: ICD-10-CM

## 2025-06-16 DIAGNOSIS — E78.2 MIXED HYPERLIPIDEMIA: ICD-10-CM

## 2025-06-16 DIAGNOSIS — R93.0 ABNORMAL MRI OF HEAD: ICD-10-CM

## 2025-06-16 DIAGNOSIS — Z86.73 HISTORY OF STROKE: ICD-10-CM

## 2025-06-16 DIAGNOSIS — I50.32 CHRONIC DIASTOLIC CONGESTIVE HEART FAILURE (HCC): ICD-10-CM

## 2025-06-16 DIAGNOSIS — Z71.1 CONCERN ABOUT MEMORY: ICD-10-CM

## 2025-06-16 DIAGNOSIS — I48.20 CHRONIC ATRIAL FIBRILLATION (HCC): ICD-10-CM

## 2025-06-16 DIAGNOSIS — D18.02 HEMANGIOMA OF INTRACRANIAL STRUCTURE (HCC): ICD-10-CM

## 2025-06-16 DIAGNOSIS — I50.32 CHRONIC HEART FAILURE WITH PRESERVED EJECTION FRACTION (HCC): ICD-10-CM

## 2025-06-16 DIAGNOSIS — C44.519 BASAL CELL CARCINOMA (BCC) OF BACK: ICD-10-CM

## 2025-06-16 DIAGNOSIS — G47.33 OBSTRUCTIVE SLEEP APNEA SYNDROME: ICD-10-CM

## 2025-06-16 DIAGNOSIS — J43.8 OTHER EMPHYSEMA (HCC): ICD-10-CM

## 2025-06-16 DIAGNOSIS — H93.13 TINNITUS OF BOTH EARS: ICD-10-CM

## 2025-06-16 DIAGNOSIS — L98.9 SKIN LESION OF BACK: Primary | ICD-10-CM

## 2025-06-16 DIAGNOSIS — I10 ESSENTIAL HYPERTENSION: ICD-10-CM

## 2025-06-16 PROCEDURE — G2211 COMPLEX E/M VISIT ADD ON: HCPCS | Performed by: NURSE PRACTITIONER

## 2025-06-16 PROCEDURE — 99214 OFFICE O/P EST MOD 30 MIN: CPT | Performed by: NURSE PRACTITIONER

## 2025-06-16 PROCEDURE — 1159F MED LIST DOCD IN RCRD: CPT | Performed by: NURSE PRACTITIONER

## 2025-06-16 PROCEDURE — 1123F ACP DISCUSS/DSCN MKR DOCD: CPT | Performed by: NURSE PRACTITIONER

## 2025-06-16 PROCEDURE — 3078F DIAST BP <80 MM HG: CPT | Performed by: NURSE PRACTITIONER

## 2025-06-16 PROCEDURE — 3074F SYST BP LT 130 MM HG: CPT | Performed by: NURSE PRACTITIONER

## 2025-06-16 RX ORDER — LORAZEPAM 1 MG/1
TABLET ORAL
Qty: 1 TABLET | Refills: 0 | Status: SHIPPED | OUTPATIENT
Start: 2025-06-16 | End: 2025-07-16

## 2025-06-16 RX ORDER — GABAPENTIN 100 MG/1
200 CAPSULE ORAL 2 TIMES DAILY
Qty: 360 CAPSULE | Refills: 1 | Status: SHIPPED | OUTPATIENT
Start: 2025-06-16 | End: 2025-12-13

## 2025-06-16 RX ORDER — DOXYCYCLINE HYCLATE 100 MG
100 TABLET ORAL 2 TIMES DAILY
Qty: 14 TABLET | Refills: 0 | Status: SHIPPED | OUTPATIENT
Start: 2025-06-16 | End: 2025-06-23

## 2025-06-16 ASSESSMENT — PATIENT HEALTH QUESTIONNAIRE - PHQ9
7. TROUBLE CONCENTRATING ON THINGS, SUCH AS READING THE NEWSPAPER OR WATCHING TELEVISION: NOT AT ALL
6. FEELING BAD ABOUT YOURSELF - OR THAT YOU ARE A FAILURE OR HAVE LET YOURSELF OR YOUR FAMILY DOWN: NOT AT ALL
8. MOVING OR SPEAKING SO SLOWLY THAT OTHER PEOPLE COULD HAVE NOTICED. OR THE OPPOSITE, BEING SO FIGETY OR RESTLESS THAT YOU HAVE BEEN MOVING AROUND A LOT MORE THAN USUAL: NOT AT ALL
1. LITTLE INTEREST OR PLEASURE IN DOING THINGS: NOT AT ALL
SUM OF ALL RESPONSES TO PHQ QUESTIONS 1-9: 0
5. POOR APPETITE OR OVEREATING: NOT AT ALL
SUM OF ALL RESPONSES TO PHQ QUESTIONS 1-9: 0
SUM OF ALL RESPONSES TO PHQ QUESTIONS 1-9: 0
3. TROUBLE FALLING OR STAYING ASLEEP: NOT AT ALL
9. THOUGHTS THAT YOU WOULD BE BETTER OFF DEAD, OR OF HURTING YOURSELF: NOT AT ALL
4. FEELING TIRED OR HAVING LITTLE ENERGY: NOT AT ALL
2. FEELING DOWN, DEPRESSED OR HOPELESS: NOT AT ALL
SUM OF ALL RESPONSES TO PHQ QUESTIONS 1-9: 0
10. IF YOU CHECKED OFF ANY PROBLEMS, HOW DIFFICULT HAVE THESE PROBLEMS MADE IT FOR YOU TO DO YOUR WORK, TAKE CARE OF THINGS AT HOME, OR GET ALONG WITH OTHER PEOPLE: NOT DIFFICULT AT ALL

## 2025-06-16 NOTE — ASSESSMENT & PLAN NOTE
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    - Follow-up MRI was ordered, but never completed; 7/2023 he had a CT orbits complete. Overdue for repeat MRI.   - Repeat MRI ordered today.    - Lorazepam ordered for MRI due to claustrophobia, reports his sone will take him to appt.     Orders:    MRI BRAIN W WO CONTRAST; Future    LORazepam (ATIVAN) 1 MG tablet; Take 1 tablet 30 minutes prior to MRI    MRI ORBITS FACE NECK W WO CONTRAST; Future

## 2025-06-16 NOTE — ASSESSMENT & PLAN NOTE
Chronic, BW reviewed. Stable.   -Avoid nsaids, work on hydration.   -Work on dietary compliance.   - Weight stable   -Reviewed recent labs and nephrology notes

## 2025-06-16 NOTE — ASSESSMENT & PLAN NOTE
Chronic, controlled. Stable.   Continue current medications as prescribed and follow up with EP and cards      Current medications:   Metoprolol 50mg BID  Torsemide 100mg BID  Spironolactone 25 mg daily   Diltiazem 180mg daily   Eliquis 5mg BID   Potasium chloride 20mEq daily

## 2025-06-16 NOTE — ASSESSMENT & PLAN NOTE
Chronic, stable.   Recommend daily weight checks and monitoring   Continue current medications as prescribed by EP and cardiology.     Current medications:   Metoprolol 50mg BID  Torsemide 100mg BID  Spironolactone 25 mg daily   Diltiazem 180mg daily   Eliquis 5mg BID   Potasium chloride 20mEq daily

## 2025-06-16 NOTE — ASSESSMENT & PLAN NOTE
Basal cell carcinoma removed from back in 02/2025 by Dr. Nova; area is healing and has improved since office visit on 4/15/25.  Recommended follow-up with Dr. Nova.

## 2025-06-16 NOTE — PROGRESS NOTES
6/16/25     Chief Complaint   Patient presents with    Skin Problem     Irritation on back     Tinnitus       HPI    Here for irritation on back   Has had irritation since having BCC removed from back on 2/20/25  Has not followed up with surgeon regarding this  Home health RN has been monitoring, was using neosporin for a while without improvement  Has intermittent pain with pressure and touch  Denies warmth or drainage    Chronic bilateral tinnitus, constant, has been ongoing for the last 15 years that has become worse- was using lipo flavonoid OTC, with mild relief, but it is expensive so he stopped taking it.      More forgetful over the last year, has trouble remembering things from one week ago. Knows he takes his medication in the morning, but doesn't always remember doing it. Not getting worse. 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 He denies any headaches, vision changes, dizziness or other concerns today.     Allergies   Allergen Reactions    Bactrim      hallucinations    Ciprofloxacin Other (See Comments)     Contraindicated with aortic aneurysm    Clindamycin/Lincomycin Diarrhea       Current Outpatient Medications   Medication Sig Dispense Refill    doxycycline hyclate (VIBRA-TABS) 100 MG tablet Take 1 tablet by mouth 2 times daily for 7 days 14 tablet 0    gabapentin (NEURONTIN) 100 MG capsule Take 2 capsules by mouth 2 times daily for 180 days. 360 capsule 1    LORazepam (ATIVAN) 1 MG tablet Take 1 tablet 30 minutes prior to MRI 1 tablet 0    metoprolol tartrate (LOPRESSOR) 50 MG tablet TAKE 1 TABLET BY MOUTH TWICE DAILY 60 tablet 3    ELIQUIS 5 MG TABS tablet TAKE 1 TABLET BY MOUTH TWICE DAILY 180 tablet 3    levETIRAcetam (KEPPRA) 500 MG tablet TAKE 1 TABLET BY MOUTH TWICE DAILY 180 tablet 1    BREZTRI AEROSPHERE 160-9-4.8 MCG/ACT AERO INHALE 2 PUFFS BY MOUTH INTO THE LUNGS IN THE MORNING AND AT BEDTIME 10.7 g 2    torsemide (DEMADEX) 100 MG

## 2025-06-16 NOTE — ASSESSMENT & PLAN NOTE
Chronic, continue current medications. Continue follow up with EP and cards as scheduled.      Current medications:   Metoprolol 50mg BID  Torsemide 100mg BID  Spironolactone 25 mg daily   Diltiazem 180mg daily   Eliquis 5mg BID

## 2025-06-16 NOTE — ASSESSMENT & PLAN NOTE
History of TIA in 2022  MRI ordered today  Lorazepam ordered to take 30 minutes prior to MRI due to claustrophobia.     Orders:    MRI BRAIN W WO CONTRAST; Future    LORazepam (ATIVAN) 1 MG tablet; Take 1 tablet 30 minutes prior to MRI

## 2025-06-16 NOTE — ASSESSMENT & PLAN NOTE
Chronic, uncontrolled.    Continue to work on healthy lifestyle modifications with diet and exercise.

## 2025-06-16 NOTE — ASSESSMENT & PLAN NOTE
Chronic, stable.   Continue medications and follow up with EP / cards.    Continue working on low-sodium diet.    Current medications:   Metoprolol 50mg BID  Torsemide 100mg BID  Spironolactone 25 mg daily   Diltiazem 180mg daily   Eliquis 5mg BID   Potasium chloride 20mEq daily

## 2025-06-16 NOTE — ASSESSMENT & PLAN NOTE
Chronic, uncontrolled, keep appt with pulmonology/ sleep medicine.  Sleep study scheduled in August.

## 2025-06-16 NOTE — ASSESSMENT & PLAN NOTE
Chronic, stable.  Continue recommended inhaled therapy; breztri 2 puffs BID and albuterol prn.

## 2025-06-20 ENCOUNTER — TELEPHONE (OUTPATIENT)
Dept: INTERNAL MEDICINE CLINIC | Age: 70
End: 2025-06-20

## 2025-06-20 NOTE — TELEPHONE ENCOUNTER
Patient calling in and request MRI be changed to Open MRI at [facility] and also would like to be prescribed diazePam stated that worked for him when he had MRI done a couple of years ago. Patient states that he is too Claustrophobic to do a closed MRI.Patient aware that provider is out of the office today. Patient would like a call back once provider responds back.

## 2025-07-09 PROBLEM — N30.00 ACUTE CYSTITIS WITHOUT HEMATURIA: Status: ACTIVE | Noted: 2025-07-09

## 2025-07-23 ENCOUNTER — TELEPHONE (OUTPATIENT)
Dept: INTERNAL MEDICINE CLINIC | Age: 70
End: 2025-07-23

## 2025-07-23 NOTE — TELEPHONE ENCOUNTER
[facility] requesting visit note re orders for MRI so they can get that process through billing.    Visit note 6/16/25 faxed to [phone], office [phone].

## 2025-07-31 ENCOUNTER — TELEPHONE (OUTPATIENT)
Dept: INTERNAL MEDICINE CLINIC | Age: 70
End: 2025-07-31

## 2025-08-20 ENCOUNTER — TELEPHONE (OUTPATIENT)
Dept: INTERNAL MEDICINE CLINIC | Age: 70
End: 2025-08-20

## 2025-08-20 DIAGNOSIS — L03.116 CELLULITIS OF LEFT LOWER EXTREMITY: ICD-10-CM

## 2025-08-20 DIAGNOSIS — L03.115 CELLULITIS OF RIGHT LOWER EXTREMITY: ICD-10-CM

## 2025-08-20 RX ORDER — DOXYCYCLINE HYCLATE 100 MG
100 TABLET ORAL 2 TIMES DAILY
Qty: 14 TABLET | Refills: 0 | Status: SHIPPED | OUTPATIENT
Start: 2025-08-20 | End: 2025-08-27

## 2025-09-02 ENCOUNTER — OFFICE VISIT (OUTPATIENT)
Dept: CARDIOLOGY CLINIC | Age: 70
End: 2025-09-02
Payer: MEDICARE

## 2025-09-02 VITALS
WEIGHT: 306.4 LBS | BODY MASS INDEX: 37.31 KG/M2 | OXYGEN SATURATION: 96 % | HEIGHT: 76 IN | SYSTOLIC BLOOD PRESSURE: 110 MMHG | DIASTOLIC BLOOD PRESSURE: 68 MMHG | HEART RATE: 78 BPM

## 2025-09-02 DIAGNOSIS — E78.2 MIXED HYPERLIPIDEMIA: ICD-10-CM

## 2025-09-02 DIAGNOSIS — I50.32 CHRONIC DIASTOLIC CONGESTIVE HEART FAILURE (HCC): Primary | ICD-10-CM

## 2025-09-02 DIAGNOSIS — I10 ESSENTIAL HYPERTENSION: ICD-10-CM

## 2025-09-02 DIAGNOSIS — I71.21 ANEURYSM OF ASCENDING AORTA WITHOUT RUPTURE: ICD-10-CM

## 2025-09-02 DIAGNOSIS — I48.20 CHRONIC ATRIAL FIBRILLATION (HCC): ICD-10-CM

## 2025-09-02 PROCEDURE — 1159F MED LIST DOCD IN RCRD: CPT | Performed by: NURSE PRACTITIONER

## 2025-09-02 PROCEDURE — 1123F ACP DISCUSS/DSCN MKR DOCD: CPT | Performed by: NURSE PRACTITIONER

## 2025-09-02 PROCEDURE — 3074F SYST BP LT 130 MM HG: CPT | Performed by: NURSE PRACTITIONER

## 2025-09-02 PROCEDURE — 3078F DIAST BP <80 MM HG: CPT | Performed by: NURSE PRACTITIONER

## 2025-09-02 PROCEDURE — 1160F RVW MEDS BY RX/DR IN RCRD: CPT | Performed by: NURSE PRACTITIONER

## 2025-09-02 PROCEDURE — 99214 OFFICE O/P EST MOD 30 MIN: CPT | Performed by: NURSE PRACTITIONER

## (undated) DEVICE — NEEDLE HYPO 25GA L1.5IN BVL ORIENTED ECLIPSE

## (undated) DEVICE — CYSTO PACK: Brand: MEDLINE INDUSTRIES, INC.

## (undated) DEVICE — Z DUP USE 2522782 SOLUTION IRRIG 1000ML STRL H2O PLAS CONTAINER UROMATIC

## (undated) DEVICE — Device: Brand: MEDEX

## (undated) DEVICE — Z DISCONTINUED USE 2275676 GLOVE SURG SZ 65 L12IN FNGR THK87MIL DK GRN LTX FREE ISOLEX

## (undated) DEVICE — GOWN SIRUS NONREIN XL W/TWL: Brand: MEDLINE INDUSTRIES, INC.

## (undated) DEVICE — SHEET,DRAPE,53X77,STERILE: Brand: MEDLINE

## (undated) DEVICE — DRESSING PETRO W3XL3IN OIL EMUL N ADH GZ KNIT IMPREG CELOS

## (undated) DEVICE — COVER LT HNDL BLU PLAS

## (undated) DEVICE — TRAY PREP DRY W/ PREM GLV 2 APPL 6 SPNG 2 UNDPD 1 OVERWRAP

## (undated) DEVICE — BAG DRNGE L6FT 20L PREFIL ABSRB POLYMER EXP TBNG DISP FOR

## (undated) DEVICE — SYRINGE MED 10ML SLIP TIP BLNT FILL AND LUERLOCK DISP

## (undated) DEVICE — MINOR SET UP PK

## (undated) DEVICE — SOLUTION IV IRRIG 250ML ST LF 0.9% SODIUM 2F7122

## (undated) DEVICE — 35 ML SYRINGE LUER-LOCK TIP: Brand: MONOJECT

## (undated) DEVICE — URETERAL DILATOR

## (undated) DEVICE — GOWN,SIRUS,POLYRNF,BRTHSLV,XL,30/CS: Brand: MEDLINE

## (undated) DEVICE — NEEDLE HYPO 18GA L1.5IN THN WALL PIVOTING SHLD BVL ORIENTED

## (undated) DEVICE — GLOVE ORANGE PI 7   MSG9070

## (undated) DEVICE — SOLUTION IV IRRIG WATER 1000ML POUR BRL 2F7114

## (undated) DEVICE — BANDAGE COMPR W4INXL12FT E DISP ESMARCH EVEN

## (undated) DEVICE — CHLORAPREP 26ML ORANGE

## (undated) DEVICE — NITINOL STONE RETRIEVAL BASKET: Brand: ZERO TIP

## (undated) DEVICE — SUTURE CHROMIC GUT SZ 4-0 L27IN ABSRB BRN FS-2 L19MM 3/8 635H

## (undated) DEVICE — GUIDEWIRE ENDOSCP L150CM DIA0.035IN TIP 3CM PTFE NIT

## (undated) DEVICE — GLOVE SURG SZ 65 L12IN FNGR THK87MIL WHT LTX FREE

## (undated) DEVICE — SYRINGE MED 10ML LUERLOCK TIP W/O SFTY DISP

## (undated) DEVICE — BANDAGE COMPR W2INXL5YD TAN BRTH SELF ADH WRP W/ HND TEAR

## (undated) DEVICE — ZIMMER® STERILE DISPOSABLE TOURNIQUET CUFF WITH PLC, DUAL PORT, SINGLE BLADDER, 18 IN. (46 CM)

## (undated) DEVICE — DRAPE HND W114XL142IN BLU POLYPR W O PCH FEN CRD AND TB HLDR

## (undated) DEVICE — UNDERGLOVE SURG SZ 8 FNGR THK0.21MIL GRN LTX BEAD CUF

## (undated) DEVICE — CYSTO/BLADDER IRRIGATION SET, REGULATING CLAMP

## (undated) DEVICE — SPONGE GZ W4XL4IN COT 12 PLY TYP VII WVN C FLD DSGN

## (undated) DEVICE — GLOVE SURG SZ 8 L12IN FNGR THK94MIL STD WHT LTX SYN POLYMER